# Patient Record
Sex: MALE | Race: WHITE | NOT HISPANIC OR LATINO | Employment: OTHER | URBAN - METROPOLITAN AREA
[De-identification: names, ages, dates, MRNs, and addresses within clinical notes are randomized per-mention and may not be internally consistent; named-entity substitution may affect disease eponyms.]

---

## 2017-06-27 PROBLEM — Q25.1 AORTA COARCTATION: Status: ACTIVE | Noted: 2017-06-27

## 2017-06-27 PROBLEM — I10 HYPERTENSION: Status: ACTIVE | Noted: 2017-06-27

## 2023-01-25 ENCOUNTER — TELEPHONE (OUTPATIENT)
Dept: NEUROLOGY | Facility: CLINIC | Age: 33
End: 2023-01-25
Payer: MEDICAID

## 2023-01-25 NOTE — TELEPHONE ENCOUNTER
Contacted patient and scheduled appt with ENMA Ruth on 2/1/23 for unruptured cerebral aneurysm. Pt does not use [facility] but appt letter will be sent. Pt provided instructions to get to [address] [place] [address]. Pt v/u.

## 2023-01-25 NOTE — TELEPHONE ENCOUNTER
----- Message from Pretty Heck sent at 1/25/2023  8:30 AM CST -----  Juana samson/ Dr. Berry Chase office calling regarding Appointment Access for the PT from a referral to see Dr Michael Reyes for Cerebral aneurysm, nonruptured, call back to the office with appt info [phone] ask for Juana

## 2023-02-01 ENCOUNTER — PATIENT MESSAGE (OUTPATIENT)
Dept: NEUROLOGY | Facility: CLINIC | Age: 33
End: 2023-02-01

## 2023-02-01 ENCOUNTER — OFFICE VISIT (OUTPATIENT)
Dept: NEUROLOGY | Facility: CLINIC | Age: 33
End: 2023-02-01
Payer: MEDICAID

## 2023-02-01 VITALS
WEIGHT: 193.25 LBS | BODY MASS INDEX: 29.29 KG/M2 | SYSTOLIC BLOOD PRESSURE: 169 MMHG | HEIGHT: 68 IN | DIASTOLIC BLOOD PRESSURE: 82 MMHG

## 2023-02-01 DIAGNOSIS — I62.9 INTRACRANIAL BLEED: ICD-10-CM

## 2023-02-01 DIAGNOSIS — I67.1 CEREBRAL ANEURYSM, NONRUPTURED: Primary | ICD-10-CM

## 2023-02-01 DIAGNOSIS — I10 PRIMARY HYPERTENSION: ICD-10-CM

## 2023-02-01 DIAGNOSIS — F10.10 ALCOHOL ABUSE: ICD-10-CM

## 2023-02-01 DIAGNOSIS — Z72.89 ENGAGES IN VAPING: ICD-10-CM

## 2023-02-01 PROCEDURE — 99999 PR PBB SHADOW E&M-EST. PATIENT-LVL IV: CPT | Mod: PBBFAC,,, | Performed by: NURSE PRACTITIONER

## 2023-02-01 PROCEDURE — 1159F PR MEDICATION LIST DOCUMENTED IN MEDICAL RECORD: ICD-10-PCS | Mod: CPTII,,, | Performed by: NURSE PRACTITIONER

## 2023-02-01 PROCEDURE — 1159F MED LIST DOCD IN RCRD: CPT | Mod: CPTII,,, | Performed by: NURSE PRACTITIONER

## 2023-02-01 PROCEDURE — 3008F PR BODY MASS INDEX (BMI) DOCUMENTED: ICD-10-PCS | Mod: CPTII,,, | Performed by: NURSE PRACTITIONER

## 2023-02-01 PROCEDURE — 3079F DIAST BP 80-89 MM HG: CPT | Mod: CPTII,,, | Performed by: NURSE PRACTITIONER

## 2023-02-01 PROCEDURE — 3079F PR MOST RECENT DIASTOLIC BLOOD PRESSURE 80-89 MM HG: ICD-10-PCS | Mod: CPTII,,, | Performed by: NURSE PRACTITIONER

## 2023-02-01 PROCEDURE — 3077F PR MOST RECENT SYSTOLIC BLOOD PRESSURE >= 140 MM HG: ICD-10-PCS | Mod: CPTII,,, | Performed by: NURSE PRACTITIONER

## 2023-02-01 PROCEDURE — 1160F RVW MEDS BY RX/DR IN RCRD: CPT | Mod: CPTII,,, | Performed by: NURSE PRACTITIONER

## 2023-02-01 PROCEDURE — 99999 PR PBB SHADOW E&M-EST. PATIENT-LVL IV: ICD-10-PCS | Mod: PBBFAC,,, | Performed by: NURSE PRACTITIONER

## 2023-02-01 PROCEDURE — 99205 OFFICE O/P NEW HI 60 MIN: CPT | Mod: S$PBB,,, | Performed by: NURSE PRACTITIONER

## 2023-02-01 PROCEDURE — 3077F SYST BP >= 140 MM HG: CPT | Mod: CPTII,,, | Performed by: NURSE PRACTITIONER

## 2023-02-01 PROCEDURE — 99214 OFFICE O/P EST MOD 30 MIN: CPT | Mod: PBBFAC | Performed by: NURSE PRACTITIONER

## 2023-02-01 PROCEDURE — 1160F PR REVIEW ALL MEDS BY PRESCRIBER/CLIN PHARMACIST DOCUMENTED: ICD-10-PCS | Mod: CPTII,,, | Performed by: NURSE PRACTITIONER

## 2023-02-01 PROCEDURE — 99205 PR OFFICE/OUTPT VISIT, NEW, LEVL V, 60-74 MIN: ICD-10-PCS | Mod: S$PBB,,, | Performed by: NURSE PRACTITIONER

## 2023-02-01 PROCEDURE — 3008F BODY MASS INDEX DOCD: CPT | Mod: CPTII,,, | Performed by: NURSE PRACTITIONER

## 2023-02-02 NOTE — PROGRESS NOTES
[facility] VISIT      SUBJECTIVE:    History for Present Illness: Kalia Pacheco is a 32 y.o.  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  He is accompanied to today's visit by his mother.    At the time of today's visit, the patient denies new or worsening focal neurologic symptoms concerning for new stroke or TIA.  Per the patient and his mother he was hospitalized in May with intracranial bleeding ( etiology unclear as record not available for review at time of visit) and seizure activity.  Post discharge patient followed up with Neurology  and PCP ([facility]).  PCP ordered outpatient CTA head and neck which indicated a small saccular aneurysm at the region of the anterior communicating artery.  Patient denies residual neurologic deficit post hospitalization.  Chronic left hip pain due to leg Perthes disease.  Patient denies recurrent seizure activity.  At the time of today's visit,He denies associated nausea, vomiting, vertigo, double vision,  language or visual disturbances.  He is independent with ADLs.  Patient currently vapes, social THC use, and alcohol dependency.  He is compliant with home medications including antihypertensives.        Past Medical History:   Diagnosis Date    ADHD (attention deficit hyperactivity disorder)     Aorta coarctation     Arthritis     Coarctation of aorta     Depression     GERD (gastroesophageal reflux disease)     History of alcohol abuse     Hypertension     Legg-Perthes disease      Past Surgical History:   Procedure Laterality Date    hip surgeries      x 6    open heart surgery      surgery on aorta as a child , 3 procedures       Family History  "  Problem Relation Age of Onset    Hypertension Mother     Hypertension Father     No Known Problems Sister         Current Outpatient Medications:     azilsartan medoxomiL (EDARBI) 40 mg Tab, Take 1 tablet by mouth Daily., Disp: , Rfl:     metoprolol succinate (TOPROL-XL) 50 MG 24 hr tablet, Take 1 tablet (50 mg total) by mouth once daily., Disp: 90 tablet, Rfl: 3    HYDROcodone-acetaminophen (NORCO)  mg per tablet, Take 1 tablet by mouth every 8 (eight) hours as needed for Pain. (Patient not taking: Reported on 10/6/2022), Disp: 21 tablet, Rfl: 0    ondansetron (ZOFRAN-ODT) 4 MG TbDL, Take 1 tablet (4 mg total) by mouth every 6 (six) hours as needed (nausea). (Patient not taking: Reported on 1/20/2023), Disp: 30 tablet, Rfl: 1     Review of Systems:   Constitutional:  Negative for chills and fever.   HENT:  Negative for sore throat.    Eyes:  Negative visual disturbance. Negative for photophobia, pain and redness.   Respiratory:  Negative for shortness of breath.    Cardiovascular:  Negative for chest pain.   Gastrointestinal:  Negative for nausea.   Genitourinary:  Negative for dysuria.   Musculoskeletal:  Negative for back pain.  Chronic left hip pain   Skin:  Negative for rash.   Neurological:  Negative for weakness.    Hematological:  Does not bruise/bleed easily.     OBJECTIVE:    BP (!) 169/82   Ht 5' 8" (1.727 m)   Wt 87.6 kg (193 lb 3.7 oz)   BMI 29.38 kg/m²     Physical Exam   Constitution: He appears well nourished. No distress   LOC: Alert and follows request  Head: Normocephalic and atraumatic.   Cardiovascular: Normal rate. Intact distal pulses  Pulmonary/Chest: Effort normal. No respiratory distress  Psychiatric: no pressured speech; normal affect; no evidence of impaired cognition    Neurologic Exam:  Orientation: person, place and time  Language: No aphasia  Speech: No dysarthria  Memory: Recent memory intact; Remote memory intact; age correct; month correct  Visual Fields (CN II):  " Full  EOM (CN III, IV, VI): Full intact  Pupils (CN II, III): PERRL  Facial Sensation (CN V): symmetric  Facial Movement (CN VII): Symmetrical facial expressions   Hearing (CN VIII):  Intact bilaterally   Shoulder/Neck (CN XI): SCM-Left: Normal; SCM-Right: Normal; Left Shoulder Shrug: Normal/Symmetric ; Right Shoulder Shrug: Normal/Symmetric  Tongue (CN XII): to midline  Motor examination of all extremities :demonstrates normal bulk and tone in all four limbs. There are no atrophy or fasciculations.  Decreased muscle strength and AROM in left lower extremity 2/2 chronic left hip pain.       Left Right     Left Right   Deltoid 5/5 5/5   Hip Flexion 4/5 5/5   Biceps 5/5 5/5   Hip Extension 4/5 5/5   Triceps 5/5 5/5   Knee Flexion 5/5 5/5   Wrist Ext 5/5 5/5   Knee Extension 4/5 5/5   Finger Abd 5/5 5/5   Ankle dorsiflex 5/5 5/5           Ankle plantar flex 5/5 5/5     Sensory examination: is normal light touch in BUE and BLE.  Romberg is negative.  Deep tendon reflexes :are 2+ and symmetric in the upper and lower extremities bilaterally.    Gait: Gait steady with normal arm swing and stride length  Coordination: No dysmetria with finger-to-nose . Rapid alternating movements and fine finger movements are intact.                                                                                                                                                                                          NIHSS:0  Modified Ida Score:  0     Relevant Labwork:        Diagnostic Results:  Brain Imaging   MRI brain 05/26/2022:  Small amount of layering blood within the occipital horn the right lateral ventricle.  No hydrocephalus.  Otherwise, normal MRI of the brain with contrast  Vessel Imaging   CTA head and neck 01/18/2023:  No evidence of acute intracranial hemorrhage, midline shift, mass, or mass effect.  Stable CT appearance of anterior communicating artery aneurysm as detailed above.  Cardiac Imaging      Assessment:  Kalia Pacheco  is a 32 y.o.  male  seen today in clinic for follow-up assessment and recommendations. The following recommendations and plan were discussed in depth with the patient who voiced understanding and was in agreement.  Plan:  Cerebral aneurysm without rupture  CHESTER aneurysm   -Discussed intracranial aneurysm pathology; risk factors; presentation; diagnosis & imaging findings.   -In -depth discussion on the natural history; annual rupture risk rate; cumulative / lifetime rupture risk.   -Given history of intracranial hemorrhage (with unclear etiology), CHESTER aneurysm on CTA and history of coarctation of the great vessels will plan for diagnostic cerebral angiogram to better assess cerebral vasculature including unruptured CHESTER aneurysm (3 x 2.5 mm in axial dimensions extending over a craniocaudal length of 2 mm.) and to help guide future plan of care including need for endovascular intervention  -patient and mother verbalized understanding of risks and benefits and would like to move forward with DSA  -labs:  CBC, CMP, PT/INR  -Hypertension: Long term goal is normotension w/ target BP of less than 130/80 mmHg.  Continue home medications and follow up with PCP for surveillance and long-term management  Tobacco dependency: Counseled on Vaping cessation.  Vaping dependency will increase your future stroke risk and elevated your BP.  Patient encouraged to work towards cessation.      H/O intracranial hemorrhage  -Given history of intracranial hemorrhage (with unclear etiology), CHESTER aneurysm on CTA and history of coarctation of the great vessels will plan for diagnostic cerebral angiogram to better assess cerebral vasculature including unruptured CHESTER aneurysm and to help guide future plan of care including need for endovascular intervention  -patient and mother verbalized understanding of risks and benefits and would like to move forward with DSA  -OSH records requested      Substance use  disorder  -alcohol and THC use  -ambulatory referral to Psychology for talk therapy    Patient/Family teaching  -A significant amount of time was spent reviewing the patient's stroke risk factors   -Counseled on lifestyle modifications for risk factor reduction     We discussed the usual stroke warning signs and symptoms, and the need to activate EMS (call 911) as soon as the symptoms present.  - Sudden onset numbness or weakness of the face, arm, or leg;  especially on one side of the body  - Sudden confusion, trouble speaking, or understanding  - Sudden trouble seeing in one or both eyes  - Sudden trouble walking, dizziness, loss of coordination  - Sudden severe headache with no known cause      I will plan on having Kalia return to clinic as needed. The patient can contact my office with any questions or concerns they may have as they arise in the interim.     45  minutes of total time spent on the encounter, which includes face to face time and non-face to face time preparing to see the patient (eg, review of tests), Obtaining and/or reviewing separately obtained history, Documenting clinical information in the electronic or other health record, Independently interpreting results (not separately reported) and communicating results to the patient/family/caregiver, patient/family education and Care coordination (not separately reported).     Mariah Adam NP-C  Department of Vascular Neurology  [facility]  [phone]    This note is dictated on M*Modal Fluency Direct word recognition program. There are word recognition mistakes that are occasionally missed on review

## 2023-03-20 ENCOUNTER — DOCUMENTATION ONLY (OUTPATIENT)
Dept: PREADMISSION TESTING | Facility: HOSPITAL | Age: 33
End: 2023-03-20
Payer: MEDICAID

## 2023-03-20 NOTE — PRE-PROCEDURE INSTRUCTIONS
PRE-OP INSTRUCTIONS:  Instructed patient to have no food,milk or milk products after midnight   It is ok to take AM medications with a few sips of water   Medication instructions for pm prior to and am of surgery reviewed.  Instructed patient to avoid taking vitamins,supplements or ibuprofen the am of surgery.  Shower instructions provided    Patient denies any side effects or issues with anesthesia or sedation.

## 2023-03-21 ENCOUNTER — ANESTHESIA EVENT (OUTPATIENT)
Dept: INTERVENTIONAL RADIOLOGY/VASCULAR | Facility: HOSPITAL | Age: 33
End: 2023-03-21
Payer: MEDICAID

## 2023-03-21 ENCOUNTER — HOSPITAL ENCOUNTER (OUTPATIENT)
Dept: INTERVENTIONAL RADIOLOGY/VASCULAR | Facility: HOSPITAL | Age: 33
Discharge: HOME | End: 2023-03-21
Attending: NURSE PRACTITIONER
Payer: MEDICAID

## 2023-03-21 VITALS
WEIGHT: 190 LBS | HEART RATE: 60 BPM | DIASTOLIC BLOOD PRESSURE: 70 MMHG | BODY MASS INDEX: 28.79 KG/M2 | HEIGHT: 68 IN | RESPIRATION RATE: 18 BRPM | OXYGEN SATURATION: 99 % | SYSTOLIC BLOOD PRESSURE: 152 MMHG | TEMPERATURE: 98 F

## 2023-03-21 DIAGNOSIS — I67.1 CEREBRAL ANEURYSM, NONRUPTURED: ICD-10-CM

## 2023-03-21 PROCEDURE — 36221 PLACE CATH THORACIC AORTA: CPT | Performed by: PSYCHIATRY & NEUROLOGY

## 2023-03-21 PROCEDURE — 36226 PR ANGIO VERTEBRAL ARTERY +/- CERVIOCEREBRAL ARCH, VERTEBRAL ART, SELECTV CATH,S&I: ICD-10-PCS | Mod: 51,RT,, | Performed by: PSYCHIATRY & NEUROLOGY

## 2023-03-21 PROCEDURE — 36227 PLACE CATH XTRNL CAROTID: CPT | Mod: 50,,, | Performed by: PSYCHIATRY & NEUROLOGY

## 2023-03-21 PROCEDURE — 37000008 HC ANESTHESIA 1ST 15 MINUTES

## 2023-03-21 PROCEDURE — 36226 PLACE CATH VERTEBRAL ART: CPT | Mod: 51,RT,, | Performed by: PSYCHIATRY & NEUROLOGY

## 2023-03-21 PROCEDURE — D9220A PRA ANESTHESIA: Mod: ANES,,, | Performed by: ANESTHESIOLOGY

## 2023-03-21 PROCEDURE — 36224 PLACE CATH CAROTD ART: CPT | Mod: 50 | Performed by: PSYCHIATRY & NEUROLOGY

## 2023-03-21 PROCEDURE — 36226 PLACE CATH VERTEBRAL ART: CPT | Mod: LT | Performed by: PSYCHIATRY & NEUROLOGY

## 2023-03-21 PROCEDURE — 36227 PLACE CATH XTRNL CAROTID: CPT | Mod: 50 | Performed by: PSYCHIATRY & NEUROLOGY

## 2023-03-21 PROCEDURE — G0269 OCCLUSIVE DEVICE IN VEIN ART: HCPCS | Performed by: PSYCHIATRY & NEUROLOGY

## 2023-03-21 PROCEDURE — C1769 GUIDE WIRE: HCPCS

## 2023-03-21 PROCEDURE — 25000003 PHARM REV CODE 250: Performed by: NURSE ANESTHETIST, CERTIFIED REGISTERED

## 2023-03-21 PROCEDURE — 76377 3D RENDER W/INTRP POSTPROCES: CPT | Mod: 26,,, | Performed by: PSYCHIATRY & NEUROLOGY

## 2023-03-21 PROCEDURE — D9220A PRA ANESTHESIA: ICD-10-PCS | Mod: ANES,,, | Performed by: ANESTHESIOLOGY

## 2023-03-21 PROCEDURE — 25500020 PHARM REV CODE 255: Performed by: PSYCHIATRY & NEUROLOGY

## 2023-03-21 PROCEDURE — D9220A PRA ANESTHESIA: ICD-10-PCS | Mod: CRNA,,, | Performed by: NURSE ANESTHETIST, CERTIFIED REGISTERED

## 2023-03-21 PROCEDURE — 01925 ANES IVNTL RAD ARTL CRTD/C: CPT

## 2023-03-21 PROCEDURE — 76377 PR  3D RENDERING W/ IMAGE POSTPROCESS: ICD-10-PCS | Mod: 26,,, | Performed by: PSYCHIATRY & NEUROLOGY

## 2023-03-21 PROCEDURE — 76377 3D RENDER W/INTRP POSTPROCES: CPT | Mod: TC | Performed by: PSYCHIATRY & NEUROLOGY

## 2023-03-21 PROCEDURE — 63600175 PHARM REV CODE 636 W HCPCS: Performed by: NURSE ANESTHETIST, CERTIFIED REGISTERED

## 2023-03-21 PROCEDURE — 37000009 HC ANESTHESIA EA ADD 15 MINS

## 2023-03-21 PROCEDURE — 36224 PLACE CATH CAROTD ART: CPT | Mod: 50,,, | Performed by: PSYCHIATRY & NEUROLOGY

## 2023-03-21 PROCEDURE — 36227 PR ANGIO XTRNL CAROTD CIRC, XTRNL CAROTID, SELECTV CATH S&I: ICD-10-PCS | Mod: 50,,, | Performed by: PSYCHIATRY & NEUROLOGY

## 2023-03-21 PROCEDURE — D9220A PRA ANESTHESIA: Mod: CRNA,,, | Performed by: NURSE ANESTHETIST, CERTIFIED REGISTERED

## 2023-03-21 PROCEDURE — 25000003 PHARM REV CODE 250: Performed by: ANESTHESIOLOGY

## 2023-03-21 PROCEDURE — 36224 IR ANGIOGRAM CEREBRAL INTRACRANIAL EA ADD VESSEL: ICD-10-PCS | Mod: 50,,, | Performed by: PSYCHIATRY & NEUROLOGY

## 2023-03-21 RX ORDER — IODIXANOL 320 MG/ML
200 INJECTION, SOLUTION INTRAVASCULAR
Status: COMPLETED | OUTPATIENT
Start: 2023-03-21 | End: 2023-03-21

## 2023-03-21 RX ORDER — ACETAMINOPHEN 500 MG
1000 TABLET ORAL ONCE
Status: COMPLETED | OUTPATIENT
Start: 2023-03-21 | End: 2023-03-21

## 2023-03-21 RX ORDER — SODIUM CHLORIDE 0.9 % (FLUSH) 0.9 %
3 SYRINGE (ML) INJECTION
Status: DISCONTINUED | OUTPATIENT
Start: 2023-03-21 | End: 2023-03-22 | Stop reason: HOSPADM

## 2023-03-21 RX ORDER — HALOPERIDOL 5 MG/ML
0.5 INJECTION INTRAMUSCULAR EVERY 10 MIN PRN
Status: DISCONTINUED | OUTPATIENT
Start: 2023-03-21 | End: 2023-03-22 | Stop reason: HOSPADM

## 2023-03-21 RX ORDER — FENTANYL CITRATE 50 UG/ML
25 INJECTION, SOLUTION INTRAMUSCULAR; INTRAVENOUS EVERY 5 MIN PRN
Status: DISCONTINUED | OUTPATIENT
Start: 2023-03-21 | End: 2023-03-22 | Stop reason: HOSPADM

## 2023-03-21 RX ORDER — LIDOCAINE HYDROCHLORIDE 10 MG/ML
1 INJECTION, SOLUTION EPIDURAL; INFILTRATION; INTRACAUDAL; PERINEURAL ONCE
Status: DISCONTINUED | OUTPATIENT
Start: 2023-03-21 | End: 2023-03-22 | Stop reason: HOSPADM

## 2023-03-21 RX ORDER — FENTANYL CITRATE 50 UG/ML
INJECTION, SOLUTION INTRAMUSCULAR; INTRAVENOUS
Status: DISCONTINUED | OUTPATIENT
Start: 2023-03-21 | End: 2023-03-21

## 2023-03-21 RX ORDER — SODIUM CHLORIDE 0.9 % (FLUSH) 0.9 %
10 SYRINGE (ML) INJECTION
Status: DISCONTINUED | OUTPATIENT
Start: 2023-03-21 | End: 2023-03-22 | Stop reason: HOSPADM

## 2023-03-21 RX ORDER — SODIUM CHLORIDE 9 MG/ML
75 INJECTION, SOLUTION INTRAVENOUS CONTINUOUS
Status: DISCONTINUED | OUTPATIENT
Start: 2023-03-21 | End: 2023-03-22 | Stop reason: HOSPADM

## 2023-03-21 RX ORDER — MIDAZOLAM HYDROCHLORIDE 1 MG/ML
INJECTION, SOLUTION INTRAMUSCULAR; INTRAVENOUS
Status: DISCONTINUED | OUTPATIENT
Start: 2023-03-21 | End: 2023-03-21

## 2023-03-21 RX ADMIN — IODIXANOL 150 ML: 320 INJECTION, SOLUTION INTRAVASCULAR at 10:03

## 2023-03-21 RX ADMIN — SODIUM CHLORIDE: 0.9 INJECTION, SOLUTION INTRAVENOUS at 08:03

## 2023-03-21 RX ADMIN — FENTANYL CITRATE 50 MCG: 50 INJECTION, SOLUTION INTRAMUSCULAR; INTRAVENOUS at 09:03

## 2023-03-21 RX ADMIN — MIDAZOLAM HYDROCHLORIDE 2 MG: 1 INJECTION, SOLUTION INTRAMUSCULAR; INTRAVENOUS at 09:03

## 2023-03-21 RX ADMIN — MIDAZOLAM HYDROCHLORIDE 1 MG: 1 INJECTION, SOLUTION INTRAMUSCULAR; INTRAVENOUS at 09:03

## 2023-03-21 RX ADMIN — ACETAMINOPHEN 1000 MG: 500 TABLET ORAL at 01:03

## 2023-03-21 NOTE — CARE UPDATE
Discharge instructions given and explained to patient and family with verbalization of understanding all instructions. Patients v/s stable, denies n/v and tolerating po, rates pain level tolerable, IV removed, and family at bedside for patient discharge home.  Neuro IR came to bedside to assess and do discharge teaching.

## 2023-03-21 NOTE — PLAN OF CARE
MD notified of hematoma in Right groin angio site and decreased DP pulse on right. Increased bedrest from 2 hours to 4 hours. Pt denies pain at right groin site.

## 2023-03-21 NOTE — TRANSFER OF CARE
"Anesthesia Transfer of Care Note    Patient: Kalia Pacheco    Procedure(s) Performed: * No procedures listed *    Patient location: PACU    Anesthesia Type: MAC    Transport from OR: Transported from OR on 6-10 L/min O2 by face mask with adequate spontaneous ventilation    Post pain: adequate analgesia    Post assessment: no apparent anesthetic complications    Post vital signs: stable    Level of consciousness: alert, awake and oriented    Nausea/Vomiting: no nausea/vomiting    Complications: none    Transfer of care protocol was followed      Last vitals:   Visit Vitals  /60 (BP Location: Right arm, Patient Position: Lying)   Pulse 61   Temp 36.6 °C (97.9 °F) (Temporal)   Resp 16   Ht 5' 8" (1.727 m)   Wt 86.2 kg (190 lb)   SpO2 96%   BMI 28.89 kg/m²     "

## 2023-03-21 NOTE — NURSING TRANSFER
Nursing Transfer Note      3/21/2023     Reason patient is being transferred: per md order    Transfer To: [facility] 26    Transfer via stretcher    Transfer with n/a    Transported by n/a    Medicines sent: n/a    Any special needs or follow-up needed: neurovascular checks    Chart send with patient: Yes    Notified: mother    Patient reassessed at: 03/21/2023 @ 4637

## 2023-03-21 NOTE — ANESTHESIA PREPROCEDURE EVALUATION
03/21/2023  Kalia Pacheco is a 32 y.o., male.      Pre-op Assessment    I have reviewed the Patient Summary Reports.     I have reviewed the Nursing Notes. I have reviewed the NPO Status.   I have reviewed the Medications.     Review of Systems  Anesthesia Hx:  No problems with previous Anesthesia  History of prior surgery of interest to airway management or planning: Denies Family Hx of Anesthesia complications.   Denies Personal Hx of Anesthesia complications.   Social:  Smoker, Alcohol Use MJ use   Hematology/Oncology:  Hematology Normal   Oncology Normal     EENT/Dental:EENT/Dental Normal   Cardiovascular:   Exercise tolerance: good Hypertension ECG has been reviewed. Hx of aortic coarctation/ASD/VSD repair as child   Pulmonary:  Pulmonary Normal    Renal/:  Renal/ Normal     Hepatic/GI:   GERD    Musculoskeletal:  Musculoskeletal Normal    Neurological:   CVA, no residual symptoms Seizures, well controlled    Endocrine:  Endocrine Normal    Dermatological:  Skin Normal    Psych:   depression          Physical Exam  General: Well nourished, Cooperative, Alert and Oriented    Airway:  Mallampati: II   Mouth Opening: Normal  TM Distance: Normal  Tongue: Normal  Neck ROM: Normal ROM    Dental:  Intact        Anesthesia Plan  Type of Anesthesia, risks & benefits discussed:    Anesthesia Type: Gen ETT, MAC  Intra-op Monitoring Plan: Standard ASA Monitors  Post Op Pain Control Plan: multimodal analgesia and IV/PO Opioids PRN  Induction:  IV  Airway Plan: Direct, Post-Induction  Informed Consent: Informed consent signed with the Patient and all parties understand the risks and agree with anesthesia plan.  All questions answered.   ASA Score: 3  Day of Surgery Review of History & Physical: H&P Update referred to the surgeon/provider.    Ready For Surgery From Anesthesia Perspective.     .

## 2023-03-21 NOTE — H&P
Radiology History & Physical      SUBJECTIVE:     Chief Complaint: Intracranial aneurysm     History of Present Illness:  Kalia Pacheco is a 32 y.o. 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 Referred for diagnostic cerebral angiogram for characterization of intracranial aneurysm & rest of the vasculature.      Past Medical History:   Diagnosis Date    ADHD (attention deficit hyperactivity disorder)     Aorta coarctation     Arthritis     Coarctation of aorta     Depression     GERD (gastroesophageal reflux disease)     History of alcohol abuse     Hypertension     Legg-Perthes disease     Seizures 05/2022     Past Surgical History:   Procedure Laterality Date    hip surgeries      x 6    open heart surgery      surgery on aorta as a child , 3 procedures         Home Meds:   Prior to Admission medications    Medication Sig Start Date End Date Taking? Authorizing Provider   HYDROcodone-acetaminophen (NORCO)  mg per tablet Take 1 tablet by mouth every 8 (eight) hours as needed for Pain. 2/6/23  Yes Christiano Baldwin MD   metoprolol succinate (TOPROL-XL) 50 MG 24 hr tablet Take 1 tablet (50 mg total) by mouth once daily. 3/20/23  Yes Kady Pacheco NP   azilsartan medoxomiL (EDARBI) 40 mg Tab Take 1 tablet (40 mg total) by mouth every morning. 3/20/23   Kady Pacheco NP   ondansetron (ZOFRAN-ODT) 4 MG TbDL Take 1 tablet (4 mg total) by mouth every 6 (six) hours as needed (nausea).  Patient not taking: Reported on 1/20/2023 10/6/22   Christiano Baldwin MD     Anticoagulants/Antiplatelets: no anticoagulation    Allergies: Review of patient's allergies indicates:  No Known Allergies  Sedation History:  no adverse reactions    Review of Systems:   Hematological: no known coagulopathies  Respiratory: no shortness  of breath  Cardiovascular: no chest pain  Gastrointestinal: no abdominal pain  Genito-Urinary: no dysuria  Musculoskeletal: negative  Neurological: no TIA or stroke symptoms         OBJECTIVE:     Vital Signs (Most Recent)  Temp: 97.9 °F (36.6 °C) (03/21/23 0810)  Pulse: 61 (03/21/23 0810)  Resp: 16 (03/21/23 0810)  BP: 129/60 (03/21/23 0811)  SpO2: 96 % (03/21/23 0810)    Physical Exam:  ASA: 3  Mallampati: 2    General: no acute distress  Mental Status: alert and oriented to person, place and time  HEENT: normocephalic, atraumatic  Chest: unlabored breathing  Heart: regular heart rate  Abdomen: nondistended  Extremity: moves all extremities    Laboratory  Lab Results   Component Value Date    INR 1.0 03/13/2023       Lab Results   Component Value Date    WBC 6.21 03/13/2023    HGB 14.4 03/13/2023    HCT 43.9 03/13/2023    MCV 94 03/13/2023     03/13/2023      Lab Results   Component Value Date    GLU 87 03/13/2023     03/13/2023    K 4.4 03/13/2023     03/13/2023    CO2 23 03/13/2023    BUN 12 03/13/2023    CREATININE 0.9 03/13/2023    CALCIUM 8.9 03/13/2023    ALT 20 10/06/2022    AST 32 10/06/2022    ALBUMIN 4.3 10/06/2022    BILITOT 0.8 10/06/2022       ASSESSMENT/PLAN:     Sedation Plan: As per anesthesia    Patient will undergo: diagnostic cerebral angiogram for characterization of intracranial aneurysm & rest of the vasculature.          Russell Triana MD     Neuro Endovascular Surgery Fellow   [facility]

## 2023-03-21 NOTE — ANESTHESIA POSTPROCEDURE EVALUATION
Anesthesia Post Evaluation    Patient: Kalia Pacheco    Procedure(s) Performed: * No procedures listed *    Final Anesthesia Type: MAC      Patient location during evaluation: PACU  Patient participation: Yes- Able to Participate  Level of consciousness: awake and alert and oriented  Post-procedure vital signs: reviewed and stable  Pain management: adequate  Airway patency: patent    PONV status at discharge: No PONV  Anesthetic complications: no      Cardiovascular status: blood pressure returned to baseline  Respiratory status: unassisted, room air and spontaneous ventilation  Hydration status: euvolemic  Follow-up not needed.          Vitals Value Taken Time   /83 03/21/23 1201   Temp 36.2 °C (97.2 °F) 03/21/23 1032   Pulse 59 03/21/23 1203   Resp 17 03/21/23 1203   SpO2 99 % 03/21/23 1203   Vitals shown include unvalidated device data.      No case tracking events are documented in the log.      Pain/Erin Score: Erin Score: 9 (3/21/2023 11:15 AM)

## 2023-03-21 NOTE — PLAN OF CARE
Pt arrived from SCU to IR 4 via stretcher for cerebral angiogram. Pt positioned supine, monitors applied. Anesthesia present for case. Please see anesthesia record for medications, assessments, and vital signs. Pt ID verified using 2 identifiers, Allergies and NPO status verified with pt.

## 2023-04-05 ENCOUNTER — OFFICE VISIT (OUTPATIENT)
Dept: NEUROLOGY | Facility: CLINIC | Age: 33
End: 2023-04-05
Payer: MEDICAID

## 2023-04-05 VITALS
BODY MASS INDEX: 28.8 KG/M2 | SYSTOLIC BLOOD PRESSURE: 104 MMHG | WEIGHT: 190.06 LBS | HEIGHT: 68 IN | DIASTOLIC BLOOD PRESSURE: 61 MMHG | HEART RATE: 65 BPM

## 2023-04-05 DIAGNOSIS — F19.11 HISTORY OF SUBSTANCE ABUSE: ICD-10-CM

## 2023-04-05 DIAGNOSIS — I10 PRIMARY HYPERTENSION: ICD-10-CM

## 2023-04-05 DIAGNOSIS — I67.1 UNRUPTURED CEREBRAL ANEURYSM: Primary | ICD-10-CM

## 2023-04-05 DIAGNOSIS — F17.200 TOBACCO DEPENDENCY: ICD-10-CM

## 2023-04-05 PROCEDURE — 3078F PR MOST RECENT DIASTOLIC BLOOD PRESSURE < 80 MM HG: ICD-10-PCS | Mod: CPTII,,, | Performed by: NURSE PRACTITIONER

## 2023-04-05 PROCEDURE — 3008F PR BODY MASS INDEX (BMI) DOCUMENTED: ICD-10-PCS | Mod: CPTII,,, | Performed by: NURSE PRACTITIONER

## 2023-04-05 PROCEDURE — 4010F PR ACE/ARB THEARPY RXD/TAKEN: ICD-10-PCS | Mod: CPTII,,, | Performed by: NURSE PRACTITIONER

## 2023-04-05 PROCEDURE — 1160F RVW MEDS BY RX/DR IN RCRD: CPT | Mod: CPTII,,, | Performed by: NURSE PRACTITIONER

## 2023-04-05 PROCEDURE — 1159F MED LIST DOCD IN RCRD: CPT | Mod: CPTII,,, | Performed by: NURSE PRACTITIONER

## 2023-04-05 PROCEDURE — 4010F ACE/ARB THERAPY RXD/TAKEN: CPT | Mod: CPTII,,, | Performed by: NURSE PRACTITIONER

## 2023-04-05 PROCEDURE — 99213 OFFICE O/P EST LOW 20 MIN: CPT | Mod: PBBFAC | Performed by: NURSE PRACTITIONER

## 2023-04-05 PROCEDURE — 3074F SYST BP LT 130 MM HG: CPT | Mod: CPTII,,, | Performed by: NURSE PRACTITIONER

## 2023-04-05 PROCEDURE — 99999 PR PBB SHADOW E&M-EST. PATIENT-LVL III: CPT | Mod: PBBFAC,,, | Performed by: NURSE PRACTITIONER

## 2023-04-05 PROCEDURE — 1159F PR MEDICATION LIST DOCUMENTED IN MEDICAL RECORD: ICD-10-PCS | Mod: CPTII,,, | Performed by: NURSE PRACTITIONER

## 2023-04-05 PROCEDURE — 3008F BODY MASS INDEX DOCD: CPT | Mod: CPTII,,, | Performed by: NURSE PRACTITIONER

## 2023-04-05 PROCEDURE — 99215 PR OFFICE/OUTPT VISIT, EST, LEVL V, 40-54 MIN: ICD-10-PCS | Mod: S$PBB,,, | Performed by: NURSE PRACTITIONER

## 2023-04-05 PROCEDURE — 99999 PR PBB SHADOW E&M-EST. PATIENT-LVL III: ICD-10-PCS | Mod: PBBFAC,,, | Performed by: NURSE PRACTITIONER

## 2023-04-05 PROCEDURE — 3078F DIAST BP <80 MM HG: CPT | Mod: CPTII,,, | Performed by: NURSE PRACTITIONER

## 2023-04-05 PROCEDURE — 99215 OFFICE O/P EST HI 40 MIN: CPT | Mod: S$PBB,,, | Performed by: NURSE PRACTITIONER

## 2023-04-05 PROCEDURE — 1160F PR REVIEW ALL MEDS BY PRESCRIBER/CLIN PHARMACIST DOCUMENTED: ICD-10-PCS | Mod: CPTII,,, | Performed by: NURSE PRACTITIONER

## 2023-04-05 PROCEDURE — 3074F PR MOST RECENT SYSTOLIC BLOOD PRESSURE < 130 MM HG: ICD-10-PCS | Mod: CPTII,,, | Performed by: NURSE PRACTITIONER

## 2023-04-05 RX ORDER — LOSARTAN POTASSIUM 50 MG/1
50 TABLET ORAL EVERY MORNING
COMMUNITY
Start: 2023-03-20 | End: 2023-07-25 | Stop reason: ALTCHOICE

## 2023-04-05 NOTE — PROGRESS NOTES
[facility] VISIT      SUBJECTIVE:    History for Present Illness: Kalia Pacheco is a 32 y.o.  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  He was initially seen in clinic by me on 02/01/2023 as a referral for abnormal imaging finding.  He is accompanied to today's visit by his mother.    At the time of today's visit, the patient denies new or worsening focal neurologic symptoms concerning for new stroke or TIA.  Patient recovered well from DSA.  He denies recurrent seizures HA ,vertigo, double vision, focal weakness or numbness, gait imbalance,  language or visual disturbances. Chronic left hip pain due to leg Perthes disease (noncontributory to today's visit).     He is independent with ADLs.  History of tobacco and substance abuse.  He is compliant with home medications including antihypertensives.    Past Medical History:   Diagnosis Date    ADHD (attention deficit hyperactivity disorder)     Aorta coarctation     Arthritis     Coarctation of aorta     Depression     GERD (gastroesophageal reflux disease)     History of alcohol abuse     Hypertension     Legg-Perthes disease     Seizures 05/2022     Past Surgical History:   Procedure Laterality Date    hip surgeries      x 6    open heart surgery      surgery on aorta as a child , 3 procedures       Family History   Problem Relation Age of Onset    Hypertension Mother     Hypertension Father     No Known Problems Sister         Current Outpatient Medications:     losartan (COZAAR) 50 MG tablet, Take 50 mg by mouth every morning., Disp: , Rfl:     metoprolol succinate (TOPROL-XL) 50 MG 24 hr tablet, Take 1 tablet (50 mg total) by mouth once daily., Disp: 90  "tablet, Rfl: 3    valACYclovir (VALTREX) 500 MG tablet, Take 1 tablet (500 mg total) by mouth 2 (two) times daily., Disp: 180 tablet, Rfl: 11    azilsartan medoxomiL (EDARBI) 40 mg Tab, Take 1 tablet (40 mg total) by mouth every morning., Disp: 90 tablet, Rfl: 3    HYDROcodone-acetaminophen (NORCO)  mg per tablet, Take 1 tablet by mouth every 8 (eight) hours as needed for Pain., Disp: 21 tablet, Rfl: 0    ondansetron (ZOFRAN-ODT) 4 MG TbDL, Take 1 tablet (4 mg total) by mouth every 6 (six) hours as needed (nausea)., Disp: 30 tablet, Rfl: 1     Review of Systems:   Constitutional:  Negative for chills and fever.   HENT:  Negative for sore throat.    Eyes:  Negative visual disturbance. Negative for photophobia, pain and redness.   Respiratory:  Negative for shortness of breath.    Cardiovascular:  Negative for chest pain.   Gastrointestinal:  Negative for nausea.   Genitourinary:  Negative for dysuria.   Musculoskeletal:  Negative for back pain.  Chronic left hip pain   Skin:  Negative for rash.   Neurological:  Negative for weakness.    Hematological:  Does not bruise/bleed easily.     OBJECTIVE:    /61   Pulse 65   Ht 5' 8" (1.727 m)   Wt 86.2 kg (190 lb 0.6 oz)   BMI 28.89 kg/m²     Physical Exam   Constitution: He appears well nourished. No distress   LOC: Alert and follows request  Head: Normocephalic and atraumatic.   Cardiovascular: Normal rate. Intact distal pulses  Pulmonary/Chest: Effort normal. No respiratory distress  Psychiatric: no pressured speech; normal affect; no evidence of impaired cognition    Neurologic Exam:  Orientation: person, place and time  Language: No aphasia  Speech: No dysarthria  Memory: Recent memory intact; Remote memory intact; age correct; month correct  Visual Fields (CN II):  Full  EOM (CN III, IV, VI): Full intact  Pupils (CN II, III): PERRL  Facial Sensation (CN V): symmetric  Facial Movement (CN VII): Symmetrical facial expressions   Hearing (CN VIII):  Intact " bilaterally   Shoulder/Neck (CN XI): SCM-Left: Normal; SCM-Right: Normal; Left Shoulder Shrug: Normal/Symmetric ; Right Shoulder Shrug: Normal/Symmetric  Tongue (CN XII): to midline  Motor examination of all extremities :demonstrates normal bulk and tone in all four limbs. There are no atrophy or fasciculations.  Decreased muscle strength and AROM in left lower extremity 2/2 chronic left hip pain.       Left Right     Left Right   Deltoid 5/5 5/5   Hip Flexion 4/5 5/5   Biceps 5/5 5/5   Hip Extension 4/5 5/5   Triceps 5/5 5/5   Knee Flexion 5/5 5/5   Wrist Ext 5/5 5/5   Knee Extension 4/5 5/5   Finger Abd 5/5 5/5   Ankle dorsiflex 5/5 5/5           Ankle plantar flex 5/5 5/5     Sensory examination: is normal light touch in BUE and BLE.  Romberg is negative.  Deep tendon reflexes :are 2+ and symmetric in the upper and lower extremities bilaterally.    Gait: Gait steady with normal arm swing and stride length  Coordination: No dysmetria with finger-to-nose . Rapid alternating movements and fine finger movements are intact.                                                                                                                                                                                          NIHSS:0  Modified Echo Score:  0     Relevant Labwork:        Diagnostic Results:  I have personally reviewed the pertinent imaging made available to me today  Brain Imaging   MRI brain 05/26/2022:  Small amount of layering blood within the occipital horn the right lateral ventricle.  No hydrocephalus.  Otherwise, normal MRI of the brain with contrast  Vessel Imaging   IR angiogram 03/21/2023:  Previously mentioned, unruptured, homogeneous, saccular, anterior communicating segment aneurysm was 3D characterized this injection. Approximate dimensions of 1.8 mm height X 1.5 mm width X 1.3 mm neck.     Prominent retrograde collateralization of the left vertebral artery and left subclavian artery through the right  vertebral artery is noted in this injection.  Compensatiory findings likely related to congenital proximal left subclavian artery stenosis versus atresia.  Negative for any obvious steal phenomena related intracranial hypoperfusion.     Cardiac Imaging     Assessment:  Kalia Pacheco  is a 32 y.o.  male  seen today in clinic for follow-up assessment and recommendations. The following recommendations and plan were discussed in depth with the patient who voiced understanding and was in agreement.  Plan:  Cerebral aneurysm without rupture  CHESTER aneurysm   -Discussed intracranial aneurysm pathology; risk factors; presentation; diagnosis & imaging findings.   -In -depth discussion on the natural history; annual rupture risk rate; cumulative / lifetime rupture risk.   -unruptured CHESTER aneurysm not amenable to endovascular embolization.  Will refer patient to Dr. Kyle in Neurosurgery to discuss possible neurosurgical intervention   -patient and mother verbalized understanding  and would like to move forward neurosurgery consult  -Hypertension: Long term goal is normotension w/ target BP of less than 130/80 mmHg.  Continue home medications and follow up with PCP for surveillance and long-term management  Tobacco dependency: Counseled on Vaping cessation.  Vaping dependency will increase your future stroke risk and elevated your BP.  Patient encouraged to work towards cessation.    Substance use disorder  -alcohol and THC use  -ambulatory referral to Psychiatry    Patient/Family teaching  -A significant amount of time was spent reviewing the patient's stroke risk factors   -Counseled on lifestyle modifications for risk factor reduction     We discussed the usual stroke warning signs and symptoms, and the need to activate EMS (call 911) as soon as the symptoms present.  - Sudden onset numbness or weakness of the face, arm, or leg;  especially on one side of the body  - Sudden confusion, trouble speaking, or  understanding  - Sudden trouble seeing in one or both eyes  - Sudden trouble walking, dizziness, loss of coordination  - Sudden severe headache with no known cause      I will plan on having Kalia return to clinic as needed. The patient can contact my office with any questions or concerns they may have as they arise in the interim.     45  minutes of total time spent on the encounter, which includes face to face time and non-face to face time preparing to see the patient (eg, review of tests), Obtaining and/or reviewing separately obtained history, Documenting clinical information in the electronic or other health record, Independently interpreting results (not separately reported) and communicating results to the patient/family/caregiver, patient/family education and Care coordination (not separately reported).     Mariah Adam, NP-C  Department of Vascular Neurology  [facility]  [phone]    This note is dictated on M*Modal Fluency Direct word recognition program. There are word recognition mistakes that are occasionally missed on review

## 2023-04-11 ENCOUNTER — TELEPHONE (OUTPATIENT)
Dept: NEUROSURGERY | Facility: CLINIC | Age: 33
End: 2023-04-11
Payer: MEDICAID

## 2023-04-12 ENCOUNTER — TELEPHONE (OUTPATIENT)
Dept: NEUROSURGERY | Facility: CLINIC | Age: 33
End: 2023-04-12
Payer: MEDICAID

## 2023-04-12 NOTE — TELEPHONE ENCOUNTER
----- Message from Patricia Townsend sent at 4/12/2023  3:03 PM CDT -----  Pt mom calling to see if appt needs to be rescheduled       Confirmed patient's contact info below:  Contact Name: Kaliasarah Pacheco  Phone Number:[phone]

## 2023-04-13 NOTE — DISCHARGE SUMMARY
Radiology Discharge Summary      History of Present Illness:  Kalia Pacheco is a 32 y.o. 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 Referred for diagnostic cerebral angiogram for characterization of intracranial aneurysm & rest of the vasculature.    Hospital Course: No complications    Admit Date: 3/21/2023  Discharge Date: 3/21/2023    Instructions Given to Patient: Yes  Diet: Resume prior diet  Activity: activity as tolerated    Description of Condition on Discharge: Stable  Vital Signs (Most Recent): Temp: 97.7 °F (36.5 °C) (03/21/23 1230)  Pulse: 60 (03/21/23 1415)  Resp: 18 (03/21/23 1415)  BP: (!) 152/70 (03/21/23 1415)  SpO2: 99 % (03/21/23 1415)    Discharge Disposition: Home    Discharge Diagnosis:     Intracranial aneurysm     Previously mentioned, unruptured, homogeneous, saccular, anterior communicating segment aneurysm was 3D characterized this injection. Approximate dimensions of 1.8 mm height X 1.5 mm width X 1.3 mm neck.    Prominent retrograde collateralization of the left vertebral artery and left subclavian artery through the right vertebral artery is noted in this injection.  Compensatiory findings likely related to congenital proximal left subclavian artery stenosis versus atresia.  Negative for any obvious steal phenomena related intracranial hypoperfusion     Follow-up: F/u in IR clinic in 2-4 weeks         Russell Triana MD     Neuro Endovascular Surgery Fellow   [facility]

## 2023-04-17 ENCOUNTER — OFFICE VISIT (OUTPATIENT)
Dept: NEUROSURGERY | Facility: CLINIC | Age: 33
End: 2023-04-17
Payer: MEDICAID

## 2023-04-17 DIAGNOSIS — I67.1 CEREBRAL ANEURYSM, NONRUPTURED: Primary | ICD-10-CM

## 2023-04-17 PROCEDURE — 1159F MED LIST DOCD IN RCRD: CPT | Mod: CPTII,95,, | Performed by: NEUROLOGICAL SURGERY

## 2023-04-17 PROCEDURE — 1159F PR MEDICATION LIST DOCUMENTED IN MEDICAL RECORD: ICD-10-PCS | Mod: CPTII,95,, | Performed by: NEUROLOGICAL SURGERY

## 2023-04-17 PROCEDURE — 99205 OFFICE O/P NEW HI 60 MIN: CPT | Mod: 95,,, | Performed by: NEUROLOGICAL SURGERY

## 2023-04-17 PROCEDURE — 99205 PR OFFICE/OUTPT VISIT, NEW, LEVL V, 60-74 MIN: ICD-10-PCS | Mod: 95,,, | Performed by: NEUROLOGICAL SURGERY

## 2023-04-17 PROCEDURE — 1160F PR REVIEW ALL MEDS BY PRESCRIBER/CLIN PHARMACIST DOCUMENTED: ICD-10-PCS | Mod: CPTII,95,, | Performed by: NEUROLOGICAL SURGERY

## 2023-04-17 PROCEDURE — 4010F PR ACE/ARB THEARPY RXD/TAKEN: ICD-10-PCS | Mod: CPTII,95,, | Performed by: NEUROLOGICAL SURGERY

## 2023-04-17 PROCEDURE — 1160F RVW MEDS BY RX/DR IN RCRD: CPT | Mod: CPTII,95,, | Performed by: NEUROLOGICAL SURGERY

## 2023-04-17 PROCEDURE — 4010F ACE/ARB THERAPY RXD/TAKEN: CPT | Mod: CPTII,95,, | Performed by: NEUROLOGICAL SURGERY

## 2023-04-17 NOTE — PROGRESS NOTES
Neurosurgery  History & Physical    SUBJECTIVE:     Chief Complaint:  Anterior cerebral artery aneurysm    History of Present Illness:  The patient is a 32-year-old man with a past medical history of ADHD, EtOH use and abuse, arthritis, hypertension, coarctation of the aorta (status post repair), PDA, VSD status post repair x2.  History of leg birth disease in intracranial hemorrhage in May 2022.  He had a new onset seizure, without a previous history of seizure.  He was referred by my endovascular partner Dr. Michael Reyes and Mariah Adam, secondary to his CHESTER aneurysm.  Was concern that his CHESTER aneurysms may not be able to be treated endovascularly given its size, and may be more appropriate for microsurgical clip ligation.  This was a virtual audio visual visit.  The patient denies any new headache, nausea, vomiting.  Denies any new or recurrent seizure since his previous seizure.   New onset seizure     Review of patient's allergies indicates:  No Known Allergies    Current Outpatient Medications   Medication Sig Dispense Refill    azilsartan medoxomiL (EDARBI) 40 mg Tab Take 1 tablet (40 mg total) by mouth every morning. 90 tablet 3    HYDROcodone-acetaminophen (NORCO)  mg per tablet Take 1 tablet by mouth every 8 (eight) hours as needed for Pain. 21 tablet 0    losartan (COZAAR) 50 MG tablet Take 50 mg by mouth every morning.      metoprolol succinate (TOPROL-XL) 50 MG 24 hr tablet Take 1 tablet (50 mg total) by mouth once daily. 90 tablet 3    ondansetron (ZOFRAN-ODT) 4 MG TbDL Take 1 tablet (4 mg total) by mouth every 6 (six) hours as needed (nausea). 30 tablet 1    valACYclovir (VALTREX) 500 MG tablet Take 1 tablet (500 mg total) by mouth 2 (two) times daily. 180 tablet 11     No current facility-administered medications for this visit.       Past Medical History:   Diagnosis Date    ADHD (attention deficit hyperactivity disorder)     Aorta coarctation     Arthritis     Coarctation of aorta      Depression     GERD (gastroesophageal reflux disease)     History of alcohol abuse     Hypertension     Legg-Perthes disease     Seizures 05/2022     Past Surgical History:   Procedure Laterality Date    hip surgeries      x 6    open heart surgery      surgery on aorta as a child , 3 procedures       Family History       Problem Relation (Age of Onset)    Hypertension Mother, Father    No Known Problems Sister          Social History     Socioeconomic History    Marital status: Single    Number of children: 0   Tobacco Use    Smoking status: Every Day     Types: Vaping with nicotine    Smokeless tobacco: Never   Substance and Sexual Activity    Alcohol use: Not Currently     Comment: rarely    Drug use: Yes     Types: Marijuana     Comment: daily    Sexual activity: Yes     Partners: Female   Social History Narrative    ** Merged History Encounter **         Single- lives with parents.       Review of Systems    OBJECTIVE:     Vital Signs     There is no height or weight on file to calculate BMI.      Neurosurgery Physical Exam  On virtual audio visual visit   Head is normocephalic atraumatic   Neck is grossly supple  No appreciable labored breathing   Admitting appears to be nontender   Patient is able to move extremities     On virtual audio visual visit the patient's speech is fluent, goal directed without any noted dysarthria or aphasia   Unable to evaluate pupils on virtual audio visual visit   Face is symmetric   Tongue is midline  Unable to evaluate palate   Hearing appears to be intact on virtual audio visual visit to voice   Patient able to move both upper and lower extremities without any gross motor asymmetry on virtual audio visual visit     Diagnostic Results:  I personally reviewed patient's Diagnostic Imaging.  There is a small saccular anterior communicating artery aneurysm 1.8 mm x 1.5 mm x 1.3 mm in size.    ASSESSMENT/PLAN:     32-year-old man with a new onset of seizure, incidentally found  anterior communicating artery aneurysm.    1. No focal deficits on virtual audio visual visit     2. Cerebral angiogram with a 2 mm x 1.3 mm anterior communicating artery aneurysms     3. Had long discussion with the patient.  Would recommend MRA with vessel wall imaging to assess for any enhancement of the blood vessel wall to necessitate treatment.  Did discuss with the patient that the size is likely not amenable to any endovascular treatment given the exceedingly high risk for intraprocedural rupture.  If there is no obvious wall enhancement certainly conservative treatment would be likely the case.  If, however, there is evidence of vessel wall enhancement would recommend treatment given the patient's age.  This would likely require craniotomy microsurgical clip ligation.  I answered all the patient's questions to his satisfaction.      You so very much for allowing me to participate in the care of this patient.  Please feel free to call any questions, comments, or concerns.    Swapna Kyle MD,MSc  Department of Neurosurgery   Department of Radiology  Department of Neurology  [facility]    [facility]   [employer] [facility] [employer]      MRA vessel wall imaging    Note dictated with voice recognition software, please excuse any grammatical errors.

## 2023-04-19 ENCOUNTER — PATIENT MESSAGE (OUTPATIENT)
Dept: NEUROSURGERY | Facility: CLINIC | Age: 33
End: 2023-04-19
Payer: MEDICAID

## 2023-04-19 ENCOUNTER — TELEPHONE (OUTPATIENT)
Dept: NEUROSURGERY | Facility: CLINIC | Age: 33
End: 2023-04-19
Payer: MEDICAID

## 2023-04-19 DIAGNOSIS — I67.1 CEREBRAL ANEURYSM, NONRUPTURED: Primary | ICD-10-CM

## 2023-04-20 ENCOUNTER — TELEPHONE (OUTPATIENT)
Dept: NEUROSURGERY | Facility: CLINIC | Age: 33
End: 2023-04-20
Payer: MEDICAID

## 2023-04-20 ENCOUNTER — PATIENT MESSAGE (OUTPATIENT)
Dept: NEUROSURGERY | Facility: CLINIC | Age: 33
End: 2023-04-20
Payer: MEDICAID

## 2023-05-05 ENCOUNTER — HOSPITAL ENCOUNTER (OUTPATIENT)
Dept: RADIOLOGY | Facility: HOSPITAL | Age: 33
Discharge: HOME | End: 2023-05-05
Attending: NEUROLOGICAL SURGERY
Payer: MEDICAID

## 2023-05-05 DIAGNOSIS — I67.1 CEREBRAL ANEURYSM, NONRUPTURED: ICD-10-CM

## 2023-05-05 PROCEDURE — 25500020 PHARM REV CODE 255: Performed by: NEUROLOGICAL SURGERY

## 2023-05-05 PROCEDURE — A9585 GADOBUTROL INJECTION: HCPCS | Performed by: NEUROLOGICAL SURGERY

## 2023-05-05 PROCEDURE — 70546 MRA BRAIN WITH AND WITHOUT: ICD-10-PCS | Mod: 26,,, | Performed by: RADIOLOGY

## 2023-05-05 PROCEDURE — 70546 MR ANGIOGRAPH HEAD W/O&W/DYE: CPT | Mod: TC

## 2023-05-05 PROCEDURE — 70546 MR ANGIOGRAPH HEAD W/O&W/DYE: CPT | Mod: 26,,, | Performed by: RADIOLOGY

## 2023-05-05 RX ORDER — GADOBUTROL 604.72 MG/ML
10 INJECTION INTRAVENOUS
Status: COMPLETED | OUTPATIENT
Start: 2023-05-05 | End: 2023-05-05

## 2023-05-05 RX ADMIN — GADOBUTROL 10 ML: 604.72 INJECTION INTRAVENOUS at 05:05

## 2023-05-08 ENCOUNTER — OFFICE VISIT (OUTPATIENT)
Dept: NEUROSURGERY | Facility: CLINIC | Age: 33
End: 2023-05-08
Payer: MEDICAID

## 2023-05-08 DIAGNOSIS — I67.1 ANTERIOR COMMUNICATING ARTERY ANEURYSM: ICD-10-CM

## 2023-05-08 DIAGNOSIS — I67.1 CEREBRAL ANEURYSM, NONRUPTURED: Primary | ICD-10-CM

## 2023-05-08 DIAGNOSIS — I67.1 UNRUPTURED CEREBRAL ANEURYSM: ICD-10-CM

## 2023-05-08 PROCEDURE — 4010F ACE/ARB THERAPY RXD/TAKEN: CPT | Mod: CPTII,95,, | Performed by: PHYSICIAN ASSISTANT

## 2023-05-08 PROCEDURE — 99214 PR OFFICE/OUTPT VISIT, EST, LEVL IV, 30-39 MIN: ICD-10-PCS | Mod: 95,,, | Performed by: PHYSICIAN ASSISTANT

## 2023-05-08 PROCEDURE — 4010F PR ACE/ARB THEARPY RXD/TAKEN: ICD-10-PCS | Mod: CPTII,95,, | Performed by: PHYSICIAN ASSISTANT

## 2023-05-08 PROCEDURE — 99214 OFFICE O/P EST MOD 30 MIN: CPT | Mod: 95,,, | Performed by: PHYSICIAN ASSISTANT

## 2023-05-08 NOTE — PROGRESS NOTES
The patient location is: [place]  The chief complaint leading to consultation is: cerebral aneurysm follow-up    Visit type: audiovisual    Face to Face time with patient: 10 minutes  40 minutes of total time spent on the encounter, which includes face to face time and non-face to face time preparing to see the patient (eg, review of tests), Obtaining and/or reviewing separately obtained history, Documenting clinical information in the electronic or other health record, Independently interpreting results (not separately reported) and communicating results to the patient/family/caregiver, or Care coordination (not separately reported).         Each patient to whom he or she provides medical services by telemedicine is:  (1) informed of the relationship between the physician and patient and the respective role of any other health care provider with respect to management of the patient; and (2) notified that he or she may decline to receive medical services by telemedicine and may withdraw from such care at any time.      Neurosurgery  Established Patient    SUBJECTIVE:     History of Present Illness (4/17/23):  The patient is a 32-year-old man with a past medical history of ADHD, EtOH use and abuse, arthritis, hypertension, coarctation of the aorta (status post repair), PDA, VSD status post repair x2.  History of Legg-Perthes disease, and intracranial hemorrhage in May 2022.  He had a new onset seizure, without a previous history of seizure.  He was referred by my endovascular partner Dr. Michael Reyes and Mariah Adam, secondary to his CHESTER aneurysm.  Was concern that his CHESTER aneurysms may not be able to be treated endovascularly given its size, and may be more appropriate for microsurgical clip ligation.  This was a virtual audio visual visit.  The patient denies any new headache, nausea, vomiting.  Denies any new or recurrent seizure since his previous seizure.    Interval History 5/8/23:  Patient presents  for follow-up via audiovirtual visit after undergoing MRA with vessel wall imaging to review results. He is accompanied by his mother. Reports he has been doing well since last visit, no changes. Denies any significant headache, seizure like activity, speech difficulty, visual disturbance, focal weakness/numbness, or any other neurologic deficits.     Review of patient's allergies indicates:  No Known Allergies    Current Outpatient Medications   Medication Sig Dispense Refill    azilsartan medoxomiL (EDARBI) 40 mg Tab Take 1 tablet (40 mg total) by mouth every morning. 90 tablet 3    HYDROcodone-acetaminophen (NORCO)  mg per tablet Take 1 tablet by mouth every 8 (eight) hours as needed for Pain. 21 tablet 0    losartan (COZAAR) 50 MG tablet Take 50 mg by mouth every morning.      metoprolol succinate (TOPROL-XL) 50 MG 24 hr tablet Take 1 tablet (50 mg total) by mouth once daily. 90 tablet 3    ondansetron (ZOFRAN-ODT) 4 MG TbDL Take 1 tablet (4 mg total) by mouth every 6 (six) hours as needed (nausea). 30 tablet 1    valACYclovir (VALTREX) 500 MG tablet Take 1 tablet (500 mg total) by mouth 2 (two) times daily. 180 tablet 11     No current facility-administered medications for this visit.       Past Medical History:   Diagnosis Date    ADHD (attention deficit hyperactivity disorder)     Aorta coarctation     Arthritis     Coarctation of aorta     Depression     GERD (gastroesophageal reflux disease)     History of alcohol abuse     Hypertension     Legg-Perthes disease     Seizures 05/2022     Past Surgical History:   Procedure Laterality Date    hip surgeries      x 6    open heart surgery      surgery on aorta as a child , 3 procedures       Family History       Problem Relation (Age of Onset)    Hypertension Mother, Father    No Known Problems Sister          Social History     Socioeconomic History    Marital status: Single    Number of children: 0   Tobacco Use    Smoking status: Every Day     Types:  Vaping with nicotine    Smokeless tobacco: Never   Substance and Sexual Activity    Alcohol use: Not Currently     Comment: rarely    Drug use: Yes     Types: Marijuana     Comment: daily    Sexual activity: Yes     Partners: Female   Social History Narrative    ** Merged History Encounter **         Single- lives with parents.       Review of Systems  Positive per HPI, otherwise a pertinent ROS was performed and was negative.        OBJECTIVE:     Vital Signs     There is no height or weight on file to calculate BMI.    Neurosurgery Physical Exam  General: well developed, well nourished, no distress.   Head: normocephalic, atraumatic  Neck: appears supple, full ROM  Neurologic: Alert and oriented. Thought content appropriate.  Language: No aphasia, goal-directed  Speech: Fluent, no dysarthria  Cranial nerves: Face grossly symmetric, tongue grossly midline  Eyes: Unable to assess pupils. EOMI appear grossly intact.  Pulmonary: no signs of respiratory distress, no labored breathing  Motor Strength: Moves all extremities spontaneously with good tone. BUE and BLE are at least 3/5. No abnormal movements seen. No appreciable motor asymmetry.          Diagnostic Results:  Imaging was independently reviewed by myself, along with the associated radiology report.    IR Cerebral Angiogram 3/21/23:  - There is a small saccular anterior communicating artery aneurysm 1.8 mm x 1.5 mm x 1.3 mm in size.      MRA brain w/ contrast 5/5/23:  - Stable small medially projecting CHESTER aneurysm without evidence of abnormal wall enhancement    ASSESSMENT/PLAN:     32 year old male who initially presented in 2022 after new onset seizure, with incidentally found anterior communicating artery aneurysm.     Patient remains neurologically intact with no focal deficits or concerning symptoms. Cerebral angiogram with a 2 mm x 1.3 mm anterior communicating artery aneurysm. No enhancement on vessel wall imaging.    - Given small size of aneurysm,  likely not amenable to any endovascular treatment given exceedingly high risk of intraprocedural rupture.   - As there is no mural enhancement of aneurysm and will small size, overall relatively low risk of rupture at this point.   - Recommend conservative measures with ongoing surveillance of aneurysm   - Follow up in 1 year with repeat CTA and MRA w/ vessel wall imaging at that time  - Discussed red flag signs/symptoms for which to contact the clinic or seek urgent medical care       Dilcia Payan PA-C  Neurosurgery  [facility]

## 2024-01-01 ENCOUNTER — TELEPHONE (OUTPATIENT)
Dept: TRANSPLANT | Facility: CLINIC | Age: 34
End: 2024-01-01
Payer: MEDICAID

## 2024-01-01 ENCOUNTER — HOSPITAL ENCOUNTER (OUTPATIENT)
Dept: PULMONOLOGY | Facility: CLINIC | Age: 34
Discharge: HOME | End: 2024-12-17
Payer: MEDICAID

## 2024-01-01 ENCOUNTER — OFFICE VISIT (OUTPATIENT)
Dept: TRANSPLANT | Facility: CLINIC | Age: 34
End: 2024-01-01
Attending: INTERNAL MEDICINE
Payer: MEDICAID

## 2024-01-01 ENCOUNTER — HOSPITAL ENCOUNTER (OUTPATIENT)
Dept: CARDIOLOGY | Facility: HOSPITAL | Age: 34
Discharge: HOME | End: 2024-12-17
Attending: INTERNAL MEDICINE
Payer: MEDICAID

## 2024-01-01 VITALS
DIASTOLIC BLOOD PRESSURE: 60 MMHG | SYSTOLIC BLOOD PRESSURE: 118 MMHG | WEIGHT: 162 LBS | HEIGHT: 68 IN | BODY MASS INDEX: 24.55 KG/M2

## 2024-01-01 VITALS
BODY MASS INDEX: 23.79 KG/M2 | DIASTOLIC BLOOD PRESSURE: 51 MMHG | HEIGHT: 68 IN | HEART RATE: 77 BPM | OXYGEN SATURATION: 100 % | WEIGHT: 157 LBS | WEIGHT: 160.69 LBS | SYSTOLIC BLOOD PRESSURE: 107 MMHG | HEIGHT: 68 IN | BODY MASS INDEX: 24.35 KG/M2

## 2024-01-01 DIAGNOSIS — I50.22 CHRONIC SYSTOLIC CONGESTIVE HEART FAILURE: Primary | ICD-10-CM

## 2024-01-01 DIAGNOSIS — I67.1 ANTERIOR COMMUNICATING ARTERY ANEURYSM: ICD-10-CM

## 2024-01-01 DIAGNOSIS — I10 PRIMARY HYPERTENSION: ICD-10-CM

## 2024-01-01 DIAGNOSIS — I35.2 NONRHEUMATIC AORTIC INSUFFICIENCY WITH AORTIC STENOSIS: ICD-10-CM

## 2024-01-01 DIAGNOSIS — I50.43 ACUTE ON CHRONIC COMBINED SYSTOLIC AND DIASTOLIC CONGESTIVE HEART FAILURE: ICD-10-CM

## 2024-01-01 DIAGNOSIS — Q23.81 AORTIC STENOSIS DUE TO BICUSPID AORTIC VALVE: ICD-10-CM

## 2024-01-01 DIAGNOSIS — I50.9 CONGESTIVE HEART FAILURE, UNSPECIFIED HF CHRONICITY, UNSPECIFIED HEART FAILURE TYPE: ICD-10-CM

## 2024-01-01 DIAGNOSIS — I50.9 CONGESTIVE HEART FAILURE, UNSPECIFIED HF CHRONICITY, UNSPECIFIED HEART FAILURE TYPE: Primary | ICD-10-CM

## 2024-01-01 DIAGNOSIS — Q23.0 AORTIC STENOSIS DUE TO BICUSPID AORTIC VALVE: ICD-10-CM

## 2024-01-01 LAB
ASCENDING AORTA: 3.21 CM
AV AREA BY CONTINUOUS VTI: 1.1 CM2
AV INDEX (PROSTH): 0.22
AV LVOT MEAN GRADIENT: 1 MMHG
AV LVOT PEAK GRADIENT: 2 MMHG
AV MEAN GRADIENT: 29.9 MMHG
AV PEAK GRADIENT: 49 MMHG
AV VALVE AREA BY VELOCITY RATIO: 1 CM²
AV VALVE AREA: 1.1 CM2
AV VELOCITY RATIO: 0.2
BSA FOR ECHO PROCEDURE: 1.88 M2
CV ECHO LV RWT: 0.16 CM
DOP CALC AO PEAK VEL: 3.5 M/S
DOP CALC AO VTI: 80.5 CM
DOP CALC LVOT AREA: 4.9 CM2
DOP CALC LVOT DIAMETER: 2.5 CM
DOP CALC LVOT PEAK VEL: 0.7 M/S
DOP CALC LVOT STROKE VOLUME: 85.4 CM3
DOP CALCLVOT PEAK VEL VTI: 17.4 CM
E WAVE DECELERATION TIME: 121.42 MS
E/A RATIO: 5.11
E/E' RATIO: 14.92 M/S
ECHO EF ESTIMATED: 42 %
ECHO LV POSTERIOR WALL: 0.6 CM (ref 0.6–1.1)
EJECTION FRACTION: 23 %
FRACTIONAL SHORTENING: 21.3 % (ref 28–44)
INTERVENTRICULAR SEPTUM: 0.6 CM (ref 0.6–1.1)
IVC DIAMETER: 1.83 CM
LA MAJOR: 6.37 CM
LA MINOR: 6.27 CM
LA WIDTH: 5.64 CM
LEFT ATRIUM SIZE: 3.51 CM
LEFT ATRIUM VOLUME INDEX MOD: 85.7 ML/M2
LEFT ATRIUM VOLUME INDEX: 56.9 ML/M2
LEFT ATRIUM VOLUME MOD: 160.19 ML
LEFT ATRIUM VOLUME: 106.34 CM3
LEFT INTERNAL DIMENSION IN SYSTOLE: 5.9 CM (ref 2.1–4)
LEFT VENTRICLE DIASTOLIC VOLUME INDEX: 158.75 ML/M2
LEFT VENTRICLE DIASTOLIC VOLUME: 296.86 ML
LEFT VENTRICLE MASS INDEX: 105.6 G/M2
LEFT VENTRICLE SYSTOLIC VOLUME INDEX: 91.7 ML/M2
LEFT VENTRICLE SYSTOLIC VOLUME: 171.39 ML
LEFT VENTRICULAR INTERNAL DIMENSION IN DIASTOLE: 7.5 CM (ref 3.5–6)
LEFT VENTRICULAR MASS: 197.5 G
LV LATERAL E/E' RATIO: 13.86
LV SEPTAL E/E' RATIO: 16.17
MV PEAK A VEL: 0.19 M/S
MV PEAK E VEL: 0.97 M/S
OHS CV RV/LV RATIO: 0.57 CM
OHS LV EJECTION FRACTION SIMPSONS BIPLANE MOD: 31 %
PISA TR MAX VEL: 2.46 M/S
RA MAJOR: 4.32 CM
RA PRESSURE ESTIMATED: 3 MMHG
RA WIDTH: 3.67 CM
RIGHT ATRIAL AREA: 14.3 CM2
RIGHT VENTRICLE DIASTOLIC BASEL DIMENSION: 4.3 CM
RV TB RVSP: 5 MMHG
RV TISSUE DOPPLER FREE WALL SYSTOLIC VELOCITY 1 (APICAL 4 CHAMBER VIEW): 11.92 CM/S
SINUS: 3.67 CM
STJ: 2.45 CM
TDI LATERAL: 0.07 M/S
TDI SEPTAL: 0.06 M/S
TDI: 0.07 M/S
TR MAX PG: 24 MMHG
TRICUSPID ANNULAR PLANE SYSTOLIC EXCURSION: 1.85 CM
TV PEAK GRADIENT: 24 MMHG
TV REST PULMONARY ARTERY PRESSURE: 27 MMHG
Z-SCORE OF LEFT VENTRICULAR DIMENSION IN END DIASTOLE: 3.64
Z-SCORE OF LEFT VENTRICULAR DIMENSION IN END SYSTOLE: 4.8

## 2024-01-01 PROCEDURE — 99205 OFFICE O/P NEW HI 60 MIN: CPT | Mod: S$PBB,,, | Performed by: INTERNAL MEDICINE

## 2024-01-01 PROCEDURE — 99999 PR PBB SHADOW E&M-EST. PATIENT-LVL V: CPT | Mod: PBBFAC,,, | Performed by: INTERNAL MEDICINE

## 2024-01-01 PROCEDURE — 4010F ACE/ARB THERAPY RXD/TAKEN: CPT | Mod: CPTII,,, | Performed by: INTERNAL MEDICINE

## 2024-01-01 PROCEDURE — 1160F RVW MEDS BY RX/DR IN RCRD: CPT | Mod: CPTII,,, | Performed by: INTERNAL MEDICINE

## 2024-01-01 PROCEDURE — 99215 OFFICE O/P EST HI 40 MIN: CPT | Mod: PBBFAC,25 | Performed by: INTERNAL MEDICINE

## 2024-01-01 PROCEDURE — 93306 TTE W/DOPPLER COMPLETE: CPT | Mod: TXP

## 2024-01-01 PROCEDURE — 3074F SYST BP LT 130 MM HG: CPT | Mod: CPTII,,, | Performed by: INTERNAL MEDICINE

## 2024-01-01 PROCEDURE — 3078F DIAST BP <80 MM HG: CPT | Mod: CPTII,,, | Performed by: INTERNAL MEDICINE

## 2024-01-01 PROCEDURE — 3008F BODY MASS INDEX DOCD: CPT | Mod: CPTII,,, | Performed by: INTERNAL MEDICINE

## 2024-01-01 PROCEDURE — 93306 TTE W/DOPPLER COMPLETE: CPT | Mod: 26,NTX,, | Performed by: INTERNAL MEDICINE

## 2024-01-01 PROCEDURE — 94618 PULMONARY STRESS TESTING: CPT | Mod: PBBFAC,NTX | Performed by: INTERNAL MEDICINE

## 2024-01-01 PROCEDURE — 1159F MED LIST DOCD IN RCRD: CPT | Mod: CPTII,,, | Performed by: INTERNAL MEDICINE

## 2024-01-01 PROCEDURE — 1111F DSCHRG MED/CURRENT MED MERGE: CPT | Mod: CPTII,,, | Performed by: INTERNAL MEDICINE

## 2024-03-05 ENCOUNTER — ANESTHESIA EVENT (OUTPATIENT)
Dept: INTERVENTIONAL RADIOLOGY/VASCULAR | Facility: HOSPITAL | Age: 34
DRG: 020 | End: 2024-03-05
Payer: MEDICAID

## 2024-03-05 ENCOUNTER — HOSPITAL ENCOUNTER (INPATIENT)
Facility: HOSPITAL | Age: 34
LOS: 15 days | Discharge: HOME | DRG: 020 | End: 2024-03-20
Attending: EMERGENCY MEDICINE | Admitting: PSYCHIATRY & NEUROLOGY
Payer: MEDICAID

## 2024-03-05 DIAGNOSIS — I60.9 SUBARACHNOID HEMORRHAGE: ICD-10-CM

## 2024-03-05 DIAGNOSIS — I60.8 SUBARACHNOID HEMORRHAGE DUE TO RUPTURED ANEURYSM: ICD-10-CM

## 2024-03-05 DIAGNOSIS — I60.9 NONTRAUMATIC SUBARACHNOID HEMORRHAGE, UNSPECIFIED: Primary | ICD-10-CM

## 2024-03-05 DIAGNOSIS — I60.9 SAH (SUBARACHNOID HEMORRHAGE): ICD-10-CM

## 2024-03-05 PROBLEM — I61.5 IVH (INTRAVENTRICULAR HEMORRHAGE): Status: ACTIVE | Noted: 2024-03-05

## 2024-03-05 PROBLEM — G91.8 OTHER HYDROCEPHALUS: Status: ACTIVE | Noted: 2024-03-05

## 2024-03-05 LAB
ABO + RH BLD: NORMAL
APTT PPP: 24.2 SEC (ref 21–32)
BLD GP AB SCN CELLS X3 SERPL QL: NORMAL
CHOLEST SERPL-MCNC: 160 MG/DL (ref 120–199)
CHOLEST/HDLC SERPL: 3 {RATIO} (ref 2–5)
HDLC SERPL-MCNC: 54 MG/DL (ref 40–75)
HDLC SERPL: 33.8 % (ref 20–50)
INR PPP: 1.1 (ref 0.8–1.2)
LDLC SERPL CALC-MCNC: 94.4 MG/DL (ref 63–159)
NONHDLC SERPL-MCNC: 106 MG/DL
OHS QRS DURATION: 128 MS
OHS QTC CALCULATION: 446 MS
PROTHROMBIN TIME: 11.6 SEC (ref 9–12.5)
SPECIMEN OUTDATE: NORMAL
TRIGL SERPL-MCNC: 58 MG/DL (ref 30–150)
TSH SERPL DL<=0.005 MIU/L-ACNC: 0.72 UIU/ML (ref 0.4–4)

## 2024-03-05 PROCEDURE — 63600175 PHARM REV CODE 636 W HCPCS: Performed by: NURSE ANESTHETIST, CERTIFIED REGISTERED

## 2024-03-05 PROCEDURE — 20000000 HC ICU ROOM

## 2024-03-05 PROCEDURE — 27100171 HC OXYGEN HIGH FLOW UP TO 24 HOURS

## 2024-03-05 PROCEDURE — 25500020 PHARM REV CODE 255: Performed by: PSYCHIATRY & NEUROLOGY

## 2024-03-05 PROCEDURE — 63600175 PHARM REV CODE 636 W HCPCS

## 2024-03-05 PROCEDURE — D9220A PRA ANESTHESIA: Mod: CRNA,,, | Performed by: STUDENT IN AN ORGANIZED HEALTH CARE EDUCATION/TRAINING PROGRAM

## 2024-03-05 PROCEDURE — 86900 BLOOD TYPING SEROLOGIC ABO: CPT | Performed by: STUDENT IN AN ORGANIZED HEALTH CARE EDUCATION/TRAINING PROGRAM

## 2024-03-05 PROCEDURE — D9220A PRA ANESTHESIA: Mod: ANES,,, | Performed by: ANESTHESIOLOGY

## 2024-03-05 PROCEDURE — 99223 1ST HOSP IP/OBS HIGH 75: CPT | Mod: ,,, | Performed by: STUDENT IN AN ORGANIZED HEALTH CARE EDUCATION/TRAINING PROGRAM

## 2024-03-05 PROCEDURE — 99900035 HC TECH TIME PER 15 MIN (STAT)

## 2024-03-05 PROCEDURE — 25000003 PHARM REV CODE 250: Performed by: PHYSICIAN ASSISTANT

## 2024-03-05 PROCEDURE — 03VG3DZ RESTRICTION OF INTRACRANIAL ARTERY WITH INTRALUMINAL DEVICE, PERCUTANEOUS APPROACH: ICD-10-PCS | Performed by: RADIOLOGY

## 2024-03-05 PROCEDURE — 25000003 PHARM REV CODE 250

## 2024-03-05 PROCEDURE — 86901 BLOOD TYPING SEROLOGIC RH(D): CPT | Performed by: STUDENT IN AN ORGANIZED HEALTH CARE EDUCATION/TRAINING PROGRAM

## 2024-03-05 PROCEDURE — 99291 CRITICAL CARE FIRST HOUR: CPT | Mod: ,,, | Performed by: PSYCHIATRY & NEUROLOGY

## 2024-03-05 PROCEDURE — B3151ZZ FLUOROSCOPY OF BILATERAL COMMON CAROTID ARTERIES USING LOW OSMOLAR CONTRAST: ICD-10-PCS | Performed by: RADIOLOGY

## 2024-03-05 PROCEDURE — B31D1ZZ FLUOROSCOPY OF RIGHT VERTEBRAL ARTERY USING LOW OSMOLAR CONTRAST: ICD-10-PCS | Performed by: RADIOLOGY

## 2024-03-05 PROCEDURE — 63600175 PHARM REV CODE 636 W HCPCS: Performed by: STUDENT IN AN ORGANIZED HEALTH CARE EDUCATION/TRAINING PROGRAM

## 2024-03-05 PROCEDURE — 94002 VENT MGMT INPAT INIT DAY: CPT

## 2024-03-05 PROCEDURE — 85610 PROTHROMBIN TIME: CPT | Performed by: STUDENT IN AN ORGANIZED HEALTH CARE EDUCATION/TRAINING PROGRAM

## 2024-03-05 PROCEDURE — 84443 ASSAY THYROID STIM HORMONE: CPT

## 2024-03-05 PROCEDURE — 009630Z DRAINAGE OF CEREBRAL VENTRICLE WITH DRAINAGE DEVICE, PERCUTANEOUS APPROACH: ICD-10-PCS | Performed by: NEUROLOGICAL SURGERY

## 2024-03-05 PROCEDURE — 99900026 HC AIRWAY MAINTENANCE (STAT)

## 2024-03-05 PROCEDURE — 61210 BURR HOLE IMPLT VENTR CATH: CPT

## 2024-03-05 PROCEDURE — 94761 N-INVAS EAR/PLS OXIMETRY MLT: CPT

## 2024-03-05 PROCEDURE — 63600175 PHARM REV CODE 636 W HCPCS: Performed by: EMERGENCY MEDICINE

## 2024-03-05 PROCEDURE — 25000003 PHARM REV CODE 250: Performed by: PSYCHIATRY & NEUROLOGY

## 2024-03-05 PROCEDURE — 25000003 PHARM REV CODE 250: Performed by: STUDENT IN AN ORGANIZED HEALTH CARE EDUCATION/TRAINING PROGRAM

## 2024-03-05 PROCEDURE — 85730 THROMBOPLASTIN TIME PARTIAL: CPT | Performed by: STUDENT IN AN ORGANIZED HEALTH CARE EDUCATION/TRAINING PROGRAM

## 2024-03-05 PROCEDURE — 99285 EMERGENCY DEPT VISIT HI MDM: CPT | Mod: 25

## 2024-03-05 PROCEDURE — 25000003 PHARM REV CODE 250: Performed by: NURSE ANESTHETIST, CERTIFIED REGISTERED

## 2024-03-05 PROCEDURE — 80061 LIPID PANEL: CPT

## 2024-03-05 PROCEDURE — B3181ZZ FLUOROSCOPY OF BILATERAL INTERNAL CAROTID ARTERIES USING LOW OSMOLAR CONTRAST: ICD-10-PCS | Performed by: RADIOLOGY

## 2024-03-05 RX ORDER — NICARDIPINE HYDROCHLORIDE 0.2 MG/ML
INJECTION INTRAVENOUS
Status: COMPLETED
Start: 2024-03-05 | End: 2024-03-05

## 2024-03-05 RX ORDER — PROPOFOL 10 MG/ML
VIAL (ML) INTRAVENOUS
Status: DISCONTINUED | OUTPATIENT
Start: 2024-03-05 | End: 2024-03-05

## 2024-03-05 RX ORDER — HEPARIN SODIUM 1000 [USP'U]/ML
INJECTION, SOLUTION INTRAVENOUS; SUBCUTANEOUS
Status: DISCONTINUED | OUTPATIENT
Start: 2024-03-05 | End: 2024-03-05

## 2024-03-05 RX ORDER — METOPROLOL TARTRATE 50 MG/1
50 TABLET ORAL DAILY
Status: ON HOLD | COMMUNITY
Start: 2023-11-21 | End: 2024-03-20 | Stop reason: HOSPADM

## 2024-03-05 RX ORDER — CLOPIDOGREL BISULFATE 75 MG/1
75 TABLET ORAL DAILY
Status: DISCONTINUED | OUTPATIENT
Start: 2024-03-06 | End: 2024-03-06

## 2024-03-05 RX ORDER — SODIUM CHLORIDE 0.9 % (FLUSH) 0.9 %
10 SYRINGE (ML) INJECTION
Status: DISCONTINUED | OUTPATIENT
Start: 2024-03-05 | End: 2024-03-06

## 2024-03-05 RX ORDER — ONDANSETRON HYDROCHLORIDE 2 MG/ML
INJECTION, SOLUTION INTRAVENOUS
Status: DISCONTINUED | OUTPATIENT
Start: 2024-03-05 | End: 2024-03-05

## 2024-03-05 RX ORDER — NICARDIPINE HYDROCHLORIDE 0.2 MG/ML
0-15 INJECTION INTRAVENOUS CONTINUOUS
Status: DISCONTINUED | OUTPATIENT
Start: 2024-03-05 | End: 2024-03-06

## 2024-03-05 RX ORDER — LIDOCAINE HCL/EPINEPHRINE/PF 2%-1:200K
1 VIAL (ML) INJECTION ONCE
Status: DISCONTINUED | OUTPATIENT
Start: 2024-03-05 | End: 2024-03-05

## 2024-03-05 RX ORDER — ROCURONIUM BROMIDE 10 MG/ML
INJECTION, SOLUTION INTRAVENOUS
Status: DISCONTINUED | OUTPATIENT
Start: 2024-03-05 | End: 2024-03-05

## 2024-03-05 RX ORDER — ONDANSETRON HYDROCHLORIDE 2 MG/ML
8 INJECTION, SOLUTION INTRAVENOUS ONCE
Status: COMPLETED | OUTPATIENT
Start: 2024-03-05 | End: 2024-03-05

## 2024-03-05 RX ORDER — NIMODIPINE 30 MG/1
60 CAPSULE, LIQUID FILLED ORAL EVERY 4 HOURS
Status: DISCONTINUED | OUTPATIENT
Start: 2024-03-05 | End: 2024-03-05

## 2024-03-05 RX ORDER — FENTANYL CITRATE 50 UG/ML
INJECTION, SOLUTION INTRAMUSCULAR; INTRAVENOUS
Status: COMPLETED
Start: 2024-03-05 | End: 2024-03-05

## 2024-03-05 RX ORDER — LIDOCAINE HYDROCHLORIDE 10 MG/ML
1 INJECTION INFILTRATION; PERINEURAL ONCE
Status: DISCONTINUED | OUTPATIENT
Start: 2024-03-05 | End: 2024-03-05

## 2024-03-05 RX ORDER — ONDANSETRON HYDROCHLORIDE 2 MG/ML
INJECTION, SOLUTION INTRAVENOUS
Status: DISPENSED
Start: 2024-03-05 | End: 2024-03-05

## 2024-03-05 RX ORDER — ASPIRIN 325 MG
TABLET ORAL
Status: DISCONTINUED | OUTPATIENT
Start: 2024-03-05 | End: 2024-03-05

## 2024-03-05 RX ORDER — PHENYLEPHRINE HYDROCHLORIDE 10 MG/ML
INJECTION INTRAVENOUS
Status: DISCONTINUED | OUTPATIENT
Start: 2024-03-05 | End: 2024-03-05

## 2024-03-05 RX ORDER — CHLORHEXIDINE GLUCONATE ORAL RINSE 1.2 MG/ML
15 SOLUTION DENTAL 2 TIMES DAILY
Status: DISCONTINUED | OUTPATIENT
Start: 2024-03-05 | End: 2024-03-06

## 2024-03-05 RX ORDER — LEVETIRACETAM 500 MG/5ML
500 INJECTION, SOLUTION, CONCENTRATE INTRAVENOUS EVERY 12 HOURS
Status: DISCONTINUED | OUTPATIENT
Start: 2024-03-05 | End: 2024-03-05

## 2024-03-05 RX ORDER — NAPROXEN SODIUM 220 MG/1
81 TABLET, FILM COATED ORAL DAILY
Status: DISCONTINUED | OUTPATIENT
Start: 2024-03-06 | End: 2024-03-06

## 2024-03-05 RX ORDER — FAMOTIDINE 10 MG/ML
20 INJECTION INTRAVENOUS 2 TIMES DAILY
Status: DISCONTINUED | OUTPATIENT
Start: 2024-03-05 | End: 2024-03-07

## 2024-03-05 RX ORDER — ONDANSETRON HYDROCHLORIDE 2 MG/ML
4 INJECTION, SOLUTION INTRAVENOUS
Status: COMPLETED | OUTPATIENT
Start: 2024-03-05 | End: 2024-03-05

## 2024-03-05 RX ORDER — LIDOCAINE HYDROCHLORIDE AND EPINEPHRINE 10; 10 MG/ML; UG/ML
1 INJECTION, SOLUTION INFILTRATION; PERINEURAL ONCE
Status: DISCONTINUED | OUTPATIENT
Start: 2024-03-05 | End: 2024-03-05

## 2024-03-05 RX ORDER — SUCCINYLCHOLINE CHLORIDE 20 MG/ML
INJECTION INTRAMUSCULAR; INTRAVENOUS
Status: DISCONTINUED | OUTPATIENT
Start: 2024-03-05 | End: 2024-03-05

## 2024-03-05 RX ORDER — DEXMEDETOMIDINE HYDROCHLORIDE 4 UG/ML
0-1.4 INJECTION, SOLUTION INTRAVENOUS CONTINUOUS
Status: DISCONTINUED | OUTPATIENT
Start: 2024-03-05 | End: 2024-03-10

## 2024-03-05 RX ORDER — ONDANSETRON HYDROCHLORIDE 2 MG/ML
INJECTION, SOLUTION INTRAVENOUS
Status: COMPLETED
Start: 2024-03-05 | End: 2024-03-05

## 2024-03-05 RX ORDER — AMOXICILLIN 250 MG
1 CAPSULE ORAL 2 TIMES DAILY
Status: DISCONTINUED | OUTPATIENT
Start: 2024-03-05 | End: 2024-03-18

## 2024-03-05 RX ORDER — PROPOFOL 10 MG/ML
0-50 INJECTION, EMULSION INTRAVENOUS CONTINUOUS
Status: DISCONTINUED | OUTPATIENT
Start: 2024-03-05 | End: 2024-03-05

## 2024-03-05 RX ORDER — MIDAZOLAM HYDROCHLORIDE 1 MG/ML
2 INJECTION INTRAMUSCULAR; INTRAVENOUS ONCE
Status: COMPLETED | OUTPATIENT
Start: 2024-03-05 | End: 2024-03-05

## 2024-03-05 RX ORDER — FENTANYL CITRATE 50 UG/ML
100 INJECTION, SOLUTION INTRAMUSCULAR; INTRAVENOUS
Status: DISCONTINUED | OUTPATIENT
Start: 2024-03-05 | End: 2024-03-05

## 2024-03-05 RX ORDER — PROPOFOL 10 MG/ML
100 VIAL (ML) INTRAVENOUS ONCE
Status: DISCONTINUED | OUTPATIENT
Start: 2024-03-05 | End: 2024-03-06

## 2024-03-05 RX ORDER — CLOPIDOGREL BISULFATE 300 MG/1
TABLET, FILM COATED ORAL
Status: DISCONTINUED | OUTPATIENT
Start: 2024-03-05 | End: 2024-03-05

## 2024-03-05 RX ORDER — BISACODYL 10 MG/1
10 SUPPOSITORY RECTAL DAILY PRN
Status: DISCONTINUED | OUTPATIENT
Start: 2024-03-05 | End: 2024-03-20 | Stop reason: HOSPADM

## 2024-03-05 RX ORDER — NOREPINEPHRINE BITARTRATE/D5W 4MG/250ML
0-3 PLASTIC BAG, INJECTION (ML) INTRAVENOUS CONTINUOUS
Status: DISCONTINUED | OUTPATIENT
Start: 2024-03-05 | End: 2024-03-06

## 2024-03-05 RX ORDER — LIDOCAINE HYDROCHLORIDE 20 MG/ML
INJECTION INTRAVENOUS
Status: DISCONTINUED | OUTPATIENT
Start: 2024-03-05 | End: 2024-03-05

## 2024-03-05 RX ORDER — PROPOFOL 10 MG/ML
0-50 INJECTION, EMULSION INTRAVENOUS CONTINUOUS
Status: DISCONTINUED | OUTPATIENT
Start: 2024-03-05 | End: 2024-03-06

## 2024-03-05 RX ORDER — EPHEDRINE SULFATE 50 MG/ML
INJECTION, SOLUTION INTRAVENOUS
Status: DISCONTINUED | OUTPATIENT
Start: 2024-03-05 | End: 2024-03-05

## 2024-03-05 RX ORDER — FENTANYL CITRATE 50 UG/ML
INJECTION, SOLUTION INTRAMUSCULAR; INTRAVENOUS
Status: DISCONTINUED | OUTPATIENT
Start: 2024-03-05 | End: 2024-03-05

## 2024-03-05 RX ORDER — FENTANYL CITRATE-0.9 % NACL/PF 10 MCG/ML
0-200 PLASTIC BAG, INJECTION (ML) INTRAVENOUS CONTINUOUS
Status: DISCONTINUED | OUTPATIENT
Start: 2024-03-05 | End: 2024-03-07

## 2024-03-05 RX ORDER — LIDOCAINE HYDROCHLORIDE AND EPINEPHRINE 10; 10 MG/ML; UG/ML
1 INJECTION, SOLUTION INFILTRATION; PERINEURAL ONCE
Status: COMPLETED | OUTPATIENT
Start: 2024-03-05 | End: 2024-03-05

## 2024-03-05 RX ORDER — IODIXANOL 320 MG/ML
100 INJECTION, SOLUTION INTRAVASCULAR
Status: COMPLETED | OUTPATIENT
Start: 2024-03-05 | End: 2024-03-05

## 2024-03-05 RX ORDER — ONDANSETRON HYDROCHLORIDE 2 MG/ML
4 INJECTION, SOLUTION INTRAVENOUS EVERY 6 HOURS PRN
Status: DISCONTINUED | OUTPATIENT
Start: 2024-03-05 | End: 2024-03-20 | Stop reason: HOSPADM

## 2024-03-05 RX ORDER — LABETALOL HCL 20 MG/4 ML
10 SYRINGE (ML) INTRAVENOUS
Status: DISCONTINUED | OUTPATIENT
Start: 2024-03-05 | End: 2024-03-11

## 2024-03-05 RX ADMIN — CEFAZOLIN 2 G: 2 INJECTION, POWDER, FOR SOLUTION INTRAMUSCULAR; INTRAVENOUS at 10:03

## 2024-03-05 RX ADMIN — SODIUM CHLORIDE 1000 ML: 9 INJECTION, SOLUTION INTRAVENOUS at 11:03

## 2024-03-05 RX ADMIN — PROPOFOL 30 MG: 10 INJECTION, EMULSION INTRAVENOUS at 10:03

## 2024-03-05 RX ADMIN — SUGAMMADEX 200 MG: 100 INJECTION, SOLUTION INTRAVENOUS at 02:03

## 2024-03-05 RX ADMIN — PHENYLEPHRINE HYDROCHLORIDE 100 MCG: 10 INJECTION INTRAVENOUS at 01:03

## 2024-03-05 RX ADMIN — ASPIRIN 325 MG ORAL TABLET 325 MG: 325 PILL ORAL at 09:03

## 2024-03-05 RX ADMIN — PROPOFOL 110 MG: 10 INJECTION, EMULSION INTRAVENOUS at 12:03

## 2024-03-05 RX ADMIN — ROCURONIUM BROMIDE 50 MG: 10 INJECTION INTRAVENOUS at 07:03

## 2024-03-05 RX ADMIN — IOHEXOL 125 ML: 350 INJECTION, SOLUTION INTRAVENOUS at 11:03

## 2024-03-05 RX ADMIN — MIDAZOLAM 2 MG: 1 INJECTION INTRAMUSCULAR; INTRAVENOUS at 11:03

## 2024-03-05 RX ADMIN — PROPOFOL 50 MG: 10 INJECTION, EMULSION INTRAVENOUS at 07:03

## 2024-03-05 RX ADMIN — CLOPIDOGREL BISULFATE 600 MG: 300 TABLET, FILM COATED ORAL at 09:03

## 2024-03-05 RX ADMIN — Medication 112.5 MCG/HR: at 11:03

## 2024-03-05 RX ADMIN — IODIXANOL 70 ML: 320 INJECTION, SOLUTION INTRAVASCULAR at 02:03

## 2024-03-05 RX ADMIN — ROCURONIUM BROMIDE 20 MG: 10 INJECTION INTRAVENOUS at 09:03

## 2024-03-05 RX ADMIN — Medication 25 MCG/HR: at 03:03

## 2024-03-05 RX ADMIN — SUCCINYLCHOLINE CHLORIDE 140 MG: 20 INJECTION, SOLUTION INTRAMUSCULAR; INTRAVENOUS at 12:03

## 2024-03-05 RX ADMIN — EPHEDRINE SULFATE 10 MG: 50 INJECTION INTRAVENOUS at 01:03

## 2024-03-05 RX ADMIN — LIDOCAINE HYDROCHLORIDE,EPINEPHRINE BITARTRATE 1 ML: 10; .01 INJECTION, SOLUTION INFILTRATION; PERINEURAL at 11:03

## 2024-03-05 RX ADMIN — ONDANSETRON HYDROCHLORIDE 8 MG: 2 INJECTION, SOLUTION INTRAVENOUS at 11:03

## 2024-03-05 RX ADMIN — SODIUM CHLORIDE: 0.9 INJECTION, SOLUTION INTRAVENOUS at 12:03

## 2024-03-05 RX ADMIN — NICARDIPINE HYDROCHLORIDE 2.5 MG/HR: 0.2 INJECTION, SOLUTION INTRAVENOUS at 02:03

## 2024-03-05 RX ADMIN — ROCURONIUM BROMIDE 50 MG: 10 INJECTION INTRAVENOUS at 01:03

## 2024-03-05 RX ADMIN — SUGAMMADEX 200 MG: 100 INJECTION, SOLUTION INTRAVENOUS at 10:03

## 2024-03-05 RX ADMIN — PROPOFOL 30 MCG/KG/MIN: 10 INJECTION, EMULSION INTRAVENOUS at 06:03

## 2024-03-05 RX ADMIN — PHENYLEPHRINE HYDROCHLORIDE 50 MCG: 10 INJECTION INTRAVENOUS at 08:03

## 2024-03-05 RX ADMIN — HEPARIN SODIUM 3000 UNITS: 1000 INJECTION, SOLUTION INTRAVENOUS; SUBCUTANEOUS at 09:03

## 2024-03-05 RX ADMIN — ONDANSETRON 4 MG: 2 INJECTION INTRAMUSCULAR; INTRAVENOUS at 11:03

## 2024-03-05 RX ADMIN — FAMOTIDINE 20 MG: 10 INJECTION, SOLUTION INTRAVENOUS at 11:03

## 2024-03-05 RX ADMIN — FENTANYL CITRATE 100 MCG: 50 INJECTION INTRAMUSCULAR; INTRAVENOUS at 11:03

## 2024-03-05 RX ADMIN — FENTANYL CITRATE 100 MCG: 50 INJECTION INTRAMUSCULAR; INTRAVENOUS at 03:03

## 2024-03-05 RX ADMIN — SODIUM CHLORIDE 0.4 MCG/KG/MIN: 9 INJECTION, SOLUTION INTRAVENOUS at 08:03

## 2024-03-05 RX ADMIN — FENTANYL CITRATE 100 MCG: 50 INJECTION, SOLUTION INTRAMUSCULAR; INTRAVENOUS at 12:03

## 2024-03-05 RX ADMIN — LIDOCAINE HYDROCHLORIDE 100 MG: 20 INJECTION INTRAVENOUS at 12:03

## 2024-03-05 RX ADMIN — ONDANSETRON 8 MG: 2 INJECTION INTRAMUSCULAR; INTRAVENOUS at 11:03

## 2024-03-05 RX ADMIN — PROPOFOL 40 MG: 10 INJECTION, EMULSION INTRAVENOUS at 09:03

## 2024-03-05 RX ADMIN — ONDANSETRON 4 MG: 2 INJECTION INTRAMUSCULAR; INTRAVENOUS at 02:03

## 2024-03-05 RX ADMIN — HEPARIN SODIUM 2000 UNITS: 1000 INJECTION, SOLUTION INTRAVENOUS; SUBCUTANEOUS at 09:03

## 2024-03-05 RX ADMIN — DEXMEDETOMIDINE HYDROCHLORIDE 0.5 MCG/KG/HR: 4 INJECTION, SOLUTION INTRAVENOUS at 11:03

## 2024-03-05 RX ADMIN — DEXMEDETOMIDINE HYDROCHLORIDE 0.7 MCG/KG/HR: 4 INJECTION, SOLUTION INTRAVENOUS at 11:03

## 2024-03-05 RX ADMIN — LEVETIRACETAM 500 MG: 100 INJECTION, SOLUTION INTRAVENOUS at 03:03

## 2024-03-05 RX ADMIN — IODIXANOL 65 ML: 320 INJECTION, SOLUTION INTRAVASCULAR at 09:03

## 2024-03-05 RX ADMIN — NICARDIPINE HYDROCHLORIDE 2.5 MG/HR: 0.2 INJECTION INTRAVENOUS at 02:03

## 2024-03-05 NOTE — NURSING
Patient arrived to [facility] from [place]>[facility] ed>[id number]    Report received from: ED nurse    Type of stroke/diagnosis: aneurysmal SAH CHESTER    Current symptoms: HA    Skin Assessment done: Yes  Wounds noted: none  Skin Assessment Verified by:  Pardeep Mckeon Completed? Yes    Patient Belongings on Admit: (be specific) khaki shorts, brown shoes with socks, phone, keys    NCC notified: MD Pavithra

## 2024-03-05 NOTE — CONSULTS
[facility] - Neuro Critical Care  Neurosurgery  Consult Note    Inpatient consult to Neurosurgery  Consult performed by: Rica Lilly PA-C  Consult ordered by: Eduard Arshad MD        Subjective:     Chief Complaint/Reason for Admission: Diffuse SAH throughout the basal cisterns with IVH    History of Present Illness: Kalia Pacheco is a 34yo man with history of known CHESTER aneurysm, not on any AP/AC who presented to [facility] this AM with headache and nausea that began 2 days ago and acutely worsened while he was at work this morning. He also reports blurry vision and has had multiple episodes of emesis. Denies any inciting factors. CTH reveals diffuse subarachnoid hemorrhage throughout the basal cisterns, HH score 1. [place] [facility] [place] and Neurosurgical management.     Review of patient's allergies indicates:  No Known Allergies    Past Medical History:   Diagnosis Date    ADHD (attention deficit hyperactivity disorder)     Aneurysm     Aorta coarctation     Arthritis     Coarctation of aorta     Depression     GERD (gastroesophageal reflux disease)     History of alcohol abuse     Hypertension     Legg-Perthes disease     Seizures 05/2022     Past Surgical History:   Procedure Laterality Date    hip surgeries      x 6    open heart surgery      surgery on aorta as a child , 3 procedures       Family History       Problem Relation (Age of Onset)    Hypertension Mother, Father    No Known Problems Sister          Tobacco Use    Smoking status: Every Day     Types: Vaping with nicotine    Smokeless tobacco: Never   Substance and Sexual Activity    Alcohol use: Not Currently     Comment: rarely    Drug use: Yes     Types: Marijuana     Comment: daily    Sexual activity: Yes     Partners: Female     Objective:     Weight: 100.7 kg (222 lb 0.1 oz)  Body mass index is 33.76 kg/m².  Vital Signs (Most Recent):  Temp: 98 °F (36.7 °C) (03/05/24 1016)  Pulse: 78 (03/05/24 1120)  Resp: 18  "(03/05/24 1016)  BP: 133/71 (03/05/24 1120)  SpO2: 100 % (03/05/24 1120) Vital Signs (24h Range):  Temp:  [97.5 °F (36.4 °C)-98 °F (36.7 °C)] 98 °F (36.7 °C)  Pulse:  [68-78] 78  Resp:  [12-24] 18  SpO2:  [99 %-100 %] 100 %  BP: (102-170)/(58-78) 133/71      Physical Exam  General: well developed, well nourished, no distress.   Head: normocephalic, atraumatic  Mental Status: Awake, Alert, Oriented, GCS 15  Speech: Clear with content appropriate to conversation  Cranial nerves: face symmetric, CN II-XII grossly intact.   Eyes: pupils equal, round, reactive to light, EOMI.  Sensory: intact to light touch throughout    Motor Strength: Moves all extremities spontaneously with good tone.  Full strength upper and lower extremities. No abnormal movements seen.     Pronator Drift: no drift noted  Finger-to-nose: Intact bilaterally    Significant Labs:  Recent Labs   Lab 03/05/24  0831   *      K 3.8      CO2 20*   BUN 13   CREATININE 0.99   CALCIUM 9.7     Recent Labs   Lab 03/05/24  0831   WBC 9.40   HGB 14.8   HCT 44.4        No results for input(s): "LABPT", "INR", "APTT" in the last 48 hours.  Microbiology Results (last 7 days)       ** No results found for the last 168 hours. **          All pertinent labs from the last 24 hours have been reviewed.    Significant Diagnostics:  CT Head Without Contrast 3/5/24:   - Diffuse SAH throughout the basal cisterns with extension into the ventricular system.     I have personally reviewed the above referenced imaging.   Assessment/Plan:     * Subarachnoid hemorrhage  Kalia Pacheco is a 32yo man with history of known CHESTER aneurysm who presented to [place] with headache and nausea. CTH demonstrated diffuse SAH throughout the basal cisterns HH and he was transferred to [facility] [place] for close monitoring in the Neuro ICU and Neurosurgical consultation.    - Please obtain STAT CTA head  - Admit to [facility] with q1 neuro checks  - NSGY planning for EVD placement   - " Please obtain STAT coags and type and screen   - Please order lidocaine with epi, fentanyl 100mg, and versed 2mg to use for the procedure   - Please ensure recording of hourly outputs  - Consult to Neuro IR for emergent DSA  - HOB@30   - Keppra 500 BID x7d   - TCDs daily x14d   - Nimotop 60q4 x 21d   - Continue to follow clinically and notify NSGY immediately if any neurostatus change     Case and plan discussed with attending Neurosurgeon Dr. Mendoza        Thank you for your consult. I will follow-up with patient. Please contact us if you have any additional questions.    Rica Lilly PA-C  Neurosurgery  [facility] - Neuro Critical Care

## 2024-03-05 NOTE — HPI
Vascular Neurology consulted for evaluation of Mr Pacheco, a 33 year old gentleman with past medical history of CHESTER aneurysm, aortic coarctation, and hypertension who presents as a transfer from [facility] for subarachnoid hemorrhage higher level of care.     In brief, presented to OSH ED after a worsening headache with concomitant nausea/vomiting and a transient episode of slurred speech/blurry vision. Imaging OSH notable for SAH with blood throughout the basilar cisterns as well as 3rd/4th ventricles. Patient was subsequently transferred to [facility] for higher level of care and Neurosurgical consultation.

## 2024-03-05 NOTE — CONSULTS
Interventional Neuroradiology Pre-procedure Note    Procedure: Diagnostic cerebral angiogram    History of Present Illness:  Kalia Pacheco is a 33 y.o. male with history of known CHESTER aneurysm, not on any AP/AC who presented to [facility] this AM with headache and nausea that began 2 days ago and acutely worsened while he was at work this morning. CTH demonstrated HH1mF4 SAH. S/p EVD placement by Nsx. NATALIYA consulted for DSA and management.     ROS:   Hematological: no known coagulopathies  Respiratory: no shortness of breath  Cardiovascular: no chest pain  Gastrointestinal: no abdominal pain  Genito-Urinary: no dysuria  Musculoskeletal: negative  Neurological: +altered mental status    Scheduled Meds:    LIDOcaine-EPINEPHrine (PF) 2%-1:200,000  1 mL Intradermal Once    niMODipine  60 mg Oral Q4H    ondansetron        senna-docusate 8.6-50 mg  1 tablet Oral BID     Current Meds:   Current Facility-Administered Medications:     bisacodyL suppository 10 mg, 10 mg, Rectal, Daily PRN, Eduard Arshad MD    fentaNYL 50 mcg/mL injection 100 mcg, 100 mcg, Intravenous, Q1H PRN, Edmond Cooley MD, 100 mcg at 03/05/24 1132    labetalol 20 mg/4 mL (5 mg/mL) IV syring, 10 mg, Intravenous, Q15 Min PRN, Eduard Arshad MD    LIDOcaine-EPINEPHrine (PF) 2%-1:200,000 injection 1 mL, 1 mL, Intradermal, Once, Hector Vidales MD    niMODipine capsule 60 mg, 60 mg, Oral, Q4H, Eduard Arshad MD    ondansetron 4 mg/2 mL injection, , , ,     senna-docusate 8.6-50 mg per tablet 1 tablet, 1 tablet, Oral, BID, Eduard Arshad MD    sodium chloride 0.9% flush 10 mL, 10 mL, Intravenous, PRN, Eduard Arshad MD   Continuous Infusions:   PRN Meds:bisacodyL, fentaNYL, labetalol, ondansetron, sodium chloride 0.9%    Allergies: Review of patient's allergies indicates:  No Known Allergies  Sedation Hx: No adverse events.    Labs:  PT/INR/PTT:  11.6/1.1/24.2 (03/05 1104)  WBC/Hgb/Hct/Plts:  9.40/14.8/44.4/255 (03/05  "0831)  BUN/Cr/glu/ALT/AST/amyl/lip:  13/0.99/--/44/57/--/-- (03/05 0831)     Objective:  Vitals: Blood pressure 133/71, pulse 78, temperature 98 °F (36.7 °C), temperature source Oral, resp. rate 17, height 5' 8" (1.727 m), weight 100.7 kg (222 lb 0.1 oz), SpO2 100 %.     Physical Exam:  General: well developed, well nourished, no distress.   Head: normocephalic, atraumatic  Neck: No tracheal deviation. No palpable masses. Full ROM.   Neurologic: Alert and oriented to self. No aphasia, moves all four extremities    ASA: 2  MAL: 2    Plan:  -Plan for cerebral angiogram +/- aneurysm embolization  -Sedation Plan: General  anesthesia  -All diagnostics and imaging reviewed  -Risks & benefits of procedure explained in detail; patient's mother consented and all questions answered  -Further reccs to follow procedure          Denver Larry MD, MHA  Fellow, NeuroEndovascular Surgery, [facility]  Neurologist, [facility] [place]    "

## 2024-03-05 NOTE — SUBJECTIVE & OBJECTIVE
Past Medical History:   Diagnosis Date    ADHD (attention deficit hyperactivity disorder)     Aneurysm     Aorta coarctation     Arthritis     Coarctation of aorta     Depression     GERD (gastroesophageal reflux disease)     History of alcohol abuse     Hypertension     Legg-Perthes disease     Seizures 05/2022     Past Surgical History:   Procedure Laterality Date    hip surgeries      x 6    open heart surgery      surgery on aorta as a child , 3 procedures       Social History     Tobacco Use    Smoking status: Every Day     Types: Vaping with nicotine    Smokeless tobacco: Never   Substance Use Topics    Alcohol use: Not Currently     Comment: rarely    Drug use: Yes     Types: Marijuana     Comment: daily     Review of patient's allergies indicates:  No Known Allergies    Medications: I have reviewed the current medication administration record.    Medications Prior to Admission   Medication Sig Dispense Refill Last Dose    metoprolol succinate (TOPROL-XL) 50 MG 24 hr tablet Take 1 tablet (50 mg total) by mouth once daily. 90 tablet 3     ondansetron (ZOFRAN-ODT) 4 MG TbDL Take 1 tablet (4 mg total) by mouth every 6 (six) hours as needed (nausea). 30 tablet 1     valACYclovir (VALTREX) 500 MG tablet Take 1 tablet (500 mg total) by mouth 2 (two) times daily. 180 tablet 2     valsartan (DIOVAN) 160 MG tablet Take 1 tablet (160 mg total) by mouth once daily. 90 tablet 3        Review of Systems   Unable to perform ROS: Other     Objective:     Vital Signs (Most Recent):  Temp: 98 °F (36.7 °C) (03/05/24 1016)  Pulse: 73 (03/05/24 1211)  Resp: (!) 28 (03/05/24 1200)  BP: 130/69 (03/05/24 1145)  SpO2: 99 % (03/05/24 1200)  ICP Mean (mmHg): 7 mmHg (03/05/24 1200)    Vital Signs Range (Last 24H):  Temp:  [97.5 °F (36.4 °C)-98 °F (36.7 °C)]   Pulse:  [68-87]   Resp:  [12-28]   BP: (102-170)/(57-78)   SpO2:  [97 %-100 %]        Physical Exam  Vitals and nursing note reviewed.   Constitutional:       General: He is  "not in acute distress.     Appearance: He is not toxic-appearing.      Comments: Somnolent   Neurological:      Mental Status: He is easily aroused.   Psychiatric:         Behavior: Behavior is cooperative.              Neurological Exam:   LOC: drowsy  Attention Span: fair  Language: No aphasia  Articulation: No dysarthria  Orientation: Person, Place, Time   Visual Fields: Full  EOM (CN III, IV, VI): Full/intact  Pupils (CN II, III): PERRL  Facial Sensation (CN V): Normal  Facial Movement (CN VII): Symmetric facial expression    Motor: 4 extremities anti-gravity without drift  Sensation: intact to light touch throughout      Laboratory:  CMP:   Recent Labs   Lab 03/05/24  0831   CALCIUM 9.7   ALBUMIN 4.5   PROT 7.1      K 3.8   CO2 20*      BUN 13   CREATININE 0.99   ALKPHOS 56   ALT 44   AST 57   BILITOT 0.8     CBC:   Recent Labs   Lab 03/05/24  0831   WBC 9.40   RBC 4.62   HGB 14.8   HCT 44.4      MCV 96   MCH 32.0*   MCHC 33.3     Lipid Panel:   Recent Labs   Lab 03/05/24  1131   CHOL 160   LDLCALC 94.4   HDL 54   TRIG 58     Coagulation:   Recent Labs   Lab 03/05/24  1104   INR 1.1   APTT 24.2     Hgb A1C: No results for input(s): "HGBA1C" in the last 168 hours.  TSH:   Recent Labs   Lab 03/05/24  1131   TSH 0.716       Diagnostic Results:      Brain imaging:  CTA Head and Neck (xpd) 03/05/2024    Narrative  EXAMINATION:  CTA HEAD AND NECK (XPD)    CLINICAL HISTORY:  Neuro deficit, acute, stroke suspected;Stroke/TIA, determine embolic source;    TECHNIQUE:  5 mm axial images of the head pre and post contrast with 0.625 mm axial CTA images of the head neck post-contrast.  Coronal and sagittal MPR and MIP imaging was performed 125 ml of Omnipaque 350 contrast was injected intravenously    COMPARISON:  CT head 03/05/2024 and MRA 05/05/2023    FINDINGS:  CT head with and  without contrast: Scattered subarachnoid hemorrhage basilar cisterns with intraventricular extension of hemorrhage.  There is " increasing distension of the ventricles compatible with worsening hydrocephalus    There is crowding and partial effacement cerebral sulci at the vertex as well as slight crowding of the cerebellar tonsils at the craniocervical junction early tonsillar herniation not excluded.    There is scattered probable subarachnoid hemorrhage extending to the craniocervical junction with questionable superimposed subdural hemorrhage    Allowing for CT technique there is no abnormal parenchymal enhancement.    CTA head:    Anterior circulation: Bilateral distal cervical, petrous, cavernous and supraclinoid segments of the ICAs are patent without significant focal stenosis or aneurysm.  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    There is continued lobular aneurysm along the left aspect of the anterior communicating artery measuring approximately 0.3 cm.    The middle cerebral arteries are patent without significant focal stenosis or definite additional aneurysm    Posterior circulation: Distal vertebral arteries, basilar artery and posterior cerebral arteries are patent there is small caliber posterior circulation with hypoplastic or absent right P1 segment of the PCA with essentially persistent fetal circulation right PCA via right posterior communicating artery with prominent left posterior communicating artery as well.    CTA neck: Slight prominence of the ascending aorta measuring approximately 4.2 cm.  There is slight truncation of the proximal descending thoracic aorta which may be unusual developmental variant.  Origin of the right brachiocephalic/left common carotid artery from the arch are within normal limits.  Left subclavian artery origin the arch is not seen.  Overall concerning for occlusion with filling of the proximal left subclavian artery possibly retrograde via left vertebral artery.    There is patent normal  caliber vertebral arteries arising from the subclavian arteries.    Right carotid: The right common carotid artery, carotid bifurcation and extracranial portions of the internal carotid artery are patent without significant focal stenosis.    Left carotid: The left common carotid artery, carotid bifurcation and extracranial portions of the internal carotid arteries are patent without significant focal stenosis.    There is less than 50% stenosis of the proximal ICAs by NASCET criteria.    Pharynx/larynx: Allowing for distortion by motion the nasopharynx, oropharynx, hypopharynx larynx and trachea unremarkable.  There is no consolidation visualized lungs.    Glands: No focal parotid, submandibular or thyroid lesion.    No evidence for adenopathy throughout the neck by size criteria.    No consolidation within the visualized lungs.  No evidence for acute fracture or subluxation visualized spine    Case discussed with Dr. Roper via telephone on 03/05/2024 at 11:22    This report was flagged in Epic as abnormal.    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  No definite additional aneurysm identified concerning for source of hemorrhage.  Clinical correlation and correlation with conventional angiography recommended.    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    CT head: Evolving subarachnoid and intraventricular hemorrhages.  Interval increase distension lateral and 3rd ventricles compatible  with developing hydrocephalus    Crowding cerebral sulci at the vertex with additional crowding basilar cisterns with questionable early cerebellar tonsillar herniation.    Clinical correlation and neuro surgical evaluation recommended.      Electronically signed by: Jose Sarmiento DO  Date:    03/05/2024  Time:    11:26      CT Head Without Contrast 03/05/2024    Narrative  EXAMINATION:  CT HEAD WITHOUT CONTRAST    CLINICAL HISTORY:  Headache, new or worsening, neuro deficit (Age 19-49y);    TECHNIQUE:  Iterative reconstruction technique was performed.    CT/cardiac nuclear exam/s in prior 12 months:  0.    COMPARISON:  .    FINDINGS:  Subarachnoid hemorrhage with moderate volume blood throughout the basilar cisterns.  Moderate blood within the 3rd and 4th ventricles.  Small amount of blood within the lateral ventricles also and mild ventricular prominence.  No mass seen.  No midline shift.  Moderate paranasal sinus disease.    Impression  Subarachnoid hemorrhage with blood throughout the basilar cisterns.  Moderate intraventricular blood and mild ventricular prominence.    COMMUNICATION  Findings discussed with Dr. Tong at the time of dictation.      Electronically signed by: Bernard Edmond MD  Date:    03/05/2024  Time:    08:46      Cardiac Evaluation:   N/A

## 2024-03-05 NOTE — HPI
Kalia Pacheco is a 34yo man with history of known CHESTER aneurysm, not on any AP/AC who presented to [facility] this AM with headache and nausea that began 2 days ago and acutely worsened while he was at work this morning. He also reports blurry vision and has had multiple episodes of emesis. Denies any inciting factors. CTH reveals diffuse subarachnoid hemorrhage throughout the basal cisterns, HH score 1. [place] [facility] and Neurosurgical management.

## 2024-03-05 NOTE — ANESTHESIA POSTPROCEDURE EVALUATION
Anesthesia Post Evaluation    Patient: Kalia Pacheco    Procedure(s) Performed: * No procedures listed *    Final Anesthesia Type: general      Patient location during evaluation: ICU  Patient participation: No - Unable to Participate, Intubation  Level of consciousness: sedated  Post-procedure vital signs: reviewed and stable      Anesthetic complications: no      Cardiovascular status: hemodynamically stable  Respiratory status: intubated and ventilator  Follow-up not needed.              Vitals Value Taken Time   /52 03/05/24 1438   Temp 37 03/05/24 1443   Pulse 61 03/05/24 1442   Resp 18 03/05/24 1442   SpO2 100 % 03/05/24 1442   Vitals shown include unvalidated device data.      No case tracking events are documented in the log.      Pain/Erin Score: Pain Rating Prior to Med Admin: 0 (3/5/2024  9:02 AM)  Pain Rating Post Med Admin: 0 (3/5/2024 12:02 PM)

## 2024-03-05 NOTE — ASSESSMENT & PLAN NOTE
33-year-old male with a suspected A-comm aneurysm rupture complicated by intraventricular hemorrhage status post EVD placement and angiography with unsuccessful vessel was intubated and sedated, likely pending surgical intervention.    - q1h neuro checks  - sheath care per IR recs  - SBP < 140, MAP > 65  - Propofol and fentanyl for sedation  - Cardene and Norepi as needed for tight BP control  - OGT for meds  - Mechanical ventilation pending surgery  - Strict I & O  - Hold DVT Ppx for now  - EVD per NSGY  - Trendelenburg at 15deg  - zofran PRN for vomiting    Hold Ppx until after surgery  A-line, PIVs

## 2024-03-05 NOTE — CONSULTS
"[facility]: Main [place]  Inpatient Consult Note  Interventional Neuroradiology       CC: Nontraumatic subarachnoid hemorrhage, unspecified    SUBJECTIVE:     History of Present Illness: per chart " Kalia Pacheco is a 33 y.o. male with history of known CHESTER aneurysm, not on any AP/AC who presented to [facility] this AM with headache and nausea that began 2 days ago and acutely worsened while he was at work this morning. CTH demonstrated [id number] SAH. S/p EVD placement by Nsx. NATALIYA consulted for DSA and management. "     Sedation History:  General    Past Medical History:   Diagnosis Date    ADHD (attention deficit hyperactivity disorder)     Aneurysm     Aorta coarctation     Arthritis     Coarctation of aorta     Depression     GERD (gastroesophageal reflux disease)     History of alcohol abuse     Hypertension     Legg-Perthes disease     Seizures 05/2022      Past Surgical History:   Procedure Laterality Date    hip surgeries      x 6    open heart surgery      surgery on aorta as a child , 3 procedures        Current Facility-Administered Medications on File Prior to Encounter   Medication Dose Route Frequency Provider Last Rate Last Admin    [COMPLETED] acetaminophen 1,000 mg/100 mL (10 mg/mL) injection 1,000 mg  1,000 mg Intravenous ED 1 Time Gregg Tong MD   Stopped at 03/05/24 0914    [COMPLETED] ondansetron injection 4 mg  4 mg Intravenous ED 1 Time Gregg Tong MD   4 mg at 03/05/24 0840    [DISCONTINUED] levETIRAcetam in NaCl (iso-os) IVPB 500 mg  500 mg Intravenous Q12H Gregg Tong  mL/hr at 03/05/24 0857 500 mg at 03/05/24 0857    [DISCONTINUED] niCARdipine (CARDENE) 40 mg/200 mL (0.2 mg/mL) infusion             [DISCONTINUED] niCARdipine 40 mg/200 mL (0.2 mg/mL) infusion  0-15 mg/hr Intravenous Continuous Gregg Tong MD 50 mL/hr at 03/05/24 0903 10 mg/hr at 03/05/24 0903     Current Outpatient Medications on File Prior to Encounter   Medication Sig Dispense " Refill    metoprolol tartrate (LOPRESSOR) 50 MG tablet Take 50 mg by mouth once daily.      metoprolol succinate (TOPROL-XL) 50 MG 24 hr tablet Take 1 tablet (50 mg total) by mouth once daily. (Patient not taking: Reported on 3/5/2024) 90 tablet 3    ondansetron (ZOFRAN-ODT) 4 MG TbDL Take 1 tablet (4 mg total) by mouth every 6 (six) hours as needed (nausea). 30 tablet 1    valACYclovir (VALTREX) 500 MG tablet Take 1 tablet (500 mg total) by mouth 2 (two) times daily. 180 tablet 2    valsartan (DIOVAN) 160 MG tablet Take 1 tablet (160 mg total) by mouth once daily. 90 tablet 3    [DISCONTINUED] HYDROcodone-acetaminophen (NORCO)  mg per tablet Take 1 tablet by mouth every 8 (eight) hours as needed for Pain. (Patient not taking: Reported on 7/25/2023) 21 tablet 0      Review of patient's allergies indicates:  No Known Allergies    Family History   Problem Relation Age of Onset    Hypertension Mother     Hypertension Father     No Known Problems Sister        reports that he has been smoking vaping with nicotine. He has never used smokeless tobacco. He reports that he does not currently use alcohol. He reports current drug use. Drug: Marijuana.     Antiplatelets/Anticoagulants: no    Review of Systems:  ROS    OBJECTIVE:     Vital Signs (Most Recent):  Temp: 98 °F (36.7 °C) (03/05/24 1016)  Pulse: 72 (03/05/24 1500)  Resp: 18 (03/05/24 1500)  BP: (!) 142/67 (03/05/24 1500)  SpO2: 100 % (03/05/24 1500) Vital Signs (24h Range):  Temp:  [97.5 °F (36.4 °C)-98 °F (36.7 °C)] 98 °F (36.7 °C)  Pulse:  [59-87] 72  Resp:  [0-28] 18  SpO2:  [97 %-100 %] 100 %  BP: ()/(46-78) 142/67       Physical Exam:  Physical Exam  Intubated and sedated  PERRL; Cough and gag reflex present    No with-drawal in arms and legs- sedation  ASA: II  Mallampati: II    Labs:  CBC/Anemia Profile:   Recent Labs   Lab 03/05/24  0831   WBC 9.40   HGB 14.8   HCT 44.4      MCV 96   RDW 14.0        Coags:   Recent Labs   Lab 03/05/24  1104    INR 1.1   APTT 24.2        Chemistries:   Recent Labs   Lab 03/05/24  0831      K 3.8      CO2 20*   BUN 13   CREATININE 0.99   CALCIUM 9.7   PROT 7.1   BILITOT 0.8   ALKPHOS 56   ALT 44   AST 57        Imaging:   IR angio- Right PICA aneurysm       ASSESSMENT/PLAN:     -Plan for cerebral angiogram +/- aneurysm embolization  -Sedation Plan: General  anesthesia  -All diagnostics and imaging reviewed  -Risks & benefits of procedure explained in detail; patient's mother consented and all questions answered  -Further reccs to follow procedure     Sedation Plan: General    Discussed with primary team, Dr. PATTEN.    Imaging reviewed with Radiology staff, Dr. Parra.      David Shay MD  Fellow, NeuroEndovascular Surgery, [facility]  Board certified Vascular Neurologist  [place], [place]

## 2024-03-05 NOTE — CONSULTS
Inpatient consult to Physical Medicine Rehab  Consult performed by: Carolina Craven NP  Consult ordered by: Eduard Arshad MD  Reason for consult: Rehab      Consult received. PM&R following.     EUGENIE Cr, FNP-C  Physical Medicine & Rehabilitation   03/05/2024

## 2024-03-05 NOTE — CONSULTS
[facility] - Neuro Critical Care  Vascular Neurology  Comprehensive Stroke Center  Consult Note    Inpatient consult to Vascular (Stroke) Neurology  Consult performed by: Luis Michael MD  Consult ordered by: Eduard Arshad MD        Assessment/Plan:     Patient is a 33 y.o. year old male with:    * Nontraumatic subarachnoid hemorrhage, unspecified  33 year old gentleman with history of CHESTER aneurysm transferred to [facility] for Neurosurgical evaluation after OSH-ED evaluation and workup consistent with SAH. NIHSS = 1. Hunt & Hill Grade I. Neurologic exam non-focal; notable for somnolence, discomfort, and decreased attention (requires repeated stimulation). 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    Admitted to Neurocritical Care. Neurosurgery at bedside preparing for EVD placement. Plan for angiogram thereafter/hemicrani/vasospasm watch. Nimodipine on MAR. Consider anti-seizure prophylaxis.     Antithrombotics for secondary stroke prevention: Antiplatelets: None: SAH    Statins for secondary stroke prevention and hyperlipidemia, if present:   N/A    Aggressive risk factor modification: HTN     Rehab efforts: The patient has been evaluated by a stroke team provider and the therapy needs have been fully considered based off the presenting complaints and exam findings. The following therapy evaluations are needed: PT evaluate and treat, OT evaluate and treat, SLP evaluate and treat, PM&R evaluate for appropriate placement    Diagnostics ordered/pending: Other: Angiogram    VTE prophylaxis:  None: Reason for No Pharmacological VTE Prophylaxis: SAH    BP parameters: SAH: Unsecured aneurysm, SBP <140        IVH (intraventricular hemorrhage)  - see principle problem    Other hydrocephalus  - see principle problem    Anterior communicating artery aneurysm  History of; 2022 - 2.5-3 mm aneurysm off the left aspect of the anterior communicating artery    Hypertension  SAH: Unsecured aneurysm, SBP <140         STROKE DOCUMENTATION     Acute Stroke Times   Last Known Normal Date: 03/05/24  Unknown Normal Time: Unknown Time  Symptom Onset Date: 03/05/24  Unknown Symptom Onset Time: Unknown Time  Thrombolytic Therapy Recommended: No  Thrombectomy Recommended: No    NIH Scale:  1a. Level of Consciousness: 1-->Not alert, but arousable by minor stimulation to obey, answer, or respond  1b. LOC Questions: 0-->Answers both questions correctly  1c. LOC Commands: 0-->Performs both tasks correctly  2. Best Gaze: 0-->Normal  3. Visual: 0-->No visual loss  4. Facial Palsy: 0-->Normal symmetrical movements  5a. Motor Arm, Left: 0-->No drift, limb holds 90 (or 45) degrees for full 10 secs  5b. Motor Arm, Right: 0-->No drift, limb holds 90 (or 45) degrees for full 10 secs  6a. Motor Leg, Left: 0-->No drift, leg holds 30 degree position for full 5 secs  6b. Motor Leg, Right: 0-->No drift, leg holds 30 degree position for full 5 secs  7. Limb Ataxia: 0-->Absent  8. Sensory: 0-->Normal, no sensory loss  9. Best Language: 0-->No aphasia, normal  10. Dysarthria: 0-->Normal  11. Extinction and Inattention (formerly Neglect): 0-->No abnormality  Total (NIH Stroke Scale): 1    Modified Johnson Score: 0  Irving Coma Scale:15   ABCD2 Score:    [id number] Score:   HAS -BLED Score:   ICH Score:   Hunt & Hill Classification:Grade I      Thrombolysis Candidate? No, SAH    Delays to Thrombolysis?  Not Applicable    Interventional Revascularization Candidate?   Is the patient eligible for mechanical endovascular reperfusion (YOHANNES)?   No,  SAH    Delays to Thrombectomy? Not Applicable    Hemorrhagic change of an Ischemic Stroke: Does this patient have an ischemic stroke with hemorrhagic changes? No     Subjective:     History of Present Illness:  Vascular Neurology consulted for evaluation of Mr Pacheco, a 33 year old gentleman with past medical history of CHESTER aneurysm, aortic coarctation, and hypertension who presents as a transfer from [facility] for subarachnoid hemorrhage higher level of care.     In brief, presented to OSH ED after a worsening headache with concomitant nausea/vomiting and a transient episode of slurred speech/blurry vision. Imaging OSH notable for SAH with blood throughout the basilar cisterns as well as 3rd/4th ventricles. Patient was subsequently transferred to [facility] for higher level of care and Neurosurgical consultation.          Past Medical History:   Diagnosis Date    ADHD (attention deficit hyperactivity disorder)     Aneurysm     Aorta coarctation     Arthritis     Coarctation of aorta     Depression     GERD (gastroesophageal reflux disease)     History of alcohol abuse     Hypertension     Legg-Perthes disease     Seizures 05/2022     Past Surgical History:   Procedure Laterality Date    hip surgeries      x 6    open heart surgery      surgery on aorta as a child , 3 procedures       Social History     Tobacco Use    Smoking status: Every Day     Types: Vaping with nicotine    Smokeless tobacco: Never   Substance Use Topics    Alcohol use: Not Currently     Comment: rarely    Drug use: Yes     Types: Marijuana     Comment: daily     Review of patient's allergies indicates:  No Known Allergies    Medications: I have reviewed the current medication administration record.    Medications Prior to Admission   Medication Sig Dispense Refill Last Dose    metoprolol succinate (TOPROL-XL) 50 MG 24 hr tablet Take 1 tablet (50 mg total) by mouth once daily. 90 tablet 3     ondansetron (ZOFRAN-ODT) 4 MG TbDL Take 1 tablet  (4 mg total) by mouth every 6 (six) hours as needed (nausea). 30 tablet 1     valACYclovir (VALTREX) 500 MG tablet Take 1 tablet (500 mg total) by mouth 2 (two) times daily. 180 tablet 2     valsartan (DIOVAN) 160 MG tablet Take 1 tablet (160 mg total) by mouth once daily. 90 tablet 3        Review of Systems   Unable to perform ROS: Other     Objective:     Vital Signs (Most Recent):  Temp: 98 °F (36.7 °C) (03/05/24 1016)  Pulse: 73 (03/05/24 1211)  Resp: (!) 28 (03/05/24 1200)  BP: 130/69 (03/05/24 1145)  SpO2: 99 % (03/05/24 1200)  ICP Mean (mmHg): 7 mmHg (03/05/24 1200)    Vital Signs Range (Last 24H):  Temp:  [97.5 °F (36.4 °C)-98 °F (36.7 °C)]   Pulse:  [68-87]   Resp:  [12-28]   BP: (102-170)/(57-78)   SpO2:  [97 %-100 %]        Physical Exam  Vitals and nursing note reviewed.   Constitutional:       General: He is not in acute distress.     Appearance: He is not toxic-appearing.      Comments: Somnolent   Neurological:      Mental Status: He is easily aroused.   Psychiatric:         Behavior: Behavior is cooperative.              Neurological Exam:   LOC: drowsy  Attention Span: fair  Language: No aphasia  Articulation: No dysarthria  Orientation: Person, Place, Time   Visual Fields: Full  EOM (CN III, IV, VI): Full/intact  Pupils (CN II, III): PERRL  Facial Sensation (CN V): Normal  Facial Movement (CN VII): Symmetric facial expression    Motor: 4 extremities anti-gravity without drift  Sensation: intact to light touch throughout      Laboratory:  CMP:   Recent Labs   Lab 03/05/24  0831   CALCIUM 9.7   ALBUMIN 4.5   PROT 7.1      K 3.8   CO2 20*      BUN 13   CREATININE 0.99   ALKPHOS 56   ALT 44   AST 57   BILITOT 0.8     CBC:   Recent Labs   Lab 03/05/24  0831   WBC 9.40   RBC 4.62   HGB 14.8   HCT 44.4      MCV 96   MCH 32.0*   MCHC 33.3     Lipid Panel:   Recent Labs   Lab 03/05/24  1131   CHOL 160   LDLCALC 94.4   HDL 54   TRIG 58     Coagulation:   Recent Labs   Lab 03/05/24  1104  "  INR 1.1   APTT 24.2     Hgb A1C: No results for input(s): "HGBA1C" in the last 168 hours.  TSH:   Recent Labs   Lab 03/05/24  1131   TSH 0.716       Diagnostic Results:      Brain imaging:  CTA Head and Neck (xpd) 03/05/2024    Narrative  EXAMINATION:  CTA HEAD AND NECK (XPD)    CLINICAL HISTORY:  Neuro deficit, acute, stroke suspected;Stroke/TIA, determine embolic source;    TECHNIQUE:  5 mm axial images of the head pre and post contrast with 0.625 mm axial CTA images of the head neck post-contrast.  Coronal and sagittal MPR and MIP imaging was performed 125 ml of Omnipaque 350 contrast was injected intravenously    COMPARISON:  CT head 03/05/2024 and MRA 05/05/2023    FINDINGS:  CT head with and  without contrast: Scattered subarachnoid hemorrhage basilar cisterns with intraventricular extension of hemorrhage.  There is increasing distension of the ventricles compatible with worsening hydrocephalus    There is crowding and partial effacement cerebral sulci at the vertex as well as slight crowding of the cerebellar tonsils at the craniocervical junction early tonsillar herniation not excluded.    There is scattered probable subarachnoid hemorrhage extending to the craniocervical junction with questionable superimposed subdural hemorrhage    Allowing for CT technique there is no abnormal parenchymal enhancement.    CTA head:    Anterior circulation: Bilateral distal cervical, petrous, cavernous and supraclinoid segments of the ICAs are patent without significant focal stenosis or aneurysm.  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    There is continued lobular aneurysm along the left aspect of the anterior communicating artery measuring approximately 0.3 cm.    The middle cerebral arteries are patent without significant focal stenosis or definite additional aneurysm    Posterior circulation: Distal vertebral " arteries, basilar artery and posterior cerebral arteries are patent there is small caliber posterior circulation with hypoplastic or absent right P1 segment of the PCA with essentially persistent fetal circulation right PCA via right posterior communicating artery with prominent left posterior communicating artery as well.    CTA neck: Slight prominence of the ascending aorta measuring approximately 4.2 cm.  There is slight truncation of the proximal descending thoracic aorta which may be unusual developmental variant.  Origin of the right brachiocephalic/left common carotid artery from the arch are within normal limits.  Left subclavian artery origin the arch is not seen.  Overall concerning for occlusion with filling of the proximal left subclavian artery possibly retrograde via left vertebral artery.    There is patent normal caliber vertebral arteries arising from the subclavian arteries.    Right carotid: The right common carotid artery, carotid bifurcation and extracranial portions of the internal carotid artery are patent without significant focal stenosis.    Left carotid: The left common carotid artery, carotid bifurcation and extracranial portions of the internal carotid arteries are patent without significant focal stenosis.    There is less than 50% stenosis of the proximal ICAs by NASCET criteria.    Pharynx/larynx: Allowing for distortion by motion the nasopharynx, oropharynx, hypopharynx larynx and trachea unremarkable.  There is no consolidation visualized lungs.    Glands: No focal parotid, submandibular or thyroid lesion.    No evidence for adenopathy throughout the neck by size criteria.    No consolidation within the visualized lungs.  No evidence for acute fracture or subluxation visualized spine    Case discussed with Dr. Roper via telephone on 03/05/2024 at 11:22    This report was flagged in Epic as abnormal.    Impression  CTA head: 3 mm left anterior communicating artery aneurysm again  identified.  No definite additional aneurysm identified concerning for source of hemorrhage.  Clinical correlation and correlation with conventional angiography recommended.    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    CT head: Evolving subarachnoid and intraventricular hemorrhages.  Interval increase distension lateral and 3rd ventricles compatible with developing hydrocephalus    Crowding cerebral sulci at the vertex with additional crowding basilar cisterns with questionable early cerebellar tonsillar herniation.    Clinical correlation and neuro surgical evaluation recommended.      Electronically signed by: Jose Sarmiento DO  Date:    03/05/2024  Time:    11:26      CT Head Without Contrast 03/05/2024    Narrative  EXAMINATION:  CT HEAD WITHOUT CONTRAST    CLINICAL HISTORY:  Headache, new or worsening, neuro deficit (Age 19-49y);    TECHNIQUE:  Iterative reconstruction technique was performed.    CT/cardiac nuclear exam/s in prior 12 months:  0.    COMPARISON:  .    FINDINGS:  Subarachnoid hemorrhage with moderate volume blood throughout the basilar cisterns.  Moderate blood within the 3rd and 4th ventricles.  Small amount of blood within the lateral ventricles also and mild ventricular prominence.  No mass seen.  No midline shift.  Moderate paranasal sinus disease.    Impression  Subarachnoid hemorrhage with blood throughout the basilar cisterns.  Moderate intraventricular blood and mild ventricular  prominence.    COMMUNICATION  Findings discussed with Dr. Tong at the time of dictation.      Electronically signed by: Bernard Edmond MD  Date:    03/05/2024  Time:    08:46      Cardiac Evaluation:   N/A      uLis Michael MD  [facility]  Department of Vascular Neurology   [facility] - Neuro Critical Care

## 2024-03-05 NOTE — PLAN OF CARE
Pt tolerated cerebral angiogram procedure well under crna care, rg groin cath secured, dressing c/d/I, report called to floor nurse, transfer back to room

## 2024-03-05 NOTE — PROCEDURES
Interventional Neuroradiology Post-Procedure Note    Pre Op Diagnosis: SAH in the setting of ruptured A.Comm Aneurysm     Post Op Diagnosis: Same    Procedure: Diagnostic cerebral angiogram    Procedure performed by: Fidel MINOR, Josue; Kendy MINOR, Denver; Jaspal MINOR, David    Written Informed Consent Obtained: Yes    Specimen Removed: NO    Estimated Blood Loss: Minimal    Procedure report:     A 5F sheath was placed into the right femoral artery and a 5F Som catheter was advanced into the aortic arch.  The right vertebral, right ICA and left ICA were subselected and angiography of the brain was performed after injection into each of these vessels. Also, 3D angiogram with reconstruction was performed.     Preliminary interpretation: Previously described A.Comm aneurysm that is projecting anteriorly and superiorly.  Please see Imaging report for full details.    A right femoral artery angiogram was performed, the sheath was sutured in the right groin and left connected with heparinized saline.  No hematoma was present at the time of hemostasis.    The patient tolerated the procedure well.     Plan:  -To [facility] for monitoring  -Bed rest for 2h  -Groin check and pulse check q2h   -Avoid carrying heavy weights > 10 lbs x 24 hrs   -Remove groin dressing tomorrow                     Denver Larry MD, MHA  Fellow, NeuroEndovascular Surgery, [facility]  Neurologist, [facility] [place], [place]

## 2024-03-05 NOTE — ANESTHESIA PROCEDURE NOTES
Intubation    Date/Time: 3/5/2024 12:56 PM    Performed by: Kenna Rice CRNA  Authorized by: Ramirez Khan MD    Intubation:     Induction:  Rapid sequence induction    Intubated:  Postinduction    Mask Ventilation:  Not attempted    Attempts:  1    Attempted By:  Student    Method of Intubation:  Video laryngoscopy    Blade:  Machado 3    Laryngeal View Grade: Grade I - full view of cords      Difficult Airway Encountered?: No      Complications:  None    Airway Device:  Oral endotracheal tube    Airway Device Size:  7.5    Style/Cuff Inflation:  Cuffed (inflated to minimal occlusive pressure)    Inflation Amount (mL):  10    Tube secured:  23    Secured at:  The teeth    Placement Verified By:  Capnometry    Complicating Factors:  None    Findings Post-Intubation:  BS equal bilateral and atraumatic/condition of teeth unchanged

## 2024-03-05 NOTE — TRANSFER OF CARE
"Anesthesia Transfer of Care Note    Patient: Kalia Pacheco    Procedure(s) Performed: * No procedures listed *    Patient location: ICU    Anesthesia Type: general    Transport from OR: Transported from OR intubated on 100% O2 by AMBU with adequate controlled ventilation. Continuous ECG monitoring in transport. Continuous SpO2 monitoring in transport. Continuos invasive BP monitoring in transport    Post pain: adequate analgesia    Post assessment: no apparent anesthetic complications and tolerated procedure well    Post vital signs: stable    Level of consciousness: sedated    Nausea/Vomiting: no nausea/vomiting    Complications: none    Transfer of care protocol was followedComments: Report given to ICU RN and CRT. Pt vss, placed on ventilator in ICU. Pt tolerated well. All questions answered.       Last vitals: Visit Vitals  /48   Pulse 58   Temp 36.7 °C (98 °F) (Oral)   Resp (!) 12   Ht 5' 8" (1.727 m)   Wt 100.7 kg (222 lb 0.1 oz)   SpO2 100%   BMI 33.76 kg/m²     "

## 2024-03-05 NOTE — SUBJECTIVE & OBJECTIVE
Review of patient's allergies indicates:  No Known Allergies    Past Medical History:   Diagnosis Date    ADHD (attention deficit hyperactivity disorder)     Aneurysm     Aorta coarctation     Arthritis     Coarctation of aorta     Depression     GERD (gastroesophageal reflux disease)     History of alcohol abuse     Hypertension     Legg-Perthes disease     Seizures 05/2022     Past Surgical History:   Procedure Laterality Date    hip surgeries      x 6    open heart surgery      surgery on aorta as a child , 3 procedures       Family History       Problem Relation (Age of Onset)    Hypertension Mother, Father    No Known Problems Sister          Tobacco Use    Smoking status: Every Day     Types: Vaping with nicotine    Smokeless tobacco: Never   Substance and Sexual Activity    Alcohol use: Not Currently     Comment: rarely    Drug use: Yes     Types: Marijuana     Comment: daily    Sexual activity: Yes     Partners: Female     Objective:     Weight: 100.7 kg (222 lb 0.1 oz)  Body mass index is 33.76 kg/m².  Vital Signs (Most Recent):  Temp: 98 °F (36.7 °C) (03/05/24 1016)  Pulse: 78 (03/05/24 1120)  Resp: 18 (03/05/24 1016)  BP: 133/71 (03/05/24 1120)  SpO2: 100 % (03/05/24 1120) Vital Signs (24h Range):  Temp:  [97.5 °F (36.4 °C)-98 °F (36.7 °C)] 98 °F (36.7 °C)  Pulse:  [68-78] 78  Resp:  [12-24] 18  SpO2:  [99 %-100 %] 100 %  BP: (102-170)/(58-78) 133/71      Physical Exam  General: well developed, well nourished, no distress.   Head: normocephalic, atraumatic  Mental Status: Awake, Alert, Oriented, GCS 15  Speech: Clear with content appropriate to conversation  Cranial nerves: face symmetric, CN II-XII grossly intact.   Eyes: pupils equal, round, reactive to light, EOMI.  Sensory: intact to light touch throughout    Motor Strength: Moves all extremities spontaneously with good tone.  Full strength upper and lower extremities. No abnormal movements seen.     Pronator Drift: no drift noted  Finger-to-nose:  "Intact bilaterally    Significant Labs:  Recent Labs   Lab 03/05/24  0831   *      K 3.8      CO2 20*   BUN 13   CREATININE 0.99   CALCIUM 9.7     Recent Labs   Lab 03/05/24  0831   WBC 9.40   HGB 14.8   HCT 44.4        No results for input(s): "LABPT", "INR", "APTT" in the last 48 hours.  Microbiology Results (last 7 days)       ** No results found for the last 168 hours. **          All pertinent labs from the last 24 hours have been reviewed.    Significant Diagnostics:  CT Head Without Contrast 3/5/24:   - Diffuse SAH throughout the basal cisterns with extension into the ventricular system.     I have personally reviewed the above referenced imaging.   "

## 2024-03-05 NOTE — H&P
[facility]  Neurocritical Care  History & Physical    Admit Date: 3/5/2024  Service Date: 03/05/2024  Length of Stay: 0    Subjective:     Chief Complaint: Nontraumatic subarachnoid hemorrhage, unspecified    History of Present Illness: The patient is a 32-year-old man with a past medical history of ADHD, EtOH use and abuse, arthritis, hypertension, coarctation of the aorta (status post repair), PDA, VSD status post repair x2.  History of Legg-Perthes disease, and intracranial hemorrhage in May 2022.  [facility] and [facility] that began 2 days ago and acutely worsened while he was at work this morning. CTH demonstrated [id number] SAH. S/p EVD placement by Nsx. Was taken to IR for angiography, but the vessel was not sercured during the procedure, the patient was returned to the NSICU, intubated and sedated, [facility] neurosurgery pending.       Past Medical History:   Diagnosis Date    ADHD (attention deficit hyperactivity disorder)     Aneurysm     Aorta coarctation     Arthritis     Coarctation of aorta     Depression     GERD (gastroesophageal reflux disease)     History of alcohol abuse     Hypertension     Legg-Perthes disease     Seizures 05/2022     Past Surgical History:   Procedure Laterality Date    hip surgeries      x 6    open heart surgery      surgery on aorta as a child , 3 procedures        Current Facility-Administered Medications on File Prior to Encounter   Medication Dose Route Frequency Provider Last Rate Last Admin    [COMPLETED] acetaminophen 1,000 mg/100 mL (10 mg/mL) injection 1,000 mg  1,000 mg Intravenous ED 1 Time Gregg Tong MD   Stopped at 03/05/24 0914    [COMPLETED] ondansetron injection 4 mg  4 mg Intravenous ED 1 Time Gregg Tong MD   4 mg at 03/05/24 0840    [DISCONTINUED] levETIRAcetam in NaCl (iso-os) IVPB 500 mg  500 mg Intravenous Q12H Gregg Tong  mL/hr at 03/05/24 0857 500 mg at 03/05/24 0857     [DISCONTINUED] niCARdipine (CARDENE) 40 mg/200 mL (0.2 mg/mL) infusion             [DISCONTINUED] niCARdipine 40 mg/200 mL (0.2 mg/mL) infusion  0-15 mg/hr Intravenous Continuous Gregg Tong MD 50 mL/hr at 03/05/24 0903 10 mg/hr at 03/05/24 0903     Current Outpatient Medications on File Prior to Encounter   Medication Sig Dispense Refill    metoprolol tartrate (LOPRESSOR) 50 MG tablet Take 50 mg by mouth once daily.      metoprolol succinate (TOPROL-XL) 50 MG 24 hr tablet Take 1 tablet (50 mg total) by mouth once daily. (Patient not taking: Reported on 3/5/2024) 90 tablet 3    ondansetron (ZOFRAN-ODT) 4 MG TbDL Take 1 tablet (4 mg total) by mouth every 6 (six) hours as needed (nausea). 30 tablet 1    valACYclovir (VALTREX) 500 MG tablet Take 1 tablet (500 mg total) by mouth 2 (two) times daily. 180 tablet 2    valsartan (DIOVAN) 160 MG tablet Take 1 tablet (160 mg total) by mouth once daily. 90 tablet 3    [DISCONTINUED] HYDROcodone-acetaminophen (NORCO)  mg per tablet Take 1 tablet by mouth every 8 (eight) hours as needed for Pain. (Patient not taking: Reported on 7/25/2023) 21 tablet 0      Allergies: Patient has no known allergies.  Family History   Problem Relation Age of Onset    Hypertension Mother     Hypertension Father     No Known Problems Sister      Social History     Tobacco Use    Smoking status: Every Day     Types: Vaping with nicotine    Smokeless tobacco: Never   Substance Use Topics    Alcohol use: Not Currently     Comment: rarely    Drug use: Yes     Types: Marijuana     Comment: daily     Review of Systems   HENT:  Negative for congestion.    Respiratory:  Negative for cough, chest tightness and shortness of breath.    Cardiovascular:  Negative for chest pain.   Gastrointestinal:  Negative for abdominal pain, constipation, diarrhea and vomiting.   Genitourinary:  Negative for difficulty urinating.   Musculoskeletal:  Negative for back pain, gait problem, neck pain and neck  stiffness.   Skin:  Negative for pallor and rash.   Neurological:  Positive for headaches. Negative for seizures, syncope and light-headedness.   Psychiatric/Behavioral:  Negative for confusion and dysphoric mood.      Objective:     Vitals:    Temp: 98 °F (36.7 °C)  Pulse: 72  BP: (!) 142/67  MAP (mmHg): 96  ICP Mean (mmHg): 15 mmHg  Resp: 18  ETCO2 (mmHg): 0 mmHg  SpO2: 100 %  Oxygen Concentration (%): 40  Vent Mode: A/C  Set Rate: 16 BPM  Vt Set: 500 mL  PEEP/CPAP: 5 cmH20  Peak Airway Pressure: 21 cmH20  Mean Airway Pressure: 8.8 cmH20  Plateau Pressure: 0 cmH20    Temp  Min: 97.5 °F (36.4 °C)  Max: 98 °F (36.7 °C)  Pulse  Min: 59  Max: 87  BP  Min: 87/46  Max: 170/70  MAP (mmHg)  Min: 63  Max: 101  ICP Mean (mmHg)  Min: 5 mmHg  Max: 15 mmHg  Resp  Min: 0  Max: 28  ETCO2 (mmHg)  Min: 0 mmHg  Max: 34 mmHg  SpO2  Min: 97 %  Max: 100 %  Oxygen Concentration (%)  Min: 40  Max: 40    No intake/output data recorded.            Physical Exam  Vitals and nursing note reviewed.   Constitutional:       General: He is not in acute distress.     Appearance: Normal appearance. He is normal weight. He is not ill-appearing, toxic-appearing or diaphoretic.   HENT:      Head: Normocephalic and atraumatic.   Eyes:      Extraocular Movements: Extraocular movements intact.      Pupils: Pupils are equal, round, and reactive to light.   Cardiovascular:      Rate and Rhythm: Normal rate and regular rhythm.      Pulses: Normal pulses.      Heart sounds: Normal heart sounds.   Pulmonary:      Effort: Pulmonary effort is normal. No respiratory distress.      Breath sounds: No wheezing, rhonchi or rales.   Chest:      Chest wall: No tenderness.   Abdominal:      General: Abdomen is flat.      Palpations: Abdomen is soft.      Tenderness: There is no abdominal tenderness. There is no guarding or rebound.   Musculoskeletal:         General: No swelling or tenderness. Normal range of motion.      Cervical back: Normal range of motion and  neck supple. No rigidity or tenderness.      Right lower leg: No edema.      Left lower leg: No edema.   Skin:     General: Skin is warm and dry.      Capillary Refill: Capillary refill takes less than 2 seconds.      Coloration: Skin is not jaundiced.      Findings: No erythema.   Neurological:      General: No focal deficit present.      Mental Status: He is alert. He is disoriented.      Cranial Nerves: No cranial nerve deficit.      Sensory: No sensory deficit.      Motor: No weakness.      Comments: Cranial nerves 2-12 grossly intact bilaterally, there was no pronator drift, strength and sensation preserved and equal in the upper and lower extremities, there is no dysmetria   Psychiatric:         Mood and Affect: Mood normal.         Behavior: Behavior normal.         Thought Content: Thought content normal.         Judgment: Judgment normal.         Assessment/Plan:     Neuro  * Nontraumatic subarachnoid hemorrhage, unspecified  33-year-old male with a suspected A-comm aneurysm rupture complicated by intraventricular hemorrhage status post EVD placement and angiography with unsuccessful vessel was intubated and sedated, likely pending surgical intervention.    - q1h neuro checks  - sheath care per IR recs  - SBP < 140, MAP > 65  - Propofol and fentanyl for sedation  - Cardene and Norepi as needed for tight BP control  - OGT for meds  - Mechanical ventilation pending surgery  - Strict I & O  - Hold DVT Ppx for now  - EVD per NSGY  - Trendelenburg at 15deg  - zofran PRN for vomiting    Hold Ppx until after surgery  A-line, PIVs       IVH (intraventricular hemorrhage)  See Principle problem      Other hydrocephalus  See Principle problem    Anterior communicating artery aneurysm  See Principle problem    Cardiac/Vascular  Hypertension  -Hold home Losartan 160 and Toprol 50 until after surgery          The patient is being Prophylaxed for:  Venous Thromboembolism with: Mechanical  Stress Ulcer with:  H2B  Ventilator Pneumonia with: chlorhexidine oral care    Activity Orders            Turn patient starting at 03/05 1200    Elevate HOB starting at 03/05 1119    Diet NPO: NPO starting at 03/05 1119          Full Code    Eduard Arshad MD  Neurocritical Care  [facility]

## 2024-03-05 NOTE — ASSESSMENT & PLAN NOTE
33 year old gentleman with history of CHESTER aneurysm [facility] evaluation after OSH-ED evaluation and workup consistent with SAH. NIHSS = 1. [employer] Grade I. Neurologic exam non-focal; notable for somnolence, discomfort, and decreased attention (requires repeated stimulation). 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    Admitted to Neurocritical Care. Neurosurgery at bedside preparing for EVD placement. Plan for angiogram thereafter/hemicrani/vasospasm watch. Nimodipine on MAR. Consider anti-seizure prophylaxis.     Antithrombotics for secondary stroke prevention: Antiplatelets: None: SAH    Statins for secondary stroke prevention and hyperlipidemia, if present:   N/A    Aggressive risk factor modification: HTN     Rehab efforts: The patient has been evaluated by a stroke team provider and the therapy needs have been fully considered based off the presenting complaints and exam findings. The following therapy evaluations are needed: PT evaluate and treat, OT evaluate and treat, SLP evaluate and treat, PM&R evaluate for appropriate placement    Diagnostics ordered/pending: Other: Angiogram    VTE prophylaxis: None: Reason for No Pharmacological VTE Prophylaxis: SAH    BP parameters: SAH: Unsecured aneurysm, SBP <140

## 2024-03-05 NOTE — ASSESSMENT & PLAN NOTE
Kalia Pacheco is a 34yo man with history of known CHESTER aneurysm who presented to [place] with headache and nausea. CTH demonstrated diffuse SAH throughout the basal cisterns HH and [place] was [place] [facility] [place] in the Neuro ICU and Neurosurgical consultation.    - Please obtain STAT CTA head  - Admit to [facility] with q1 neuro checks  - NSGY planning for EVD placement   - Please obtain STAT coags and type and screen   - Please order lidocaine with epi, fentanyl 100mg, and versed 2mg to use for the procedure   - Please ensure recording of hourly outputs  - Consult to Neuro IR for emergent DSA  - HOB@30   - Keppra 500 BID x7d   - TCDs daily x14d   - Nimotop 60q4 x 21d   - Continue to follow clinically and notify NSGY immediately if any neurostatus change     Case and plan discussed with attending Neurosurgeon Dr. Mendoza

## 2024-03-05 NOTE — ANESTHESIA PREPROCEDURE EVALUATION
[facility]  Anesthesia Pre-Operative Evaluation         Patient Name: Kalia Pacheco  YOB: 1990  MRN: [id number]    SUBJECTIVE:     Pre-operative evaluation for  IR cerebral angiogram with possible intervention     03/05/2024    Kalia Pacheco is a 33 y.o. male with a significant medical history of known CHESTER aneurysm, HTN, LCP disease, coarctation of the aorta/ASD/VSD repair as a child, and GERD now presenting with subarachnoid hemorrhage.     CTA reveals evolving subarachnoid hemorrhage throughout the basal cisterns. Interval increase distension lateral and 3rd ventricles compatible with developing hydrocephalus. He now presents for the above procedure.    NPO since last night around 8PM. Currently with nausea and emesis. PIV 18g x2. On RA satting >98%.    LDA:        Peripheral IV - Single Lumen 03/05/24 18 G Left;Posterior Hand (Active)   Number of days: 0            Peripheral IV - Single Lumen 03/05/24 0834 18 G Posterior;Right Hand (Active)   Site Assessment Clean;Intact;Dry;No redness;No swelling 03/05/24 0834   Number of days: 0       Prev airway: None documented.      Patient Active Problem List   Diagnosis    Aorta coarctation    Hypertension    Anterior communicating artery aneurysm    Subarachnoid hemorrhage       Review of patient's allergies indicates:  No Known Allergies    Current Inpatient Medications:   LIDOcaine-EPINEPHrine (PF) 2%-1:200,000  1 mL Intradermal Once    niMODipine  60 mg Oral Q4H    ondansetron        senna-docusate 8.6-50 mg  1 tablet Oral BID       No current facility-administered medications on file prior to encounter.     Current Outpatient Medications on File Prior to Encounter   Medication Sig Dispense Refill    metoprolol succinate (TOPROL-XL) 50 MG 24 hr tablet Take 1 tablet (50 mg total) by mouth once daily. 90 tablet 3    ondansetron (ZOFRAN-ODT) 4 MG TbDL Take 1 tablet (4 mg total) by mouth every 6 (six) hours as needed  "(nausea). 30 tablet 1    valACYclovir (VALTREX) 500 MG tablet Take 1 tablet (500 mg total) by mouth 2 (two) times daily. 180 tablet 2    valsartan (DIOVAN) 160 MG tablet Take 1 tablet (160 mg total) by mouth once daily. 90 tablet 3       Past Surgical History:   Procedure Laterality Date    hip surgeries      x 6    open heart surgery      surgery on aorta as a child , 3 procedures         Social History     Socioeconomic History    Marital status: Single    Number of children: 0   Tobacco Use    Smoking status: Every Day     Types: Vaping with nicotine    Smokeless tobacco: Never   Substance and Sexual Activity    Alcohol use: Not Currently     Comment: rarely    Drug use: Yes     Types: Marijuana     Comment: daily    Sexual activity: Yes     Partners: Female   Social History Narrative    ** Merged History Encounter **         Single- lives with parents.       OBJECTIVE:     Vital Signs Range:      3/5/2024     8:30 AM 3/5/2024    10:16 AM 3/5/2024    11:20 AM   Vitals - 1 value per visit   SYSTOLIC 164 102    DIASTOLIC 78 58    Pulse 71 74    Temp 36.4 °C (97.5 °F) 36.7 °C (98 °F)    Resp 24 18    SPO2 100 % 100 %    Weight (lb) 183.2  222   Weight (kg) 83.099  100.7   Height   5' 8" (1.727 m)   BMI (Calculated)   33.8         CBC:   Lab Results   Component Value Date    WBC 9.40 03/05/2024    HGB 14.8 03/05/2024    HCT 44.4 03/05/2024    MCV 96 03/05/2024     03/05/2024         CMP:   Sodium   Date Value Ref Range Status   03/05/2024 137 136 - 145 mmol/L Final     Potassium   Date Value Ref Range Status   03/05/2024 3.8 3.5 - 5.1 mmol/L Final     Chloride   Date Value Ref Range Status   03/05/2024 105 95 - 110 mmol/L Final     CO2   Date Value Ref Range Status   03/05/2024 20 (L) 23 - 29 mmol/L Final     Glucose   Date Value Ref Range Status   03/05/2024 151 (H) 74 - 106 mg/dL Final     BUN   Date Value Ref Range Status   03/05/2024 13 9 - 20 mg/dL Final     Creatinine   Date Value Ref Range Status "   03/05/2024 0.99 0.80 - 1.50 mg/dL Final     Calcium   Date Value Ref Range Status   03/05/2024 9.7 8.4 - 10.2 mg/dL Final     Total Protein   Date Value Ref Range Status   03/05/2024 7.1 6.3 - 8.2 g/dL Final     Albumin   Date Value Ref Range Status   03/05/2024 4.5 3.5 - 5.2 g/dL Final     Total Bilirubin   Date Value Ref Range Status   03/05/2024 0.8 0.2 - 1.3 mg/dL Final     Alkaline Phosphatase   Date Value Ref Range Status   03/05/2024 56 38 - 145 U/L Final     AST   Date Value Ref Range Status   03/05/2024 57 17 - 59 U/L Final     ALT   Date Value Ref Range Status   03/05/2024 44 10 - 44 U/L Final     Anion Gap   Date Value Ref Range Status   03/05/2024 12 8 - 16 mmol/L Final     eGFR if    Date Value Ref Range Status   05/19/2022 >60 >60 mL/min/1.73 m^2 Final     eGFR if non    Date Value Ref Range Status   05/19/2022 >60 >60 mL/min/1.73 m^2 Final     Comment:     Calculation used to obtain the estimated glomerular filtration  rate (eGFR) is the CKD-EPI equation.          INR:  Lab Results   Component Value Date    INR 1.1 03/05/2024    INR 1.0 03/13/2023       EKG:   Results for orders placed or performed during the hospital encounter of 03/05/24   EKG 12-lead    Collection Time: 03/05/24  8:43 AM   Result Value Ref Range    QRS Duration 134 ms    OHS QTC Calculation 447 ms    Narrative    Test Reason : R07.9,    Vent. Rate : 070 BPM     Atrial Rate : 070 BPM     P-R Int : 228 ms          QRS Dur : 134 ms      QT Int : 414 ms       P-R-T Axes : 018 066 -47 degrees     QTc Int : 447 ms    Sinus rhythm with 1st degree A-V block with occasional Premature  ventricular complexes  LVH with QRS widening and repolarization abnormality ( Sokolow-Jernigan ,   Terry product , Romhilt-Almonte )  Lateral infarct ,age undetermined  Abnormal ECG  When compared with ECG of 23-SEP-1997 08:50,  PREVIOUS ECG IS PRESENT    Referred By: JARRED OTT           Confirmed By:         2D ECHO:   No  results found for this or any previous visit.         ASSESSMENT/PLAN:         Pre-op Assessment    I have reviewed the Patient Summary Reports.     I have reviewed the Nursing Notes. I have reviewed the NPO Status.   I have reviewed the Medications.     Review of Systems  Anesthesia Hx:  No problems with previous Anesthesia   History of prior surgery of interest to airway management or planning:          Denies Family Hx of Anesthesia complications.    Denies Personal Hx of Anesthesia complications.                    Social:  Smoker, Alcohol Use MJ use      Hematology/Oncology:  Hematology Normal   Oncology Normal                                   EENT/Dental:  EENT/Dental Normal           Cardiovascular:  Exercise tolerance: good   Hypertension              ECG has been reviewed. Hx of aortic coarctation/ASD/VSD repair as child                         Pulmonary:  Pulmonary Normal                       Renal/:  Renal/ Normal                 Hepatic/GI:     GERD             Musculoskeletal:  Musculoskeletal Normal                Neurological:   CVA, no residual symptoms    Seizures, well controlled                                Endocrine:  Endocrine Normal            Dermatological:  Skin Normal    Psych:    depression                Physical Exam  General: Confusion and Alert    Airway:  Mallampati: II     Dental:  Intact        Anesthesia Plan  Type of Anesthesia, risks & benefits discussed:    Anesthesia Type: Gen ETT  Intra-op Monitoring Plan: Standard ASA Monitors  Post Op Pain Control Plan: multimodal analgesia and IV/PO Opioids PRN  Induction:  IV  Airway Plan: Direct and Video, Post-Induction  Informed Consent: Informed consent signed with the Patient and all parties understand the risks and agree with anesthesia plan.  All questions answered.   ASA Score: 4 Emergent  Day of Surgery Review of History & Physical: H&P Update referred to the surgeon/provider.    Ready For Surgery From Anesthesia  Perspective.     .

## 2024-03-05 NOTE — PROCEDURES
A time-out was completed verifying correct patient, procedure, and site. All pre-operative labs and imaging were reviewed. The scalp was clipped. Patient was prepped with ChloraPrep and draped in a sterile manner. Incisions were infiltrated with 1% Xylocaine with epinephrine 1:419651 and post-procedural antibiotics were given. An incision was made on the right side of the head at the mid-pupillary line, 11 cm behind the nasion. A self-retaining retractor was placed. A burrhole was drilled with the cranial twist drill and the dura was punctured with the EVD catheter trochar. The ventricular catheter was then passed into the ventricle and brought out through a separate stab wound, the depth of catheter was 7.5 cm from the outer table of the skull. There was intermittent drainage of CSF after that. The catheter was secured to the scalp with #2-0 silk suture, stapled to the scalp at various places along its length, and the incision was closed with staples. The patient tolerated the procedure well. No complications. Sponge and needle counts were correct. Blood loss is minimal. Post-operative CTH was ordered for confirmation of catheter placement.

## 2024-03-05 NOTE — ANESTHESIA PROCEDURE NOTES
Arterial    Diagnosis: SAH    Patient location during procedure: done in OR  Timeout: 3/5/2024 1:00 PM  Procedure end time: 3/5/2024 1:05 PM    Staffing  Authorizing Provider: Ramirez Khan MD  Performing Provider: Ramirez Khan MD    Staffing  Performed by: Ramirez Khan MD  Authorized by: Ramirez Khan MD    Anesthesiologist was present at the time of the procedure.    Preanesthetic Checklist  Completed: patient identified, IV checked, site marked, risks and benefits discussed, surgical consent, monitors and equipment checked, pre-op evaluation, timeout performed and anesthesia consent givenArterial  Skin Prep: chlorhexidine gluconate  Local Infiltration: none  Orientation: right  Location: ulnar    Catheter Size: 20 G  Catheter placement by Ultrasound guidance. Heme positive aspiration all ports.   Vessel Caliber: small, patent, compressibility normal  Vascular Doppler:  not done  Needle advanced into vessel with real time Ultrasound guidance.  Guidewire confirmed in vessel.  Sterile sheath used.Insertion Attempts: 1  Assessment  Dressing: secured with tape and tegaderm  Patient: Tolerated well

## 2024-03-05 NOTE — ED TRIAGE NOTES
Patient identifiers for Kalia Pacheco 33 y.o. male checked and correct.  Chief Complaint   Patient presents with    headbleed transfer     Past Medical History:   Diagnosis Date    ADHD (attention deficit hyperactivity disorder)     Aneurysm     Aorta coarctation     Arthritis     Coarctation of aorta     Depression     GERD (gastroesophageal reflux disease)     History of alcohol abuse     Hypertension     Legg-Perthes disease     Seizures 05/2022     Allergies reported: Review of patient's allergies indicates:  No Known Allergies      LOC: Patient is awake, alert, and aware of environment with an appropriate affect. Patient is oriented x 4 and speaking appropriately.  APPEARANCE: Patient resting comfortably and in no acute distress. Patient is clean and well groomed, patient's clothing is properly fastened.  SKIN: The skin is warm and dry. Patient has normal skin turgor and moist mucus membranes.   MUSKULOSKELETAL: Patient is moving all extremities well, no obvious deformities noted. Pulses intact.   RESPIRATORY: Airway is open and patent. Respirations are spontaneous and non-labored with normal effort and rate.  CARDIAC: Patient has a normal rate and rhythm. Normal sinus on cardiac monitor. No peripheral edema noted.   ABDOMEN: No distention noted. Soft and non-tender upon palpation.  NEUROLOGICAL:PERRL. Facial expression is symmetrical. Hand grasps are equal bilaterally. Normal sensation in all extremities when touched with finger. Reports a headache

## 2024-03-05 NOTE — SUBJECTIVE & OBJECTIVE
Past Medical History:   Diagnosis Date    ADHD (attention deficit hyperactivity disorder)     Aneurysm     Aorta coarctation     Arthritis     Coarctation of aorta     Depression     GERD (gastroesophageal reflux disease)     History of alcohol abuse     Hypertension     Legg-Perthes disease     Seizures 05/2022     Past Surgical History:   Procedure Laterality Date    hip surgeries      x 6    open heart surgery      surgery on aorta as a child , 3 procedures        Current Facility-Administered Medications on File Prior to Encounter   Medication Dose Route Frequency Provider Last Rate Last Admin    [COMPLETED] acetaminophen 1,000 mg/100 mL (10 mg/mL) injection 1,000 mg  1,000 mg Intravenous ED 1 Time Gregg Tong MD   Stopped at 03/05/24 0914    [COMPLETED] ondansetron injection 4 mg  4 mg Intravenous ED 1 Time Gregg Tong MD   4 mg at 03/05/24 0840    [DISCONTINUED] levETIRAcetam in NaCl (iso-os) IVPB 500 mg  500 mg Intravenous Q12H Gregg Tong  mL/hr at 03/05/24 0857 500 mg at 03/05/24 0857    [DISCONTINUED] niCARdipine (CARDENE) 40 mg/200 mL (0.2 mg/mL) infusion             [DISCONTINUED] niCARdipine 40 mg/200 mL (0.2 mg/mL) infusion  0-15 mg/hr Intravenous Continuous Gregg Tong MD 50 mL/hr at 03/05/24 0903 10 mg/hr at 03/05/24 0903     Current Outpatient Medications on File Prior to Encounter   Medication Sig Dispense Refill    metoprolol tartrate (LOPRESSOR) 50 MG tablet Take 50 mg by mouth once daily.      metoprolol succinate (TOPROL-XL) 50 MG 24 hr tablet Take 1 tablet (50 mg total) by mouth once daily. (Patient not taking: Reported on 3/5/2024) 90 tablet 3    ondansetron (ZOFRAN-ODT) 4 MG TbDL Take 1 tablet (4 mg total) by mouth every 6 (six) hours as needed (nausea). 30 tablet 1    valACYclovir (VALTREX) 500 MG tablet Take 1 tablet (500 mg total) by mouth 2 (two) times daily. 180 tablet 2    valsartan (DIOVAN) 160 MG tablet Take 1 tablet (160 mg total) by mouth once daily.  90 tablet 3    [DISCONTINUED] HYDROcodone-acetaminophen (NORCO)  mg per tablet Take 1 tablet by mouth every 8 (eight) hours as needed for Pain. (Patient not taking: Reported on 7/25/2023) 21 tablet 0      Allergies: Patient has no known allergies.  Family History   Problem Relation Age of Onset    Hypertension Mother     Hypertension Father     No Known Problems Sister      Social History     Tobacco Use    Smoking status: Every Day     Types: Vaping with nicotine    Smokeless tobacco: Never   Substance Use Topics    Alcohol use: Not Currently     Comment: rarely    Drug use: Yes     Types: Marijuana     Comment: daily     Review of Systems   HENT:  Negative for congestion.    Respiratory:  Negative for cough, chest tightness and shortness of breath.    Cardiovascular:  Negative for chest pain.   Gastrointestinal:  Negative for abdominal pain, constipation, diarrhea and vomiting.   Genitourinary:  Negative for difficulty urinating.   Musculoskeletal:  Negative for back pain, gait problem, neck pain and neck stiffness.   Skin:  Negative for pallor and rash.   Neurological:  Positive for headaches. Negative for seizures, syncope and light-headedness.   Psychiatric/Behavioral:  Negative for confusion and dysphoric mood.      Objective:     Vitals:    Temp: 98 °F (36.7 °C)  Pulse: 72  BP: (!) 142/67  MAP (mmHg): 96  ICP Mean (mmHg): 15 mmHg  Resp: 18  ETCO2 (mmHg): 0 mmHg  SpO2: 100 %  Oxygen Concentration (%): 40  Vent Mode: A/C  Set Rate: 16 BPM  Vt Set: 500 mL  PEEP/CPAP: 5 cmH20  Peak Airway Pressure: 21 cmH20  Mean Airway Pressure: 8.8 cmH20  Plateau Pressure: 0 cmH20    Temp  Min: 97.5 °F (36.4 °C)  Max: 98 °F (36.7 °C)  Pulse  Min: 59  Max: 87  BP  Min: 87/46  Max: 170/70  MAP (mmHg)  Min: 63  Max: 101  ICP Mean (mmHg)  Min: 5 mmHg  Max: 15 mmHg  Resp  Min: 0  Max: 28  ETCO2 (mmHg)  Min: 0 mmHg  Max: 34 mmHg  SpO2  Min: 97 %  Max: 100 %  Oxygen Concentration (%)  Min: 40  Max: 40    No intake/output data  recorded.            Physical Exam  Vitals and nursing note reviewed.   Constitutional:       General: He is not in acute distress.     Appearance: Normal appearance. He is normal weight. He is not ill-appearing, toxic-appearing or diaphoretic.   HENT:      Head: Normocephalic and atraumatic.   Eyes:      Extraocular Movements: Extraocular movements intact.      Pupils: Pupils are equal, round, and reactive to light.   Cardiovascular:      Rate and Rhythm: Normal rate and regular rhythm.      Pulses: Normal pulses.      Heart sounds: Normal heart sounds.   Pulmonary:      Effort: Pulmonary effort is normal. No respiratory distress.      Breath sounds: No wheezing, rhonchi or rales.   Chest:      Chest wall: No tenderness.   Abdominal:      General: Abdomen is flat.      Palpations: Abdomen is soft.      Tenderness: There is no abdominal tenderness. There is no guarding or rebound.   Musculoskeletal:         General: No swelling or tenderness. Normal range of motion.      Cervical back: Normal range of motion and neck supple. No rigidity or tenderness.      Right lower leg: No edema.      Left lower leg: No edema.   Skin:     General: Skin is warm and dry.      Capillary Refill: Capillary refill takes less than 2 seconds.      Coloration: Skin is not jaundiced.      Findings: No erythema.   Neurological:      General: No focal deficit present.      Mental Status: He is alert. He is disoriented.      Cranial Nerves: No cranial nerve deficit.      Sensory: No sensory deficit.      Motor: No weakness.      Comments: Cranial nerves 2-12 grossly intact bilaterally, there was no pronator drift, strength and sensation preserved and equal in the upper and lower extremities, there is no dysmetria   Psychiatric:         Mood and Affect: Mood normal.         Behavior: Behavior normal.         Thought Content: Thought content normal.         Judgment: Judgment normal.

## 2024-03-05 NOTE — HPI
The patient is a 32-year-old man with a past medical history of ADHD, EtOH use and abuse, arthritis, hypertension, coarctation of the aorta (status post repair), PDA, VSD status post repair x2.  History of Legg-Perthes disease, and intracranial hemorrhage in May 2022.  presented to [facility] this AM with headache and nausea that began 2 days ago and acutely worsened while he was at work this morning. CTH demonstrated HH1mF4 SAH. S/p EVD placement by Nsx. Was taken to IR for angiography, but the vessel was not sercured during the procedure, the patient was returned to the NSICU, intubated and sedated, [facility] neurosurgery pending.

## 2024-03-05 NOTE — ED PROVIDER NOTES
Encounter Date: 3/5/2024       History     Chief Complaint   Patient presents with    headbleed transfer     HPI  34 Y/O M with Dx'ed SAH @ [facility] is BIBA reporting nausea and emesis x1 with SBP <120. Patient denies any complaints eating bandlike headache.  No ipsilateral numbness or weakness.  Nausea resolved.  No chest/back/abdominal pain.  Denies recent illness.  No allergies to any medications.    Review of patient's allergies indicates:  No Known Allergies  Past Medical History:   Diagnosis Date    ADHD (attention deficit hyperactivity disorder)     Aneurysm     Aorta coarctation     Arthritis     Coarctation of aorta     Depression     GERD (gastroesophageal reflux disease)     History of alcohol abuse     Hypertension     Legg-Perthes disease     Seizures 05/2022     Past Surgical History:   Procedure Laterality Date    hip surgeries      x 6    open heart surgery      surgery on aorta as a child , 3 procedures       Family History   Problem Relation Age of Onset    Hypertension Mother     Hypertension Father     No Known Problems Sister      Social History     Tobacco Use    Smoking status: Every Day     Types: Vaping with nicotine    Smokeless tobacco: Never   Substance Use Topics    Alcohol use: Not Currently     Comment: rarely    Drug use: Yes     Types: Marijuana     Comment: daily     Review of Systems    Physical Exam     Initial Vitals [03/05/24 1016]   BP Pulse Resp Temp SpO2   (!) 102/58 74 18 98 °F (36.7 °C) 100 %      MAP       --         Physical Exam  GENERAL: Well-appearing and Non-Toxic; Well-Nourished; NAD.  HEENT: AT/NC; anicteric; PERRL, EOMI, Acuity & Fields WNL; speaking full sentences with no drooling.  NECK: Supple, FROM with no meningismus, no accessory muscle use.  HEART: Regular rate and rhythm, no M/G/T.   LUNGS: No Tachypnea, No Retractions, and CTA B/L with no W/R/R.  ABDOMEN: Soft, ND, NTTP. No rigidity. No guarding.   BACK: Atraumatic  EXTREMITIES: FROM.   SKIN:  Warm, Dry, No Skin Tears or Rashes.  VASCULAR: 2+ pulses Prox/Dist &amp; Symm with no delay.  NEUROLOGIC: AAOx3, No Receptive or Expressive Aphasia, Answering Questions Appropriately and following commands, + Mild Perseveration; CN/PN Intact, Strength 5/5 with intact sensation to light touch to all 4 extremity; No no truncal Ataxia.    ED Course   Procedures  Labs Reviewed   APTT   PROTIME-INR   TYPE & SCREEN          Imaging Results              CTA Head and Neck (xpd) (In process)                      Medications   LIDOcaine-EPINEPHrine (PF) 2%-1:200,000 injection 1 mL (has no administration in time range)     Medical Decision Making  Transfer from [facility] and general secondary to subarachnoid hemorrhage warranting emergent neurosurgical evaluation and admission to neuro critical care unit.  No medications need of reversal agents.  Blood pressure monitored and controlled.  CTA ordered by neurosurgery team.  ____________________  Td Casillas MD, [facility]  Emergency Medicine Staff  10:47 AM 3/5/2024      Risk  Decision regarding hospitalization.                                      Clinical Impression:  Final diagnoses:  [I60.9] Subarachnoid hemorrhage          ED Disposition Condition    Admit                 Deon Casillas MD  03/05/24 1049

## 2024-03-06 LAB
ALLENS TEST: ABNORMAL
ANION GAP SERPL CALC-SCNC: 13 MMOL/L (ref 8–16)
BASOPHILS # BLD AUTO: 0.02 K/UL (ref 0–0.2)
BASOPHILS NFR BLD: 0.2 % (ref 0–1.9)
BUN SERPL-MCNC: 9 MG/DL (ref 6–20)
CALCIUM SERPL-MCNC: 8.8 MG/DL (ref 8.7–10.5)
CHLORIDE SERPL-SCNC: 111 MMOL/L (ref 95–110)
CO2 SERPL-SCNC: 18 MMOL/L (ref 23–29)
CREAT SERPL-MCNC: 0.9 MG/DL (ref 0.5–1.4)
DELSYS: ABNORMAL
DIFFERENTIAL METHOD BLD: ABNORMAL
EOSINOPHIL # BLD AUTO: 0.1 K/UL (ref 0–0.5)
EOSINOPHIL NFR BLD: 0.6 % (ref 0–8)
ERYTHROCYTE [DISTWIDTH] IN BLOOD BY AUTOMATED COUNT: 13 % (ref 11.5–14.5)
ERYTHROCYTE [SEDIMENTATION RATE] IN BLOOD BY WESTERGREN METHOD: 16 MM/H
EST. GFR  (NO RACE VARIABLE): >60 ML/MIN/1.73 M^2
FIO2: 40
GLUCOSE SERPL-MCNC: 107 MG/DL (ref 70–110)
HCO3 UR-SCNC: 20.2 MMOL/L (ref 24–28)
HCT VFR BLD AUTO: 38.6 % (ref 40–54)
HGB BLD-MCNC: 12.9 G/DL (ref 14–18)
IMM GRANULOCYTES # BLD AUTO: 0.03 K/UL (ref 0–0.04)
IMM GRANULOCYTES NFR BLD AUTO: 0.3 % (ref 0–0.5)
LYMPHOCYTES # BLD AUTO: 1.2 K/UL (ref 1–4.8)
LYMPHOCYTES NFR BLD: 11.7 % (ref 18–48)
MAGNESIUM SERPL-MCNC: 1.8 MG/DL (ref 1.6–2.6)
MCH RBC QN AUTO: 31.9 PG (ref 27–31)
MCHC RBC AUTO-ENTMCNC: 33.4 G/DL (ref 32–36)
MCV RBC AUTO: 96 FL (ref 82–98)
MODE: ABNORMAL
MONOCYTES # BLD AUTO: 0.5 K/UL (ref 0.3–1)
MONOCYTES NFR BLD: 4.3 % (ref 4–15)
NEUTROPHILS # BLD AUTO: 8.7 K/UL (ref 1.8–7.7)
NEUTROPHILS NFR BLD: 82.9 % (ref 38–73)
NRBC BLD-RTO: 0 /100 WBC
PA AA PRP-ACNC: 405 ARU (ref 620–672)
PCO2 BLDA: 31.8 MMHG (ref 35–45)
PEEP: 5
PH SMN: 7.41 [PH] (ref 7.35–7.45)
PHOSPHATE SERPL-MCNC: 3.2 MG/DL (ref 2.7–4.5)
PLATELET # BLD AUTO: 194 K/UL (ref 150–450)
PLATELET FUNCTION ASSAY - EPINEPHRINE: 124 SECS (ref 76–199)
PLATELET RESPONSE PLAVIX: 228 PRU (ref 194–418)
PMV BLD AUTO: 10.2 FL (ref 9.2–12.9)
PO2 BLDA: 188 MMHG (ref 80–100)
POC BE: -4 MMOL/L
POC SATURATED O2: 100 % (ref 95–100)
POC TCO2: 21 MMOL/L (ref 23–27)
POCT GLUCOSE: 121 MG/DL (ref 70–110)
POCT GLUCOSE: 98 MG/DL (ref 70–110)
POTASSIUM SERPL-SCNC: 3.9 MMOL/L (ref 3.5–5.1)
RBC # BLD AUTO: 4.04 M/UL (ref 4.6–6.2)
SAMPLE: ABNORMAL
SITE: ABNORMAL
SODIUM SERPL-SCNC: 142 MMOL/L (ref 136–145)
SP02: 100
VT: 500
WBC # BLD AUTO: 10.48 K/UL (ref 3.9–12.7)

## 2024-03-06 PROCEDURE — 85576 BLOOD PLATELET AGGREGATION: CPT | Mod: 91

## 2024-03-06 PROCEDURE — 85576 BLOOD PLATELET AGGREGATION: CPT | Performed by: PSYCHIATRY & NEUROLOGY

## 2024-03-06 PROCEDURE — 27100171 HC OXYGEN HIGH FLOW UP TO 24 HOURS

## 2024-03-06 PROCEDURE — 80048 BASIC METABOLIC PNL TOTAL CA: CPT

## 2024-03-06 PROCEDURE — 99900017 HC EXTUBATION W/PARAMETERS (STAT)

## 2024-03-06 PROCEDURE — 83735 ASSAY OF MAGNESIUM: CPT

## 2024-03-06 PROCEDURE — 82803 BLOOD GASES ANY COMBINATION: CPT

## 2024-03-06 PROCEDURE — 25000003 PHARM REV CODE 250: Performed by: PSYCHIATRY & NEUROLOGY

## 2024-03-06 PROCEDURE — 63600175 PHARM REV CODE 636 W HCPCS: Performed by: STUDENT IN AN ORGANIZED HEALTH CARE EDUCATION/TRAINING PROGRAM

## 2024-03-06 PROCEDURE — 99291 CRITICAL CARE FIRST HOUR: CPT | Mod: ,,, | Performed by: PSYCHIATRY & NEUROLOGY

## 2024-03-06 PROCEDURE — 25000003 PHARM REV CODE 250: Performed by: STUDENT IN AN ORGANIZED HEALTH CARE EDUCATION/TRAINING PROGRAM

## 2024-03-06 PROCEDURE — 99900035 HC TECH TIME PER 15 MIN (STAT)

## 2024-03-06 PROCEDURE — 84100 ASSAY OF PHOSPHORUS: CPT

## 2024-03-06 PROCEDURE — 25000003 PHARM REV CODE 250

## 2024-03-06 PROCEDURE — 94761 N-INVAS EAR/PLS OXIMETRY MLT: CPT | Mod: XB

## 2024-03-06 PROCEDURE — 94010 BREATHING CAPACITY TEST: CPT

## 2024-03-06 PROCEDURE — 85025 COMPLETE CBC W/AUTO DIFF WBC: CPT

## 2024-03-06 PROCEDURE — 63600175 PHARM REV CODE 636 W HCPCS

## 2024-03-06 PROCEDURE — 99233 SBSQ HOSP IP/OBS HIGH 50: CPT | Mod: ,,, | Performed by: PSYCHIATRY & NEUROLOGY

## 2024-03-06 PROCEDURE — 94003 VENT MGMT INPAT SUBQ DAY: CPT

## 2024-03-06 PROCEDURE — 37799 UNLISTED PX VASCULAR SURGERY: CPT

## 2024-03-06 PROCEDURE — 25000003 PHARM REV CODE 250: Performed by: PHYSICIAN ASSISTANT

## 2024-03-06 PROCEDURE — 20000000 HC ICU ROOM

## 2024-03-06 PROCEDURE — 27200966 HC CLOSED SUCTION SYSTEM

## 2024-03-06 PROCEDURE — 94150 VITAL CAPACITY TEST: CPT

## 2024-03-06 RX ORDER — NAPROXEN SODIUM 220 MG/1
81 TABLET, FILM COATED ORAL DAILY
Status: DISCONTINUED | OUTPATIENT
Start: 2024-03-07 | End: 2024-03-20 | Stop reason: HOSPADM

## 2024-03-06 RX ORDER — OXYCODONE HYDROCHLORIDE 5 MG/1
5 TABLET ORAL EVERY 6 HOURS PRN
Status: DISCONTINUED | OUTPATIENT
Start: 2024-03-06 | End: 2024-03-20 | Stop reason: HOSPADM

## 2024-03-06 RX ORDER — PROCHLORPERAZINE EDISYLATE 5 MG/ML
10 INJECTION INTRAMUSCULAR; INTRAVENOUS ONCE
Status: COMPLETED | OUTPATIENT
Start: 2024-03-06 | End: 2024-03-06

## 2024-03-06 RX ORDER — SODIUM,POTASSIUM PHOSPHATES 280-250MG
2 POWDER IN PACKET (EA) ORAL
Status: DISCONTINUED | OUTPATIENT
Start: 2024-03-06 | End: 2024-03-08

## 2024-03-06 RX ORDER — MAGNESIUM SULFATE HEPTAHYDRATE 40 MG/ML
2 INJECTION, SOLUTION INTRAVENOUS ONCE
Status: COMPLETED | OUTPATIENT
Start: 2024-03-06 | End: 2024-03-06

## 2024-03-06 RX ORDER — ACETAMINOPHEN 325 MG/1
650 TABLET ORAL EVERY 6 HOURS PRN
Status: DISCONTINUED | OUTPATIENT
Start: 2024-03-06 | End: 2024-03-17

## 2024-03-06 RX ORDER — NIMODIPINE 30 MG/1
60 CAPSULE, LIQUID FILLED ORAL EVERY 4 HOURS
Status: DISCONTINUED | OUTPATIENT
Start: 2024-03-06 | End: 2024-03-07

## 2024-03-06 RX ORDER — METOCLOPRAMIDE HYDROCHLORIDE 5 MG/ML
10 INJECTION INTRAMUSCULAR; INTRAVENOUS EVERY 6 HOURS PRN
Status: DISCONTINUED | OUTPATIENT
Start: 2024-03-06 | End: 2024-03-20 | Stop reason: HOSPADM

## 2024-03-06 RX ORDER — CLOPIDOGREL BISULFATE 75 MG/1
75 TABLET ORAL DAILY
Status: DISCONTINUED | OUTPATIENT
Start: 2024-03-07 | End: 2024-03-08

## 2024-03-06 RX ORDER — KETOROLAC TROMETHAMINE 30 MG/ML
30 INJECTION, SOLUTION INTRAMUSCULAR; INTRAVENOUS ONCE
Status: DISCONTINUED | OUTPATIENT
Start: 2024-03-06 | End: 2024-03-06

## 2024-03-06 RX ADMIN — MAGNESIUM SULFATE HEPTAHYDRATE 2 G: 40 INJECTION, SOLUTION INTRAVENOUS at 06:03

## 2024-03-06 RX ADMIN — CHLORHEXIDINE GLUCONATE 0.12% ORAL RINSE 15 ML: 1.2 LIQUID ORAL at 08:03

## 2024-03-06 RX ADMIN — CEFAZOLIN 2 G: 2 INJECTION, POWDER, FOR SOLUTION INTRAMUSCULAR; INTRAVENOUS at 05:03

## 2024-03-06 RX ADMIN — NIMODIPINE 60 MG: 60 SOLUTION ORAL at 05:03

## 2024-03-06 RX ADMIN — ONDANSETRON 4 MG: 2 INJECTION INTRAMUSCULAR; INTRAVENOUS at 02:03

## 2024-03-06 RX ADMIN — CEFAZOLIN 2 G: 2 INJECTION, POWDER, FOR SOLUTION INTRAMUSCULAR; INTRAVENOUS at 01:03

## 2024-03-06 RX ADMIN — NIMODIPINE 60 MG: 60 SOLUTION ORAL at 01:03

## 2024-03-06 RX ADMIN — DEXMEDETOMIDINE HYDROCHLORIDE 0.7 MCG/KG/HR: 4 INJECTION, SOLUTION INTRAVENOUS at 03:03

## 2024-03-06 RX ADMIN — OXYCODONE 5 MG: 5 TABLET ORAL at 10:03

## 2024-03-06 RX ADMIN — ASPIRIN 81 MG CHEWABLE TABLET 81 MG: 81 TABLET CHEWABLE at 08:03

## 2024-03-06 RX ADMIN — ONDANSETRON 4 MG: 2 INJECTION INTRAMUSCULAR; INTRAVENOUS at 10:03

## 2024-03-06 RX ADMIN — FAMOTIDINE 20 MG: 10 INJECTION, SOLUTION INTRAVENOUS at 09:03

## 2024-03-06 RX ADMIN — FAMOTIDINE 20 MG: 10 INJECTION, SOLUTION INTRAVENOUS at 08:03

## 2024-03-06 RX ADMIN — NIMODIPINE 60 MG: 30 CAPSULE, LIQUID FILLED ORAL at 05:03

## 2024-03-06 RX ADMIN — NIMODIPINE 60 MG: 30 CAPSULE, LIQUID FILLED ORAL at 03:03

## 2024-03-06 RX ADMIN — DOCUSATE SODIUM AND SENNOSIDES 1 TABLET: 8.6; 5 TABLET, FILM COATED ORAL at 09:03

## 2024-03-06 RX ADMIN — ACETAMINOPHEN 650 MG: 325 TABLET ORAL at 05:03

## 2024-03-06 RX ADMIN — NIMODIPINE 60 MG: 30 CAPSULE, LIQUID FILLED ORAL at 09:03

## 2024-03-06 RX ADMIN — NIMODIPINE 60 MG: 60 SOLUTION ORAL at 09:03

## 2024-03-06 RX ADMIN — CLOPIDOGREL BISULFATE 75 MG: 75 TABLET ORAL at 08:03

## 2024-03-06 RX ADMIN — CEFAZOLIN 2 G: 2 INJECTION, POWDER, FOR SOLUTION INTRAMUSCULAR; INTRAVENOUS at 09:03

## 2024-03-06 RX ADMIN — SODIUM CHLORIDE 1000 ML: 9 INJECTION, SOLUTION INTRAVENOUS at 07:03

## 2024-03-06 RX ADMIN — PROCHLORPERAZINE EDISYLATE 10 MG: 5 INJECTION INTRAMUSCULAR; INTRAVENOUS at 06:03

## 2024-03-06 NOTE — HPI
Vascular Neurology consulted for evaluation of Mr Pacheco, a 33 year old gentleman with past medical history of CHESTER aneurysm, aortic coarctation, and hypertension who presents as a transfer from [facility] for subarachnoid hemorrhage higher level of care.      In brief, presented to OSH ED after a worsening headache with concomitant nausea/vomiting and a transient episode of slurred speech/blurry vision. Imaging OSH notable for SAH with blood throughout the basilar cisterns as well as 3rd/4th ventricles. Patient was subsequently transferred to [facility] [place] for higher level of care and Neurosurgical consultation.

## 2024-03-06 NOTE — PLAN OF CARE
Pt arrived to 104 for aneurysm coiling. Pt oriented to unit and staff, Pt safely transferred from stretcher to procedural table. Fall risk reviewed and comfort measures utilized with interventions. Safety strap applied, position pillows to minimize pressure points. Blankets applied. Pt prepped and draped utilizing standard sterile technique. Patient placed on continuous monitoring, as required by sedation policy. Timeouts implemented utilizing standard universal time-out per department and facility policy. CRNA to remain at bedside with continuous monitoring. Pt resting comfortably. Denies pain/discomfort. Will continue to monitor. See flow sheets for monitoring, medication administration, and updates. patient verbalizes understanding.

## 2024-03-06 NOTE — PT/OT/SLP PROGRESS
Speech Language Pathology  Discharge Summary    Kalia Pacheco  MRN: [id number]    SLP Evaluation orders received and reviewed.  Patient not seen today and orders discontinued secondary Patient intubated. Patient will require new orders when appropriate.      3/6/2024

## 2024-03-06 NOTE — PROGRESS NOTES
[facility] - Neuro Critical Care  Neurosurgery  Progress Note    Subjective:     History of Present Illness: Kalia Pacheco is a 34yo man with history of known CHESTER aneurysm, not on any AP/AC who presented to [facility] this AM with headache and nausea that began 2 days ago and acutely worsened while he was at work this morning. He also reports blurry vision and has had multiple episodes of emesis. Denies any inciting factors. CTH reveals diffuse subarachnoid hemorrhage throughout the basal cisterns, HH score 1. He was transferred to [facility] for Neuro Critical Care and Neurosurgical management.     Post-Op Info:  * No surgery found *       Interval History: 3/6: s/p R V4 segment irregularly shaped aneurysm stent coil, no intervention to acomm aneurysm     Medications:  Continuous Infusions:   dexmedeTOMIDine (Precedex) infusion (titrating) 0.5 mcg/kg/hr (03/06/24 0900)    fentanyl Stopped (03/06/24 0848)     Scheduled Meds:   aspirin  81 mg Per OG tube Daily    ceFAZolin (Ancef) IV (PEDS and ADULTS)  2 g Intravenous Q8H    chlorhexidine  15 mL Mouth/Throat BID    clopidogreL  75 mg Per OG tube Daily    famotidine (PF)  20 mg Intravenous BID    niMODipine  60 mg Per OG tube Q4H    senna-docusate 8.6-50 mg  1 tablet Oral BID     PRN Meds:bisacodyL, fentanyl, labetalol, magnesium oxide, magnesium oxide, ondansetron, potassium bicarbonate, potassium bicarbonate, potassium bicarbonate, potassium, sodium phosphates, potassium, sodium phosphates, potassium, sodium phosphates     Review of Systems  Objective:     Weight: (!) 239.6 kg (528 lb 3.6 oz)  Body mass index is 80.32 kg/m².  Vital Signs (Most Recent):  Temp: 97 °F (36.1 °C) (03/06/24 0301)  Pulse: (!) 57 (03/06/24 0954)  Resp: 16 (03/06/24 0954)  BP: (!) 129/41 (03/06/24 0911)  SpO2: 100 % (03/06/24 0954) Vital Signs (24h Range):  Temp:  [96.4 °F (35.8 °C)-98.4 °F (36.9 °C)] 97 °F (36.1 °C)  Pulse:  [53-96] 57  Resp:  [0-28] 16  SpO2:  [97 %-100 %] 100 %  BP:  ()/(41-75) 129/41  Arterial Line BP: ()/(32-66) 114/36     Date 03/06/24 0700 - 03/07/24 0659   Shift 4826-2403 4203-4636 7840-3588 24 Hour Total   INTAKE   I.V.(mL/kg) 224.6(0.9)   224.6(0.9)   IV Piggyback 96.5   96.5   Shift Total(mL/kg) 321.1(1.3)   321.1(1.3)   OUTPUT   Drains 30   30   Shift Total(mL/kg) 30(0.1)   30(0.1)   Weight (kg) 239.6 239.6 239.6 239.6              Vent Mode: Spont  Oxygen Concentration (%):  [40] 40  Resp Rate Total:  [14 br/min-24 br/min] 17 br/min  Vt Set:  [500 mL] 500 mL  PEEP/CPAP:  [5 cmH20] 5 cmH20  Pressure Support:  [8 cmH20] 8 cmH20  Mean Airway Pressure:  [7.8 cmH20-10 cmH20] 7.8 cmH20        Male External Urinary Catheter 03/05/24 1900 (Active)   Collection Container Urimeter 03/06/24 0900   Securement Method secured to top of thigh w/ adhesive device 03/06/24 0900   Skin no redness;no breakdown 03/06/24 0900   Tolerance no signs/symptoms of discomfort 03/06/24 0900   Output (mL) 100 mL 03/06/24 0613            ICP/Ventriculostomy 03/05/24 1143 Right Parietal region (Active)   Level of Ventriculostomy (cm above) 20 03/06/24 0701   Status Open to drainage 03/06/24 0954   Site Assessment Clean;Dry 03/06/24 0954   Site Drainage No drainage 03/06/24 0501   Waveform normal waveform 03/06/24 0701   Output (mL) 9 mL 03/06/24 0900   CSF Color pink;red;clear 03/06/24 0954   Dressing Status Clean;Dry;Intact 03/06/24 0954   Interventions HOB degrees;zeroed;level adjusted per order 03/06/24 0954          Physical Exam         Neurosurgery Physical Exam    [id number]  PERRL  BSi  FC x4    Significant Labs:  Recent Labs   Lab 03/05/24  0831 03/06/24  0138   * 107    142   K 3.8 3.9    111*   CO2 20* 18*   BUN 13 9   CREATININE 0.99 0.9   CALCIUM 9.7 8.8   MG  --  1.8     Recent Labs   Lab 03/05/24  0831 03/06/24  0138   WBC 9.40 10.48   HGB 14.8 12.9*   HCT 44.4 38.6*    194     Recent Labs   Lab 03/05/24  1104   INR 1.1   APTT 24.2     Microbiology  Results (last 7 days)       ** No results found for the last 168 hours. **          All pertinent labs from the last 24 hours have been reviewed.    Significant Diagnostics:  CT: No results found in the last 24 hours.  MRI: No results found in the last 24 hours.  Assessment/Plan:     * Nontraumatic subarachnoid hemorrhage, unspecified  Kalia Pacheco is a 32yo man with history of known CHESTRE aneurysm who presented to [place] with headache and nausea. CTH demonstrated diffuse SAH throughout the basal cisterns HH and he was transferred to [facility] [place] for close monitoring in the Neuro ICU and Neurosurgical consultation.     R frontal EVD was placed on arrival on 3/6 and patient was taken to IR for angiogram, with no intervention upon Acomm aneurysm. Upon further review of DSA, a vertebral artery aneurysm was noted and patient was brought back to IR for a right V4/PICA irregular aneurysm stent assisted coiling.     Imaging reviewed:  CTH 3/5: Diffuse basal cistern SAH with IVH   CTA 3/5: 3mm ant projecting L Acomm, no R A1, R pcomm feeding posterior circulation   DSA 3/5: EVD in position, anteriosuperiorly projecting small L Acomm, R V4 irregular aneurysm not well visualized    DSA +embo 3/5: stent assisted coiling of R V4/PICA irregularly shaped anuerysm, 1/4 lobules still filling     - Admit to NCC with q1 neuro checks  - R EVD at 10, please record ICP and output hourly, call NSGY if ICP sustained >25  - HOB@30   - Keppra 500 BID x7d   - TCDs daily x14d   - Nimotop 60q4 x 21d   - strict I/Os, goal euvolemia to net positive for SAH patients in general, in this patient may need to be more conservative due to cardiac hx, management per NCC   - WTE per NCC   - continue ASA/plavix per neuro IR  - based on bleed pattern believe vertebral aneurysm to be the ruptured aneurysm, no plan for intervention upon Acomm at this point  - Continue to follow clinically and notify NSGY immediately if any neurostatus change     Case and  plan discussed with attending Neurosurgeon Dr. Saroj Lange MD  Neurosurgery  [facility] - Neuro Critical Care

## 2024-03-06 NOTE — PLAN OF CARE
[facility] Care Plan    POC reviewed with Kalia Pacheco and family at 1700. Patient's family verbalized understanding. Questions and concerns addressed. No acute events today. Pt progressing toward goals. Will continue to monitor. See below and flowsheets for full assessment and VS info.     -Admitted today from [facility] for SAH  -EVD placed at bedside open at 20  -Traveled to IR today for diagnostic angio  -Sheath remains in place right groin  -Propofol/Fentanyl started today for agitation/pulling at ETT  -Cardene started for SBP < 140  -Restraints applied for attempting to self-extubate  -Consents signed - patient traveling back to IR tonight Class A interventional angio  -Stroke education booklet personalized and at bedside. Education provided to family today.        Is this a stroke patient? yes- Stroke booklet reviewed with patient and family, risk factors identified for patient and stroke booklet remains at bedside for ongoing education.     Care individualization: Yes  Restraints:   Restraint Order  Length of Order: Order good for next 24 hours or when removed.  Date that the current order will : 24  Time that the current order will :   Order Upon Application: Yes    Neuro:  Irving Coma Scale  Best Eye Response: 3-->(E3) to speech  Best Motor Response: 6-->(M6) obeys commands  Best Verbal Response: 1-->(V1) none  Brandon Coma Scale Score: 10  Assessment Qualifiers: patient chemically sedated or paralyzed, patient intubated  Pupil PERRLA: yes     24 hr Temp:  [97.5 °F (36.4 °C)-98.4 °F (36.9 °C)]     CV:      BP goals:   SBP < 140  MAP > 65    Resp:      Vent Mode: A/C  Set Rate: 16 BPM  Oxygen Concentration (%): 40  Vt Set: 500 mL  PEEP/CPAP: 5 cmH20    Plan:  Remains intubated with sheath for procedure this evening    GI/:        Last Bowel Movement: 24       Intake/Output Summary (Last 24 hours) at 3/5/2024 1910  Last data filed at 3/5/2024 1800  Gross per 24 hour   Intake  591.12 ml   Output 63 ml   Net 528.12 ml          Labs/Accuchecks:  Recent Labs   Lab 03/05/24  0831   WBC 9.40   RBC 4.62   HGB 14.8   HCT 44.4         Recent Labs   Lab 03/05/24  0831      K 3.8   CO2 20*      BUN 13   CREATININE 0.99   ALKPHOS 56   ALT 44   AST 57   BILITOT 0.8      Recent Labs   Lab 03/05/24  1104   INR 1.1   APTT 24.2      Recent Labs   Lab 03/05/24  0831   TROPONINI 0.070       Electrolytes: N/A - electrolytes WDL  Accuchecks: none    Gtts:   fentanyl 50 mcg/hr (03/05/24 1800)    nicardipine Stopped (03/05/24 1724)    NORepinephrine bitartrate-D5W      propofoL 30 mcg/kg/min (03/05/24 1835)       LDA/Wounds:      Nurses Note -- 4 Eyes      Is there altered skin present? no     Prevention Measures Documented    Second RN/Staff Member:  Carol DAVIDSON

## 2024-03-06 NOTE — CONSULTS
"[facility]  Adult Nutrition  Consult Note    SUMMARY     Recommendations    1. When appropriate ADAT per MD/SLP    2. If TF warranted: Impact Peptide 1.5 @ 55ml/hr   - Or hospital alternative Pevot 1.5 @ 55ml/hr     3. RD Following    Goals: Meet % een/epn by next RD f/u  Nutrition Goal Status: new  Communication of RD Recs: other (comment) (poc)    Assessment and Plan    Nutrition Problem  Inadequate oral intake    Related to (etiology):   Inability to consume sufficient energy     Signs and Symptoms (as evidenced by):   NPO, NGT    Interventions/Recommendations (treatment strategy):  Collaboration with other providers    Nutrition Diagnosis Status:   New    Reason for Assessment    Reason For Assessment: consult  Diagnosis: hemorrhage  Relevant Medical History: GERD, HTN  Interdisciplinary Rounds: did not attend  General Information Comments: RD consulted to assess dietary needs. Pt's extubated today w/ NGT in place pending Mckeon swallow study. Wt appears stable. RD following. UBW appears to be around 200-189# per chart review. Message RN regarding last bed scale wt. Likely a bed scale area. Following.  Nutrition Discharge Planning: pending medical course    Nutrition Risk Screen    Nutrition Risk Screen: tube feeding or parenteral nutrition    Nutrition/Diet History    Food Allergies: NKFA    Anthropometrics    Temp: 97 °F (36.1 °C)  Height Method: Measured  Height: 5' 8" (172.7 cm)  Height (inches): 68 in  Weight Method: Bed Scale  Weight: (!) 239.6 kg (528 lb 3.6 oz)  Weight (lb): (!) 528.23 lb  Ideal Body Weight (IBW), Male: 154 lb  % Ideal Body Weight, Male (lb): 144.16 %  BMI (Calculated): 80.3  BMI Grade: greater than 40 - morbid obesity       Lab/Procedures/Meds    Pertinent Labs Reviewed: reviewed  Pertinent Labs Comments: H/H: 12.9/38.6, MCH: 31.9  Pertinent Medications Reviewed: reviewed  Pertinent Medications Comments: Clopidogrel, famotidine, senna-docusate, precedex, " fentanyl      Estimated/Assessed Needs    Weight Used For Calorie Calculations: 100.7 kg (222 lb) (ABW 3/5/24)  Energy Calorie Requirements (kcal): 1921 (msj)  Energy Need Method: Capron-St Jeor  Protein Requirements:  (.8-1 g/kg)  Weight Used For Protein Calculations: 100.7 kg (222 lb) (ABW 3/5/24)     Estimated Fluid Requirement Method: RDA Method  RDA Method (mL): 1921         Nutrition Prescription Ordered    Current Diet Order: NPO    Evaluation of Received Nutrient/Fluid Intake    I/O: +561.3 L since admit  Comments: LBM 3/4  % Intake of Estimated Energy Needs: 0 - 25 %  % Meal Intake: NPO    Nutrition Risk    Level of Risk/Frequency of Follow-up: low - moderate (f/u 1x/week)       Monitor and Evaluation    Anthropometric Measurements: height/length, weight, weight change, body mass index  Biochemical Data, Medical Tests and Procedures: electrolyte and renal panel, gastrointestinal profile, glucose/endocrine profile, inflammatory profile, lipid profile       Nutrition Follow-Up    RD Follow-up?: Yes    Matilde Willson RD, LDN

## 2024-03-06 NOTE — PROGRESS NOTES
[facility]  Vascular Neurology  Comprehensive Stroke Center  Progress Note    Assessment/Plan:     * Nontraumatic subarachnoid hemorrhage, unspecified  33 year old gentleman with history of CHESTER aneurysm transferred to [facility] for Neurosurgical evaluation after OSH-ED evaluation and workup consistent with SAH. NIHSS = 1. Hunt & Hill Grade I. Neurologic exam non-focal; notable for somnolence, discomfort, and decreased attention (requires repeated stimulation). 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 Patient extubated this morning, pending SLP eval. Patient complains of headache, but was improved with 5ml drained from EVD per nursing staff. Neuro exam stable.     Recommendation:  -TTE    Antithrombotics for secondary stroke prevention: Antiplatelets: Aspirin: 81 mg daily  Clopidogrel: 75 mg daily for intracranial stent per IR    Statins for secondary stroke prevention and hyperlipidemia, if present:   Statins: Atorvastatin- 40 mg daily    Aggressive risk factor modification: HTN     Rehab efforts: The patient has been evaluated by a stroke team provider and the therapy needs have been fully considered based off the presenting complaints and exam findings. The following therapy evaluations are needed: PT evaluate and treat, OT  evaluate and treat, SLP evaluate and treat, PM&R evaluate for appropriate placement    Diagnostics ordered/pending: TTE to assess cardiac function/status     VTE prophylaxis: Mechanical prophylaxis: Place SCDs    BP parameters: SAH: Secured aneurysm, no target, increase BP to prevent vasospasm if needed         Other hydrocephalus  EVD placed by NSGY on 3/5.  Maintained by [facility].    Anterior communicating artery aneurysm  Past history of.  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  Stable.    Hypertension  Stroke Risk Factor  SBP <140  PRN labetolol           03/06/2024: Patient s/p EVD placement by NSGY secondary to hydrocephalus, s/p DSA with identification of  right vertebral artery aneurysm and confirmed stable left anterior communicating artery aneurysm, and s/p stent and coil of vertebral artery aneurysm. Patient extubated this morning, pending SLP eval. Patient complains of headache, but was improved with 5ml drained from EVD per nursing staff. Neuro exam stable. Pending ECHO.     STROKE DOCUMENTATION   Acute Stroke Times   Last Known Normal Date: 03/05/24  Unknown Normal Time: Unknown Time  Symptom Onset Date: 03/05/24  Unknown Symptom Onset Time: Unknown Time  Thrombolytic Therapy Recommended: No  Thrombectomy Recommended: No    NIH Scale:  1a. Level of Consciousness: 0-->Alert, keenly responsive  1b. LOC Questions: 0-->Answers both questions correctly  1c. LOC Commands: 0-->Performs both tasks correctly  2. Best Gaze: 0-->Normal  3. Visual: 0-->No visual loss  4. Facial Palsy: 0-->Normal symmetrical movements  5a. Motor Arm, Left: 0-->No drift, limb holds 90 (or 45) degrees for full 10 secs  5b. Motor Arm, Right: 0-->No drift, limb holds 90 (or 45) degrees for full 10 secs  6a. Motor Leg, Left: 0-->No drift, leg holds 30 degree position for full 5 secs  6b. Motor Leg, Right: 0-->No drift, leg holds 30 degree position for full 5 secs  7. Limb Ataxia: 0-->Absent  8. Sensory:  0-->Normal, no sensory loss  9. Best Language: 0-->No aphasia, normal  10. Dysarthria: 0-->Normal  11. Extinction and Inattention (formerly Neglect): 0-->No abnormality  Total (NIH Stroke Scale): 0       Modified [place] Score: 0  Irving Coma Scale:    ABCD2 Score:    [id number] Score:   HAS -BLED Score:   ICH Score:   Hunt & Hill Classification:Grade I     Hemorrhagic change of an Ischemic Stroke: Does this patient have an ischemic stroke with hemorrhagic changes? No     Neurologic Chief Complaint: SAH secondary to right vertebral artery aneurysm rupture    Subjective:     Interval History: Patient is seen for follow-up neurological assessment and treatment recommendations: Patient s/p EVD placement by NSGY secondary to hydrocephalus, s/p DSA with identification of  right vertebral artery aneurysm and confirmed stable left anterior communicating artery aneurysm, and s/p stent and coil of vertebral artery aneurysm. Patient extubated this morning, pending SLP eval. Patient complains of headache, but was improved with 5ml drained from EVD per nursing staff. Pending ECHO.     HPI, Past Medical, Family, and Social History remains the same as documented in the initial encounter.     Review of Systems   Respiratory: Negative.     Cardiovascular: Negative.    Neurological:  Positive for weakness and headaches. Negative for facial asymmetry.     Scheduled Meds:   [START ON 3/7/2024] aspirin  81 mg Oral Daily    ceFAZolin (Ancef) IV (PEDS and ADULTS)  2 g Intravenous Q8H    [START ON 3/7/2024] clopidogreL  75 mg Oral Daily    famotidine (PF)  20 mg Intravenous BID    niMODipine  60 mg Oral Q4H    senna-docusate 8.6-50 mg  1 tablet Oral BID     Continuous Infusions:   dexmedeTOMIDine (Precedex) infusion (titrating) Stopped (03/06/24 1044)    fentanyl Stopped (03/06/24 0848)     PRN Meds:bisacodyL, fentanyl, labetalol, magnesium oxide, magnesium oxide, ondansetron, potassium bicarbonate, potassium bicarbonate, potassium  "bicarbonate, potassium, sodium phosphates, potassium, sodium phosphates, potassium, sodium phosphates    Objective:     Vital Signs (Most Recent):  Temp: 97 °F (36.1 °C) (03/06/24 0301)  Pulse: 66 (03/06/24 1243)  Resp: (!) 9 (03/06/24 1243)  BP: (!) 86/52 (03/06/24 1200)  SpO2: 100 % (03/06/24 1243)  BP Location: Right arm    Vital Signs Range (Last 24H):  Temp:  [96.4 °F (35.8 °C)-98.4 °F (36.9 °C)]   Pulse:  [53-96]   Resp:  [0-26]   BP: ()/(41-75)   SpO2:  [100 %]   Arterial Line BP: ()/(32-66)   BP Location: Right arm       Physical Exam  Vitals and nursing note reviewed.   HENT:      Head:      Comments: EVD in place.  Neurological:      Mental Status: He is alert.   Psychiatric:         Mood and Affect: Affect is flat.              Neurological Exam:   LOC: alert  Attention Span: Good   Language: No aphasia  Articulation: No dysarthria  Orientation: Person, Place, Time   Facial Movement (CN VII): Symmetric facial expression    Sensation: intact to light touch bilaterally  Tone: Normal tone throughout    Laboratory:  CMP:   Recent Labs   Lab 03/06/24  0138   CALCIUM 8.8      K 3.9   CO2 18*   *   BUN 9   CREATININE 0.9     BMP:   Recent Labs   Lab 03/06/24  0138      K 3.9   *   CO2 18*   BUN 9   CREATININE 0.9   CALCIUM 8.8     CBC:   Recent Labs   Lab 03/06/24  0138   WBC 10.48   RBC 4.04*   HGB 12.9*   HCT 38.6*      MCV 96   MCH 31.9*   MCHC 33.4     Lipid Panel:   Recent Labs   Lab 03/05/24  1131   CHOL 160   LDLCALC 94.4   HDL 54   TRIG 58     Coagulation:   Recent Labs   Lab 03/05/24  1104   INR 1.1   APTT 24.2     Platelet Aggregation Study: No results for input(s): "PLTAGG", "PLTAGINTERP", "PLTAGREGLACO", "ADPPLTAGGREG" in the last 168 hours.  Hgb A1C: No results for input(s): "HGBA1C" in the last 168 hours.  TSH:   Recent Labs   Lab 03/05/24  1131   TSH 0.716       Diagnostic Results     Brain Imaging:   CT Head w/o Contrast 3/5/24  Impression:  Subarachnoid " hemorrhage with blood throughout the basilar cisterns.  Moderate intraventricular blood and mild ventricular prominence.    Vessel Imaging:  CTA Head & Neck 3/6/24  Impression:  CTA head: 3 mm left anterior communicating artery aneurysm again identified.  No definite additional aneurysm identified concerning for source of hemorrhage.  Clinical correlation and correlation with conventional angiography recommended.  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  CT head: Evolving subarachnoid and intraventricular hemorrhages.  Interval increase distension lateral and 3rd ventricles compatible with developing hydrocephalus  Crowding cerebral sulci at the vertex with additional crowding basilar cisterns with questionable early cerebellar tonsillar herniation.    Cardiac Imaging:  ECHO   Recommended    Peyton Panchal NP  [facility]  Department of Vascular Neurology   [facility]

## 2024-03-06 NOTE — SUBJECTIVE & OBJECTIVE
Interval History: 3/6: s/p R V4 segment irregularly shaped aneurysm stent coil, no intervention to acomm aneurysm     Medications:  Continuous Infusions:   dexmedeTOMIDine (Precedex) infusion (titrating) 0.5 mcg/kg/hr (03/06/24 0900)    fentanyl Stopped (03/06/24 0848)     Scheduled Meds:   aspirin  81 mg Per OG tube Daily    ceFAZolin (Ancef) IV (PEDS and ADULTS)  2 g Intravenous Q8H    chlorhexidine  15 mL Mouth/Throat BID    clopidogreL  75 mg Per OG tube Daily    famotidine (PF)  20 mg Intravenous BID    niMODipine  60 mg Per OG tube Q4H    senna-docusate 8.6-50 mg  1 tablet Oral BID     PRN Meds:bisacodyL, fentanyl, labetalol, magnesium oxide, magnesium oxide, ondansetron, potassium bicarbonate, potassium bicarbonate, potassium bicarbonate, potassium, sodium phosphates, potassium, sodium phosphates, potassium, sodium phosphates     Review of Systems  Objective:     Weight: (!) 239.6 kg (528 lb 3.6 oz)  Body mass index is 80.32 kg/m².  Vital Signs (Most Recent):  Temp: 97 °F (36.1 °C) (03/06/24 0301)  Pulse: (!) 57 (03/06/24 0954)  Resp: 16 (03/06/24 0954)  BP: (!) 129/41 (03/06/24 0911)  SpO2: 100 % (03/06/24 0954) Vital Signs (24h Range):  Temp:  [96.4 °F (35.8 °C)-98.4 °F (36.9 °C)] 97 °F (36.1 °C)  Pulse:  [53-96] 57  Resp:  [0-28] 16  SpO2:  [97 %-100 %] 100 %  BP: ()/(41-75) 129/41  Arterial Line BP: ()/(32-66) 114/36     Date 03/06/24 0700 - 03/07/24 0659   Shift 3410-7998 7449-5007 5494-7835 24 Hour Total   INTAKE   I.V.(mL/kg) 224.6(0.9)   224.6(0.9)   IV Piggyback 96.5   96.5   Shift Total(mL/kg) 321.1(1.3)   321.1(1.3)   OUTPUT   Drains 30   30   Shift Total(mL/kg) 30(0.1)   30(0.1)   Weight (kg) 239.6 239.6 239.6 239.6              Vent Mode: Spont  Oxygen Concentration (%):  [40] 40  Resp Rate Total:  [14 br/min-24 br/min] 17 br/min  Vt Set:  [500 mL] 500 mL  PEEP/CPAP:  [5 cmH20] 5 cmH20  Pressure Support:  [8 cmH20] 8 cmH20  Mean Airway Pressure:  [7.8 cmH20-10 cmH20] 7.8  cmH20        Male External Urinary Catheter 03/05/24 1900 (Active)   Collection Container Urimeter 03/06/24 0900   Securement Method secured to top of thigh w/ adhesive device 03/06/24 0900   Skin no redness;no breakdown 03/06/24 0900   Tolerance no signs/symptoms of discomfort 03/06/24 0900   Output (mL) 100 mL 03/06/24 0613            ICP/Ventriculostomy 03/05/24 1143 Right Parietal region (Active)   Level of Ventriculostomy (cm above) 20 03/06/24 0701   Status Open to drainage 03/06/24 0954   Site Assessment Clean;Dry 03/06/24 0954   Site Drainage No drainage 03/06/24 0501   Waveform normal waveform 03/06/24 0701   Output (mL) 9 mL 03/06/24 0900   CSF Color pink;red;clear 03/06/24 0954   Dressing Status Clean;Dry;Intact 03/06/24 0954   Interventions HOB degrees;zeroed;level adjusted per order 03/06/24 0954          Physical Exam         Neurosurgery Physical Exam    [id number]  PERRL  BSi  FC x4    Significant Labs:  Recent Labs   Lab 03/05/24  0831 03/06/24  0138   * 107    142   K 3.8 3.9    111*   CO2 20* 18*   BUN 13 9   CREATININE 0.99 0.9   CALCIUM 9.7 8.8   MG  --  1.8     Recent Labs   Lab 03/05/24  0831 03/06/24  0138   WBC 9.40 10.48   HGB 14.8 12.9*   HCT 44.4 38.6*    194     Recent Labs   Lab 03/05/24  1104   INR 1.1   APTT 24.2     Microbiology Results (last 7 days)       ** No results found for the last 168 hours. **          All pertinent labs from the last 24 hours have been reviewed.    Significant Diagnostics:  CT: No results found in the last 24 hours.  MRI: No results found in the last 24 hours.

## 2024-03-06 NOTE — ANESTHESIA PREPROCEDURE EVALUATION
03/05/2024  Kalia Pacheco is a 33 y.o., male.  Pre-operative evaluation for * No procedures listed *    Kalia Pacheco is a 33 y.o. male   S/p attempted but unsuccessful aneurysm clipping earlier today  Now bringback  Intubated, sedated  Art line and 2 18 g PIVs    PAPER CONSENT COMPLETED BY PATIENT'S MOTHER AND PLACED IN PATIENT'S CHART.    Patient Active Problem List   Diagnosis    Aorta coarctation    Hypertension    Anterior communicating artery aneurysm    Nontraumatic subarachnoid hemorrhage, unspecified    Other hydrocephalus    IVH (intraventricular hemorrhage)       Past Surgical History:   Procedure Laterality Date    hip surgeries      x 6    open heart surgery      surgery on aorta as a child , 3 procedures         Social History     Socioeconomic History    Marital status: Single    Number of children: 0   Tobacco Use    Smoking status: Every Day     Types: Vaping with nicotine    Smokeless tobacco: Never   Substance and Sexual Activity    Alcohol use: Not Currently     Comment: rarely    Drug use: Yes     Types: Marijuana     Comment: daily    Sexual activity: Yes     Partners: Female   Social History Narrative    ** Merged History Encounter **         Single- lives with parents.       No current facility-administered medications on file prior to encounter.     Current Outpatient Medications on File Prior to Encounter   Medication Sig Dispense Refill    metoprolol tartrate (LOPRESSOR) 50 MG tablet Take 50 mg by mouth once daily.      metoprolol succinate (TOPROL-XL) 50 MG 24 hr tablet Take 1 tablet (50 mg total) by mouth once daily. (Patient not taking: Reported on 3/5/2024) 90 tablet 3    ondansetron (ZOFRAN-ODT) 4 MG TbDL Take 1 tablet (4 mg total) by mouth every 6 (six) hours as needed (nausea). 30 tablet 1    valACYclovir (VALTREX) 500 MG tablet Take 1 tablet (500 mg total) by  "mouth 2 (two) times daily. 180 tablet 2    valsartan (DIOVAN) 160 MG tablet Take 1 tablet (160 mg total) by mouth once daily. 90 tablet 3       Review of patient's allergies indicates:  No Known Allergies      CBC:   Recent Labs     03/05/24  0831   WBC 9.40   RBC 4.62   HGB 14.8   HCT 44.4      MCV 96   MCH 32.0*   MCHC 33.3       CMP:   Recent Labs     03/05/24  0831      K 3.8      CO2 20*   BUN 13   CREATININE 0.99   *   CALCIUM 9.7   ALBUMIN 4.5   PROT 7.1   ALKPHOS 56   ALT 44   AST 57   BILITOT 0.8       INR  Recent Labs     03/05/24  1104   INR 1.1   APTT 24.2         Diagnostic Studies:    EKG:   Results for orders placed or performed in visit on 03/05/24   EKG 12-lead    Collection Time: 03/05/24 10:16 AM   Result Value Ref Range    QRS Duration 128 ms    OHS QTC Calculation 446 ms    Narrative    Test Reason :     Vent. Rate : 074 BPM     Atrial Rate : 074 BPM     P-R Int : 218 ms          QRS Dur : 128 ms      QT Int : 402 ms       P-R-T Axes : 055 024 087 degrees     QTc Int : 446 ms    Sinus rhythm with 1st degree A-V block with occasional Premature  ventricular complexes  Biatrial enlargement  Nonspecific intraventricular block  Cannot rule out Anterior infarct (cited on or before 05-MAR-2024)  T wave abnormality, consider lateral ischemia  Abnormal ECG  When compared with ECG of 05-MAR-2024 08:43,  Serial changes of evolving Anterior infarct Present  Confirmed by Mateo MINOR, Hector ZALDIVAR ([id number]) on 3/5/2024 2:05:34 PM    Referred By: DIONICIO TALLEY           Confirmed By:Hector Galindo MD       TTE:  No results found for this or any previous visit.  No results found for: "EF"   No results found for this or any previous visit.    KEVEN:  No results found for this or any previous visit.    Stress Test:  No results found for this or any previous visit.       LHC:  No results found for this or any previous visit.       PFT:  No results found for: "FEV1", "FVC", "IMJ1ICL", "TLC", "DLCO" "     ALLERGIES:   Review of patient's allergies indicates:  No Known Allergies  LDA:   Vent Mode: A/C  Oxygen Concentration (%):  [40] 40  Resp Rate Total:  [16 br/min-21 br/min] 20 br/min  Vt Set:  [500 mL] 500 mL  PEEP/CPAP:  [5 cmH20] 5 cmH20  Mean Airway Pressure:  [8.8 cmH20] 8.8 cmH20      Lines/Drains/Airways       Drain  Duration                  Drain/Device  03/05/24 1430 Right groin <1 day         ICP/Ventriculostomy 03/05/24 1143 Right Parietal region <1 day              Airway  Duration                  Airway - Non-Surgical 03/05/24 1256 <1 day              Arterial Line  Duration             Arterial Line 03/05/24 1300 Right Other (Comment) <1 day              Peripheral Intravenous Line  Duration                  Peripheral IV - Single Lumen 03/05/24 0834 18 G Posterior;Right Hand <1 day         Peripheral IV - Single Lumen 03/05/24 1843 18 G Left Antecubital <1 day                  Anesthesia Evaluation         Pre-op Assessment    I have reviewed the Patient Summary Reports.     I have reviewed the Nursing Notes. I have reviewed the NPO Status.   I have reviewed the Medications.     Review of Systems  UTO 2/2 clinical condition    Physical Exam  General: Well nourished  Intubated and sedated  Airway:  Pre-Existing Airway: Oral Endotracheal tube        Anesthesia Plan  Type of Anesthesia, risks & benefits discussed:    Anesthesia Type: Gen ETT  Intra-op Monitoring Plan: Standard ASA Monitors and Art Line  Post Op Pain Control Plan: multimodal analgesia and IV/PO Opioids PRN  Induction:  IV  Airway Plan: Direct, Post-Induction  Informed Consent: Informed consent signed with the Patient representative and all parties understand the risks and agree with anesthesia plan.  All questions answered. Patient consented to blood products? Yes  ASA Score: 3 Emergent  Day of Surgery Review of History & Physical: H&P Update referred to the surgeon/provider.    Ready For Surgery From Anesthesia Perspective.      .

## 2024-03-06 NOTE — PT/OT/SLP PROGRESS
Physical Therapy Discharge      Patient Name:  Kalia Pacheco   MRN:  [id number]    Patient not seen today secondary to (patient intubated, sedated); not appropriate for PT evaluation at this time. Orders to be discontinued, please re-consult following extubation/as needed.

## 2024-03-06 NOTE — ANESTHESIA POSTPROCEDURE EVALUATION
Anesthesia Post Evaluation    Patient: Kalia Pacheco    Procedure(s) Performed: * No procedures listed *    Final Anesthesia Type: general      Patient location during evaluation: ICU  Patient participation: No - Unable to Participate, Sedation  Level of consciousness: sedated  Post-procedure vital signs: reviewed and stable  Pain management: adequate  Airway patency: patent    PONV status at discharge: No PONV  Anesthetic complications: no      Cardiovascular status: hemodynamically stable  Respiratory status: intubated                Vitals Value Taken Time   BP 83/50 03/06/24 1003   Temp 36.1 °C (97 °F) 03/06/24 0301   Pulse 56 03/06/24 1038   Resp 21 03/06/24 1038   SpO2 100 % 03/06/24 1038   Vitals shown include unvalidated device data.      No case tracking events are documented in the log.      Pain/Erin Score: Pain Rating Prior to Med Admin: 0 (3/6/2024  5:00 AM)  Pain Rating Post Med Admin: 0 (3/6/2024  3:02 AM)

## 2024-03-06 NOTE — MEDICAL/APP STUDENT
33M history of Acomm aneurysm, Intracranial hemorrahge May 2022, aortic coarctation s/p repair, who presented to [place] ED yesterday morning with headache and nausea. Investigation revealed subarachnoid hemorrhage, [facility], neurosurgery placed R frontal EVD. Subsequent DSA revealed aneurysm of the vertebral artery, taken back to IR for right V4/PICA stent assisted coiling. So he has two aneurysms, one in the Acomm and a newly diagnosed on in the vertebral. No plan for intervention on the Acomm aneurysm.    On exam, just got extubated. Moving all extremities spontaneously.    Will continue DAPT     Ddx: Connective tissue disase (marfans, ehler-danlos), vasculitis

## 2024-03-06 NOTE — TRANSFER OF CARE
"Anesthesia Transfer of Care Note    Patient: Kalia Pacheco    Procedure(s) Performed: * No procedures listed *    Patient location: ICU    Anesthesia Type: general    Transport from OR: Transported from OR intubated on 100% O2 by AMBU with adequate controlled ventilation. Continuous ECG monitoring in transport. Continuous SpO2 monitoring in transport. Continuos invasive BP monitoring in transport    Post pain: adequate analgesia    Post assessment: no apparent anesthetic complications and tolerated procedure well    Post vital signs: stable    Level of consciousness: sedated    Nausea/Vomiting: no nausea/vomiting    Complications: none    Transfer of care protocol was followed      Last vitals: Visit Vitals  /60 (BP Location: Right arm, Patient Position: Lying)   Pulse 96   Temp 35.8 °C (96.4 °F) (Axillary)   Resp 17   Ht 5' 8" (1.727 m)   Wt 100.7 kg (222 lb 0.1 oz)   SpO2 100%   BMI 33.76 kg/m²     "

## 2024-03-06 NOTE — SUBJECTIVE & OBJECTIVE
Interval History:  See hospital course.     Review of Systems   Unable to perform ROS: Intubated     Objective:     Vitals:  Temp: 98.3 °F (36.8 °C)  Pulse: 68  Rhythm: normal sinus rhythm  BP: (!) 87/51  MAP (mmHg): 63  ICP Mean (mmHg): 6 mmHg  Resp: 17  SpO2: 100 %  Oxygen Concentration (%): 40  Vent Mode: Spont  Set Rate: 16 BPM  Vt Set: 500 mL  Pressure Support: 8 cmH20  PEEP/CPAP: 5 cmH20  Peak Airway Pressure: 14 cmH20  Mean Airway Pressure: 8.6 cmH20  Plateau Pressure: 15 cmH20    Temp  Min: 96.4 °F (35.8 °C)  Max: 98.4 °F (36.9 °C)  Pulse  Min: 53  Max: 96  BP  Min: 67/42  Max: 159/74  MAP (mmHg)  Min: 50  Max: 106  ICP Mean (mmHg)  Min: 0 mmHg  Max: 24 mmHg  Resp  Min: 3  Max: 26  ETCO2 (mmHg)  Min: 0 mmHg  Max: 0 mmHg  SpO2  Min: 100 %  Max: 100 %  Oxygen Concentration (%)  Min: 40  Max: 40    03/05 0701 - 03/06 0700  In: 1612.3 [I.V.:412.3]  Out: 1051 [Urine:900; Drains:151]   Unmeasured Output  Stool Occurrence: 0        Physical Exam  Vitals and nursing note reviewed.   Constitutional:       Appearance: He is not ill-appearing.   HENT:      Head: Normocephalic and atraumatic.      Mouth/Throat:      Mouth: Mucous membranes are moist.      Pharynx: Oropharynx is clear.   Eyes:      Extraocular Movements: Extraocular movements intact.      Conjunctiva/sclera: Conjunctivae normal.      Pupils: Pupils are equal, round, and reactive to light.   Cardiovascular:      Rate and Rhythm: Regular rhythm. Bradycardia present.   Pulmonary:      Effort: Pulmonary effort is normal. No respiratory distress.   Abdominal:      General: Abdomen is flat. There is no distension.      Palpations: Abdomen is soft.      Tenderness: There is no abdominal tenderness.   Musculoskeletal:      Right lower leg: No edema.      Left lower leg: No edema.   Skin:     General: Skin is warm and dry.   Neurological:      General: No focal deficit present.      Mental Status: He is alert.      Cranial Nerves: No cranial nerve deficit.       Sensory: No sensory deficit.      Motor: No weakness.      Comments: Moves all extremities spontaneously, following commands            Medications:  ContinuousdexmedeTOMIDine (Precedex) infusion (titrating), Last Rate: Stopped (03/06/24 1044)  fentanyl, Last Rate: Stopped (03/06/24 0848)    Scheduled[START ON 3/7/2024] aspirin, 81 mg, Daily  ceFAZolin (Ancef) IV (PEDS and ADULTS), 2 g, Q8H  [START ON 3/7/2024] clopidogreL, 75 mg, Daily  famotidine (PF), 20 mg, BID  niMODipine, 60 mg, Q4H  senna-docusate 8.6-50 mg, 1 tablet, BID    PRNacetaminophen, 650 mg, Q6H PRN  bisacodyL, 10 mg, Daily PRN  fentanyl, 100 mcg, Q1H PRN  labetalol, 10 mg, Q15 Min PRN  magnesium oxide, 800 mg, PRN  magnesium oxide, 800 mg, PRN  ondansetron, 4 mg, Q6H PRN  potassium bicarbonate, 35 mEq, PRN  potassium bicarbonate, 50 mEq, PRN  potassium bicarbonate, 60 mEq, PRN  potassium, sodium phosphates, 2 packet, PRN  potassium, sodium phosphates, 2 packet, PRN  potassium, sodium phosphates, 2 packet, PRN      Today I personally reviewed pertinent medications, lines/drains/airways, imaging, cardiology results, laboratory results, microbiology results, notably:    Diet  Diet NPO  Diet NPO

## 2024-03-06 NOTE — HOSPITAL COURSE
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 Patient extubated this morning, pending SLP eval. Patient complains of headache, but was improved with 5ml drained from EVD per nursing staff. Neuro exam stable. Pending ECHO.   03/07/24: Patient stable on SAH pathway with daily TCDs and nimodipine. Exam non-focal with EVD @10 with 250ml out in last 24hrs. Awaiting MRA at this time. On DAPT with subtherapeutic PRU with possible pending switch to brillinta ameliorate.  03/09/2024: No acute events overnight. Stable neuro exam (NIH 1 for chronic leg weakness). Remain on SAH pathway. EVD in place. MRA Brain with no vessel wall enhancement. DAPT switched to ASA and brilinta for stents; patient tolerating well.   03/10/2024 NAEO, neuro exam stable. EVD remains in place and open. No current complaints/concerns. NSGY following.  03/11/2024 NAEO, neuro exam stable. EVD still in, drain moved to 20 today. TCDs unremarkable thus far. Hyponatremic with Na 132, urine studies ordered.   3/12/2024 EVD clamped. TCDs today w/signs of early vasospasm in the L CHESTER. Neuro exam stable.  3/13/2024 NAEON, neuro exam remains stable. Remains on SAH protocol. No evidence of vasospasm on today's TCD.  3/14/2024 No acute events overnight. Stable neuro exam. EVD d/c'd yesterday. Repeat CTH stable. Continue to remain on SAH protocol. TCD from today with Mild MCA and left CHESTER territory vasospasm. On nimodipine.    3/18/2024 No acute events overnight. Patient completed 14-days monitoring in [facility]. TCD today with no vasospasms. Tolerating diet well. PT/OT recommend low intensity therapy. Patient can discharge home from [facility] and follow up with NSGY and Vascular Neurology in clinic.    Yes

## 2024-03-06 NOTE — PLAN OF CARE
[facility] Care Plan    POC reviewed with Kalia Juan Carlos Pacheco and family at 0300. Pt unable to verbalize understanding. Questions and concerns addressed. No acute events overnight. Pt progressing toward goals. Will continue to monitor. See below and flowsheets for full assessment and VS info.     Fentanyl gtt at 87.5  Precedex gtt at 0.7  Bath complete, linens changed  IR - Stent + coil      Is this a stroke patient? no    Neuro:  Irving Coma Scale  Best Eye Response: 3-->(E3) to speech  Best Motor Response: 6-->(M6) obeys commands  Best Verbal Response: 1-->(V1) none  Gardner Coma Scale Score: 10  Assessment Qualifiers: patient chemically sedated or paralyzed  Pupil PERRLA: yes     24hr Temp:  [96.4 °F (35.8 °C)-98.4 °F (36.9 °C)]     CV:   Rhythm: normal sinus rhythm  BP goals:   SBP < 140  MAP > 65    Resp:      Vent Mode: A/C  Set Rate: 16 BPM  Oxygen Concentration (%): 40  Vt Set: 500 mL  PEEP/CPAP: 5 cmH20    Plan: wean to extubate    GI/:     Diet/Nutrition Received: NPO  Last Bowel Movement: 03/04/24       Intake/Output Summary (Last 24 hours) at 3/6/2024 0525  Last data filed at 3/6/2024 0501  Gross per 24 hour   Intake 1612.25 ml   Output 394 ml   Net 1218.25 ml          Labs/Accuchecks:  Recent Labs   Lab 03/06/24  0138   WBC 10.48   RBC 4.04*   HGB 12.9*   HCT 38.6*         Recent Labs   Lab 03/05/24  0831 03/06/24  0138    142   K 3.8 3.9   CO2 20* 18*    111*   BUN 13 9   CREATININE 0.99 0.9   ALKPHOS 56  --    ALT 44  --    AST 57  --    BILITOT 0.8  --       Recent Labs   Lab 03/05/24  1104   INR 1.1   APTT 24.2      Recent Labs   Lab 03/05/24  0831   TROPONINI 0.070       Electrolytes: N/A - electrolytes WDL  Accuchecks: Q6H    Gtts:   dexmedeTOMIDine (Precedex) infusion (titrating) 0.7 mcg/kg/hr (03/06/24 0326)    fentanyl 87.5 mcg/hr (03/06/24 0400)    nicardipine Stopped (03/05/24 2313)    NORepinephrine bitartrate-D5W      propofoL Stopped (03/05/24 6690)        LDA/Wounds:    Nurses Note -- 4 Eyes    Is there altered skin present? no     Prevention Measures Documented    Second RN/Staff Member:  carroll DAVIDSON     Problem: Adult Inpatient Plan of Care  Goal: Plan of Care Review  Outcome: Ongoing, Progressing  Goal: Patient-Specific Goal (Individualized)  Description: Admit Date: 3/5/2024    Subarachnoid hemorrhage    Past Medical History:  No date: ADHD (attention deficit hyperactivity disorder)  No date: Aneurysm  No date: Aorta coarctation  No date: Arthritis  No date: Coarctation of aorta  No date: Depression  No date: GERD (gastroesophageal reflux disease)  No date: History of alcohol abuse  No date: Hypertension  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 Pt likes dim lights.    Restraints:              Outcome: Ongoing, Progressing  Goal: Absence of Hospital-Acquired Illness or Injury  Outcome: Ongoing, Progressing     Problem: Adjustment to Illness (Stroke, Hemorrhagic)  Goal: Optimal Coping  Outcome: Ongoing, Progressing     Problem: Pain (Stroke, Hemorrhagic)  Goal: Acceptable Pain Control  Outcome: Ongoing, Progressing     Problem: Fall Injury Risk  Goal: Absence of Fall and Fall-Related Injury  Outcome: Ongoing, Progressing

## 2024-03-06 NOTE — PROCEDURES
nterventional Neuroradiology Post-Procedure Note     Pre Op Diagnosis: SAH in the setting of ruptured A.Comm Aneurysm      Post Op Diagnosis: Same     Procedure: Diagnostic cerebral angiogram     Procedure performed by: Fidel MINOR, Josue; Jaspal MINOR, David CAR     Written Informed Consent Obtained: Yes     Specimen Removed: NO     Estimated Blood Loss: Minimal     Procedure report:      A 6F sheath was placed into the right femoral artery and a 5F Som catheter was advanced into the aortic arch.  The right vertebral was subselected and angiography of the brain was performed after injection into right vertebral artery. Also, 3D angiogram with reconstruction was performed.      Preliminary interpretation: Previously described right V4 aneurysm near PICA origin is seen. Morphology is multi-lobulated and stent assisted coiling was performed. Please see Imaging report for full details.     A right femoral artery angiogram was performed and hemostasis achieved via 6 F closure device.  No hematoma was present at the time of hemostasis.     The patient tolerated the procedure well.      Plan:  -To [facility] for monitoring  -Bed rest for 2h  -Groin check and pulse check q2h   - Continue aspirin 81 mg and plavix 75 mg for 6 months   -Avoid carrying heavy weights > 10 lbs x 24 hrs   -Remove groin dressing tomorrow            David Shay MD  Fellow, NeuroEndovascular Surgery, [facility] [place] Geoffrey Bowser  Board certified Vascular Neurologist  [place], [place]

## 2024-03-06 NOTE — SUBJECTIVE & OBJECTIVE
Neurologic Chief Complaint: SAH secondary to right vertebral artery aneurysm rupture    Subjective:     Interval History: Patient is seen for follow-up neurological assessment and treatment recommendations: Patient s/p EVD placement by NSGY secondary to hydrocephalus, s/p DSA with identification of  right vertebral artery aneurysm and confirmed stable left anterior communicating artery aneurysm, and s/p stent and coil of vertebral artery aneurysm. Patient extubated this morning, pending SLP eval. Patient complains of headache, but was improved with 5ml drained from EVD per nursing staff. Pending ECHO.     HPI, Past Medical, Family, and Social History remains the same as documented in the initial encounter.     Review of Systems   Respiratory: Negative.     Cardiovascular: Negative.    Neurological:  Positive for weakness and headaches. Negative for facial asymmetry.     Scheduled Meds:   [START ON 3/7/2024] aspirin  81 mg Oral Daily    ceFAZolin (Ancef) IV (PEDS and ADULTS)  2 g Intravenous Q8H    [START ON 3/7/2024] clopidogreL  75 mg Oral Daily    famotidine (PF)  20 mg Intravenous BID    niMODipine  60 mg Oral Q4H    senna-docusate 8.6-50 mg  1 tablet Oral BID     Continuous Infusions:   dexmedeTOMIDine (Precedex) infusion (titrating) Stopped (03/06/24 1044)    fentanyl Stopped (03/06/24 0848)     PRN Meds:bisacodyL, fentanyl, labetalol, magnesium oxide, magnesium oxide, ondansetron, potassium bicarbonate, potassium bicarbonate, potassium bicarbonate, potassium, sodium phosphates, potassium, sodium phosphates, potassium, sodium phosphates    Objective:     Vital Signs (Most Recent):  Temp: 97 °F (36.1 °C) (03/06/24 0301)  Pulse: 66 (03/06/24 1243)  Resp: (!) 9 (03/06/24 1243)  BP: (!) 86/52 (03/06/24 1200)  SpO2: 100 % (03/06/24 1243)  BP Location: Right arm    Vital Signs Range (Last 24H):  Temp:  [96.4 °F (35.8 °C)-98.4 °F (36.9 °C)]   Pulse:  [53-96]   Resp:  [0-26]   BP: ()/(41-75)   SpO2:  [100 %]  "  Arterial Line BP: ()/(32-66)   BP Location: Right arm       Physical Exam  Vitals and nursing note reviewed.   HENT:      Head:      Comments: EVD in place.  Neurological:      Mental Status: He is alert.   Psychiatric:         Mood and Affect: Affect is flat.              Neurological Exam:   LOC: alert  Attention Span: Good   Language: No aphasia  Articulation: No dysarthria  Orientation: Person, Place, Time   Facial Movement (CN VII): Symmetric facial expression    Sensation: intact to light touch bilaterally  Tone: Normal tone throughout    Laboratory:  CMP:   Recent Labs   Lab 03/06/24  0138   CALCIUM 8.8      K 3.9   CO2 18*   *   BUN 9   CREATININE 0.9     BMP:   Recent Labs   Lab 03/06/24  0138      K 3.9   *   CO2 18*   BUN 9   CREATININE 0.9   CALCIUM 8.8     CBC:   Recent Labs   Lab 03/06/24  0138   WBC 10.48   RBC 4.04*   HGB 12.9*   HCT 38.6*      MCV 96   MCH 31.9*   MCHC 33.4     Lipid Panel:   Recent Labs   Lab 03/05/24  1131   CHOL 160   LDLCALC 94.4   HDL 54   TRIG 58     Coagulation:   Recent Labs   Lab 03/05/24  1104   INR 1.1   APTT 24.2     Platelet Aggregation Study: No results for input(s): "PLTAGG", "PLTAGINTERP", "PLTAGREGLACO", "ADPPLTAGGREG" in the last 168 hours.  Hgb A1C: No results for input(s): "HGBA1C" in the last 168 hours.  TSH:   Recent Labs   Lab 03/05/24  1131   TSH 0.716       Diagnostic Results     Brain Imaging:   CT Head w/o Contrast 3/5/24  Impression:  Subarachnoid hemorrhage with blood throughout the basilar cisterns.  Moderate intraventricular blood and mild ventricular prominence.    Vessel Imaging:  CTA Head & Neck 3/6/24  Impression:  CTA head: 3 mm left anterior communicating artery aneurysm again identified.  No definite additional aneurysm identified concerning for source of hemorrhage.  Clinical correlation and correlation with conventional angiography recommended.  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  CT head: Evolving subarachnoid and intraventricular hemorrhages.  Interval increase distension lateral and 3rd ventricles compatible with developing hydrocephalus  Crowding cerebral sulci at the vertex with additional crowding basilar cisterns with questionable early cerebellar tonsillar herniation.    Cardiac Imaging:  ECHO   Recommended

## 2024-03-06 NOTE — HOSPITAL COURSE
03/06/2024 Overnight, mild bradycardia and soft BPs. Weaned off fentanyl, weaning precedex. Extubated to NC this morning.   03/07/2024 AF, soft BPs. TCD with no signs of vasospasm. Neuro exam stable. Persistent HA, waxing/waning in severity. EVD with 289mL output. MRA pending.   03/08/2024 AF, HDS on RA. TCD no signs of vasospasm. Neuro exam stable. HA. EVD open @10 w/ 250mL output. MRA today.  03/09/2024 AF. BP soft. RA. TTE with reduced EF (40-45%) and mild aortic regurg. TCD w/ no signs of vasospasm. EVD open @10 w/ 224mL output. Started brillinta.   3/10: KUB, robaxin, encourage PO  3/11/2024: no free water intake, send Urine studies, TCD no vasospasm, Follow Na, discussed with IR- aneurysm secured and now SBP <220  03/12/2024: mild hyponatremia, mild hypotension fluid responsive, evd clamped  3/13/24: Pull EVD per NSGY  3/14/2024 Q2h neuros, added valium 2 mg q6h prn for spasms   3/15/2024 TCDs pending, 3/14 TCD with mild MCA/CHESTER vasospasm, NS 1 L bolus x 1  03/16/2024 naeon, mild pi elevation no exam change  3/17/2024 NAEOD  3/18/2024: Stable. NSGY recommended step down

## 2024-03-06 NOTE — PLAN OF CARE
Pt tolerated aneurysm coiling well under crna care, 6fr exoseal closure device placed rg groin, dressing c/d/I, hemostasis obtain @ 0694, bed rest up @ 2124, transfer to , report given @ bedside

## 2024-03-06 NOTE — PLAN OF CARE
Recommendations    1. When appropriate ADAT per MD/SLP    2. If TF warranted: Impact Peptide 1.5 @ 55ml/hr   - Or hospital alternative Pevot 1.5 @ 55ml/hr     3. RD Following    Goals: Meet % een/epn by next RD f/u  Nutrition Goal Status: new  Communication of RD Recs: other (comment) (poc)

## 2024-03-06 NOTE — ASSESSMENT & PLAN NOTE
Kalia Pacheco is a 34yo man with history of known CHESTER aneurysm who presented to [place] with headache and nausea. CTH demonstrated diffuse SAH throughout the basal cisterns HH and he was transferred to [facility] for close monitoring in the Neuro ICU and Neurosurgical consultation.     R frontal EVD was placed on arrival on 3/6 and patient was taken to IR for angiogram, with no intervention upon Acomm aneurysm. Upon further review of DSA, a vertebral artery aneurysm was noted and patient was brought back to IR for a right V4/PICA irregular aneurysm stent assisted coiling.     Imaging reviewed:  CTH 3/5: Diffuse basal cistern SAH with IVH   CTA 3/5: 3mm ant projecting L Acomm, no R A1, R pcomm feeding posterior circulation   DSA 3/5: EVD in position, anteriosuperiorly projecting small L Acomm, R V4 irregular aneurysm not well visualized    DSA +embo 3/5: stent assisted coiling of R V4/PICA irregularly shaped anuerysm, 1/4 lobules still filling     - Admit to NCC with q1 neuro checks  - R EVD at 10, please record ICP and output hourly, call NSGY if ICP sustained >25  - HOB@30   - Keppra 500 BID x7d   - TCDs daily x14d   - Nimotop 60q4 x 21d   - strict I/Os, goal euvolemia to net positive for SAH patients in general, in this patient may need to be more conservative due to cardiac hx, management per NCC   - WTE per NCC   - continue ASA/plavix per neuro IR  - based on bleed pattern believe vertebral aneurysm to be the ruptured aneurysm, no plan for intervention upon Acomm at this point  - Continue to follow clinically and notify NSGY immediately if any neurostatus change     Case and plan discussed with attending Neurosurgeon Dr. Kyle

## 2024-03-06 NOTE — ASSESSMENT & PLAN NOTE
33 year old gentleman with history of CHESTER aneurysm transferred to [facility] [place] [facility] for Neurosurgical evaluation after OSH-ED evaluation and workup consistent with SAH. NIHSS = 1. [employer] Grade I. Neurologic exam non-focal; notable for somnolence, discomfort, and decreased attention (requires repeated stimulation). 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 Patient extubated this morning, pending SLP eval. Patient complains of headache, but was improved with 5ml drained from EVD per nursing staff. Neuro exam stable.     Recommendation:  -TTE    Antithrombotics for secondary stroke prevention: Antiplatelets: Aspirin: 81 mg daily  Clopidogrel: 75 mg daily for intracranial stent per IR    Statins for secondary stroke prevention and hyperlipidemia, if present:   Statins: Atorvastatin- 40 mg daily    Aggressive risk factor modification: HTN     Rehab efforts: The patient has been evaluated by a stroke team provider and the therapy needs have been fully considered based off the presenting complaints and exam findings. The following therapy evaluations are needed: PT evaluate and treat, OT evaluate and treat, SLP evaluate and treat, PM&R evaluate for appropriate placement    Diagnostics ordered/pending: TTE to assess cardiac function/status     VTE prophylaxis:  Pt seen and treated by provider. Mechanical prophylaxis: Place SCDs    BP parameters: SAH: Secured aneurysm, no target, increase BP to prevent vasospasm if needed

## 2024-03-06 NOTE — PLAN OF CARE
[facility] Care Plan    POC reviewed with Kalia Chandleres and family at 1400. Patient and family verbalized understanding. Questions and concerns addressed. No acute events today. Pt progressing toward goals. Will continue to monitor. See below and flowsheets for full assessment and VS info.     -Fentanyl and Precedex infusions weaned and d/c today  -Patient extubated today to room air  -Mckeno passed - nauseated and emesis today will progress diet when ready  -EVD open at 10  -Stroke education reviewed with patient and family today        Is this a stroke patient? yes- Stroke booklet reviewed with patient and family, risk factors identified for patient and stroke booklet remains at bedside for ongoing education.     Care individualization: Booklet individualized at bedside  Restraints:   Restraint Order  Length of Order: Order good for next 24 hours or when removed.  Date that the current order will : 24  Time that the current order will :   Order Upon Application: Yes    Neuro:  Industry Coma Scale  Best Eye Response: 4-->(E4) spontaneous  Best Motor Response: 6-->(M6) obeys commands  Best Verbal Response: 5-->(V5) oriented  Industry Coma Scale Score: 15  Assessment Qualifiers: patient not sedated/intubated  Pupil PERRLA: yes     24 hr Temp:  [96.4 °F (35.8 °C)-98.3 °F (36.8 °C)]     CV:   Rhythm: normal sinus rhythm  BP goals:   SBP < 140  MAP > 65    Resp:      Vent Mode: Spont  Set Rate: 16 BPM  Oxygen Concentration (%): 40  Vt Set: 500 mL  PEEP/CPAP: 5 cmH20  Pressure Support: 8 cmH20    Plan:  Extubated today    GI/:     Diet/Nutrition Received: ice chips, NPO  Last Bowel Movement: 24       Intake/Output Summary (Last 24 hours) at 3/6/2024 1610  Last data filed at 3/6/2024 1600  Gross per 24 hour   Intake 1431.05 ml   Output 1203 ml   Net 228.05 ml     Unmeasured Output  Stool Occurrence: 0  Emesis Occurrence: 1    Labs/Accuchecks:  Recent Labs   Lab 24  0138   WBC 10.48   RBC  4.04*   HGB 12.9*   HCT 38.6*         Recent Labs   Lab 03/05/24  0831 03/06/24  0138    142   K 3.8 3.9   CO2 20* 18*    111*   BUN 13 9   CREATININE 0.99 0.9   ALKPHOS 56  --    ALT 44  --    AST 57  --    BILITOT 0.8  --       Recent Labs   Lab 03/05/24  1104   INR 1.1   APTT 24.2      Recent Labs   Lab 03/05/24  0831   TROPONINI 0.070       Electrolytes: N/A - electrolytes WDL  Accuchecks: none    Gtts:   dexmedeTOMIDine (Precedex) infusion (titrating) Stopped (03/06/24 1044)    fentanyl Stopped (03/06/24 0848)       LDA/Wounds:      Nurses Note -- 4 Eyes      Is there altered skin present? no     Prevention Measures Documented

## 2024-03-06 NOTE — ASSESSMENT & PLAN NOTE
33-year-old male with a suspected A-comm aneurysm rupture complicated by intraventricular hemorrhage status post EVD placement and angiography with unsuccessful vessel was intubated and sedated, likely pending surgical intervention.    - q1h neuro checks  - sheath care per IR recs  - SBP < 140, MAP > 65  - Cardene and Norepi as needed for tight BP control  - OGT for meds  - Extubated today  - Strict I & O  - Hold DVT Ppx for now  - EVD per NSGY  - Trendelenburg at 15deg  - zofran PRN for vomiting  - DAPT  - Resume SQH when appropriate

## 2024-03-06 NOTE — PLAN OF CARE
[facility]  Initial Discharge Assessment       Primary Care Provider: Christiano Baldwin MD    Admission Diagnosis: Subarachnoid hemorrhage [I60.9]    Admission Date: 3/5/2024  Expected Discharge Date: 3/14/2024    Transition of Care Barriers: Underinsured    Payor: MEDICAID / Plan: [place] / Product Type: Managed Medicaid /     Extended Emergency Contact Information  Primary Emergency Contact: Kady Jay  Mobile Phone: [phone]  Relation: Mother  Secondary Emergency Contact: KAVIN JAY  Mobile Phone: [phone]  Relation: Father  Preferred language: English   needed? No    Discharge Plan A: Home with family, Home Health  Discharge Plan B: Rehab      [facility] - [place] - [address]  [address]  [place] [address]  Phone: [phone] Fax: [phone]    [facility] - [place] - [address]  [address]  [facility] [address]  Phone: [phone] Fax: [phone]        Transferred from:  [facility]    Past Medical History:   Diagnosis Date    ADHD (attention deficit hyperactivity disorder)     Aneurysm     Aorta coarctation     Arthritis     Coarctation of aorta     Depression     GERD (gastroesophageal reflux disease)     History of alcohol abuse     Hypertension     Legg-Perthes disease     Seizures 05/2022         CM met with patient and Kady Jay (Mother) [phone]  in room for Discharge Planning Assessment.  Patient is able to answer questions.  Per patient, he lives alone in a single story house with 4 step(s) to enter.   Per patient, he was independent with ADLS and used no dme for ambulation.  Patient will have assistance from his family upon discharge.   Discharge Planning Booklet given to patient/family and discussed.  All questions addressed.  CM will follow for needs.      Discharge Plan A and Plan B have been determined by review of patient's clinical status, future  medical and therapeutic needs, and coverage/benefits for post-acute care in coordination with multidisciplinary team members.        Initial Assessment (most recent)       Adult Discharge Assessment - 03/06/24 3883          Discharge Assessment    Confirmed/corrected address, phone number and insurance Yes     Confirmed Demographics Correct on Facesheet     Source of Information patient;family     Communicated FRANCISCO with patient/caregiver Date not available/Unable to determine     Reason For Admission Nontraumatic subarachnoid hemorrhage,     People in Home alone     Facility Arrived From: [facility]     Do you expect to return to your current living situation? Yes     Do you have help at home or someone to help you manage your care at home? Yes     Who are your caregiver(s) and their phone number(s)? Kady Pacheco (Mother) [phone]     Prior to hospitilization cognitive status: Alert/Oriented     Current cognitive status: Alert/Oriented     Walking or Climbing Stairs Difficulty no     Dressing/Bathing Difficulty no     Home Accessibility stairs to enter home     Number of Stairs, Main Entrance four     Stair Railings, Main Entrance railings on both sides of stairs     Home Layout Able to live on 1st floor     Equipment Currently Used at Home none     Readmission within 30 days? No     Patient currently being followed by outpatient case management? No     Do you currently have service(s) that help you manage your care at home? No     Do you take prescription medications? Yes     Do you have prescription coverage? Yes     Coverage Healthy Blue     Do you have any problems affording any of your prescribed medications? No     Is the patient taking medications as prescribed? yes     Who is going to help you get home at discharge? Kady Pacheco (Mother) [phone]     How do you get to doctors appointments? family or friend will provide     Are you on dialysis? No     Do you take coumadin? No     Discharge Plan A Home with  family;Home Health     Discharge Plan B Rehab     DME Needed Upon Discharge  none     Discharge Plan discussed with: Patient     Transition of Care Barriers Underinsured        Physical Activity    On average, how many days per week do you engage in moderate to strenuous exercise (like a brisk walk)? 7 days     On average, how many minutes do you engage in exercise at this level? 40 min        Financial Resource Strain    How hard is it for you to pay for the very basics like food, housing, medical care, and heating? Not hard at all        Housing Stability    In the last 12 months, was there a time when you were not able to pay the mortgage or rent on time? No     In the last 12 months, how many places have you lived? 2        Transportation Needs    In the past 12 months, has lack of transportation kept you from medical appointments or from getting medications? No     In the past 12 months, has lack of transportation kept you from meetings, work, or from getting things needed for daily living? No        Food Insecurity    Within the past 12 months, you worried that your food would run out before you got the money to buy more. Never true     Within the past 12 months, the food you bought just didn't last and you didn't have money to get more. Never true        Stress    Do you feel stress - tense, restless, nervous, or anxious, or unable to sleep at night because your mind is troubled all the time - these days? Not at all        Social Connections    In a typical week, how many times do you talk on the phone with family, friends, or neighbors? More than three times a week     How often do you get together with friends or relatives? More than three times a week     How often do you attend Religious or Bahai services? Never     Do you belong to any clubs or organizations such as Religious groups, unions, fraternal or athletic groups, or school groups? Yes     How often do you attend meetings of the clubs or  organizations you belong to? More than 4 times per year     Are you , , , , never , or living with a partner? Never         Alcohol Use    Q1: How often do you have a drink containing alcohol? 2-3 times a week     Q2: How many drinks containing alcohol do you have on a typical day when you are drinking? 1 or 2     Q3: How often do you have six or more drinks on one occasion? Never                   Discharge Plan A and Plan B have been determined by review of patient's clinical status, future medical and therapeutic needs, and coverage/benefits for post-acute care in coordination with multidisciplinary team members.      Amber Vazquez RN, CCRN-K, [facility]  Neuro-Critical Care   X [phone]

## 2024-03-06 NOTE — PROGRESS NOTES
[facility]  Neurocritical Care  Progress Note    Admit Date: 3/5/2024  Service Date: 03/06/2024  Length of Stay: 1    Subjective:     Chief Complaint: Nontraumatic subarachnoid hemorrhage, unspecified    History of Present Illness: The patient is a 32-year-old man with a past medical history of ADHD, EtOH use and abuse, arthritis, hypertension, coarctation of the aorta (status post repair), PDA, VSD status post repair x2.  History of Legg-Perthes disease, and intracranial hemorrhage in May 2022.  presented to [facility] this AM with headache and nausea that began 2 days ago and acutely worsened while he was at work this morning. CTH demonstrated HH1mF4 SAH. S/p EVD placement by Nsx. Was taken to IR for angiography, but the vessel was not sercured during the procedure, the patient was returned to the NSICU, intubated and sedated, [facility] neurosurgery pending.     Hospital Course: 03/06/2024 Overnight, mild bradycardia and soft BPs. Weaned off fentanyl, weaning precedex. Extubated to NC this morning.     Interval History:  See hospital course.     Review of Systems   Unable to perform ROS: Intubated     Objective:     Vitals:  Temp: 98.3 °F (36.8 °C)  Pulse: 68  Rhythm: normal sinus rhythm  BP: (!) 87/51  MAP (mmHg): 63  ICP Mean (mmHg): 6 mmHg  Resp: 17  SpO2: 100 %  Oxygen Concentration (%): 40  Vent Mode: Spont  Set Rate: 16 BPM  Vt Set: 500 mL  Pressure Support: 8 cmH20  PEEP/CPAP: 5 cmH20  Peak Airway Pressure: 14 cmH20  Mean Airway Pressure: 8.6 cmH20  Plateau Pressure: 15 cmH20    Temp  Min: 96.4 °F (35.8 °C)  Max: 98.4 °F (36.9 °C)  Pulse  Min: 53  Max: 96  BP  Min: 67/42  Max: 159/74  MAP (mmHg)  Min: 50  Max: 106  ICP Mean (mmHg)  Min: 0 mmHg  Max: 24 mmHg  Resp  Min: 3  Max: 26  ETCO2 (mmHg)  Min: 0 mmHg  Max: 0 mmHg  SpO2  Min: 100 %  Max: 100 %  Oxygen Concentration (%)  Min: 40  Max: 40    03/05 0701 - 03/06 0700  In: 1612.3 [I.V.:412.3]  Out: 1051 [Urine:900; Drains:151]   Unmeasured  Output  Stool Occurrence: 0        Physical Exam  Vitals and nursing note reviewed.   Constitutional:       Appearance: He is not ill-appearing.   HENT:      Head: Normocephalic and atraumatic.      Mouth/Throat:      Mouth: Mucous membranes are moist.      Pharynx: Oropharynx is clear.   Eyes:      Extraocular Movements: Extraocular movements intact.      Conjunctiva/sclera: Conjunctivae normal.      Pupils: Pupils are equal, round, and reactive to light.   Cardiovascular:      Rate and Rhythm: Regular rhythm. Bradycardia present.   Pulmonary:      Effort: Pulmonary effort is normal. No respiratory distress.   Abdominal:      General: Abdomen is flat. There is no distension.      Palpations: Abdomen is soft.      Tenderness: There is no abdominal tenderness.   Musculoskeletal:      Right lower leg: No edema.      Left lower leg: No edema.   Skin:     General: Skin is warm and dry.   Neurological:      General: No focal deficit present.      Mental Status: He is alert.      Cranial Nerves: No cranial nerve deficit.      Sensory: No sensory deficit.      Motor: No weakness.      Comments: Moves all extremities spontaneously, following commands            Medications:  ContinuousdexmedeTOMIDine (Precedex) infusion (titrating), Last Rate: Stopped (03/06/24 1044)  fentanyl, Last Rate: Stopped (03/06/24 0848)    Scheduled[START ON 3/7/2024] aspirin, 81 mg, Daily  ceFAZolin (Ancef) IV (PEDS and ADULTS), 2 g, Q8H  [START ON 3/7/2024] clopidogreL, 75 mg, Daily  famotidine (PF), 20 mg, BID  niMODipine, 60 mg, Q4H  senna-docusate 8.6-50 mg, 1 tablet, BID    PRNacetaminophen, 650 mg, Q6H PRN  bisacodyL, 10 mg, Daily PRN  fentanyl, 100 mcg, Q1H PRN  labetalol, 10 mg, Q15 Min PRN  magnesium oxide, 800 mg, PRN  magnesium oxide, 800 mg, PRN  ondansetron, 4 mg, Q6H PRN  potassium bicarbonate, 35 mEq, PRN  potassium bicarbonate, 50 mEq, PRN  potassium bicarbonate, 60 mEq, PRN  potassium, sodium phosphates, 2 packet, PRN  potassium,  sodium phosphates, 2 packet, PRN  potassium, sodium phosphates, 2 packet, PRN      Today I personally reviewed pertinent medications, lines/drains/airways, imaging, cardiology results, laboratory results, microbiology results, notably:    Diet  Diet NPO  Diet NPO    Assessment/Plan:     Neuro  * Nontraumatic subarachnoid hemorrhage, unspecified  33-year-old male with a suspected A-comm aneurysm rupture complicated by intraventricular hemorrhage status post EVD placement and angiography with unsuccessful vessel was intubated and sedated, likely pending surgical intervention.    - q1h neuro checks  - sheath care per IR recs  - SBP < 140, MAP > 65  - Nimodipine 60q4  - TCDs daily  - Cardene and Norepi as needed for tight BP control  - OGT for meds  - Extubated today  - Strict I & O  - Hold DVT Ppx for now  - EVD per NSGY  - Trendelenburg at 15deg  - zofran PRN for vomiting  - DAPT  - Resume SQH when appropriate     IVH (intraventricular hemorrhage)  See Principle problem      Other hydrocephalus  See Principle problem    Anterior communicating artery aneurysm  See Principle problem    Cardiac/Vascular  Hypertension  -Hold home Losartan 160 and Toprol 50 until after surgery          The patient is being Prophylaxed for:  Venous Thromboembolism with: None  Stress Ulcer with: None  Ventilator Pneumonia with: not applicable    Activity Orders            Bed rest starting at 03/05 2152    Turn patient starting at 03/05 1200    Elevate HOB starting at 03/05 1119    Diet NPO: NPO starting at 03/05 1119          Full Code    Aby Moran MD  Neurocritical Care  [facility]

## 2024-03-06 NOTE — PROGRESS NOTES
RT and NCC team at bedside for ordered extubation.    Patient extubated to 3LPM NC and tolerated well. NGT to remain in place until Mckeon can be performed

## 2024-03-07 LAB
ANION GAP SERPL CALC-SCNC: 8 MMOL/L (ref 8–16)
BASOPHILS # BLD AUTO: 0.03 K/UL (ref 0–0.2)
BASOPHILS NFR BLD: 0.3 % (ref 0–1.9)
BUN SERPL-MCNC: 6 MG/DL (ref 6–20)
CALCIUM SERPL-MCNC: 8.3 MG/DL (ref 8.7–10.5)
CHLORIDE SERPL-SCNC: 112 MMOL/L (ref 95–110)
CO2 SERPL-SCNC: 20 MMOL/L (ref 23–29)
CREAT SERPL-MCNC: 0.8 MG/DL (ref 0.5–1.4)
DIFFERENTIAL METHOD BLD: ABNORMAL
EOSINOPHIL # BLD AUTO: 0 K/UL (ref 0–0.5)
EOSINOPHIL NFR BLD: 0.1 % (ref 0–8)
ERYTHROCYTE [DISTWIDTH] IN BLOOD BY AUTOMATED COUNT: 13.1 % (ref 11.5–14.5)
EST. GFR  (NO RACE VARIABLE): >60 ML/MIN/1.73 M^2
GLUCOSE SERPL-MCNC: 87 MG/DL (ref 70–110)
HCT VFR BLD AUTO: 34.6 % (ref 40–54)
HGB BLD-MCNC: 11.4 G/DL (ref 14–18)
IMM GRANULOCYTES # BLD AUTO: 0.04 K/UL (ref 0–0.04)
IMM GRANULOCYTES NFR BLD AUTO: 0.4 % (ref 0–0.5)
LYMPHOCYTES # BLD AUTO: 1.5 K/UL (ref 1–4.8)
LYMPHOCYTES NFR BLD: 15.7 % (ref 18–48)
MAGNESIUM SERPL-MCNC: 1.8 MG/DL (ref 1.6–2.6)
MCH RBC QN AUTO: 31.7 PG (ref 27–31)
MCHC RBC AUTO-ENTMCNC: 32.9 G/DL (ref 32–36)
MCV RBC AUTO: 96 FL (ref 82–98)
MONOCYTES # BLD AUTO: 0.6 K/UL (ref 0.3–1)
MONOCYTES NFR BLD: 6.8 % (ref 4–15)
NEUTROPHILS # BLD AUTO: 7.1 K/UL (ref 1.8–7.7)
NEUTROPHILS NFR BLD: 76.7 % (ref 38–73)
NRBC BLD-RTO: 0 /100 WBC
PHOSPHATE SERPL-MCNC: 2.9 MG/DL (ref 2.7–4.5)
PLATELET # BLD AUTO: 161 K/UL (ref 150–450)
PMV BLD AUTO: 10.5 FL (ref 9.2–12.9)
POCT GLUCOSE: 83 MG/DL (ref 70–110)
POTASSIUM SERPL-SCNC: 3.7 MMOL/L (ref 3.5–5.1)
RBC # BLD AUTO: 3.6 M/UL (ref 4.6–6.2)
SODIUM SERPL-SCNC: 140 MMOL/L (ref 136–145)
WBC # BLD AUTO: 9.27 K/UL (ref 3.9–12.7)

## 2024-03-07 PROCEDURE — 85025 COMPLETE CBC W/AUTO DIFF WBC: CPT

## 2024-03-07 PROCEDURE — 97165 OT EVAL LOW COMPLEX 30 MIN: CPT

## 2024-03-07 PROCEDURE — 97530 THERAPEUTIC ACTIVITIES: CPT

## 2024-03-07 PROCEDURE — 25000003 PHARM REV CODE 250

## 2024-03-07 PROCEDURE — 25000003 PHARM REV CODE 250: Performed by: PSYCHIATRY & NEUROLOGY

## 2024-03-07 PROCEDURE — 84100 ASSAY OF PHOSPHORUS: CPT

## 2024-03-07 PROCEDURE — 25000003 PHARM REV CODE 250: Performed by: STUDENT IN AN ORGANIZED HEALTH CARE EDUCATION/TRAINING PROGRAM

## 2024-03-07 PROCEDURE — 94761 N-INVAS EAR/PLS OXIMETRY MLT: CPT

## 2024-03-07 PROCEDURE — 92523 SPEECH SOUND LANG COMPREHEN: CPT

## 2024-03-07 PROCEDURE — 83735 ASSAY OF MAGNESIUM: CPT

## 2024-03-07 PROCEDURE — 80048 BASIC METABOLIC PNL TOTAL CA: CPT

## 2024-03-07 PROCEDURE — 63600175 PHARM REV CODE 636 W HCPCS

## 2024-03-07 PROCEDURE — 20000000 HC ICU ROOM

## 2024-03-07 PROCEDURE — 99291 CRITICAL CARE FIRST HOUR: CPT | Mod: ,,, | Performed by: PSYCHIATRY & NEUROLOGY

## 2024-03-07 PROCEDURE — 25000003 PHARM REV CODE 250: Performed by: PHYSICIAN ASSISTANT

## 2024-03-07 PROCEDURE — 63600175 PHARM REV CODE 636 W HCPCS: Performed by: STUDENT IN AN ORGANIZED HEALTH CARE EDUCATION/TRAINING PROGRAM

## 2024-03-07 PROCEDURE — 92610 EVALUATE SWALLOWING FUNCTION: CPT

## 2024-03-07 RX ORDER — MAGNESIUM SULFATE HEPTAHYDRATE 40 MG/ML
2 INJECTION, SOLUTION INTRAVENOUS ONCE
Status: COMPLETED | OUTPATIENT
Start: 2024-03-07 | End: 2024-03-07

## 2024-03-07 RX ORDER — GABAPENTIN 300 MG/1
300 CAPSULE ORAL EVERY 8 HOURS
Status: DISCONTINUED | OUTPATIENT
Start: 2024-03-07 | End: 2024-03-20 | Stop reason: HOSPADM

## 2024-03-07 RX ORDER — HYDROXYZINE HYDROCHLORIDE 25 MG/1
25 TABLET, FILM COATED ORAL NIGHTLY PRN
Status: DISCONTINUED | OUTPATIENT
Start: 2024-03-07 | End: 2024-03-18

## 2024-03-07 RX ORDER — GABAPENTIN 300 MG/1
300 CAPSULE ORAL EVERY 8 HOURS
Status: DISCONTINUED | OUTPATIENT
Start: 2024-03-07 | End: 2024-03-07

## 2024-03-07 RX ORDER — NIMODIPINE 30 MG/1
30 CAPSULE, LIQUID FILLED ORAL
Status: DISCONTINUED | OUTPATIENT
Start: 2024-03-07 | End: 2024-03-20 | Stop reason: HOSPADM

## 2024-03-07 RX ORDER — POLYETHYLENE GLYCOL 3350 17 G/17G
17 POWDER, FOR SOLUTION ORAL DAILY
Status: DISCONTINUED | OUTPATIENT
Start: 2024-03-07 | End: 2024-03-18

## 2024-03-07 RX ORDER — PROCHLORPERAZINE EDISYLATE 5 MG/ML
10 INJECTION INTRAMUSCULAR; INTRAVENOUS ONCE
Status: COMPLETED | OUTPATIENT
Start: 2024-03-07 | End: 2024-03-07

## 2024-03-07 RX ADMIN — GABAPENTIN 300 MG: 300 CAPSULE ORAL at 02:03

## 2024-03-07 RX ADMIN — GABAPENTIN 300 MG: 300 CAPSULE ORAL at 09:03

## 2024-03-07 RX ADMIN — OXYCODONE 5 MG: 5 TABLET ORAL at 02:03

## 2024-03-07 RX ADMIN — CEFAZOLIN 2 G: 2 INJECTION, POWDER, FOR SOLUTION INTRAMUSCULAR; INTRAVENOUS at 06:03

## 2024-03-07 RX ADMIN — ACETAMINOPHEN 650 MG: 325 TABLET ORAL at 02:03

## 2024-03-07 RX ADMIN — FAMOTIDINE 20 MG: 10 INJECTION, SOLUTION INTRAVENOUS at 10:03

## 2024-03-07 RX ADMIN — ACETAMINOPHEN 650 MG: 325 TABLET ORAL at 12:03

## 2024-03-07 RX ADMIN — NIMODIPINE 30 MG: 30 CAPSULE, LIQUID FILLED ORAL at 06:03

## 2024-03-07 RX ADMIN — NIMODIPINE 30 MG: 30 CAPSULE, LIQUID FILLED ORAL at 10:03

## 2024-03-07 RX ADMIN — HYDROXYZINE HYDROCHLORIDE 25 MG: 25 TABLET, FILM COATED ORAL at 09:03

## 2024-03-07 RX ADMIN — NIMODIPINE 30 MG: 30 CAPSULE, LIQUID FILLED ORAL at 11:03

## 2024-03-07 RX ADMIN — POLYETHYLENE GLYCOL 3350 17 G: 17 POWDER, FOR SOLUTION ORAL at 10:03

## 2024-03-07 RX ADMIN — NIMODIPINE 60 MG: 30 CAPSULE, LIQUID FILLED ORAL at 02:03

## 2024-03-07 RX ADMIN — PROCHLORPERAZINE EDISYLATE 10 MG: 5 INJECTION INTRAMUSCULAR; INTRAVENOUS at 02:03

## 2024-03-07 RX ADMIN — NIMODIPINE 60 MG: 30 CAPSULE, LIQUID FILLED ORAL at 01:03

## 2024-03-07 RX ADMIN — CEFAZOLIN 2 G: 2 INJECTION, POWDER, FOR SOLUTION INTRAMUSCULAR; INTRAVENOUS at 09:03

## 2024-03-07 RX ADMIN — CEFAZOLIN 2 G: 2 INJECTION, POWDER, FOR SOLUTION INTRAMUSCULAR; INTRAVENOUS at 01:03

## 2024-03-07 RX ADMIN — Medication 800 MG: at 06:03

## 2024-03-07 RX ADMIN — NIMODIPINE 30 MG: 30 CAPSULE, LIQUID FILLED ORAL at 08:03

## 2024-03-07 RX ADMIN — CLOPIDOGREL BISULFATE 75 MG: 75 TABLET ORAL at 10:03

## 2024-03-07 RX ADMIN — DOCUSATE SODIUM AND SENNOSIDES 1 TABLET: 8.6; 5 TABLET, FILM COATED ORAL at 08:03

## 2024-03-07 RX ADMIN — POTASSIUM BICARBONATE 50 MEQ: 978 TABLET, EFFERVESCENT ORAL at 06:03

## 2024-03-07 RX ADMIN — NIMODIPINE 60 MG: 30 CAPSULE, LIQUID FILLED ORAL at 10:03

## 2024-03-07 RX ADMIN — OXYCODONE 5 MG: 5 TABLET ORAL at 04:03

## 2024-03-07 RX ADMIN — METOCLOPRAMIDE 10 MG: 5 INJECTION, SOLUTION INTRAMUSCULAR; INTRAVENOUS at 02:03

## 2024-03-07 RX ADMIN — MAGNESIUM SULFATE HEPTAHYDRATE 2 G: 40 INJECTION, SOLUTION INTRAVENOUS at 02:03

## 2024-03-07 RX ADMIN — OXYCODONE 5 MG: 5 TABLET ORAL at 09:03

## 2024-03-07 RX ADMIN — OXYCODONE 5 MG: 5 TABLET ORAL at 10:03

## 2024-03-07 RX ADMIN — NIMODIPINE 60 MG: 30 CAPSULE, LIQUID FILLED ORAL at 06:03

## 2024-03-07 RX ADMIN — DOCUSATE SODIUM AND SENNOSIDES 1 TABLET: 8.6; 5 TABLET, FILM COATED ORAL at 10:03

## 2024-03-07 RX ADMIN — ASPIRIN 81 MG CHEWABLE TABLET 81 MG: 81 TABLET CHEWABLE at 10:03

## 2024-03-07 NOTE — PT/OT/SLP EVAL
Speech Language Pathology Evaluation  Cognitive/Bedside Swallow    Patient Name:  Kalia Pacheco   MRN:  [id number]  Admitting Diagnosis: Nontraumatic subarachnoid hemorrhage, unspecified    Recommendations:                  General Recommendations:  Cognitive-linguistic therapy  Diet recommendations:  Regular Diet - IDDSI Level 7, Thin liquids - IDDSI Level 0   Aspiration Precautions: Standard aspiration precautions   General Precautions: Standard, fall (EVD in place)  Communication strategies:  none    Assessment:     Kalia Pacheco is a 33 y.o. male with an SLP diagnosis of mild Cognitive Impairment.      History:     Past Medical History:   Diagnosis Date    ADHD (attention deficit hyperactivity disorder)     Aneurysm     Aorta coarctation     Arthritis     Coarctation of aorta     Depression     GERD (gastroesophageal reflux disease)     History of alcohol abuse     Hypertension     Legg-Perthes disease     Seizures 05/2022       Past Surgical History:   Procedure Laterality Date    hip surgeries      x 6    open heart surgery      surgery on aorta as a child , 3 procedures       Chief Complaint: Nontraumatic subarachnoid hemorrhage, unspecified     History of Present Illness: The patient is a 32-year-old man with a past medical history of ADHD, EtOH use and abuse, arthritis, hypertension, coarctation of the aorta (status post repair), PDA, VSD status post repair x2.  History of Legg-Perthes disease, and intracranial hemorrhage in May 2022.  presented to [facility] this AM with headache and nausea that began 2 days ago and acutely worsened while he was at work this morning. CTH demonstrated HH1mF4 SAH. S/p EVD placement by Nsx. Was taken to IR for angiography, but the vessel was not sercured during the procedure, the patient was returned to the NSICU, intubated and sedated, [facility] neurosurgery pending.      Hospital Course: 03/06/2024 Overnight, mild bradycardia and soft BPs. Weaned off fentanyl,  "weaning precedex. Extubated to NC this morning.     Prior Intubation HX:  3/5 - 3/6    Modified Barium Swallow: none on file    Chest X-Rays: 3/5/24: FINDINGS:  Endotracheal tube tip projects at the superior margin of the clavicular heads, approximately 7 cm above the floyd.  Enteric tube courses below the diaphragm, extending beyond the field of view.  Mediastinal structures are midline.  There are low lung volumes with increased interstitial prominence.  No substantial volume of pleural fluid, pneumothorax or large region of confluent airspace consolidation identified.  Osseous structures intact.    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    Subjective     "I had an aneurysm."     Pain/Comfort:  Pain Rating 1: 0/10    Respiratory Status: Room air    Objective:     Cognitive Status:    Pt O x 4. General recall was good. Pt immediately recalled series of 3-, 4-, and 5-digits series with 100% accuracy. Following a 2 minute delay, pt recalled 2/3 unrelated words given cues (1/3 IND). Pt answered problem solving q's with 83% accuracy IND/100% given cues.  Min cues were needed to generate multiple causes for a hypothetical problem.        Receptive Language:   Comprehension:      WFL for complex yes/no q's and 1-, 2-, and 3-step commands    Expressive Language:  Verbal:  Pt able to express basic and functional information without evidence of expressive language deficits.  During a word fluency, pt listed 17 items in a category in one minute (15-20 is wnl).       Motor Speech:  WFL    Voice:   WFL    Visual-Spatial:  tbd    Reading:   tbd      Written Expression:   tbd    Oral Musculature Evaluation  Oral Musculature: WNL  Dentition: present and adequate  Secretion Management: adequate  Mucosal Quality: adequate  Mandibular Strength and Mobility: WNL  Oral Labial Strength and Mobility: WNL  Lingual Strength and Mobility: WNL  Velar Elevation: " WNL  Buccal Strength and Mobility: WNL  Volitional Cough: strong  Volitional Swallow: elicited  Voice Prior to PO Intake: dry, clear    Bedside Swallow Eval:   Consistencies Assessed:  Thin liquids cup sip x 1, straw sips x 2  Solids 1 cracker      Oral Phase:   WNL    Pharyngeal Phase:   no overt clinical signs/symptoms of aspiration  no overt clinical signs/symptoms of pharyngeal dysphagia    Compensatory Strategies  None    Treatment: Education provided to pt/family regarding role of SLP, purpose of speech/language/cognitive and swallowing evaluations, diet recommendations, recommendations for continued cognitive therapy, and SLP treatment plan and POC. Pt demonstrated understanding and agreement.     Goals:   Multidisciplinary Problems       SLP Goals          Problem: SLP    Goal Priority Disciplines Outcome   SLP Goal     SLP    Description: Speech Language Pathology Goals  Goals expected to be met by 3/14:  1. Pt will recall 3/3 novel items after a 2 minute delay given mod cues.   2. Pt will complete moderate to complex problem solving tasks with 80% accuracy given min cues.   3. Pt will complete mental manipulation tasks with 80% accuracy.   4. Pt will participate in evaluation of reading, writing, visual spatial, and functional math abilities.                                Plan:     Patient to be seen:  3 x/week   Plan of Care expires:  04/08/24  Plan of Care reviewed with:  patient, family   SLP Follow-Up:  Yes       Discharge recommendations:  Therapy Intensity Recommendations at Discharge:  (tbd)     Time Tracking:     SLP Treatment Date:   03/07/24  Speech Start Time:  1027  Speech Stop Time:  1043     Speech Total Time (min):  16 min    Billable Minutes: Eval 8  and Eval Swallow and Oral Function 8    03/07/2024

## 2024-03-07 NOTE — PLAN OF CARE
[facility] Care Plan    POC reviewed with Kalia Pacheco and family at 1400. Pt verbalized understanding. Questions and concerns addressed. No acute events today. Pt progressing toward goals. Will continue to monitor. See below and flowsheets for full assessment and VS info.             Is this a stroke patient? yes- Stroke booklet reviewed with patient and family, risk factors identified for patient and stroke booklet remains at bedside for ongoing education.     Care individualization: yes  Restraints:   Restraint Order  Length of Order: Order good for next 24 hours or when removed.  Date that the current order will : 24  Time that the current order will :   Order Upon Application: Yes    Neuro:  Irving Coma Scale  Best Eye Response: 4-->(E4) spontaneous  Best Motor Response: 6-->(M6) obeys commands  Best Verbal Response: 5-->(V5) oriented  Estero Coma Scale Score: 15  Assessment Qualifiers: patient not sedated/intubated  Pupil PERRLA: yes     24 hr Temp:  [97.2 °F (36.2 °C)-98.2 °F (36.8 °C)]     CV:   Rhythm: normal sinus rhythm  BP goals:   SBP < 140  MAP >     Resp:      Vent Mode: Spont  Set Rate: 16 BPM  Oxygen Concentration (%): 40  Vt Set: 500 mL  PEEP/CPAP: 5 cmH20  Pressure Support: 8 cmH20    Plan: N/A    GI/:     Diet/Nutrition Received: regular  Last Bowel Movement: 24       Intake/Output Summary (Last 24 hours) at 3/7/2024 1546  Last data filed at 3/7/2024 1501  Gross per 24 hour   Intake 1552.17 ml   Output 3602 ml   Net -2049.83 ml     Unmeasured Output  Urine Occurrence: 1  Stool Occurrence: 0  Emesis Occurrence: 1    Labs/Accuchecks:  Recent Labs   Lab 24   WBC 9.27   RBC 3.60*   HGB 11.4*   HCT 34.6*         Recent Labs   Lab 24  0831 24  0138 24      < > 140   K 3.8   < > 3.7   CO2 20*   < > 20*      < > 112*   BUN 13   < > 6   CREATININE 0.99   < > 0.8   ALKPHOS 56  --   --    ALT 44  --   --    AST 57  --    --    BILITOT 0.8  --   --     < > = values in this interval not displayed.      Recent Labs   Lab 03/05/24  1104   INR 1.1   APTT 24.2      Recent Labs   Lab 03/05/24  0831   TROPONINI 0.070       Electrolytes: N/A - electrolytes WDL  Accuchecks: none    Gtts:   dexmedeTOMIDine (Precedex) infusion (titrating) Stopped (03/06/24 1044)       LDA/Wounds:      Nurses Note -- 4 Eyes      Is there altered skin present? no     Prevention Measures Documented    Second RN/Staff Member:  Chary STALLINGS RN      [facility]

## 2024-03-07 NOTE — NURSING
GHASSAN Rubalcava PAC notified of sbp 90 and 98, MAP 81 and 71, orders will be placed to split Nimodipine doses

## 2024-03-07 NOTE — NURSING
Spoke with [facility] in MRI regarding ordered scan, stated that scan availability is most likely to be this evening, will check again prior to shift end.

## 2024-03-07 NOTE — PLAN OF CARE
OT evaluation completed.  Problem: Occupational Therapy  Goal: Occupational Therapy Goal  Description: Goals set 3/7 with an expiration date, 4/4:  Patient will increase functional independence with ADLs by performing:    Patient will demonstrate rolling to the right with modified independence.  Not met   Patient will demonstrate rolling to the left with modified independence.   Not met  Patient will demonstrate supine -sit with modified independence.   Not met  Patient will demonstrate stand pivot transfers with modified independence.   Not met  Patient will demonstrate grooming while standing with modified independence.   Not met  Patient will demonstrate upper body dressing with modified independence while seated EOB.   Not met  Patient will demonstrate lower body dressing with modified independence while seated EOB.   Not met  Patient will demonstrate toileting with modified independence.   Not met  Patient will demonstrate bathing while seated EOB with modified independence   Not met  Patient's family / caregiver will demonstrate independence and safety with assisting patient with self-care skills and functional mobility.     Not met      Outcome: Ongoing, Progressing

## 2024-03-07 NOTE — ASSESSMENT & PLAN NOTE
Kalia Pacheco is a 32yo man with history of known CHESTER aneurysm who presented to [place] with headache and nausea. CTH demonstrated diffuse SAH throughout the basal cisterns HH and he was transferred to [facility] for close monitoring in the Neuro ICU and Neurosurgical consultation.     R frontal EVD was placed on arrival on 3/6 and patient was taken to IR for angiogram, with no intervention upon Acomm aneurysm. Upon further review of DSA, a vertebral artery aneurysm was noted and patient was brought back to IR for a right V4/PICA irregular aneurysm stent assisted coiling.     Imaging reviewed:  CTH 3/5: Diffuse basal cistern SAH with IVH   CTA 3/5: 3mm ant projecting L Acomm, no R A1, R pcomm feeding posterior circulation   DSA 3/5: EVD in position, anteriosuperiorly projecting small L Acomm, R V4 irregular aneurysm not well visualized    DSA +embo 3/5: stent assisted coiling of R V4/PICA irregularly shaped anuerysm, 1/4 lobules still filling     - Admit to NCC with q1 neuro checks  - R EVD at 10, please record ICP and output hourly, call NSGY if ICP sustained >25  - HOB@30   - Keppra 500 BID x7d   - TCDs daily x14d   - Nimotop 60q4 x 21d   - strict I/Os, goal euvolemia to net positive for SAH patients in general, in this patient may need to be more conservative due to cardiac hx, management per NCC   - WTE per NCC   - continue ASA/plavix per neuro IR  - based on bleed pattern believe vertebral aneurysm to be the ruptured aneurysm, no plan for intervention upon Acomm at this point  -Will obtain MRA with vessel wall of the PICA and Acom  -PRU not therapeutic. Likely needs brillinta  - Continue to follow clinically and notify NSGY immediately if any neurostatus change     Case and plan discussed with attending Neurosurgeon Dr. Kyle

## 2024-03-07 NOTE — SUBJECTIVE & OBJECTIVE
Interval History: NAEON. Pt exam stable this morning. EVD put out 274cc with icps from 0-15. Pt is -1100 in volume. Plan for MRA today. PRU not therapeutic will need to be swapped to brillinta    Medications:  Continuous Infusions:   dexmedeTOMIDine (Precedex) infusion (titrating) Stopped (03/06/24 1044)    fentanyl Stopped (03/06/24 0848)     Scheduled Meds:   aspirin  81 mg Oral Daily    ceFAZolin (Ancef) IV (PEDS and ADULTS)  2 g Intravenous Q8H    clopidogreL  75 mg Oral Daily    famotidine (PF)  20 mg Intravenous BID    niMODipine  60 mg Oral Q4H    polyethylene glycol  17 g Oral Daily    senna-docusate 8.6-50 mg  1 tablet Oral BID     PRN Meds:acetaminophen, bisacodyL, fentanyl, labetalol, magnesium oxide, magnesium oxide, metoclopramide, ondansetron, oxyCODONE, potassium bicarbonate, potassium bicarbonate, potassium bicarbonate, potassium, sodium phosphates, potassium, sodium phosphates, potassium, sodium phosphates     Review of Systems  Objective:     Weight: 106.6 kg (235 lb)  Body mass index is 35.73 kg/m².  Vital Signs (Most Recent):  Temp: 97.3 °F (36.3 °C) (03/07/24 0723)  Pulse: 88 (03/07/24 0816)  Resp: 20 (03/07/24 0816)  BP: 113/62 (03/07/24 0802)  SpO2: 98 % (03/07/24 0816) Vital Signs (24h Range):  Temp:  [97.3 °F (36.3 °C)-98.3 °F (36.8 °C)] 97.3 °F (36.3 °C)  Pulse:  [] 88  Resp:  [9-25] 20  SpO2:  [96 %-100 %] 98 %  BP: ()/(47-74) 113/62  Arterial Line BP: (114-160)/(36-80) 160/71     Date 03/07/24 0700 - 03/08/24 0659   Shift 9763-5525 6091-4155 5211-0931 24 Hour Total   INTAKE   IV Piggyback 50   50   Shift Total(mL/kg) 50(0.5)   50(0.5)   OUTPUT   Drains 18   18   Shift Total(mL/kg) 18(0.2)   18(0.2)   Weight (kg) 106.6 106.6 106.6 106.6              Vent Mode: Spont  Oxygen Concentration (%):  [40] 40  Resp Rate Total:  [16 br/min-17 br/min] 16 br/min  PEEP/CPAP:  [5 cmH20] 5 cmH20  Pressure Support:  [8 cmH20] 8 cmH20  Mean Airway Pressure:  [8.6 cmH20] 8.6 cmH20              ICP/Ventriculostomy 03/05/24 1143 Right Parietal region (Active)   Level of Ventriculostomy (cm above) 10 03/07/24 0701   Status Open to drainage 03/07/24 0801   Site Assessment Clean;Dry 03/07/24 0801   Site Drainage No drainage 03/07/24 0801   Waveform normal waveform 03/07/24 0701   Output (mL) 8 mL 03/07/24 0801   CSF Color red;clear 03/07/24 0801   Dressing Status Clean;Dry;Intact 03/07/24 0801   Interventions bed controls locked;zeroed 03/07/24 0801          Physical Exam       Neurosurgery Physical Exam    General: well developed, well nourished, no distress.   HEENT: normocephalic, atraumatic  CV: regular rate   Pulmonary: normal respirations, no signs of respiratory distress  Abdomen: soft, non-distended, not tender to palpation  Skin: Skin is warm, dry and intact  Heme: no bruising    Neuro:   Mental Status: AO x4, no aphasia, no dysarthria   CN: PERRL, EOMI, VF intact to confrontation, sensation intact bilaterally, eyebrow raise and grimace symmetric, tongue midline  Motor: moves all extremities spontaneously, full strength throughout, no pronator drift   Sensory: intact to light touch throughout  Cerebellar: finger to nose intact bilaterally  Reflexes: -Patterson's, -Babinski, no clonus. Patellar: 2+ bilaterally            Significant Labs:  Recent Labs   Lab 03/06/24  0138 03/07/24 0222    87    140   K 3.9 3.7   * 112*   CO2 18* 20*   BUN 9 6   CREATININE 0.9 0.8   CALCIUM 8.8 8.3*   MG 1.8 1.8     Recent Labs   Lab 03/06/24  0138 03/07/24  0222   WBC 10.48 9.27   HGB 12.9* 11.4*   HCT 38.6* 34.6*    161     Recent Labs   Lab 03/05/24  1104   INR 1.1   APTT 24.2     Microbiology Results (last 7 days)       ** No results found for the last 168 hours. **          All pertinent labs from the last 24 hours have been reviewed.    Significant Diagnostics:  CT: No results found in the last 24 hours.  I have reviewed all pertinent imaging results/findings within the past 24 hours.

## 2024-03-07 NOTE — PT/OT/SLP EVAL
"Occupational Therapy   Evaluation    Name: Kalia Pacheco  MRN: [id number]  Admitting Diagnosis: Nontraumatic subarachnoid hemorrhage, unspecified  Recent Surgery: * No surgery found *      Recommendations:     Discharge Recommendations: No Therapy Indicated  Discharge Equipment Recommendations:  none  Barriers to discharge:  None    Assessment:     Kalia Pacheco is a 33 y.o. male with a medical diagnosis of Nontraumatic subarachnoid hemorrhage, unspecified.  He presents with performance deficits affecting function: weakness, impaired self care skills, impaired functional mobility.      Rehab Prognosis: Good; patient would benefit from acute skilled OT services to address these deficits and reach maximum level of function.       Plan:     Patient to be seen 3 x/week to address the above listed problems via self-care/home management, therapeutic activities, therapeutic exercises, neuromuscular re-education  Plan of Care Expires: 04/03/24  Plan of Care Reviewed with: patient    Subjective     Patient:  "I have had 6 hip surgeries on the left hip; I grew up in a wheelchair."  "I am a little nauseated."    Occupational Profile:  Patient resides in [place] alone in one story home with 4 steps to enter, bilateral rail. Patient is right handed. PTA patient independent with ADLs including driving.  Works: [employer].  Hobbies:  golf, tennis.  DME:  rolling walker.       Pain/Comfort:  Pain Rating 1: 0/10  Pain Rating Post-Intervention 1: 0/10    Patients cultural, spiritual, [employer]aritan conflicts given the current situation: no    Objective:     Communicated with: Nurse prior to session.  Patient found supine with external ventricular drain, peripheral IV, blood pressure cuff, arterial line, bed alarm upon OT entry to room.    General Precautions: Standard, aspiration, fall (EVD clamped prior to session)  Orthopedic Precautions: N/A  Braces: N/A  Respiratory Status: Room air    Occupational Performance:    Bed " Mobility:    Patient completed Rolling/Turning to Left with  modified independence  Patient completed Rolling/Turning to Right with modified independence  Patient completed Supine to Sit with modified independence  Patient completed Sit to Supine with modified independence    Functional Mobility/Transfers:  Patient completed Sit <> Stand Transfer with stand by assistance  with  no assistive device   Patient completed Bed <> Chair Transfer using Stand Pivot technique with stand by assistance with no assistive device    Activities of Daily Living:  Grooming: stand by assistance    Upper Body Dressing: stand by assistance    Lower Body Dressing: stand by assistance      Cognitive/Visual Perceptual:  Cognitive/Psychosocial Skills:     -       Oriented to: Person, Place, Time, and Situation   -       Follows Commands/attention:Follows one-step commands  -       Communication: clear/fluent    Physical Exam:  Postural examination/scapula alignment:    -       Rounded shoulders  Upper Extremity Range of Motion:     -       Right Upper Extremity: WNL  -       Left Upper Extremity: WNL  Upper Extremity Strength:    -       Right Upper Extremity: WNL  -       Left Upper Extremity: WNL    AMPAC 6 Click ADL:  AMPAC Total Score: 18    Treatment & Education:  Patient alert and oriented x 3; able to follow 4/4 one step commands.  Patient attentive and interactive throughout the session.      Patient left supine with all lines intact, call button in reach, and bed alarm on    GOALS:   Multidisciplinary Problems       Occupational Therapy Goals          Problem: Occupational Therapy    Goal Priority Disciplines Outcome Interventions   Occupational Therapy Goal     OT, PT/OT Ongoing, Progressing    Description: Goals set 3/7 with an expiration date, 4/4:  Patient will increase functional independence with ADLs by performing:    Patient will demonstrate rolling to the right with modified independence.  Not met   Patient will  demonstrate rolling to the left with modified independence.   Not met  Patient will demonstrate supine -sit with modified independence.   Not met  Patient will demonstrate stand pivot transfers with modified independence.   Not met  Patient will demonstrate grooming while standing with modified independence.   Not met  Patient will demonstrate upper body dressing with modified independence while seated EOB.   Not met  Patient will demonstrate lower body dressing with modified independence while seated EOB.   Not met  Patient will demonstrate toileting with modified independence.   Not met  Patient will demonstrate bathing while seated EOB with modified independence   Not met  Patient's family / caregiver will demonstrate independence and safety with assisting patient with self-care skills and functional mobility.     Not met                           History:     Past Medical History:   Diagnosis Date    ADHD (attention deficit hyperactivity disorder)     Aneurysm     Aorta coarctation     Arthritis     Coarctation of aorta     Depression     GERD (gastroesophageal reflux disease)     History of alcohol abuse     Hypertension     Legg-Perthes disease     Seizures 05/2022         Past Surgical History:   Procedure Laterality Date    hip surgeries      x 6    open heart surgery      surgery on aorta as a child , 3 procedures         Time Tracking:     OT Date of Treatment: 03/07/24  OT Start Time: 0527  OT Stop Time: 0547  OT Total Time (min): 20 min    Billable Minutes:Evaluation 10  Therapeutic Activity 10    3/7/2024

## 2024-03-07 NOTE — PROGRESS NOTES
[facility] - Neuro Critical Care  Neurosurgery  Progress Note    Subjective:     History of Present Illness: Kalia Pacheco is a 34yo man with history of known CHESTER aneurysm, not on any AP/AC who presented to [facility] this AM with headache and nausea that began 2 days ago and acutely worsened while he was at work this morning. He also reports blurry vision and has had multiple episodes of emesis. Denies any inciting factors. CTH reveals diffuse subarachnoid hemorrhage throughout the basal cisterns, HH score 1. [place] [facility] [place] and Neurosurgical management.     Post-Op Info:  * No surgery found *       Interval History: NAEON. Pt exam stable this morning. EVD put out 274cc with icps from 0-15. Pt is -1100 in volume. Plan for MRA today. PRU not therapeutic will need to be swapped to brillinta    Medications:  Continuous Infusions:   dexmedeTOMIDine (Precedex) infusion (titrating) Stopped (03/06/24 1044)    fentanyl Stopped (03/06/24 0848)     Scheduled Meds:   aspirin  81 mg Oral Daily    ceFAZolin (Ancef) IV (PEDS and ADULTS)  2 g Intravenous Q8H    clopidogreL  75 mg Oral Daily    famotidine (PF)  20 mg Intravenous BID    niMODipine  60 mg Oral Q4H    polyethylene glycol  17 g Oral Daily    senna-docusate 8.6-50 mg  1 tablet Oral BID     PRN Meds:acetaminophen, bisacodyL, fentanyl, labetalol, magnesium oxide, magnesium oxide, metoclopramide, ondansetron, oxyCODONE, potassium bicarbonate, potassium bicarbonate, potassium bicarbonate, potassium, sodium phosphates, potassium, sodium phosphates, potassium, sodium phosphates     Review of Systems  Objective:     Weight: 106.6 kg (235 lb)  Body mass index is 35.73 kg/m².  Vital Signs (Most Recent):  Temp: 97.3 °F (36.3 °C) (03/07/24 0723)  Pulse: 88 (03/07/24 0816)  Resp: 20 (03/07/24 0816)  BP: 113/62 (03/07/24 0802)  SpO2: 98 % (03/07/24 0816) Vital Signs (24h Range):  Temp:  [97.3 °F (36.3 °C)-98.3 °F (36.8 °C)] 97.3 °F (36.3  °C)  Pulse:  [] 88  Resp:  [9-25] 20  SpO2:  [96 %-100 %] 98 %  BP: ()/(47-74) 113/62  Arterial Line BP: (114-160)/(36-80) 160/71     Date 03/07/24 0700 - 03/08/24 0659   Shift 2171-8100 1500-5182 7066-0890 24 Hour Total   INTAKE   IV Piggyback 50   50   Shift Total(mL/kg) 50(0.5)   50(0.5)   OUTPUT   Drains 18   18   Shift Total(mL/kg) 18(0.2)   18(0.2)   Weight (kg) 106.6 106.6 106.6 106.6              Vent Mode: Spont  Oxygen Concentration (%):  [40] 40  Resp Rate Total:  [16 br/min-17 br/min] 16 br/min  PEEP/CPAP:  [5 cmH20] 5 cmH20  Pressure Support:  [8 cmH20] 8 cmH20  Mean Airway Pressure:  [8.6 cmH20] 8.6 cmH20             ICP/Ventriculostomy 03/05/24 1143 Right Parietal region (Active)   Level of Ventriculostomy (cm above) 10 03/07/24 0701   Status Open to drainage 03/07/24 0801   Site Assessment Clean;Dry 03/07/24 0801   Site Drainage No drainage 03/07/24 0801   Waveform normal waveform 03/07/24 0701   Output (mL) 8 mL 03/07/24 0801   CSF Color red;clear 03/07/24 0801   Dressing Status Clean;Dry;Intact 03/07/24 0801   Interventions bed controls locked;zeroed 03/07/24 0801          Physical Exam       Neurosurgery Physical Exam    General: well developed, well nourished, no distress.   HEENT: normocephalic, atraumatic  CV: regular rate   Pulmonary: normal respirations, no signs of respiratory distress  Abdomen: soft, non-distended, not tender to palpation  Skin: Skin is warm, dry and intact  Heme: no bruising    Neuro:   Mental Status: AO x4, no aphasia, no dysarthria   CN: PERRL, EOMI, VF intact to confrontation, sensation intact bilaterally, eyebrow raise and grimace symmetric, tongue midline  Motor: moves all extremities spontaneously, full strength throughout, no pronator drift   Sensory: intact to light touch throughout  Cerebellar: finger to nose intact bilaterally  Reflexes: -Patterson's, -Babinski, no clonus. Patellar: 2+ bilaterally            Significant Labs:  Recent Labs   Lab  03/06/24  0138 03/07/24  0222    87    140   K 3.9 3.7   * 112*   CO2 18* 20*   BUN 9 6   CREATININE 0.9 0.8   CALCIUM 8.8 8.3*   MG 1.8 1.8     Recent Labs   Lab 03/06/24  0138 03/07/24  0222   WBC 10.48 9.27   HGB 12.9* 11.4*   HCT 38.6* 34.6*    161     Recent Labs   Lab 03/05/24  1104   INR 1.1   APTT 24.2     Microbiology Results (last 7 days)       ** No results found for the last 168 hours. **          All pertinent labs from the last 24 hours have been reviewed.    Significant Diagnostics:  CT: No results found in the last 24 hours.  I have reviewed all pertinent imaging results/findings within the past 24 hours.  Assessment/Plan:     * Nontraumatic subarachnoid hemorrhage, unspecified  Kalia Pacheco is a 32yo man with history of known CHESTER aneurysm who presented to [place] with headache and nausea. CTH demonstrated diffuse SAH throughout the basal cisterns HH and he was transferred to [facility] [place] for close monitoring in the Neuro ICU and Neurosurgical consultation.     R frontal EVD was placed on arrival on 3/6 and patient was taken to IR for angiogram, with no intervention upon Acomm aneurysm. Upon further review of DSA, a vertebral artery aneurysm was noted and patient was brought back to IR for a right V4/PICA irregular aneurysm stent assisted coiling.     Imaging reviewed:  CTH 3/5: Diffuse basal cistern SAH with IVH   CTA 3/5: 3mm ant projecting L Acomm, no R A1, R pcomm feeding posterior circulation   DSA 3/5: EVD in position, anteriosuperiorly projecting small L Acomm, R V4 irregular aneurysm not well visualized    DSA +embo 3/5: stent assisted coiling of R V4/PICA irregularly shaped anuerysm, 1/4 lobules still filling     - Admit to NCC with q1 neuro checks  - R EVD at 10, please record ICP and output hourly, call NSGY if ICP sustained >25  - HOB@30   - Keppra 500 BID x7d   - TCDs daily x14d   - Nimotop 60q4 x 21d   - strict I/Os, goal euvolemia to net positive for SAH  patients in general, in this patient may need to be more conservative due to cardiac hx, management per NCC   - WTE per NCC   - continue ASA/plavix per neuro IR  - based on bleed pattern believe vertebral aneurysm to be the ruptured aneurysm, no plan for intervention upon Acomm at this point  -Will obtain MRA with vessel wall of the PICA and Acom  -PRU not therapeutic. Likely needs brillinta  - Continue to follow clinically and notify NSGY immediately if any neurostatus change     Case and plan discussed with attending Neurosurgeon Dr. Saroj Santiago MD  Neurosurgery  [facility] - Neuro Critical Care

## 2024-03-07 NOTE — PLAN OF CARE
[facility] Care Plan    POC reviewed with Kalia Pacheco and family at 0300. Questions and concerns addressed. No acute events overnight. Pt progressing toward goals. Will continue to monitor. See below and flowsheets for full assessment and VS info.           Is this a stroke patient? no    Neuro:  [place] Coma Scale  Best Eye Response: 4-->(E4) spontaneous  Best Motor Response: 6-->(M6) obeys commands  Best Verbal Response: 5-->(V5) oriented  [place] Coma Scale Score: 15  Assessment Qualifiers: patient not sedated/intubated  Pupil PERRLA: yes     24hr Temp:  [98 °F (36.7 °C)-98.3 °F (36.8 °C)]     CV:   Rhythm: normal sinus rhythm  BP goals:   SBP < 140  MAP > 65    Resp:      Vent Mode: Spont  Set Rate: 16 BPM  Oxygen Concentration (%): 40  Vt Set: 500 mL  PEEP/CPAP: 5 cmH20  Pressure Support: 8 cmH20    Plan: N/A    GI/:     Diet/Nutrition Received: NPO, ice chips  Last Bowel Movement: 03/04/24       Intake/Output Summary (Last 24 hours) at 3/7/2024 0537  Last data filed at 3/7/2024 0501  Gross per 24 hour   Intake 1657.26 ml   Output 2791 ml   Net -1133.74 ml     Unmeasured Output  Urine Occurrence: 1  Stool Occurrence: 0  Emesis Occurrence: 1    Labs/Accuchecks:  Recent Labs   Lab 03/07/24  0222   WBC 9.27   RBC 3.60*   HGB 11.4*   HCT 34.6*         Recent Labs   Lab 03/05/24  0831 03/06/24  0138 03/07/24  0222      < > 140   K 3.8   < > 3.7   CO2 20*   < > 20*      < > 112*   BUN 13   < > 6   CREATININE 0.99   < > 0.8   ALKPHOS 56  --   --    ALT 44  --   --    AST 57  --   --    BILITOT 0.8  --   --     < > = values in this interval not displayed.      Recent Labs   Lab 03/05/24  1104   INR 1.1   APTT 24.2      Recent Labs   Lab 03/05/24  0831   TROPONINI 0.070       Electrolytes: Electrolytes replaced  Accuchecks: Q6H    Gtts:   dexmedeTOMIDine (Precedex) infusion (titrating) Stopped (03/06/24 1044)    fentanyl Stopped (03/06/24 0848)       LDA/Wounds:    Nurses Note -- 4 Eyes    Is  there altered skin present? no     Prevention Measures Documented    Second RN/Staff Member:  carroll DAVIDSON     Problem: Adult Inpatient Plan of Care  Goal: Plan of Care Review  Outcome: Ongoing, Progressing  Goal: Patient-Specific Goal (Individualized)  Description: Admit Date: 3/5/2024    Subarachnoid hemorrhage    Past Medical History:  No date: ADHD (attention deficit hyperactivity disorder)  No date: Aneurysm  No date: Aorta coarctation  No date: Arthritis  No date: Coarctation of aorta  No date: Depression  No date: GERD (gastroesophageal reflux disease)  No date: History of alcohol abuse  No date: Hypertension  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 Pt likes dim lights.    Restraints:              Outcome: Ongoing, Progressing  Goal: Absence of Hospital-Acquired Illness or Injury  Outcome: Ongoing, Progressing  Goal: Optimal Comfort and Wellbeing  Outcome: Ongoing, Progressing     Problem: Cerebral Tissue Perfusion (Stroke, Hemorrhagic)  Goal: Optimal Cerebral Tissue Perfusion  Outcome: Ongoing, Progressing     Problem: Functional Ability Impaired (Stroke, Hemorrhagic)  Goal: Optimal Functional Ability  Outcome: Ongoing, Progressing

## 2024-03-08 LAB
ANION GAP SERPL CALC-SCNC: 11 MMOL/L (ref 8–16)
ASCENDING AORTA: 3.16 CM
AV INDEX (PROSTH): 0.17
AV MEAN GRADIENT: 55 MMHG
AV PEAK GRADIENT: 81 MMHG
AV VALVE AREA BY VELOCITY RATIO: 0.73 CM²
AV VALVE AREA: 0.79 CM²
AV VELOCITY RATIO: 0.16
BASOPHILS # BLD AUTO: 0.05 K/UL (ref 0–0.2)
BASOPHILS NFR BLD: 0.4 % (ref 0–1.9)
BSA FOR ECHO PROCEDURE: 2.26 M2
BUN SERPL-MCNC: 6 MG/DL (ref 6–20)
CALCIUM SERPL-MCNC: 9.5 MG/DL (ref 8.7–10.5)
CHLORIDE SERPL-SCNC: 107 MMOL/L (ref 95–110)
CO2 SERPL-SCNC: 20 MMOL/L (ref 23–29)
CREAT SERPL-MCNC: 0.9 MG/DL (ref 0.5–1.4)
CV ECHO LV RWT: 0.35 CM
DIFFERENTIAL METHOD BLD: ABNORMAL
DOP CALC AO PEAK VEL: 4.51 M/S
DOP CALC AO VTI: 101.54 CM
DOP CALC LVOT AREA: 4.5 CM2
DOP CALC LVOT DIAMETER: 2.4 CM
DOP CALC LVOT PEAK VEL: 0.73 M/S
DOP CALC LVOT STROKE VOLUME: 80.26 CM3
DOP CALCLVOT PEAK VEL VTI: 17.75 CM
E/E' RATIO: 6 M/S
ECHO LV POSTERIOR WALL: 1.14 CM (ref 0.6–1.1)
EOSINOPHIL # BLD AUTO: 0 K/UL (ref 0–0.5)
EOSINOPHIL NFR BLD: 0.1 % (ref 0–8)
ERYTHROCYTE [DISTWIDTH] IN BLOOD BY AUTOMATED COUNT: 13 % (ref 11.5–14.5)
EST. GFR  (NO RACE VARIABLE): >60 ML/MIN/1.73 M^2
FRACTIONAL SHORTENING: 20 % (ref 28–44)
GLUCOSE SERPL-MCNC: 101 MG/DL (ref 70–110)
HCT VFR BLD AUTO: 42.9 % (ref 40–54)
HGB BLD-MCNC: 14.7 G/DL (ref 14–18)
IMM GRANULOCYTES # BLD AUTO: 0.05 K/UL (ref 0–0.04)
IMM GRANULOCYTES NFR BLD AUTO: 0.4 % (ref 0–0.5)
INTERVENTRICULAR SEPTUM: 1.52 CM (ref 0.6–1.1)
LA MAJOR: 6.2 CM
LA MINOR: 5.92 CM
LA WIDTH: 4.69 CM
LEFT ATRIUM SIZE: 4.76 CM
LEFT ATRIUM VOLUME INDEX: 52.5 ML/M2
LEFT ATRIUM VOLUME: 114.93 CM3
LEFT INTERNAL DIMENSION IN SYSTOLE: 5.22 CM (ref 2.1–4)
LEFT VENTRICLE DIASTOLIC VOLUME INDEX: 99.64 ML/M2
LEFT VENTRICLE DIASTOLIC VOLUME: 218.21 ML
LEFT VENTRICLE MASS INDEX: 189 G/M2
LEFT VENTRICLE SYSTOLIC VOLUME INDEX: 59.7 ML/M2
LEFT VENTRICLE SYSTOLIC VOLUME: 130.83 ML
LEFT VENTRICULAR INTERNAL DIMENSION IN DIASTOLE: 6.53 CM (ref 3.5–6)
LEFT VENTRICULAR MASS: 414.69 G
LV LATERAL E/E' RATIO: 6.55 M/S
LV SEPTAL E/E' RATIO: 5.54 M/S
LYMPHOCYTES # BLD AUTO: 1.5 K/UL (ref 1–4.8)
LYMPHOCYTES NFR BLD: 10.8 % (ref 18–48)
MAGNESIUM SERPL-MCNC: 1.7 MG/DL (ref 1.6–2.6)
MCH RBC QN AUTO: 32 PG (ref 27–31)
MCHC RBC AUTO-ENTMCNC: 34.3 G/DL (ref 32–36)
MCV RBC AUTO: 94 FL (ref 82–98)
MONOCYTES # BLD AUTO: 0.8 K/UL (ref 0.3–1)
MONOCYTES NFR BLD: 6 % (ref 4–15)
MV PEAK E VEL: 0.72 M/S
NEUTROPHILS # BLD AUTO: 11.2 K/UL (ref 1.8–7.7)
NEUTROPHILS NFR BLD: 82.3 % (ref 38–73)
NRBC BLD-RTO: 0 /100 WBC
OHS QRS DURATION: 120 MS
OHS QTC CALCULATION: 447 MS
PHOSPHATE SERPL-MCNC: 2.1 MG/DL (ref 2.7–4.5)
PLATELET # BLD AUTO: 229 K/UL (ref 150–450)
PMV BLD AUTO: 10 FL (ref 9.2–12.9)
POTASSIUM SERPL-SCNC: 4 MMOL/L (ref 3.5–5.1)
RA MAJOR: 4.99 CM
RA PRESSURE ESTIMATED: 3 MMHG
RA WIDTH: 3.9 CM
RBC # BLD AUTO: 4.59 M/UL (ref 4.6–6.2)
RIGHT VENTRICULAR END-DIASTOLIC DIMENSION: 3.25 CM
SINUS: 3.6 CM
SODIUM SERPL-SCNC: 138 MMOL/L (ref 136–145)
STJ: 3.36 CM
TDI LATERAL: 0.11 M/S
TDI SEPTAL: 0.13 M/S
TDI: 0.12 M/S
TRICUSPID ANNULAR PLANE SYSTOLIC EXCURSION: 3.05 CM
WBC # BLD AUTO: 13.59 K/UL (ref 3.9–12.7)
Z-SCORE OF LEFT VENTRICULAR DIMENSION IN END DIASTOLE: -1.25
Z-SCORE OF LEFT VENTRICULAR DIMENSION IN END SYSTOLE: 1.18

## 2024-03-08 PROCEDURE — 97535 SELF CARE MNGMENT TRAINING: CPT

## 2024-03-08 PROCEDURE — 25500020 PHARM REV CODE 255: Performed by: PSYCHIATRY & NEUROLOGY

## 2024-03-08 PROCEDURE — 25000003 PHARM REV CODE 250: Performed by: PHYSICIAN ASSISTANT

## 2024-03-08 PROCEDURE — 99233 SBSQ HOSP IP/OBS HIGH 50: CPT | Mod: ,,, | Performed by: PSYCHIATRY & NEUROLOGY

## 2024-03-08 PROCEDURE — 25000003 PHARM REV CODE 250: Performed by: STUDENT IN AN ORGANIZED HEALTH CARE EDUCATION/TRAINING PROGRAM

## 2024-03-08 PROCEDURE — 80048 BASIC METABOLIC PNL TOTAL CA: CPT

## 2024-03-08 PROCEDURE — A9585 GADOBUTROL INJECTION: HCPCS | Performed by: PSYCHIATRY & NEUROLOGY

## 2024-03-08 PROCEDURE — 84100 ASSAY OF PHOSPHORUS: CPT

## 2024-03-08 PROCEDURE — 20000000 HC ICU ROOM

## 2024-03-08 PROCEDURE — 63600175 PHARM REV CODE 636 W HCPCS

## 2024-03-08 PROCEDURE — 63600175 PHARM REV CODE 636 W HCPCS: Performed by: STUDENT IN AN ORGANIZED HEALTH CARE EDUCATION/TRAINING PROGRAM

## 2024-03-08 PROCEDURE — 83735 ASSAY OF MAGNESIUM: CPT

## 2024-03-08 PROCEDURE — 25000003 PHARM REV CODE 250: Performed by: PSYCHIATRY & NEUROLOGY

## 2024-03-08 PROCEDURE — 25000003 PHARM REV CODE 250

## 2024-03-08 PROCEDURE — 99291 CRITICAL CARE FIRST HOUR: CPT | Mod: ,,, | Performed by: PSYCHIATRY & NEUROLOGY

## 2024-03-08 PROCEDURE — 85025 COMPLETE CBC W/AUTO DIFF WBC: CPT

## 2024-03-08 RX ORDER — LANOLIN ALCOHOL/MO/W.PET/CERES
800 CREAM (GRAM) TOPICAL
Status: DISCONTINUED | OUTPATIENT
Start: 2024-03-08 | End: 2024-03-19

## 2024-03-08 RX ORDER — SODIUM,POTASSIUM PHOSPHATES 280-250MG
2 POWDER IN PACKET (EA) ORAL
Status: DISCONTINUED | OUTPATIENT
Start: 2024-03-08 | End: 2024-03-19

## 2024-03-08 RX ORDER — HEPARIN SODIUM 5000 [USP'U]/ML
5000 INJECTION, SOLUTION INTRAVENOUS; SUBCUTANEOUS EVERY 8 HOURS
Status: DISCONTINUED | OUTPATIENT
Start: 2024-03-08 | End: 2024-03-17

## 2024-03-08 RX ORDER — GADOBUTROL 604.72 MG/ML
10 INJECTION INTRAVENOUS
Status: COMPLETED | OUTPATIENT
Start: 2024-03-08 | End: 2024-03-08

## 2024-03-08 RX ORDER — MIDAZOLAM HYDROCHLORIDE 1 MG/ML
2 INJECTION INTRAMUSCULAR; INTRAVENOUS ONCE
Status: COMPLETED | OUTPATIENT
Start: 2024-03-08 | End: 2024-03-08

## 2024-03-08 RX ORDER — MIDAZOLAM HYDROCHLORIDE 1 MG/ML
1 INJECTION INTRAMUSCULAR; INTRAVENOUS
Status: DISCONTINUED | OUTPATIENT
Start: 2024-03-08 | End: 2024-03-11

## 2024-03-08 RX ADMIN — HEPARIN SODIUM 5000 UNITS: 5000 INJECTION INTRAVENOUS; SUBCUTANEOUS at 09:03

## 2024-03-08 RX ADMIN — CEFAZOLIN 2 G: 2 INJECTION, POWDER, FOR SOLUTION INTRAMUSCULAR; INTRAVENOUS at 05:03

## 2024-03-08 RX ADMIN — POLYETHYLENE GLYCOL 3350 17 G: 17 POWDER, FOR SOLUTION ORAL at 08:03

## 2024-03-08 RX ADMIN — CLOPIDOGREL BISULFATE 75 MG: 75 TABLET ORAL at 08:03

## 2024-03-08 RX ADMIN — NIMODIPINE 30 MG: 30 CAPSULE, LIQUID FILLED ORAL at 06:03

## 2024-03-08 RX ADMIN — GABAPENTIN 300 MG: 300 CAPSULE ORAL at 05:03

## 2024-03-08 RX ADMIN — GABAPENTIN 300 MG: 300 CAPSULE ORAL at 02:03

## 2024-03-08 RX ADMIN — SODIUM CHLORIDE 1000 ML: 9 INJECTION, SOLUTION INTRAVENOUS at 10:03

## 2024-03-08 RX ADMIN — NIMODIPINE 30 MG: 30 CAPSULE, LIQUID FILLED ORAL at 02:03

## 2024-03-08 RX ADMIN — NIMODIPINE 30 MG: 30 CAPSULE, LIQUID FILLED ORAL at 08:03

## 2024-03-08 RX ADMIN — GADOBUTROL 10 ML: 604.72 INJECTION INTRAVENOUS at 02:03

## 2024-03-08 RX ADMIN — CEFAZOLIN 2 G: 2 INJECTION, POWDER, FOR SOLUTION INTRAMUSCULAR; INTRAVENOUS at 09:03

## 2024-03-08 RX ADMIN — NIMODIPINE 30 MG: 30 CAPSULE, LIQUID FILLED ORAL at 05:03

## 2024-03-08 RX ADMIN — DOCUSATE SODIUM AND SENNOSIDES 1 TABLET: 8.6; 5 TABLET, FILM COATED ORAL at 09:03

## 2024-03-08 RX ADMIN — CEFAZOLIN 2 G: 2 INJECTION, POWDER, FOR SOLUTION INTRAMUSCULAR; INTRAVENOUS at 02:03

## 2024-03-08 RX ADMIN — NIMODIPINE 30 MG: 30 CAPSULE, LIQUID FILLED ORAL at 04:03

## 2024-03-08 RX ADMIN — ACETAMINOPHEN 650 MG: 325 TABLET ORAL at 05:03

## 2024-03-08 RX ADMIN — DOCUSATE SODIUM AND SENNOSIDES 1 TABLET: 8.6; 5 TABLET, FILM COATED ORAL at 08:03

## 2024-03-08 RX ADMIN — HYDROXYZINE HYDROCHLORIDE 25 MG: 25 TABLET, FILM COATED ORAL at 09:03

## 2024-03-08 RX ADMIN — OXYCODONE 5 MG: 5 TABLET ORAL at 05:03

## 2024-03-08 RX ADMIN — TICAGRELOR 90 MG: 90 TABLET ORAL at 09:03

## 2024-03-08 RX ADMIN — GABAPENTIN 300 MG: 300 CAPSULE ORAL at 09:03

## 2024-03-08 RX ADMIN — ASPIRIN 81 MG CHEWABLE TABLET 81 MG: 81 TABLET CHEWABLE at 08:03

## 2024-03-08 RX ADMIN — MIDAZOLAM 2 MG: 1 INJECTION INTRAMUSCULAR; INTRAVENOUS at 02:03

## 2024-03-08 RX ADMIN — NIMODIPINE 30 MG: 30 CAPSULE, LIQUID FILLED ORAL at 12:03

## 2024-03-08 RX ADMIN — NIMODIPINE 30 MG: 30 CAPSULE, LIQUID FILLED ORAL at 09:03

## 2024-03-08 RX ADMIN — NIMODIPINE 30 MG: 30 CAPSULE, LIQUID FILLED ORAL at 07:03

## 2024-03-08 RX ADMIN — NIMODIPINE 30 MG: 30 CAPSULE, LIQUID FILLED ORAL at 10:03

## 2024-03-08 NOTE — PLAN OF CARE
[facility] Care Plan    POC reviewed with Kalia Pacheco and family. Pt verbalized understanding. Questions and concerns addressed. No acute events today. Pt progressing toward goals. Will continue to monitor. See below and flowsheets for full assessment and VS info.     - EVD open at 10  - CSF red-tinged/clear, 0-18 mL/hr  - 1L NS bolus given  - Vessel wall MRA completed      Is this a stroke patient? yes- Stroke booklet reviewed with patient and family, risk factors identified for patient and stroke booklet remains at bedside for ongoing education.     Care individualization: maintain wakefulness during the day to encourage sleep at night, goal to have patient sit in chair for meals, PT/OT ordered  Restraints: N/a - Pt met discontinuation criteria.  Restraint Order  Length of Order: Order good for next 24 hours or when removed.  Date that the current order will : 24  Time that the current order will :   Order Upon Application: Yes    Neuro:  [place] Coma Scale  Best Eye Response: 4-->(E4) spontaneous  Best Motor Response: 6-->(M6) obeys commands  Best Verbal Response: 5-->(V5) oriented  [place] Coma Scale Score: 15  Assessment Qualifiers: no eye obstruction present, patient not sedated/intubated  Pupil PERRLA: yes     24 hr Temp:  [97.2 °F (36.2 °C)-98.6 °F (37 °C)]     CV:   Rhythm: normal sinus rhythm  BP goals:   SBP < 140  MAP > 65    Resp:      Vent Mode: Spont  Set Rate: 16 BPM  Oxygen Concentration (%): 40  Vt Set: 500 mL  PEEP/CPAP: 5 cmH20  Pressure Support: 8 cmH20    Plan: N/A    GI/:     Diet/Nutrition Received: regular  Last Bowel Movement: 24       Intake/Output Summary (Last 24 hours) at 3/8/2024 1310  Last data filed at 3/8/2024 1201  Gross per 24 hour   Intake 1640.9 ml   Output 2272 ml   Net -631.1 ml     Unmeasured Output  Urine Occurrence: 1  Stool Occurrence: 0  Emesis Occurrence: 1    Labs/Accuchecks:  Recent Labs   Lab 24  0425   WBC 13.59*   RBC 4.59*    HGB 14.7   HCT 42.9         Recent Labs   Lab 03/05/24  0831 03/06/24  0138 03/08/24  0425      < > 138   K 3.8   < > 4.0   CO2 20*   < > 20*      < > 107   BUN 13   < > 6   CREATININE 0.99   < > 0.9   ALKPHOS 56  --   --    ALT 44  --   --    AST 57  --   --    BILITOT 0.8  --   --     < > = values in this interval not displayed.      Recent Labs   Lab 03/05/24  1104   INR 1.1   APTT 24.2      Recent Labs   Lab 03/05/24  0831   TROPONINI 0.070       Electrolytes: Electrolytes replaced  Accuchecks: none    Gtts:   dexmedeTOMIDine (Precedex) infusion (titrating) Stopped (03/06/24 1044)       LDA/Wounds:      Nurses Note -- 4 Eyes      Is there altered skin present? no     Prevention Measures Documented    Second RN/Staff Member:  MAREN Aguilar

## 2024-03-08 NOTE — SUBJECTIVE & OBJECTIVE
Interval History:  See hospital course.     Review of Systems   Eyes:  Negative for visual disturbance.   Gastrointestinal:  Negative for abdominal pain and nausea.   Musculoskeletal:  Negative for back pain and neck pain.   Neurological:  Positive for headaches. Negative for dizziness, weakness, light-headedness and numbness.       Objective:     Vitals:  Temp: 98 °F (36.7 °C)  Pulse: 82  Rhythm: normal sinus rhythm  BP: 113/69  MAP (mmHg): 87  ICP Mean (mmHg): 23 mmHg  Resp: 17  SpO2: 99 %    Temp  Min: 97.5 °F (36.4 °C)  Max: 98.6 °F (37 °C)  Pulse  Min: 71  Max: 110  BP  Min: 97/56  Max: 167/72  MAP (mmHg)  Min: 70  Max: 103  ICP Mean (mmHg)  Min: -8 mmHg  Max: 23 mmHg  Resp  Min: 14  Max: 43  SpO2  Min: 91 %  Max: 99 %    03/07 0701 - 03/08 0700  In: 643.3 [I.V.:44.2]  Out: 3401 [Urine:3150; Drains:250]   Unmeasured Output  Urine Occurrence: 1  Stool Occurrence: 0  Emesis Occurrence: 1        Physical Exam  Vitals and nursing note reviewed.   Constitutional:       General: He is not in acute distress.  HENT:      Head: Normocephalic.      Comments: EVD     Mouth/Throat:      Mouth: Mucous membranes are moist.   Eyes:      Extraocular Movements: Extraocular movements intact.      Conjunctiva/sclera: Conjunctivae normal.      Pupils: Pupils are equal, round, and reactive to light.   Cardiovascular:      Rate and Rhythm: Normal rate and regular rhythm.   Pulmonary:      Effort: Pulmonary effort is normal. No respiratory distress.   Abdominal:      General: Abdomen is flat. There is no distension.      Palpations: Abdomen is soft.      Tenderness: There is no abdominal tenderness.   Musculoskeletal:      Right lower leg: No edema.      Left lower leg: No edema.   Skin:     General: Skin is warm and dry.   Neurological:      General: No focal deficit present.      Mental Status: He is alert and oriented to person, place, and time.      Cranial Nerves: No cranial nerve deficit.      Sensory: No sensory deficit.       Motor: No weakness.      Comments:   Follows commands  Moves all extremities spontaneously  Full strength throughout              Medications:  ContinuousdexmedeTOMIDine (Precedex) infusion (titrating), Last Rate: Stopped (03/06/24 1044)    Scheduledaspirin, 81 mg, Daily  ceFAZolin (Ancef) IV (PEDS and ADULTS), 2 g, Q8H  clopidogreL, 75 mg, Daily  gabapentin, 300 mg, Q8H  niMODipine, 30 mg, Q2H  polyethylene glycol, 17 g, Daily  senna-docusate 8.6-50 mg, 1 tablet, BID    PRNacetaminophen, 650 mg, Q6H PRN  bisacodyL, 10 mg, Daily PRN  hydrOXYzine HCL, 25 mg, Nightly PRN  labetalol, 10 mg, Q15 Min PRN  magnesium oxide, 800 mg, PRN  magnesium oxide, 800 mg, PRN  metoclopramide, 10 mg, Q6H PRN  midazolam, 1 mg, Q15 Min PRN  ondansetron, 4 mg, Q6H PRN  oxyCODONE, 5 mg, Q6H PRN  potassium bicarbonate, 35 mEq, PRN  potassium bicarbonate, 50 mEq, PRN  potassium bicarbonate, 60 mEq, PRN  potassium, sodium phosphates, 2 packet, PRN  potassium, sodium phosphates, 2 packet, PRN  potassium, sodium phosphates, 2 packet, PRN      Today I personally reviewed pertinent medications, lines/drains/airways, imaging, cardiology results, laboratory results, microbiology results, notably:    Diet  Diet Adult Regular (IDDSI Level 7) Thin; Standard Tray  Diet Adult Regular (IDDSI Level 7) Thin; Standard Tray

## 2024-03-08 NOTE — PROGRESS NOTES
[facility] Neuro Critical Care  Neurocritical Care  Progress Note    Admit Date: 3/5/2024  Service Date: 03/07/2024  Length of Stay: 2    Subjective:     Chief Complaint: Nontraumatic subarachnoid hemorrhage, unspecified    History of Present Illness: The patient is a 32-year-old man with a past medical history of ADHD, EtOH use and abuse, arthritis, hypertension, coarctation of the aorta (status post repair), PDA, VSD status post repair x2.  History of Legg-Perthes disease, and intracranial hemorrhage in May 2022.  presented to [facility] this AM with headache and nausea that began 2 days ago and acutely worsened while he was at work this morning. CTH demonstrated HH1mF4 SAH. S/p EVD placement by Nsx. Was taken to IR for angiography, but the vessel was not sercured during the procedure, the patient was returned to the NSICU, intubated and sedated, [facility] neurosurgery pending.     Hospital Course: 03/06/2024 Overnight, mild bradycardia and soft BPs. Weaned off fentanyl, weaning precedex. Extubated to NC this morning.   03/07/2024 AF, soft BPs. TCD with no signs of vasospasm. Neuro exam stable. Persistent HA, waxing/waning in severity. EVD with 289mL output. MRA pending.     Interval History:  See hospital course.     Review of Systems   Neurological:  Positive for headaches. Negative for weakness and numbness.     Objective:     Vitals:  Temp: 97.2 °F (36.2 °C)  Pulse: 75  Rhythm: normal sinus rhythm  BP: 90/65  MAP (mmHg): 73  ICP Mean (mmHg): 23 mmHg  Resp: 11  SpO2: 95 %    Temp  Min: 97.2 °F (36.2 °C)  Max: 98.2 °F (36.8 °C)  Pulse  Min: 69  Max: 102  BP  Min: 89/52  Max: 160/66  MAP (mmHg)  Min: 65  Max: 104  ICP Mean (mmHg)  Min: -13 mmHg  Max: 24 mmHg  Resp  Min: 11  Max: 29  SpO2  Min: 93 %  Max: 100 %    03/06 0701 - 03/07 0700  In: 1657.3 [P.O.:250; I.V.:266.1]  Out: 2699 [Urine:2300; Drains:289]   Unmeasured Output  Urine Occurrence: 1  Stool Occurrence: 0  Emesis Occurrence: 1        Physical Exam  Vitals  and nursing note reviewed.   Constitutional:       General: He is not in acute distress.  HENT:      Head: Normocephalic and atraumatic.      Mouth/Throat:      Mouth: Mucous membranes are moist.   Eyes:      Extraocular Movements: Extraocular movements intact.      Conjunctiva/sclera: Conjunctivae normal.      Pupils: Pupils are equal, round, and reactive to light.   Cardiovascular:      Rate and Rhythm: Normal rate and regular rhythm.   Pulmonary:      Effort: Pulmonary effort is normal. No respiratory distress.   Abdominal:      General: Abdomen is flat. There is no distension.      Palpations: Abdomen is soft.      Tenderness: There is no abdominal tenderness.   Musculoskeletal:      Right lower leg: No edema.      Left lower leg: No edema.   Skin:     General: Skin is warm and dry.   Neurological:      General: No focal deficit present.      Mental Status: He is alert and oriented to person, place, and time.      Cranial Nerves: No cranial nerve deficit.      Sensory: No sensory deficit.      Motor: No weakness.      Comments:   Follows commands  Moves all extremities spontaneously  Full strength throughout            Medications:  ContinuousdexmedeTOMIDine (Precedex) infusion (titrating), Last Rate: Stopped (03/06/24 1044)    Scheduledaspirin, 81 mg, Daily  ceFAZolin (Ancef) IV (PEDS and ADULTS), 2 g, Q8H  clopidogreL, 75 mg, Daily  gabapentin, 300 mg, Q8H  niMODipine, 30 mg, Q2H  polyethylene glycol, 17 g, Daily  senna-docusate 8.6-50 mg, 1 tablet, BID    PRNacetaminophen, 650 mg, Q6H PRN  bisacodyL, 10 mg, Daily PRN  labetalol, 10 mg, Q15 Min PRN  magnesium oxide, 800 mg, PRN  magnesium oxide, 800 mg, PRN  metoclopramide, 10 mg, Q6H PRN  ondansetron, 4 mg, Q6H PRN  oxyCODONE, 5 mg, Q6H PRN  potassium bicarbonate, 35 mEq, PRN  potassium bicarbonate, 50 mEq, PRN  potassium bicarbonate, 60 mEq, PRN  potassium, sodium phosphates, 2 packet, PRN  potassium, sodium phosphates, 2 packet, PRN  potassium, sodium  phosphates, 2 packet, PRN      Today I personally reviewed pertinent medications, lines/drains/airways, imaging, cardiology results, laboratory results, microbiology results, notably:    Diet  Diet Adult Regular (IDDSI Level 7) Thin; Standard Tray  Diet Adult Regular (IDDSI Level 7) Thin; Standard Tray    Assessment/Plan:     Neuro  * Nontraumatic subarachnoid hemorrhage, unspecified  33-year-old male with a suspected A-comm aneurysm rupture complicated by intraventricular hemorrhage status post EVD placement and angiography with unsuccessful vessel was intubated and sedated, likely pending surgical intervention.    - F/u MRA  - q1h neuro checks  - Nimodipine 30mg q2h  - Daily TCDs  - sheath care per IR recs  - SBP < 140, MAP > 65  - Cardene and Norepi as needed for tight BP control, can restart home BP meds as needed  - OGT for meds  - Strict I & O  - Hold DVT Ppx for now  - EVD per NSGY  - zofran PRN for vomiting  - DAPT, although PRU not therapeutic  - Resume SQH when appropriate   - PT/OT following  - Na goal: EuNa    IVH (intraventricular hemorrhage)  See Principle problem      Other hydrocephalus  See Principle problem    Anterior communicating artery aneurysm  See Principle problem    Cardiac/Vascular  Hypertension  - Holding home Losartan 160 and Toprol 50          The patient is being Prophylaxed for:  Venous Thromboembolism with: None  Stress Ulcer with: None  Ventilator Pneumonia with: not applicable    Activity Orders            Diet Adult Regular (IDDSI Level 7) Thin; Standard Tray: Regular starting at 03/07 1158    Turn patient starting at 03/05 1200    Elevate HOB starting at 03/05 1119          Full Code    Aby Moran MD  Neurocritical Care  [facility] - Neuro Critical Care

## 2024-03-08 NOTE — PT/OT/SLP PROGRESS
"Occupational Therapy   Treatment    Name: Kalia Pacheco  MRN: [id number]  Admitting Diagnosis:  Nontraumatic subarachnoid hemorrhage, unspecified       Recommendations:     Discharge Recommendations: No Therapy Indicated  Discharge Equipment Recommendations:  none  Barriers to discharge:  None    Assessment:     Kalia Pacheco is a 33 y.o. male with a medical diagnosis of Nontraumatic subarachnoid hemorrhage, unspecified.  He presents with performance deficits affecting function are impaired self care skills, impaired functional mobility.     Rehab Prognosis:  Good; patient would benefit from acute skilled OT services to address these deficits and reach maximum level of function.       Plan:     Patient to be seen 3 x/week to address the above listed problems via self-care/home management, therapeutic activities, therapeutic exercises, neuromuscular re-education  Plan of Care Expires: 04/03/24  Plan of Care Reviewed with: patient    Subjective     Patient:  "I feel so much better sitting up."  Pain/Comfort:  Pain Rating 1: 0/10  Pain Rating Post-Intervention 1: 0/10    Objective:     Communicated with: Nurse prior to session.  Patient found supine with external ventricular drain, peripheral IV, blood pressure cuff, bed alarm upon OT entry to room.    General Precautions: Standard, fall; EVD clamped by RN prior to the session    Orthopedic Precautions:N/A  Braces: N/A  Respiratory Status: Room air     Occupational Performance:     Bed Mobility:    Patient completed Rolling/Turning to Left with  modified independence  Patient completed Rolling/Turning to Right with modified independence  Patient completed Supine to Sit with modified independence  Patient completed Sit to Supine with modified independence     Functional Mobility/Transfers:  Patient completed Sit <> Stand Transfer with supervision  with  no assistive device   Patient completed Bed <> Chair Transfer using Stand Pivot technique with supervision " with no assistive device    Activities of Daily Living:  Grooming: supervision    Upper Body Dressing: supervision    Lower Body Dressing: supervision      AMPAC 6 Click ADL: 18    Treatment & Education:  Patient alert and oriented x 3; able to follow 4/4 one step commands.  Patient attentive and interactive throughout the session.      Patient left supine with all lines intact, call button in reach, and bed alarm on    GOALS:   Multidisciplinary Problems       Occupational Therapy Goals          Problem: Occupational Therapy    Goal Priority Disciplines Outcome Interventions   Occupational Therapy Goal     OT, PT/OT Ongoing, Progressing    Description: Goals set 3/7 with an expiration date, 4/4:  Patient will increase functional independence with ADLs by performing:    Patient will demonstrate rolling to the right with modified independence.  Not met   Patient will demonstrate rolling to the left with modified independence.   Not met  Patient will demonstrate supine -sit with modified independence.   Not met  Patient will demonstrate stand pivot transfers with modified independence.   Not met  Patient will demonstrate grooming while standing with modified independence.   Not met  Patient will demonstrate upper body dressing with modified independence while seated EOB.   Not met  Patient will demonstrate lower body dressing with modified independence while seated EOB.   Not met  Patient will demonstrate toileting with modified independence.   Not met  Patient will demonstrate bathing while seated EOB with modified independence   Not met  Patient's family / caregiver will demonstrate independence and safety with assisting patient with self-care skills and functional mobility.     Not met                           Time Tracking:     OT Date of Treatment: 03/08/24  OT Start Time: 0629  OT Stop Time: 0639  OT Total Time (min): 10 min    Billable Minutes:Self Care/Home Management 10    OT/ZULEYMA: OT          3/8/2024

## 2024-03-08 NOTE — PROGRESS NOTES
[facility] Neuro Critical Care  Neurocritical Care  Progress Note    Admit Date: 3/5/2024  Service Date: 03/08/2024  Length of Stay: 3    Subjective:     Chief Complaint: Nontraumatic subarachnoid hemorrhage, unspecified    History of Present Illness: The patient is a 32-year-old man with a past medical history of ADHD, EtOH use and abuse, arthritis, hypertension, coarctation of the aorta (status post repair), PDA, VSD status post repair x2.  History of Legg-Perthes disease, and intracranial hemorrhage in May 2022.  presented to [facility] this AM with headache and nausea that began 2 days ago and acutely worsened while he was at work this morning. CTH demonstrated HH1mF4 SAH. S/p EVD placement by Nsx. Was taken to IR for angiography, but the vessel was not sercured during the procedure, the patient was returned to the NSICU, intubated and sedated, [facility] neurosurgery pending.     Hospital Course: 03/06/2024 Overnight, mild bradycardia and soft BPs. Weaned off fentanyl, weaning precedex. Extubated to NC this morning.   03/07/2024 AF, soft BPs. TCD with no signs of vasospasm. Neuro exam stable. Persistent HA, waxing/waning in severity. EVD with 289mL output. MRA pending.   03/08/2024 AF, HDS on RA. TCD no signs of vasospasm. Neuro exam stable. HA. EVD open @10 w/ 250mL output. MRA today.    Interval History:  See hospital course.     Review of Systems   Eyes:  Negative for visual disturbance.   Gastrointestinal:  Negative for abdominal pain and nausea.   Musculoskeletal:  Negative for back pain and neck pain.   Neurological:  Positive for headaches. Negative for dizziness, weakness, light-headedness and numbness.       Objective:     Vitals:  Temp: 98 °F (36.7 °C)  Pulse: 82  Rhythm: normal sinus rhythm  BP: 113/69  MAP (mmHg): 87  ICP Mean (mmHg): 23 mmHg  Resp: 17  SpO2: 99 %    Temp  Min: 97.5 °F (36.4 °C)  Max: 98.6 °F (37 °C)  Pulse  Min: 71  Max: 110  BP  Min: 97/56  Max: 167/72  MAP (mmHg)  Min: 70  Max:  103  ICP Mean (mmHg)  Min: -8 mmHg  Max: 23 mmHg  Resp  Min: 14  Max: 43  SpO2  Min: 91 %  Max: 99 %    03/07 0701 - 03/08 0700  In: 643.3 [I.V.:44.2]  Out: 3401 [Urine:3150; Drains:250]   Unmeasured Output  Urine Occurrence: 1  Stool Occurrence: 0  Emesis Occurrence: 1        Physical Exam  Vitals and nursing note reviewed.   Constitutional:       General: He is not in acute distress.  HENT:      Head: Normocephalic.      Comments: EVD     Mouth/Throat:      Mouth: Mucous membranes are moist.   Eyes:      Extraocular Movements: Extraocular movements intact.      Conjunctiva/sclera: Conjunctivae normal.      Pupils: Pupils are equal, round, and reactive to light.   Cardiovascular:      Rate and Rhythm: Normal rate and regular rhythm.   Pulmonary:      Effort: Pulmonary effort is normal. No respiratory distress.   Abdominal:      General: Abdomen is flat. There is no distension.      Palpations: Abdomen is soft.      Tenderness: There is no abdominal tenderness.   Musculoskeletal:      Right lower leg: No edema.      Left lower leg: No edema.   Skin:     General: Skin is warm and dry.   Neurological:      General: No focal deficit present.      Mental Status: He is alert and oriented to person, place, and time.      Cranial Nerves: No cranial nerve deficit.      Sensory: No sensory deficit.      Motor: No weakness.      Comments:   Follows commands  Moves all extremities spontaneously  Full strength throughout              Medications:  ContinuousdexmedeTOMIDine (Precedex) infusion (titrating), Last Rate: Stopped (03/06/24 1044)    Scheduledaspirin, 81 mg, Daily  ceFAZolin (Ancef) IV (PEDS and ADULTS), 2 g, Q8H  clopidogreL, 75 mg, Daily  gabapentin, 300 mg, Q8H  niMODipine, 30 mg, Q2H  polyethylene glycol, 17 g, Daily  senna-docusate 8.6-50 mg, 1 tablet, BID    PRNacetaminophen, 650 mg, Q6H PRN  bisacodyL, 10 mg, Daily PRN  hydrOXYzine HCL, 25 mg, Nightly PRN  labetalol, 10 mg, Q15 Min PRN  magnesium oxide, 800 mg,  PRN  magnesium oxide, 800 mg, PRN  metoclopramide, 10 mg, Q6H PRN  midazolam, 1 mg, Q15 Min PRN  ondansetron, 4 mg, Q6H PRN  oxyCODONE, 5 mg, Q6H PRN  potassium bicarbonate, 35 mEq, PRN  potassium bicarbonate, 50 mEq, PRN  potassium bicarbonate, 60 mEq, PRN  potassium, sodium phosphates, 2 packet, PRN  potassium, sodium phosphates, 2 packet, PRN  potassium, sodium phosphates, 2 packet, PRN      Today I personally reviewed pertinent medications, lines/drains/airways, imaging, cardiology results, laboratory results, microbiology results, notably:    Diet  Diet Adult Regular (IDDSI Level 7) Thin; Standard Tray  Diet Adult Regular (IDDSI Level 7) Thin; Standard Tray    Assessment/Plan:     Neuro  * Nontraumatic subarachnoid hemorrhage, unspecified  33-year-old male with a suspected A-comm aneurysm rupture complicated by intraventricular hemorrhage status post EVD placement and angiography with unsuccessful vessel was intubated and sedated, likely pending surgical intervention.    - MRA completed today  - EVD open @ 10, nsgy following  - q1h neuro checks  - Nimodipine 30mg q2h  - Daily TCDs  - SBP < 140, MAP > 65  - Cardene and Norepi as needed for tight BP control, can restart home BP meds as needed  - OGT for meds  - Strict I & O  - Hold DVT Ppx for now  - EVD per NSGY  - zofran PRN for vomiting  - DAPT, although PRU not therapeutic - starting brillinta per vascular neurology recs  - Resume SQH when appropriate   - PT/OT following  - Na goal: EuNa    IVH (intraventricular hemorrhage)  See Principle problem      Other hydrocephalus  See Principle problem    Anterior communicating artery aneurysm  See Principle problem    Cardiac/Vascular  Hypertension  - Holding home Losartan 160 and Toprol 50  - Restart as appropriate          The patient is being Prophylaxed for:  Venous Thromboembolism with: None  Stress Ulcer with: None  Ventilator Pneumonia with: not applicable    Activity Orders            Diet Adult Regular  (IDDSI Level 7) Thin; Standard Tray: Regular starting at 03/07 1158    Turn patient starting at 03/05 1200    Elevate HOB starting at 03/05 1119          Full Code    Aby Moran MD  Neurocritical Care  [facility] - Neuro Critical Care

## 2024-03-08 NOTE — PLAN OF CARE
Goals remain appropriate.  Problem: Occupational Therapy  Goal: Occupational Therapy Goal  Description: Goals set 3/7 with an expiration date, 4/4:  Patient will increase functional independence with ADLs by performing:    Patient will demonstrate rolling to the right with modified independence.  Not met   Patient will demonstrate rolling to the left with modified independence.   Not met  Patient will demonstrate supine -sit with modified independence.   Not met  Patient will demonstrate stand pivot transfers with modified independence.   Not met  Patient will demonstrate grooming while standing with modified independence.   Not met  Patient will demonstrate upper body dressing with modified independence while seated EOB.   Not met  Patient will demonstrate lower body dressing with modified independence while seated EOB.   Not met  Patient will demonstrate toileting with modified independence.   Not met  Patient will demonstrate bathing while seated EOB with modified independence   Not met  Patient's family / caregiver will demonstrate independence and safety with assisting patient with self-care skills and functional mobility.     Not met      Outcome: Ongoing, Progressing

## 2024-03-08 NOTE — PROGRESS NOTES
[facility] - Neuro Critical Care  Neurosurgery  Progress Note    Subjective:     History of Present Illness: Kalia Pacheco is a 32yo man with history of known CHESTER aneurysm, not on any AP/AC who presented to [facility] this AM with headache and nausea that began 2 days ago and acutely worsened while he was at work this morning. He also reports blurry vision and has had multiple episodes of emesis. Denies any inciting factors. CTH reveals diffuse subarachnoid hemorrhage throughout the basal cisterns, HH score 1. He was transferred to [facility] [place] for Neuro Critical Care and Neurosurgical management.     Post-Op Info:  * No surgery found *       Interval History: NAEON. Pt exam stable this morning. PBD5 plan for MRA with vessell wall imaging.    Medications:  Continuous Infusions:   dexmedeTOMIDine (Precedex) infusion (titrating) Stopped (03/06/24 1044)     Scheduled Meds:   aspirin  81 mg Oral Daily    ceFAZolin (Ancef) IV (PEDS and ADULTS)  2 g Intravenous Q8H    clopidogreL  75 mg Oral Daily    gabapentin  300 mg Oral Q8H    niMODipine  30 mg Oral Q2H    polyethylene glycol  17 g Oral Daily    senna-docusate 8.6-50 mg  1 tablet Oral BID     PRN Meds:acetaminophen, bisacodyL, hydrOXYzine HCL, labetalol, magnesium oxide, magnesium oxide, metoclopramide, midazolam, ondansetron, oxyCODONE, potassium bicarbonate, potassium bicarbonate, potassium bicarbonate, potassium, sodium phosphates, potassium, sodium phosphates, potassium, sodium phosphates     Review of Systems  Objective:     Weight: 106.6 kg (235 lb)  Body mass index is 35.73 kg/m².  Vital Signs (Most Recent):  Temp: 97.6 °F (36.4 °C) (03/08/24 1101)  Pulse: 84 (03/08/24 1301)  Resp: 18 (03/08/24 1301)  BP: (!) 108/58 (03/08/24 1301)  SpO2: 98 % (03/08/24 1301) Vital Signs (24h Range):  Temp:  [97.2 °F (36.2 °C)-98.6 °F (37 °C)] 97.6 °F (36.4 °C)  Pulse:  [] 84  Resp:  [11-43] 18  SpO2:  [91 %-99 %] 98 %  BP: ()/(40-86) 108/58     Date 03/08/24 0700 -  "03/09/24 0659   Shift 3073-3653 6343-3021 0490-0184 24 Hour Total   INTAKE   IV Piggyback 1047.6   1047.6   Shift Total(mL/kg) 1047.6(9.8)   1047.6(9.8)   OUTPUT   Urine(mL/kg/hr) 425   425   Drains 43   43   Shift Total(mL/kg) 468(4.4)   468(4.4)   Weight (kg) 106.6 106.6 106.6 106.6                            ICP/Ventriculostomy 03/05/24 1143 Right Parietal region (Active)   Level of Ventriculostomy (cm above) 10 03/08/24 0701   Status Open to drainage 03/08/24 1301   Site Assessment Clean;Dry 03/08/24 1301   Site Drainage No drainage 03/08/24 1301   Waveform normal waveform 03/08/24 0701   Output (mL) 8 mL 03/08/24 1101   CSF Color red;clear 03/08/24 1301   Dressing Status Clean;Dry;Intact 03/08/24 1301   Interventions HOB degrees;bed controls locked 03/08/24 1301          Physical Exam       Neurosurgery Physical Exam    General: well developed, well nourished, no distress.   HEENT: normocephalic, atraumatic  CV: regular rate   Pulmonary: normal respirations, no signs of respiratory distress  Abdomen: soft, non-distended, not tender to palpation  Skin: Skin is warm, dry and intact  Heme: no bruising    Neuro:   Mental Status: AO x4, no aphasia, no dysarthria   CN: PERRL, EOMI, VF intact to confrontation, sensation intact bilaterally, eyebrow raise and grimace symmetric, tongue midline  Motor: moves all extremities spontaneously, full strength throughout, no pronator drift   Sensory: intact to light touch throughout  Cerebellar: finger to nose intact bilaterally  Reflexes: -Patterson's, -Babinski, no clonus. Patellar: 2+ bilaterally       Significant Labs:  Recent Labs   Lab 03/07/24 0222 03/08/24  0425   GLU 87 101    138   K 3.7 4.0   * 107   CO2 20* 20*   BUN 6 6   CREATININE 0.8 0.9   CALCIUM 8.3* 9.5   MG 1.8 1.7     Recent Labs   Lab 03/07/24 0222 03/08/24  0425   WBC 9.27 13.59*   HGB 11.4* 14.7   HCT 34.6* 42.9    229     No results for input(s): "LABPT", "INR", "APTT" in the last 48 " hours.  Microbiology Results (last 7 days)       ** No results found for the last 168 hours. **          All pertinent labs from the last 24 hours have been reviewed.    Significant Diagnostics:  CT: No results found in the last 24 hours.  I have reviewed all pertinent imaging results/findings within the past 24 hours.  Assessment/Plan:     * Nontraumatic subarachnoid hemorrhage, unspecified  Kalia Pacheco is a 34yo man with history of known CHESTER aneurysm who presented to [place] with headache and nausea. CTH demonstrated diffuse SAH throughout the basal cisterns HH and he was [place] to [facility] [place] monitoring in the Neuro ICU and Neurosurgical consultation.     R frontal EVD was placed on arrival on 3/6 and patient was taken to IR for angiogram, with no intervention upon Acomm aneurysm. Upon further review of DSA, a vertebral artery aneurysm was noted and patient was brought back to IR for a right V4/PICA irregular aneurysm stent assisted coiling.     Imaging reviewed:  CTH 3/5: Diffuse basal cistern SAH with IVH   CTA 3/5: 3mm ant projecting L Acomm, no R A1, R pcomm feeding posterior circulation   DSA 3/5: EVD in position, anteriosuperiorly projecting small L Acomm, R V4 irregular aneurysm not well visualized    DSA +embo 3/5: stent assisted coiling of R V4/PICA irregularly shaped anuerysm, 1/4 lobules still filling     - Admit to NCC with q1 neuro checks  - R EVD at 10, please record ICP and output hourly, call NSGY if ICP sustained >25  - HOB@30   - Keppra 500 BID x7d   - TCDs daily x14d   - Nimotop 60q4 x 21d   - strict I/Os, goal euvolemia to net positive for SAH patients in general, in this patient may need to be more conservative due to cardiac hx, management per NCC   - WTE per NCC   - continue ASA/plavix per neuro IR  - based on bleed pattern believe vertebral aneurysm to be the ruptured aneurysm, no plan for intervention upon Acomm at this point  -Will obtain MRA with vessel wall of the  PICA and Acom  -PRU not therapeutic. Likely needs brillinta  - Continue to follow clinically and notify NSGY immediately if any neurostatus change     Case and plan discussed with attending Neurosurgeon Dr. Saroj Santiago MD  Neurosurgery  [facility] - Neuro Critical Care

## 2024-03-08 NOTE — SUBJECTIVE & OBJECTIVE
Neurologic Chief Complaint: SAH    Subjective:     Interval History: Patient is seen for follow-up neurological assessment and treatment recommendations:     Patient stable on SAH pathway with daily TCDs and nimodipine. Exam non-focal with EVD @10 with 250ml out in last 24hrs. Awaiting MRA at this time. On DAPT with subtherapeutic PRU with possible pending switch to brillinta ameliorate.    HPI, Past Medical, Family, and Social History remains the same as documented in the initial encounter.     Review of Systems  Scheduled Meds:   aspirin  81 mg Oral Daily    ceFAZolin (Ancef) IV (PEDS and ADULTS)  2 g Intravenous Q8H    clopidogreL  75 mg Oral Daily    gabapentin  300 mg Oral Q8H    niMODipine  30 mg Oral Q2H    polyethylene glycol  17 g Oral Daily    senna-docusate 8.6-50 mg  1 tablet Oral BID    sodium chloride 0.9%  1,000 mL Intravenous Once     Continuous Infusions:   dexmedeTOMIDine (Precedex) infusion (titrating) Stopped (03/06/24 1044)     PRN Meds:acetaminophen, bisacodyL, hydrOXYzine HCL, labetalol, magnesium oxide, magnesium oxide, metoclopramide, midazolam, ondansetron, oxyCODONE, potassium bicarbonate, potassium bicarbonate, potassium bicarbonate, potassium, sodium phosphates, potassium, sodium phosphates, potassium, sodium phosphates    Objective:     Vital Signs (Most Recent):  Temp: 97.5 °F (36.4 °C) (03/08/24 0701)  Pulse: 81 (03/08/24 1001)  Resp: 16 (03/08/24 1001)  BP: 112/77 (03/08/24 1023)  SpO2: 98 % (03/08/24 1001)  BP Location: Left arm    Vital Signs Range (Last 24H):  Temp:  [97.2 °F (36.2 °C)-98.6 °F (37 °C)]   Pulse:  []   Resp:  [11-43]   BP: ()/(40-86)   SpO2:  [91 %-99 %]   BP Location: Left arm       Physical Exam  Constitutional:       Appearance: Normal appearance.   HENT:      Head: Normocephalic.      Right Ear: External ear normal.      Left Ear: External ear normal.      Nose: Nose normal. No congestion or rhinorrhea.      Mouth/Throat:      Mouth: Mucous membranes  are moist.      Pharynx: Oropharynx is clear.   Eyes:      Extraocular Movements: Extraocular movements intact.      Pupils: Pupils are equal, round, and reactive to light.   Cardiovascular:      Rate and Rhythm: Normal rate and regular rhythm.      Pulses: Normal pulses.      Heart sounds: Normal heart sounds.   Pulmonary:      Effort: Pulmonary effort is normal. No respiratory distress.   Abdominal:      General: Abdomen is flat. Bowel sounds are normal.      Palpations: Abdomen is soft.   Musculoskeletal:      Cervical back: No rigidity or tenderness.      Right lower leg: No edema.      Left lower leg: No edema.   Skin:     General: Skin is warm and dry.      Capillary Refill: Capillary refill takes less than 2 seconds.      Findings: No rash.   Neurological:      Mental Status: He is alert.   Psychiatric:         Mood and Affect: Mood normal.         Behavior: Behavior normal.              Neurological Exam:   LOC: alert  Attention Span: Good   Language: No aphasia  Orientation: Person, Place, Time   Visual Fields: Full  EOM (CN III, IV, VI): Full/intact  Pupils (CN II, III): PERRL  Facial Sensation (CN V): Normal  Facial Movement (CN VII): Symmetric facial expression    Reflexes: 2+ throughout  Motor: Arm left  Normal 5/5  Leg left  Normal 5/5  Arm right  Normal 5/5  Leg right Normal 5/5  Cerebellum: No evidence of appendicular or axial ataxia  Sensation: Intact to light touch, temperature and vibration    Laboratory:  CMP:   Recent Labs   Lab 03/08/24  0425   CALCIUM 9.5      K 4.0   CO2 20*      BUN 6   CREATININE 0.9     CBC:   Recent Labs   Lab 03/08/24  0425   WBC 13.59*   RBC 4.59*   HGB 14.7   HCT 42.9      MCV 94   MCH 32.0*   MCHC 34.3       Diagnostic Results     Brain Imaging   CTH 3/5/23: Subarachnoid hemorrhage with blood throughout the basilar cisterns.  Moderate intraventricular blood and mild ventricular prominence.     Vessel Imaging   CTA head/neck 3/5/23: 3 mm left  anterior communicating artery aneurysm again identified.  No definite additional aneurysm identified concerning for source of hemorrhage.  Clinical correlation and correlation with conventional angiography recommended. 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 Interval increase distension lateral and 3rd ventricles compatible with developing hydrocephalus. Crowding cerebral sulci at the vertex with additional crowding basilar cisterns with questionable early cerebellar tonsillar herniation.    IR Angio 3/5/24: Anterior communicating artery aneurysm 2 x 1.7 mm with a wide neck. Small irregular aneurysm from the right V 4 vertebral segment just proximal to the posteroinferior cerebellar artery.  This is not well characterized on these views.  Repeat selective angiography is planned for better evaluation.    Cardiac Imaging   TTE 3/8/24:   Left Ventricle: The left ventricle is normal in size. Normal wall thickness. Global hypokinesis present. There is mildly reduced systolic function with a visually estimated ejection fraction of 40 - 45%. There is indeterminate diastolic function.    Right Ventricle: Normal right ventricular cavity size. Wall thickness is normal. Right ventricle wall motion  is normal. Systolic function is normal.    Left Atrium: Left atrium is severely dilated.    Aortic Valve: The valve morphology is clearly abnormal though the number of leaflets is not evident.  There is severe  aortic valve stenosis. Aortic valve area by VTI is 0.8 cm². Aortic valve peak velocity is 4.5 m/s. Mean gradient is 55 mmHg. The dimensionless index is 0.17. There is mild aortic regurgitation.    IVC/SVC: Normal venous pressure at 3 mmHg.

## 2024-03-08 NOTE — SUBJECTIVE & OBJECTIVE
Interval History: NAEON. Pt exam stable this morning. PBD5 plan for MRA with vessell wall imaging.    Medications:  Continuous Infusions:   dexmedeTOMIDine (Precedex) infusion (titrating) Stopped (03/06/24 1044)     Scheduled Meds:   aspirin  81 mg Oral Daily    ceFAZolin (Ancef) IV (PEDS and ADULTS)  2 g Intravenous Q8H    clopidogreL  75 mg Oral Daily    gabapentin  300 mg Oral Q8H    niMODipine  30 mg Oral Q2H    polyethylene glycol  17 g Oral Daily    senna-docusate 8.6-50 mg  1 tablet Oral BID     PRN Meds:acetaminophen, bisacodyL, hydrOXYzine HCL, labetalol, magnesium oxide, magnesium oxide, metoclopramide, midazolam, ondansetron, oxyCODONE, potassium bicarbonate, potassium bicarbonate, potassium bicarbonate, potassium, sodium phosphates, potassium, sodium phosphates, potassium, sodium phosphates     Review of Systems  Objective:     Weight: 106.6 kg (235 lb)  Body mass index is 35.73 kg/m².  Vital Signs (Most Recent):  Temp: 97.6 °F (36.4 °C) (03/08/24 1101)  Pulse: 84 (03/08/24 1301)  Resp: 18 (03/08/24 1301)  BP: (!) 108/58 (03/08/24 1301)  SpO2: 98 % (03/08/24 1301) Vital Signs (24h Range):  Temp:  [97.2 °F (36.2 °C)-98.6 °F (37 °C)] 97.6 °F (36.4 °C)  Pulse:  [] 84  Resp:  [11-43] 18  SpO2:  [91 %-99 %] 98 %  BP: ()/(40-86) 108/58     Date 03/08/24 0700 - 03/09/24 0659   Shift 4910-7218 0322-3895 4445-0947 24 Hour Total   INTAKE   IV Piggyback 1047.6   1047.6   Shift Total(mL/kg) 1047.6(9.8)   1047.6(9.8)   OUTPUT   Urine(mL/kg/hr) 425   425   Drains 43   43   Shift Total(mL/kg) 468(4.4)   468(4.4)   Weight (kg) 106.6 106.6 106.6 106.6                            ICP/Ventriculostomy 03/05/24 1143 Right Parietal region (Active)   Level of Ventriculostomy (cm above) 10 03/08/24 0701   Status Open to drainage 03/08/24 1301   Site Assessment Clean;Dry 03/08/24 1301   Site Drainage No drainage 03/08/24 1301   Waveform normal waveform 03/08/24 0701   Output (mL) 8 mL 03/08/24 1101   CSF Color  "red;clear 03/08/24 1301   Dressing Status Clean;Dry;Intact 03/08/24 1301   Interventions HOB degrees;bed controls locked 03/08/24 1301          Physical Exam       Neurosurgery Physical Exam    General: well developed, well nourished, no distress.   HEENT: normocephalic, atraumatic  CV: regular rate   Pulmonary: normal respirations, no signs of respiratory distress  Abdomen: soft, non-distended, not tender to palpation  Skin: Skin is warm, dry and intact  Heme: no bruising    Neuro:   Mental Status: AO x4, no aphasia, no dysarthria   CN: PERRL, EOMI, VF intact to confrontation, sensation intact bilaterally, eyebrow raise and grimace symmetric, tongue midline  Motor: moves all extremities spontaneously, full strength throughout, no pronator drift   Sensory: intact to light touch throughout  Cerebellar: finger to nose intact bilaterally  Reflexes: -Patterson's, -Babinski, no clonus. Patellar: 2+ bilaterally       Significant Labs:  Recent Labs   Lab 03/07/24 0222 03/08/24  0425   GLU 87 101    138   K 3.7 4.0   * 107   CO2 20* 20*   BUN 6 6   CREATININE 0.8 0.9   CALCIUM 8.3* 9.5   MG 1.8 1.7     Recent Labs   Lab 03/07/24 0222 03/08/24  0425   WBC 9.27 13.59*   HGB 11.4* 14.7   HCT 34.6* 42.9    229     No results for input(s): "LABPT", "INR", "APTT" in the last 48 hours.  Microbiology Results (last 7 days)       ** No results found for the last 168 hours. **          All pertinent labs from the last 24 hours have been reviewed.    Significant Diagnostics:  CT: No results found in the last 24 hours.  I have reviewed all pertinent imaging results/findings within the past 24 hours.  "

## 2024-03-08 NOTE — ASSESSMENT & PLAN NOTE
33-year-old male with a suspected A-comm aneurysm rupture complicated by intraventricular hemorrhage status post EVD placement and angiography with unsuccessful vessel was intubated and sedated, likely pending surgical intervention.    - MRA completed today  - EVD open @ 10, nsgy following  - q1h neuro checks  - Nimodipine 30mg q2h  - Daily TCDs  - SBP < 140, MAP > 65  - Cardene and Norepi as needed for tight BP control, can restart home BP meds as needed  - OGT for meds  - Strict I & O  - Hold DVT Ppx for now  - EVD per NSGY  - zofran PRN for vomiting  - DAPT, although PRU not therapeutic - starting brillinta per vascular neurology recs  - Resume SQH when appropriate   - PT/OT following  - Na goal: EuNa

## 2024-03-08 NOTE — SUBJECTIVE & OBJECTIVE
Interval History:  See hospital course.     Review of Systems   Neurological:  Positive for headaches. Negative for weakness and numbness.     Objective:     Vitals:  Temp: 97.2 °F (36.2 °C)  Pulse: 75  Rhythm: normal sinus rhythm  BP: 90/65  MAP (mmHg): 73  ICP Mean (mmHg): 23 mmHg  Resp: 11  SpO2: 95 %    Temp  Min: 97.2 °F (36.2 °C)  Max: 98.2 °F (36.8 °C)  Pulse  Min: 69  Max: 102  BP  Min: 89/52  Max: 160/66  MAP (mmHg)  Min: 65  Max: 104  ICP Mean (mmHg)  Min: -13 mmHg  Max: 24 mmHg  Resp  Min: 11  Max: 29  SpO2  Min: 93 %  Max: 100 %    03/06 0701 - 03/07 0700  In: 1657.3 [P.O.:250; I.V.:266.1]  Out: 2699 [Urine:2300; Drains:289]   Unmeasured Output  Urine Occurrence: 1  Stool Occurrence: 0  Emesis Occurrence: 1        Physical Exam  Vitals and nursing note reviewed.   Constitutional:       General: He is not in acute distress.  HENT:      Head: Normocephalic and atraumatic.      Mouth/Throat:      Mouth: Mucous membranes are moist.   Eyes:      Extraocular Movements: Extraocular movements intact.      Conjunctiva/sclera: Conjunctivae normal.      Pupils: Pupils are equal, round, and reactive to light.   Cardiovascular:      Rate and Rhythm: Normal rate and regular rhythm.   Pulmonary:      Effort: Pulmonary effort is normal. No respiratory distress.   Abdominal:      General: Abdomen is flat. There is no distension.      Palpations: Abdomen is soft.      Tenderness: There is no abdominal tenderness.   Musculoskeletal:      Right lower leg: No edema.      Left lower leg: No edema.   Skin:     General: Skin is warm and dry.   Neurological:      General: No focal deficit present.      Mental Status: He is alert and oriented to person, place, and time.      Cranial Nerves: No cranial nerve deficit.      Sensory: No sensory deficit.      Motor: No weakness.      Comments:   Follows commands  Moves all extremities spontaneously  Full strength throughout            Medications:  ContinuousdexmedeTOMIDine (Precedex)  infusion (titrating), Last Rate: Stopped (03/06/24 1044)    Scheduledaspirin, 81 mg, Daily  ceFAZolin (Ancef) IV (PEDS and ADULTS), 2 g, Q8H  clopidogreL, 75 mg, Daily  gabapentin, 300 mg, Q8H  niMODipine, 30 mg, Q2H  polyethylene glycol, 17 g, Daily  senna-docusate 8.6-50 mg, 1 tablet, BID    PRNacetaminophen, 650 mg, Q6H PRN  bisacodyL, 10 mg, Daily PRN  labetalol, 10 mg, Q15 Min PRN  magnesium oxide, 800 mg, PRN  magnesium oxide, 800 mg, PRN  metoclopramide, 10 mg, Q6H PRN  ondansetron, 4 mg, Q6H PRN  oxyCODONE, 5 mg, Q6H PRN  potassium bicarbonate, 35 mEq, PRN  potassium bicarbonate, 50 mEq, PRN  potassium bicarbonate, 60 mEq, PRN  potassium, sodium phosphates, 2 packet, PRN  potassium, sodium phosphates, 2 packet, PRN  potassium, sodium phosphates, 2 packet, PRN      Today I personally reviewed pertinent medications, lines/drains/airways, imaging, cardiology results, laboratory results, microbiology results, notably:    Diet  Diet Adult Regular (IDDSI Level 7) Thin; Standard Tray  Diet Adult Regular (IDDSI Level 7) Thin; Standard Tray

## 2024-03-08 NOTE — ASSESSMENT & PLAN NOTE
Kalia Pacheco is a 34yo man with history of known CHESTER aneurysm who presented to [place] with headache and nausea. CTH demonstrated diffuse SAH throughout the basal cisterns HH and he was transferred to [facility] [place] for close monitoring in the Neuro ICU and Neurosurgical consultation.     R frontal EVD was placed on arrival on 3/6 and patient was taken to IR for angiogram, with no intervention upon Acomm aneurysm. Upon further review of DSA, a vertebral artery aneurysm was noted and patient was brought back to IR for a right V4/PICA irregular aneurysm stent assisted coiling.     Imaging reviewed:  CTH 3/5: Diffuse basal cistern SAH with IVH   CTA 3/5: 3mm ant projecting L Acomm, no R A1, R pcomm feeding posterior circulation   DSA 3/5: EVD in position, anteriosuperiorly projecting small L Acomm, R V4 irregular aneurysm not well visualized    DSA +embo 3/5: stent assisted coiling of R V4/PICA irregularly shaped anuerysm, 1/4 lobules still filling     - Admit to NCC with q1 neuro checks  - R EVD at 10, please record ICP and output hourly, call NSGY if ICP sustained >25  - HOB@30   - Keppra 500 BID x7d   - TCDs daily x14d   - Nimotop 60q4 x 21d   - strict I/Os, goal euvolemia to net positive for SAH patients in general, in this patient may need to be more conservative due to cardiac hx, management per NCC   - WTE per NCC   - continue ASA/plavix per neuro IR  - based on bleed pattern believe vertebral aneurysm to be the ruptured aneurysm, no plan for intervention upon Acomm at this point  -Will obtain MRA with vessel wall of the PICA and Acom  -PRU not therapeutic. Likely needs brillinta  - Continue to follow clinically and notify NSGY immediately if any neurostatus change     Case and plan discussed with attending Neurosurgeon Dr. Kyle

## 2024-03-08 NOTE — ASSESSMENT & PLAN NOTE
33-year-old male with a suspected A-comm aneurysm rupture complicated by intraventricular hemorrhage status post EVD placement and angiography with unsuccessful vessel was intubated and sedated, likely pending surgical intervention.    - F/u MRA  - q1h neuro checks  - Nimodipine 30mg q2h  - Daily TCDs  - sheath care per IR recs  - SBP < 140, MAP > 65  - Cardene and Norepi as needed for tight BP control, can restart home BP meds as needed  - OGT for meds  - Strict I & O  - Hold DVT Ppx for now  - EVD per NSGY  - zofran PRN for vomiting  - DAPT, although PRU not therapeutic  - Resume SQH when appropriate   - PT/OT following  - Na goal: EuNa

## 2024-03-08 NOTE — PLAN OF CARE
[facility] Care Plan    POC reviewed with Kalia Pacheco and family at 0300. Pt verbalized understanding. Questions and concerns addressed. No acute events overnight. Pt progressing toward goals. Will continue to monitor. See below and flowsheets for full assessment and VS info.     - Oxy given x2 for pain   - EVD open at 10        Is this a stroke patient? yes- Stroke booklet reviewed with patient and family, risk factors identified for patient and stroke booklet remains at bedside for ongoing education.     Care individualization:   Restraints:   Restraint Order  Length of Order: Order good for next 24 hours or when removed.  Date that the current order will : 24  Time that the current order will :   Order Upon Application: Yes    Neuro:  [place] Coma Scale  Best Eye Response: 4-->(E4) spontaneous  Best Motor Response: 6-->(M6) obeys commands  Best Verbal Response: 5-->(V5) oriented  Irving Coma Scale Score: 15  Assessment Qualifiers: patient not sedated/intubated  Pupil PERRLA: yes     24hr Temp:  [97.2 °F (36.2 °C)-98.6 °F (37 °C)]     CV:   Rhythm: normal sinus rhythm  BP goals:   SBP < 140  MAP > 65    Resp:      Vent Mode: Spont  Set Rate: 16 BPM  Oxygen Concentration (%): 40  Vt Set: 500 mL  PEEP/CPAP: 5 cmH20  Pressure Support: 8 cmH20    Plan: N/A    GI/:     Diet/Nutrition Received: regular  Last Bowel Movement: 24       Intake/Output Summary (Last 24 hours) at 3/8/2024 0629  Last data filed at 3/8/2024 0605  Gross per 24 hour   Intake 643.29 ml   Output 3401 ml   Net -2757.71 ml     Unmeasured Output  Urine Occurrence: 1  Stool Occurrence: 0  Emesis Occurrence: 1    Labs/Accuchecks:  Recent Labs   Lab 24  0425   WBC 13.59*   RBC 4.59*   HGB 14.7   HCT 42.9         Recent Labs   Lab 24  0831 24  0138 24  0425      < > 138   K 3.8   < > 4.0   CO2 20*   < > 20*      < > 107   BUN 13   < > 6   CREATININE 0.99   < > 0.9   ALKPHOS 56  --    --    ALT 44  --   --    AST 57  --   --    BILITOT 0.8  --   --     < > = values in this interval not displayed.      Recent Labs   Lab 03/05/24  1104   INR 1.1   APTT 24.2      Recent Labs   Lab 03/05/24  0831   TROPONINI 0.070       Electrolytes: N/A - electrolytes WDL  Accuchecks: none    Gtts:   dexmedeTOMIDine (Precedex) infusion (titrating) Stopped (03/06/24 1044)       LDA/Wounds:    Nurses Note -- 4 Eyes    Is there altered skin present? no     Prevention Measures Documented    Second RN/Staff Member:      STUART

## 2024-03-08 NOTE — PROGRESS NOTES
[facility] Neuro Critical Care  Vascular Neurology  Comprehensive Stroke Center  Progress Note    Assessment/Plan:     * Nontraumatic subarachnoid hemorrhage, unspecified  33 year old gentleman with history of CHESTER aneurysm transferred to [facility] for Neurosurgical evaluation after OSH-ED evaluation and workup consistent with SAH. NIHSS = 1. Hunt & Hill Grade I. Neurologic exam non-focal; notable for somnolence, discomfort, and decreased attention (requires repeated stimulation). 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     Patient stable on SAH pathway with daily TCDs and nimodipine. Exam non-focal with EVD @10 with 250ml out in last 24hrs. Awaiting MRA at this time. On DAPT with subtherapeutic PRU with possible pending switch to brillinta ameliorate     Recommendation:  SAH pathway  MRA  DAPT for stent with possible switch to brillinta given subtherapeutic PRU    Antithrombotics for secondary stroke prevention: Antiplatelets: Aspirin: 81 mg daily  Clopidogrel: 75 mg daily for intracranial stent per IR with possible switch to brillinta given subtherapeutic PRU    Statins for secondary stroke prevention and hyperlipidemia, if present:   Statins: Atorvastatin- 40 mg daily    Aggressive risk factor modification: HTN     Rehab efforts: The patient has been evaluated by a stroke team provider and the therapy needs have been fully considered based off the presenting complaints and exam findings. The following therapy evaluations are needed: PT evaluate and treat, OT evaluate and treat, SLP  evaluate and treat, PM&R evaluate for appropriate placement    Diagnostics ordered/pending: MRA    VTE prophylaxis: Mechanical prophylaxis: Place SCDs    BP parameters: SAH: Secured aneurysm, no target, increase BP to prevent vasospasm if needed         Other hydrocephalus  EVD placed by NSGY on 3/5.  Maintained by NCC.    Anterior communicating artery aneurysm  Past history of.  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  Stable.    Hypertension  Stroke Risk Factor  SBP <140  PRN labetolol           03/06/2024: Patient s/p EVD placement by NSGY secondary to hydrocephalus, s/p DSA with identification of  right vertebral artery aneurysm and confirmed stable left anterior communicating artery aneurysm, and s/p stent and coil of vertebral artery aneurysm. Patient extubated this morning, pending SLP eval. Patient complains of headache, but was improved with 5ml drained from EVD per nursing staff. Neuro exam stable. Pending ECHO.     03/07/24: Patient stable on SAH pathway with daily TCDs and nimodipine. Exam non-focal with EVD @10 with 250ml out in last 24hrs. Awaiting MRA at this time. On DAPT with subtherapeutic PRU with possible pending switch to brillinta ameliorate.    STROKE DOCUMENTATION   Acute Stroke Times   Last Known Normal Date: 03/05/24  Unknown Normal Time: Unknown Time  Symptom Onset Date: 03/05/24  Unknown Symptom Onset Time: Unknown Time  Thrombolytic Therapy Recommended: No  Thrombectomy Recommended: No    NIH Scale:  Interval: baseline  1a. Level of Consciousness: 0-->Alert, keenly responsive  1b. LOC Questions: 0-->Answers both questions correctly  1c. LOC Commands: 0-->Performs both tasks correctly  2. Best Gaze: 0-->Normal  3. Visual: 0-->No visual loss  4. Facial Palsy: 0-->Normal symmetrical movements  5a. Motor Arm, Left: 0-->No drift, limb holds 90 (or 45) degrees for full 10 secs  5b. Motor Arm, Right: 0-->No drift, limb holds 90 (or 45) degrees for full 10 secs  6a.  Motor Leg, Left: 0-->No drift, leg holds 30 degree position for full 5 secs  6b. Motor Leg, Right: 0-->No drift, leg holds 30 degree position for full 5 secs  7. Limb Ataxia: 0-->Absent  8. Sensory: 0-->Normal, no sensory loss  9. Best Language: 0-->No aphasia, normal  10. Dysarthria: 0-->Normal  11. Extinction and Inattention (formerly Neglect): 0-->No abnormality  Total (NIH Stroke Scale): 0       Modified Marquez Score: 0  [place] Coma Scale:    ABCD2 Score:    [id number] Score:   HAS -BLED Score:   ICH Score:   Hunt & Hill Classification:Grade I     Hemorrhagic change of an Ischemic Stroke: Does this patient have an ischemic stroke with hemorrhagic changes? No     Neurologic Chief Complaint: SAH    Subjective:     Interval History: Patient is seen for follow-up neurological assessment and treatment recommendations:     Patient stable on SAH pathway with daily TCDs and nimodipine. Exam non-focal with EVD @10 with 250ml out in last 24hrs. Awaiting MRA at this time. On DAPT with subtherapeutic PRU with possible pending switch to brillinta ameliorate.    HPI, Past Medical, Family, and Social History remains the same as documented in the initial encounter.     Review of Systems  Scheduled Meds:   aspirin  81 mg Oral Daily    ceFAZolin (Ancef) IV (PEDS and ADULTS)  2 g Intravenous Q8H    clopidogreL  75 mg Oral Daily    gabapentin  300 mg Oral Q8H    niMODipine  30 mg Oral Q2H    polyethylene glycol  17 g Oral Daily    senna-docusate 8.6-50 mg  1 tablet Oral BID    sodium chloride 0.9%  1,000 mL Intravenous Once     Continuous Infusions:   dexmedeTOMIDine (Precedex) infusion (titrating) Stopped (03/06/24 1044)     PRN Meds:acetaminophen, bisacodyL, hydrOXYzine HCL, labetalol, magnesium oxide, magnesium oxide, metoclopramide, midazolam, ondansetron, oxyCODONE, potassium bicarbonate, potassium bicarbonate, potassium bicarbonate, potassium, sodium phosphates, potassium, sodium phosphates, potassium, sodium  phosphates    Objective:     Vital Signs (Most Recent):  Temp: 97.5 °F (36.4 °C) (03/08/24 0701)  Pulse: 81 (03/08/24 1001)  Resp: 16 (03/08/24 1001)  BP: 112/77 (03/08/24 1023)  SpO2: 98 % (03/08/24 1001)  BP Location: Left arm    Vital Signs Range (Last 24H):  Temp:  [97.2 °F (36.2 °C)-98.6 °F (37 °C)]   Pulse:  []   Resp:  [11-43]   BP: ()/(40-86)   SpO2:  [91 %-99 %]   BP Location: Left arm       Physical Exam  Constitutional:       Appearance: Normal appearance.   HENT:      Head: Normocephalic.      Right Ear: External ear normal.      Left Ear: External ear normal.      Nose: Nose normal. No congestion or rhinorrhea.      Mouth/Throat:      Mouth: Mucous membranes are moist.      Pharynx: Oropharynx is clear.   Eyes:      Extraocular Movements: Extraocular movements intact.      Pupils: Pupils are equal, round, and reactive to light.   Cardiovascular:      Rate and Rhythm: Normal rate and regular rhythm.      Pulses: Normal pulses.      Heart sounds: Normal heart sounds.   Pulmonary:      Effort: Pulmonary effort is normal. No respiratory distress.   Abdominal:      General: Abdomen is flat. Bowel sounds are normal.      Palpations: Abdomen is soft.   Musculoskeletal:      Cervical back: No rigidity or tenderness.      Right lower leg: No edema.      Left lower leg: No edema.   Skin:     General: Skin is warm and dry.      Capillary Refill: Capillary refill takes less than 2 seconds.      Findings: No rash.   Neurological:      Mental Status: He is alert.   Psychiatric:         Mood and Affect: Mood normal.         Behavior: Behavior normal.              Neurological Exam:   LOC: alert  Attention Span: Good   Language: No aphasia  Orientation: Person, Place, Time   Visual Fields: Full  EOM (CN III, IV, VI): Full/intact  Pupils (CN II, III): PERRL  Facial Sensation (CN V): Normal  Facial Movement (CN VII): Symmetric facial expression    Reflexes: 2+ throughout  Motor: Arm left  Normal 5/5  Leg left   Normal 5/5  Arm right  Normal 5/5  Leg right Normal 5/5  Cerebellum: No evidence of appendicular or axial ataxia  Sensation: Intact to light touch, temperature and vibration    Laboratory:  CMP:   Recent Labs   Lab 03/08/24  0425   CALCIUM 9.5      K 4.0   CO2 20*      BUN 6   CREATININE 0.9     CBC:   Recent Labs   Lab 03/08/24  0425   WBC 13.59*   RBC 4.59*   HGB 14.7   HCT 42.9      MCV 94   MCH 32.0*   MCHC 34.3       Diagnostic Results     Brain Imaging   CTH 3/5/23: Subarachnoid hemorrhage with blood throughout the basilar cisterns.  Moderate intraventricular blood and mild ventricular prominence.     Vessel Imaging   CTA head/neck 3/5/23: 3 mm left anterior communicating artery aneurysm again identified.  No definite additional aneurysm identified concerning for source of hemorrhage.  Clinical correlation and correlation with conventional angiography recommended. 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 Interval increase distension lateral and 3rd ventricles compatible with developing hydrocephalus. Crowding cerebral sulci at the vertex with additional crowding basilar cisterns with questionable early cerebellar tonsillar herniation.    IR Angio 3/5/24: Anterior communicating artery aneurysm 2 x 1.7 mm with a wide neck. Small irregular aneurysm from the right V 4 vertebral segment just proximal to the posteroinferior cerebellar artery.  This is not well characterized on  these views.  Repeat selective angiography is planned for better evaluation.    Cardiac Imaging   TTE 3/8/24:   Left Ventricle: The left ventricle is normal in size. Normal wall thickness. Global hypokinesis present. There is mildly reduced systolic function with a visually estimated ejection fraction of 40 - 45%. There is indeterminate diastolic function.    Right Ventricle: Normal right ventricular cavity size. Wall thickness is normal. Right ventricle wall motion  is normal. Systolic function is normal.    Left Atrium: Left atrium is severely dilated.    Aortic Valve: The valve morphology is clearly abnormal though the number of leaflets is not evident.  There is severe aortic valve stenosis. Aortic valve area by VTI is 0.8 cm². Aortic valve peak velocity is 4.5 m/s. Mean gradient is 55 mmHg. The dimensionless index is 0.17. There is mild aortic regurgitation.    IVC/SVC: Normal venous pressure at 3 mmHg.    Severino Bustos MD  [facility]  Department of Vascular Neurology   [facility] - Neuro Critical [facility]

## 2024-03-08 NOTE — ASSESSMENT & PLAN NOTE
33 year old gentleman with history of CHESTER aneurysm transferred to [facility] for Neurosurgical evaluation after OSH-ED evaluation and workup consistent with SAH. NIHSS = 1. Hunt & Hill Grade I. Neurologic exam non-focal; notable for somnolence, discomfort, and decreased attention (requires repeated stimulation). 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 Patient extubated this morning, pending SLP eval. Patient complains of headache, but was improved with 5ml drained from EVD per nursing staff. Neuro exam stable.     Recommendation:  SAH pathway  MRA  DAPT for stent with possible switch to brillinta given subtherapeutic PRU    Antithrombotics for secondary stroke prevention: Antiplatelets: Aspirin: 81 mg daily  Clopidogrel: 75 mg daily for intracranial stent per IR with possible switch to brillinta given subtherapeutic PRU    Statins for secondary stroke prevention and hyperlipidemia, if present:   Statins: Atorvastatin- 40 mg daily    Aggressive risk factor modification: HTN     Rehab efforts: The patient has been evaluated by a stroke team provider and the therapy needs have been fully considered based off the presenting complaints and exam findings. The following therapy evaluations are needed: PT evaluate and treat, OT evaluate and treat, SLP  evaluate and treat, PM&R evaluate for appropriate placement    Diagnostics ordered/pending: MRA    VTE prophylaxis: Mechanical prophylaxis: Place SCDs    BP parameters: SAH: Secured aneurysm, no target, increase BP to prevent vasospasm if needed

## 2024-03-09 LAB
ALBUMIN SERPL BCP-MCNC: 3.5 G/DL (ref 3.5–5.2)
ALP SERPL-CCNC: 54 U/L (ref 55–135)
ALT SERPL W/O P-5'-P-CCNC: 14 U/L (ref 10–44)
ANION GAP SERPL CALC-SCNC: 11 MMOL/L (ref 8–16)
AST SERPL-CCNC: 19 U/L (ref 10–40)
BASOPHILS # BLD AUTO: 0.05 K/UL (ref 0–0.2)
BASOPHILS NFR BLD: 0.4 % (ref 0–1.9)
BILIRUB SERPL-MCNC: 1.3 MG/DL (ref 0.1–1)
BUN SERPL-MCNC: 8 MG/DL (ref 6–20)
CALCIUM SERPL-MCNC: 9.6 MG/DL (ref 8.7–10.5)
CHLORIDE SERPL-SCNC: 105 MMOL/L (ref 95–110)
CO2 SERPL-SCNC: 18 MMOL/L (ref 23–29)
CREAT SERPL-MCNC: 0.8 MG/DL (ref 0.5–1.4)
DIFFERENTIAL METHOD BLD: ABNORMAL
EOSINOPHIL # BLD AUTO: 0 K/UL (ref 0–0.5)
EOSINOPHIL NFR BLD: 0.4 % (ref 0–8)
ERYTHROCYTE [DISTWIDTH] IN BLOOD BY AUTOMATED COUNT: 13.1 % (ref 11.5–14.5)
EST. GFR  (NO RACE VARIABLE): >60 ML/MIN/1.73 M^2
GLUCOSE SERPL-MCNC: 101 MG/DL (ref 70–110)
HCT VFR BLD AUTO: 47.1 % (ref 40–54)
HGB BLD-MCNC: 16 G/DL (ref 14–18)
IMM GRANULOCYTES # BLD AUTO: 0.03 K/UL (ref 0–0.04)
IMM GRANULOCYTES NFR BLD AUTO: 0.3 % (ref 0–0.5)
LYMPHOCYTES # BLD AUTO: 1.8 K/UL (ref 1–4.8)
LYMPHOCYTES NFR BLD: 15.6 % (ref 18–48)
MAGNESIUM SERPL-MCNC: 1.7 MG/DL (ref 1.6–2.6)
MCH RBC QN AUTO: 31.7 PG (ref 27–31)
MCHC RBC AUTO-ENTMCNC: 34 G/DL (ref 32–36)
MCV RBC AUTO: 93 FL (ref 82–98)
MONOCYTES # BLD AUTO: 0.9 K/UL (ref 0.3–1)
MONOCYTES NFR BLD: 7.7 % (ref 4–15)
NEUTROPHILS # BLD AUTO: 8.6 K/UL (ref 1.8–7.7)
NEUTROPHILS NFR BLD: 75.6 % (ref 38–73)
NRBC BLD-RTO: 0 /100 WBC
PHOSPHATE SERPL-MCNC: 3.3 MG/DL (ref 2.7–4.5)
PLATELET # BLD AUTO: 248 K/UL (ref 150–450)
PMV BLD AUTO: 10 FL (ref 9.2–12.9)
POTASSIUM SERPL-SCNC: 4.1 MMOL/L (ref 3.5–5.1)
PROT SERPL-MCNC: 6.9 G/DL (ref 6–8.4)
RBC # BLD AUTO: 5.05 M/UL (ref 4.6–6.2)
SODIUM SERPL-SCNC: 134 MMOL/L (ref 136–145)
WBC # BLD AUTO: 11.31 K/UL (ref 3.9–12.7)

## 2024-03-09 PROCEDURE — 99233 SBSQ HOSP IP/OBS HIGH 50: CPT | Mod: ,,, | Performed by: NEUROLOGICAL SURGERY

## 2024-03-09 PROCEDURE — 80053 COMPREHEN METABOLIC PANEL: CPT

## 2024-03-09 PROCEDURE — 20000000 HC ICU ROOM

## 2024-03-09 PROCEDURE — 63600175 PHARM REV CODE 636 W HCPCS: Performed by: STUDENT IN AN ORGANIZED HEALTH CARE EDUCATION/TRAINING PROGRAM

## 2024-03-09 PROCEDURE — 84100 ASSAY OF PHOSPHORUS: CPT

## 2024-03-09 PROCEDURE — 99291 CRITICAL CARE FIRST HOUR: CPT | Mod: ,,, | Performed by: PSYCHIATRY & NEUROLOGY

## 2024-03-09 PROCEDURE — 25000003 PHARM REV CODE 250

## 2024-03-09 PROCEDURE — 63600175 PHARM REV CODE 636 W HCPCS

## 2024-03-09 PROCEDURE — 85025 COMPLETE CBC W/AUTO DIFF WBC: CPT

## 2024-03-09 PROCEDURE — 25000003 PHARM REV CODE 250: Performed by: STUDENT IN AN ORGANIZED HEALTH CARE EDUCATION/TRAINING PROGRAM

## 2024-03-09 PROCEDURE — 25000003 PHARM REV CODE 250: Performed by: PSYCHIATRY & NEUROLOGY

## 2024-03-09 PROCEDURE — 83735 ASSAY OF MAGNESIUM: CPT

## 2024-03-09 PROCEDURE — 99233 SBSQ HOSP IP/OBS HIGH 50: CPT | Mod: ,,, | Performed by: PSYCHIATRY & NEUROLOGY

## 2024-03-09 PROCEDURE — 25000003 PHARM REV CODE 250: Performed by: PHYSICIAN ASSISTANT

## 2024-03-09 PROCEDURE — 94761 N-INVAS EAR/PLS OXIMETRY MLT: CPT

## 2024-03-09 RX ORDER — TALC
6 POWDER (GRAM) TOPICAL NIGHTLY
Status: DISCONTINUED | OUTPATIENT
Start: 2024-03-09 | End: 2024-03-20 | Stop reason: HOSPADM

## 2024-03-09 RX ADMIN — CEFAZOLIN 2 G: 2 INJECTION, POWDER, FOR SOLUTION INTRAMUSCULAR; INTRAVENOUS at 01:03

## 2024-03-09 RX ADMIN — HEPARIN SODIUM 5000 UNITS: 5000 INJECTION INTRAVENOUS; SUBCUTANEOUS at 06:03

## 2024-03-09 RX ADMIN — NIMODIPINE 30 MG: 30 CAPSULE, LIQUID FILLED ORAL at 12:03

## 2024-03-09 RX ADMIN — Medication 6 MG: at 08:03

## 2024-03-09 RX ADMIN — NIMODIPINE 30 MG: 30 CAPSULE, LIQUID FILLED ORAL at 08:03

## 2024-03-09 RX ADMIN — NIMODIPINE 30 MG: 30 CAPSULE, LIQUID FILLED ORAL at 04:03

## 2024-03-09 RX ADMIN — HYDROXYZINE HYDROCHLORIDE 25 MG: 25 TABLET, FILM COATED ORAL at 08:03

## 2024-03-09 RX ADMIN — GABAPENTIN 300 MG: 300 CAPSULE ORAL at 02:03

## 2024-03-09 RX ADMIN — NIMODIPINE 30 MG: 30 CAPSULE, LIQUID FILLED ORAL at 02:03

## 2024-03-09 RX ADMIN — ACETAMINOPHEN 650 MG: 325 TABLET ORAL at 08:03

## 2024-03-09 RX ADMIN — HEPARIN SODIUM 5000 UNITS: 5000 INJECTION INTRAVENOUS; SUBCUTANEOUS at 10:03

## 2024-03-09 RX ADMIN — GABAPENTIN 300 MG: 300 CAPSULE ORAL at 10:03

## 2024-03-09 RX ADMIN — DOCUSATE SODIUM AND SENNOSIDES 1 TABLET: 8.6; 5 TABLET, FILM COATED ORAL at 08:03

## 2024-03-09 RX ADMIN — CEFAZOLIN 2 G: 2 INJECTION, POWDER, FOR SOLUTION INTRAMUSCULAR; INTRAVENOUS at 08:03

## 2024-03-09 RX ADMIN — NIMODIPINE 30 MG: 30 CAPSULE, LIQUID FILLED ORAL at 10:03

## 2024-03-09 RX ADMIN — TICAGRELOR 90 MG: 90 TABLET ORAL at 08:03

## 2024-03-09 RX ADMIN — GABAPENTIN 300 MG: 300 CAPSULE ORAL at 06:03

## 2024-03-09 RX ADMIN — NIMODIPINE 30 MG: 30 CAPSULE, LIQUID FILLED ORAL at 06:03

## 2024-03-09 RX ADMIN — HEPARIN SODIUM 5000 UNITS: 5000 INJECTION INTRAVENOUS; SUBCUTANEOUS at 02:03

## 2024-03-09 RX ADMIN — CEFAZOLIN 2 G: 2 INJECTION, POWDER, FOR SOLUTION INTRAMUSCULAR; INTRAVENOUS at 05:03

## 2024-03-09 RX ADMIN — ASPIRIN 81 MG CHEWABLE TABLET 81 MG: 81 TABLET CHEWABLE at 08:03

## 2024-03-09 NOTE — ASSESSMENT & PLAN NOTE
33-year-old male with a suspected A-comm aneurysm rupture complicated by intraventricular hemorrhage status post EVD placement and angiography with unsuccessful vessel was intubated and sedated, likely pending surgical intervention.    - Continued admission to [facility]  - MRA reviewed  - EVD open @ 10, nsgy following  - q1h neuro checks  - Nimodipine 30mg q2h  - Daily TCDs  - SBP < 140, MAP > 65  - Cardene and Norepi as needed for tight BP control, can restart home BP meds as needed  - TTE with reduced EF (40-45%), significantly dilated LA, and mild aortic regurgitation  - Strict I & O  - Euvolemia  - SQH for DVT ppx  - EVD per NSGY  - zofran PRN for vomiting  - DAPT, although PRU not therapeutic - brillinta in lieu of plavix per vascular neurology recs  - PT/OT following  - Na goal: EuNa  - Adding nightly melatonin

## 2024-03-09 NOTE — HOSPITAL COURSE
3/9: PBD 6. NAEON.  cc output, ICP < 23.   3/10: PBD 7. NAEON.   3/11 naeon, exam stbale, moving drain to 20 today  3/14 naeon, exam stable. EVD removed yesterday. Continue SAH protocol  3/18: NAEO. Downgraded to NPU. TCDs stable. Pending MRA brain with contrast to assess for residual flow.

## 2024-03-09 NOTE — ASSESSMENT & PLAN NOTE
Kalia Pacheco is a 32yo man with history of known CHESTER aneurysm who presented to [facility] with headache and nausea. CTH demonstrated diffuse SAH throughout the basal cisterns HH and he was transferred to [facility] for close monitoring in the Neuro ICU and Neurosurgical consultation.     R frontal EVD was placed on arrival on 3/6 and patient was taken to IR for angiogram, with no intervention upon Acomm aneurysm. Upon further review of DSA, a vertebral artery aneurysm was noted and patient was brought back to IR for a right V4/PICA irregular aneurysm stent assisted coiling.     Imaging reviewed:  CTH 3/5: Diffuse basal cistern SAH with IVH   CTA 3/5: 3mm ant projecting L Acomm, no R A1, R pcomm feeding posterior circulation   DSA 3/5: EVD in position, anteriosuperiorly projecting small L Acomm, R V4 irregular aneurysm not well visualized    DSA +embo 3/5: stent assisted coiling of R V4/PICA irregularly shaped anuerysm, 1/4 lobules still filling     - Admit to NCC with q1 neuro checks  - R EVD at 10, please record ICP and output hourly, call NSGY if ICP sustained >25  - Keppra 500 BID x7d   - TCDs daily x14d   - Nimotop 60q4 x 21d   - strict I/Os, goal euvolemia to net positive for SAH patients in general, in this patient may need to be more conservative due to cardiac hx, management per NCC   - WTE per NCC   - continue ASA/brilnta per neuro IR  - based on bleed pattern believe vertebral aneurysm to be the ruptured aneurysm, no plan for intervention upon Acomm at this point  -MRI with contrast not suggestive of vessel wall enhancement to Acomm  - Continue to follow clinically and notify NSGY immediately if any neurostatus change     Case and plan discussed with attending Neurosurgeon Dr. Kyle

## 2024-03-09 NOTE — SUBJECTIVE & OBJECTIVE
Interval History:  See hospital course.     Review of Systems   Neurological:  Negative for weakness, numbness and headaches.       Objective:     Vitals:  Temp: 98.3 °F (36.8 °C)  Pulse: 85  Rhythm: normal sinus rhythm  BP: (!) 106/52  MAP (mmHg): 83  ICP Mean (mmHg): 5 mmHg  Resp: 18  SpO2: 98 %    Temp  Min: 98.2 °F (36.8 °C)  Max: 98.5 °F (36.9 °C)  Pulse  Min: 77  Max: 100  BP  Min: 89/53  Max: 134/70  MAP (mmHg)  Min: 61  Max: 94  ICP Mean (mmHg)  Min: -1 mmHg  Max: 11 mmHg  Resp  Min: 10  Max: 64  SpO2  Min: 88 %  Max: 98 %    03/08 0701 - 03/09 0700  In: 1796.2 [P.O.:700]  Out: 2774 [Urine:2550; Drains:224]   Unmeasured Output  Urine Occurrence: 1  Stool Occurrence: 0  Emesis Occurrence: 1        Physical Exam  Vitals and nursing note reviewed.   Constitutional:       Appearance: He is not ill-appearing.   HENT:      Head: Normocephalic.      Comments: EVD     Mouth/Throat:      Mouth: Mucous membranes are moist.      Pharynx: Oropharynx is clear.   Eyes:      Extraocular Movements: Extraocular movements intact.      Conjunctiva/sclera: Conjunctivae normal.      Pupils: Pupils are equal, round, and reactive to light.   Cardiovascular:      Rate and Rhythm: Normal rate and regular rhythm.   Pulmonary:      Effort: Pulmonary effort is normal. No respiratory distress.   Abdominal:      General: Abdomen is flat. There is no distension.      Palpations: Abdomen is soft.      Tenderness: There is no abdominal tenderness.   Musculoskeletal:      Right lower leg: No edema.      Left lower leg: No edema.   Skin:     General: Skin is warm and dry.   Neurological:      General: No focal deficit present.      Mental Status: He is alert and oriented to person, place, and time.      Cranial Nerves: No cranial nerve deficit or dysarthria.      Sensory: No sensory deficit.      Motor: No weakness or pronator drift.      Comments:   Follows commands  Moves all extremities spontaneously  Full strength throughout               Medications:  ContinuousdexmedeTOMIDine (Precedex) infusion (titrating), Last Rate: Stopped (03/06/24 1044)    Scheduledaspirin, 81 mg, Daily  ceFAZolin (Ancef) IV (PEDS and ADULTS), 2 g, Q8H  gabapentin, 300 mg, Q8H  heparin (porcine), 5,000 Units, Q8H  melatonin, 6 mg, Nightly  niMODipine, 30 mg, Q2H  polyethylene glycol, 17 g, Daily  senna-docusate 8.6-50 mg, 1 tablet, BID  ticagrelor, 90 mg, BID    PRNacetaminophen, 650 mg, Q6H PRN  bisacodyL, 10 mg, Daily PRN  hydrOXYzine HCL, 25 mg, Nightly PRN  labetalol, 10 mg, Q15 Min PRN  magnesium oxide, 800 mg, PRN  magnesium oxide, 800 mg, PRN  metoclopramide, 10 mg, Q6H PRN  midazolam, 1 mg, Q15 Min PRN  ondansetron, 4 mg, Q6H PRN  oxyCODONE, 5 mg, Q6H PRN  potassium bicarbonate, 35 mEq, PRN  potassium bicarbonate, 50 mEq, PRN  potassium bicarbonate, 60 mEq, PRN  potassium, sodium phosphates, 2 packet, PRN  potassium, sodium phosphates, 2 packet, PRN  potassium, sodium phosphates, 2 packet, PRN      Today I personally reviewed pertinent medications, lines/drains/airways, imaging, cardiology results, laboratory results, microbiology results, notably:    Diet  Diet Adult Regular (IDDSI Level 7) Thin; Standard Tray  Diet Adult Regular (IDDSI Level 7) Thin; Standard Tray

## 2024-03-09 NOTE — ASSESSMENT & PLAN NOTE
33 year old gentleman with history of CHESTER aneurysm transferred to [facility] for Neurosurgical evaluation after OSH-ED evaluation and workup consistent with SAH. NIHSS = 1. Hunt & Hill Grade I. Neurologic exam non-focal; notable for somnolence, discomfort, and decreased attention (requires repeated stimulation). 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     No acute events overnight. Stable neuro exam (NIH 1 for chronic leg weakness). Remain on SAH pathway; TCD pending for today. EVD in place. MRA Brain with no vessel wall enhancement. DAPT switched to ASA and brilinta for stents; patient tolerating well.     Antithrombotics for secondary stroke prevention: Antiplatelets: Aspirin: 81 mg daily and brillinta for therapeutic PRU    Statins for secondary stroke prevention and hyperlipidemia, if present:   Statins: Atorvastatin- 40 mg daily    Aggressive risk factor modification: HTN     Rehab efforts: The patient has been evaluated by a stroke team provider and the therapy needs have been fully considered based off the presenting complaints and exam findings. The following therapy evaluations are needed: PT evaluate and treat, OT evaluate and treat, SLP evaluate and treat, PM&R evaluate for appropriate placement  Diet recommendations:  Regular Diet - IDDSI Level 7, Thin liquids - IDDSI Level 0    Discharge Recommendations: No Therapy Indicated     Diagnostics ordered/pending: None    VTE prophylaxis: Mechanical prophylaxis: Place SCDs    BP parameters: SAH: Secured aneurysm, no target, increase BP  to prevent vasospasm if needed

## 2024-03-09 NOTE — SUBJECTIVE & OBJECTIVE
Interval History:   3/9: PBD 6. NAEON.  cc output, ICP < 23.        Medications:  Continuous Infusions:   dexmedeTOMIDine (Precedex) infusion (titrating) Stopped (03/06/24 1044)     Scheduled Meds:   aspirin  81 mg Oral Daily    ceFAZolin (Ancef) IV (PEDS and ADULTS)  2 g Intravenous Q8H    gabapentin  300 mg Oral Q8H    heparin (porcine)  5,000 Units Subcutaneous Q8H    niMODipine  30 mg Oral Q2H    polyethylene glycol  17 g Oral Daily    senna-docusate 8.6-50 mg  1 tablet Oral BID    ticagrelor  90 mg Oral BID     PRN Meds:acetaminophen, bisacodyL, hydrOXYzine HCL, labetalol, magnesium oxide, magnesium oxide, metoclopramide, midazolam, ondansetron, oxyCODONE, potassium bicarbonate, potassium bicarbonate, potassium bicarbonate, potassium, sodium phosphates, potassium, sodium phosphates, potassium, sodium phosphates     Review of Systems  Objective:     Weight: 106.6 kg (235 lb)  Body mass index is 35.73 kg/m².  Vital Signs (Most Recent):  Temp: 98.5 °F (36.9 °C) (03/09/24 0305)  Pulse: 78 (03/09/24 0605)  Resp: (!) 53 (03/09/24 0605)  BP: 101/60 (03/09/24 0605)  SpO2: 96 % (03/09/24 0605) Vital Signs (24h Range):  Temp:  [97.6 °F (36.4 °C)-98.5 °F (36.9 °C)] 98.5 °F (36.9 °C)  Pulse:  [] 78  Resp:  [15-64] 53  SpO2:  [88 %-99 %] 96 %  BP: ()/(50-79) 101/60                              ICP/Ventriculostomy 03/05/24 1143 Right Parietal region (Active)   Level of Ventriculostomy (cm above) 10 03/09/24 0701   Status Open to drainage 03/09/24 0701   Site Assessment Clean;Dry 03/09/24 0701   Site Drainage No drainage 03/09/24 0701   Waveform normal waveform 03/09/24 0701   Output (mL) 14 mL 03/09/24 0505   CSF Color red;clear 03/09/24 0701   Dressing Status Clean;Dry;Intact 03/09/24 0701   Interventions HOB degrees;bed controls locked 03/09/24 0701          Physical Exam         Neurosurgery Physical Exam  Aox3  FC x 4   No pronator drift  ENCARNACION AG  PERRL, EOMI  CN intact    Significant Labs:  Recent Labs  "  Lab 03/08/24  0425 03/09/24  0514    101    134*   K 4.0 4.1    105   CO2 20* 18*   BUN 6 8   CREATININE 0.9 0.8   CALCIUM 9.5 9.6   MG 1.7 1.7     Recent Labs   Lab 03/08/24  0425 03/09/24  0514   WBC 13.59* 11.31   HGB 14.7 16.0   HCT 42.9 47.1    248     No results for input(s): "LABPT", "INR", "APTT" in the last 48 hours.  Microbiology Results (last 7 days)       ** No results found for the last 168 hours. **          All pertinent labs from the last 24 hours have been reviewed.    Significant Diagnostics:  I have reviewed all pertinent imaging results/findings within the past 24 hours.  "

## 2024-03-09 NOTE — PLAN OF CARE
[facility] Care Plan    POC reviewed with Kalia Pacheco and family at 0300. Pt verbalized understanding. Questions and concerns addressed. No acute events overnight. Pt progressing toward goals. Will continue to monitor. See below and flowsheets for full assessment and VS info.     - EVD open at 10      Is this a stroke patient? no    Neuro:  Irving Coma Scale  Best Eye Response: 4-->(E4) spontaneous  Best Motor Response: 6-->(M6) obeys commands  Best Verbal Response: 5-->(V5) oriented  Irving Coma Scale Score: 15  Assessment Qualifiers: patient not sedated/intubated  Pupil PERRLA: yes     24hr Temp:  [97.5 °F (36.4 °C)-98.5 °F (36.9 °C)]     CV:   Rhythm: normal sinus rhythm  BP goals:   SBP < 160  MAP > 65    Resp:      Vent Mode: Spont  Set Rate: 16 BPM  Oxygen Concentration (%): 40  Vt Set: 500 mL  PEEP/CPAP: 5 cmH20  Pressure Support: 8 cmH20    Plan: N/A    GI/:     Diet/Nutrition Received: regular  Last Bowel Movement: 03/07/24       Intake/Output Summary (Last 24 hours) at 3/9/2024 0629  Last data filed at 3/9/2024 0505  Gross per 24 hour   Intake 1796.18 ml   Output 2774 ml   Net -977.82 ml     Unmeasured Output  Urine Occurrence: 1  Stool Occurrence: 0  Emesis Occurrence: 1    Labs/Accuchecks:  Recent Labs   Lab 03/09/24  0514   WBC 11.31   RBC 5.05   HGB 16.0   HCT 47.1         Recent Labs   Lab 03/09/24  0514   *   K 4.1   CO2 18*      BUN 8   CREATININE 0.8   ALKPHOS 54*   ALT 14   AST 19   BILITOT 1.3*      Recent Labs   Lab 03/05/24  1104   INR 1.1   APTT 24.2      Recent Labs   Lab 03/05/24  0831   TROPONINI 0.070       Electrolytes: N/A - electrolytes WDL  Accuchecks: none    Gtts:   dexmedeTOMIDine (Precedex) infusion (titrating) Stopped (03/06/24 1044)       LDA/Wounds:    Nurses Note -- 4 Eyes    Is there altered skin present? no     Prevention Measures Documented    Second RN/Staff Member:      STUART

## 2024-03-09 NOTE — PROGRESS NOTES
[facility] - Neuro [facility] Care  Neurocritical Care  Progress Note    Admit Date: 3/5/2024  Service Date: 03/09/2024  Length of Stay: 4    Subjective:     Chief Complaint: Nontraumatic subarachnoid hemorrhage, unspecified    History of Present Illness: The patient is a 32-year-old man with a past medical history of ADHD, EtOH use and abuse, arthritis, hypertension, coarctation of the aorta (status post repair), PDA, VSD status post repair x2.  History of Legg-Perthes disease, and intracranial hemorrhage in May 2022.  presented to [facility] this AM with headache and nausea that began 2 days ago and acutely worsened while he was at work this morning. CTH demonstrated HH1mF4 SAH. S/p EVD placement by Nsx. Was taken to IR for angiography, but the vessel was not sercured during the procedure, the patient was returned to the NSICU, intubated and sedated, [facility] neurosurgery pending.     Hospital Course: 03/06/2024 Overnight, mild bradycardia and soft BPs. Weaned off fentanyl, weaning precedex. Extubated to NC this morning.   03/07/2024 AF, soft BPs. TCD with no signs of vasospasm. Neuro exam stable. Persistent HA, waxing/waning in severity. EVD with 289mL output. MRA pending.   03/08/2024 AF, HDS on RA. TCD no signs of vasospasm. Neuro exam stable. HA. EVD open @10 w/ 250mL output. MRA today.  03/09/2024 AF. BP soft. RA. TTE with reduced EF (40-45%) and mild aortic regurg. TCD w/ no signs of vasospasm. EVD open @10 w/ 224mL output. Started brillinta.     Interval History:  See hospital course.     Review of Systems   Neurological:  Negative for weakness, numbness and headaches.       Objective:     Vitals:  Temp: 98.3 °F (36.8 °C)  Pulse: 85  Rhythm: normal sinus rhythm  BP: (!) 106/52  MAP (mmHg): 83  ICP Mean (mmHg): 5 mmHg  Resp: 18  SpO2: 98 %    Temp  Min: 98.2 °F (36.8 °C)  Max: 98.5 °F (36.9 °C)  Pulse  Min: 77  Max: 100  BP  Min: 89/53  Max: 134/70  MAP (mmHg)  Min: 61  Max: 94  ICP Mean (mmHg)  Min: -1 mmHg  Max:  11 mmHg  Resp  Min: 10  Max: 64  SpO2  Min: 88 %  Max: 98 %    03/08 0701 - 03/09 0700  In: 1796.2 [P.O.:700]  Out: 2774 [Urine:2550; Drains:224]   Unmeasured Output  Urine Occurrence: 1  Stool Occurrence: 0  Emesis Occurrence: 1        Physical Exam  Vitals and nursing note reviewed.   Constitutional:       Appearance: He is not ill-appearing.   HENT:      Head: Normocephalic.      Comments: EVD     Mouth/Throat:      Mouth: Mucous membranes are moist.      Pharynx: Oropharynx is clear.   Eyes:      Extraocular Movements: Extraocular movements intact.      Conjunctiva/sclera: Conjunctivae normal.      Pupils: Pupils are equal, round, and reactive to light.   Cardiovascular:      Rate and Rhythm: Normal rate and regular rhythm.   Pulmonary:      Effort: Pulmonary effort is normal. No respiratory distress.   Abdominal:      General: Abdomen is flat. There is no distension.      Palpations: Abdomen is soft.      Tenderness: There is no abdominal tenderness.   Musculoskeletal:      Right lower leg: No edema.      Left lower leg: No edema.   Skin:     General: Skin is warm and dry.   Neurological:      General: No focal deficit present.      Mental Status: He is alert and oriented to person, place, and time.      Cranial Nerves: No cranial nerve deficit or dysarthria.      Sensory: No sensory deficit.      Motor: No weakness or pronator drift.      Comments:   Follows commands  Moves all extremities spontaneously  Full strength throughout              Medications:  ContinuousdexmedeTOMIDine (Precedex) infusion (titrating), Last Rate: Stopped (03/06/24 1044)    Scheduledaspirin, 81 mg, Daily  ceFAZolin (Ancef) IV (PEDS and ADULTS), 2 g, Q8H  gabapentin, 300 mg, Q8H  heparin (porcine), 5,000 Units, Q8H  melatonin, 6 mg, Nightly  niMODipine, 30 mg, Q2H  polyethylene glycol, 17 g, Daily  senna-docusate 8.6-50 mg, 1 tablet, BID  ticagrelor, 90 mg, BID    PRNacetaminophen, 650 mg, Q6H PRN  bisacodyL, 10 mg, Daily  PRN  hydrOXYzine HCL, 25 mg, Nightly PRN  labetalol, 10 mg, Q15 Min PRN  magnesium oxide, 800 mg, PRN  magnesium oxide, 800 mg, PRN  metoclopramide, 10 mg, Q6H PRN  midazolam, 1 mg, Q15 Min PRN  ondansetron, 4 mg, Q6H PRN  oxyCODONE, 5 mg, Q6H PRN  potassium bicarbonate, 35 mEq, PRN  potassium bicarbonate, 50 mEq, PRN  potassium bicarbonate, 60 mEq, PRN  potassium, sodium phosphates, 2 packet, PRN  potassium, sodium phosphates, 2 packet, PRN  potassium, sodium phosphates, 2 packet, PRN      Today I personally reviewed pertinent medications, lines/drains/airways, imaging, cardiology results, laboratory results, microbiology results, notably:    Diet  Diet Adult Regular (IDDSI Level 7) Thin; Standard Tray  Diet Adult Regular (IDDSI Level 7) Thin; Standard Tray    Assessment/Plan:     Neuro  * Nontraumatic subarachnoid hemorrhage, unspecified  33-year-old male with a suspected A-comm aneurysm rupture complicated by intraventricular hemorrhage status post EVD placement and angiography with unsuccessful vessel was intubated and sedated, likely pending surgical intervention.    - Continued admission to [facility]  - MRA reviewed  - EVD open @ 10, nsgy following  - q1h neuro checks  - Nimodipine 30mg q2h  - Daily TCDs  - SBP < 140, MAP > 65  - Cardene and Norepi as needed for tight BP control, can restart home BP meds as needed  - TTE with reduced EF (40-45%), significantly dilated LA, and mild aortic regurgitation  - Strict I & O  - Euvolemia  - SQH for DVT ppx  - EVD per NSGY  - zofran PRN for vomiting  - DAPT, although PRU not therapeutic - brillinta in lieu of plavix per vascular neurology recs  - PT/OT following  - Na goal: EuNa  - Adding nightly melatonin    IVH (intraventricular hemorrhage)  See Principle problem      Other hydrocephalus  See Principle problem    Anterior communicating artery aneurysm  See Principle problem    Cardiac/Vascular  Hypertension  - Holding home Losartan 160 and Toprol 50  - Restart as  appropriate          The patient is being Prophylaxed for:  Venous Thromboembolism with: Chemical  Stress Ulcer with: None  Ventilator Pneumonia with: not applicable    Activity Orders            Diet Adult Regular (IDDSI Level 7) Thin; Standard Tray: Regular starting at 03/07 1158    Turn patient starting at 03/05 1200    Elevate HOB starting at 03/05 1119          Full Code    Aby Moran MD  Neurocritical Care  [facility] - Neuro Critical Care

## 2024-03-09 NOTE — SUBJECTIVE & OBJECTIVE
Neurologic Chief Complaint: SAH    Subjective:     Interval History: Patient is seen for follow-up neurological assessment and treatment recommendations:     No acute events overnight. Stable neuro exam (NIH 1 for chronic leg weakness). Remain on SAH pathway. EVD in place. MRA Brain with no vessel wall enhancement. DAPT switched to ASA and brilinta for stents; patient tolerating well.     HPI, Past Medical, Family, and Social History remains the same as documented in the initial encounter.     Review of Systems   Constitutional:  Negative for chills and fever.   HENT:  Negative for trouble swallowing.    Eyes:  Negative for photophobia and visual disturbance.   Respiratory:  Negative for chest tightness and shortness of breath.    Cardiovascular:  Negative for chest pain.   Gastrointestinal:  Negative for abdominal pain.   Genitourinary:  Negative for difficulty urinating.   Musculoskeletal:  Negative for neck pain.   Neurological:  Negative for dizziness, speech difficulty, light-headedness, numbness and headaches.     Scheduled Meds:   aspirin  81 mg Oral Daily    ceFAZolin (Ancef) IV (PEDS and ADULTS)  2 g Intravenous Q8H    gabapentin  300 mg Oral Q8H    heparin (porcine)  5,000 Units Subcutaneous Q8H    melatonin  6 mg Oral Nightly    niMODipine  30 mg Oral Q2H    polyethylene glycol  17 g Oral Daily    senna-docusate 8.6-50 mg  1 tablet Oral BID    ticagrelor  90 mg Oral BID     Continuous Infusions:   dexmedeTOMIDine (Precedex) infusion (titrating) Stopped (03/06/24 1044)     PRN Meds:acetaminophen, bisacodyL, hydrOXYzine HCL, labetalol, magnesium oxide, magnesium oxide, metoclopramide, midazolam, ondansetron, oxyCODONE, potassium bicarbonate, potassium bicarbonate, potassium bicarbonate, potassium, sodium phosphates, potassium, sodium phosphates, potassium, sodium phosphates    Objective:     Vital Signs (Most Recent):  Temp: 98.3 °F (36.8 °C) (03/09/24 1501)  Pulse: 85 (03/09/24 1501)  Resp: (!) 36  (03/09/24 1501)  BP: (!) 107/58 (03/09/24 1501)  SpO2: 98 % (03/09/24 1501)  BP Location: Left arm    Vital Signs Range (Last 24H):  Temp:  [98.2 °F (36.8 °C)-98.5 °F (36.9 °C)]   Pulse:  []   Resp:  [10-64]   BP: ()/(50-79)   SpO2:  [88 %-99 %]   BP Location: Left arm       Physical Exam  Constitutional:       Appearance: Normal appearance.   HENT:      Head: Normocephalic.      Comments: EVD in place     Nose: Nose normal. No congestion or rhinorrhea.      Mouth/Throat:      Mouth: Mucous membranes are moist.      Pharynx: Oropharynx is clear.   Eyes:      Extraocular Movements: Extraocular movements intact.      Pupils: Pupils are equal, round, and reactive to light.   Cardiovascular:      Rate and Rhythm: Normal rate and regular rhythm.      Pulses: Normal pulses.      Heart sounds: Normal heart sounds.   Pulmonary:      Effort: Pulmonary effort is normal. No respiratory distress.   Abdominal:      General: Abdomen is flat. Bowel sounds are normal.      Palpations: Abdomen is soft.   Musculoskeletal:      Cervical back: No rigidity or tenderness.      Right lower leg: No edema.      Left lower leg: No edema.   Skin:     General: Skin is warm and dry.      Capillary Refill: Capillary refill takes less than 2 seconds.      Findings: No rash.   Neurological:      General: No focal deficit present.      Mental Status: He is alert and oriented to person, place, and time.      Comments: Chronic LLE weakness   Psychiatric:         Mood and Affect: Mood normal.         Behavior: Behavior normal.              Neurological Exam:   LOC: alert  Attention Span: Good   Language: No aphasia  Orientation: Person, Place, Time   Visual Fields: Full  EOM (CN III, IV, VI): Full/intact  Pupils (CN II, III): PERRL  Facial Sensation (CN V): Normal  Facial Movement (CN VII): Symmetric facial expression    Motor: Arm left  Normal 5/5  Leg left  Normal 4/5  Arm right  Normal 5/5  Leg right Normal 5/5  Sensation: Intact to  light touch, temperature and vibration    Laboratory:  CMP:   Recent Labs   Lab 03/09/24  0514   CALCIUM 9.6   ALBUMIN 3.5   PROT 6.9   *   K 4.1   CO2 18*      BUN 8   CREATININE 0.8   ALKPHOS 54*   ALT 14   AST 19   BILITOT 1.3*       CBC:   Recent Labs   Lab 03/09/24  0514   WBC 11.31   RBC 5.05   HGB 16.0   HCT 47.1      MCV 93   MCH 31.7*   MCHC 34.0         Diagnostic Results     Brain Imaging   CTH 3/5/23: Subarachnoid hemorrhage with blood throughout the basilar cisterns.  Moderate intraventricular blood and mild ventricular prominence.     Vessel Imaging   MRA Brain - 03/08/2024  Impression:     Examination mildly degraded by patient motion artifact.     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  No abnormal vessel wall enhancement.     No residual flow related signal within the recently treated right PICA origin aneurysm.  Prominent enhancement at this site on vessel wall imaging.  This is nonspecific in the post treatment setting but aneurysm is presumed site of rupture given distribution of hemorrhage.     Retrograde filling of the left vertebral artery, patient with known subclavian steal.      CTA head/neck 3/5/23: 3 mm left anterior communicating artery aneurysm again identified.  No definite additional aneurysm identified concerning for source of hemorrhage.  Clinical correlation and correlation with conventional angiography recommended. 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 Interval increase distension lateral and 3rd ventricles compatible with developing hydrocephalus. Crowding cerebral sulci at the vertex with additional crowding basilar cisterns with questionable early cerebellar tonsillar herniation.    IR Angio 3/5/24: Anterior communicating artery aneurysm 2 x 1.7 mm with a wide neck. Small irregular aneurysm from the right V 4 vertebral segment just proximal to the posteroinferior cerebellar artery.  This is not well characterized on these views.  Repeat selective angiography is planned for better evaluation.    Cardiac Imaging   TTE 3/8/24:   Left Ventricle: The left ventricle is normal in size. Normal wall thickness. Global hypokinesis present. There is mildly reduced systolic function with a visually estimated ejection fraction of 40 - 45%. There is indeterminate diastolic function.    Right Ventricle: Normal right ventricular cavity size. Wall thickness is normal. Right ventricle wall motion  is normal. Systolic function is normal.    Left Atrium: Left atrium is severely dilated.    Aortic Valve: The valve morphology is clearly abnormal though the number of leaflets is not evident.  There is severe aortic valve stenosis. Aortic valve area by VTI is 0.8 cm². Aortic valve peak velocity is 4.5 m/s. Mean gradient is 55 mmHg. The dimensionless index is 0.17. There is mild aortic regurgitation.    IVC/SVC: Normal venous pressure at 3 mmHg.

## 2024-03-09 NOTE — PROGRESS NOTES
[facility] - Neuro Critical Care  Vascular Neurology  Comprehensive Stroke Center  Progress Note    Assessment/Plan:     * Nontraumatic subarachnoid hemorrhage, unspecified  33 year old gentleman with history of CHESTER aneurysm transferred to [facility] for Neurosurgical evaluation after OSH-ED evaluation and workup consistent with SAH. NIHSS = 1. Hunt & Hill Grade I. Neurologic exam non-focal; notable for somnolence, discomfort, and decreased attention (requires repeated stimulation). 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     No acute events overnight. Stable neuro exam (NIH 1 for chronic leg weakness). Remain on SAH pathway; TCD pending for today. EVD in place. MRA Brain with no vessel wall enhancement. DAPT switched to ASA and brilinta for stents; patient tolerating well.     Antithrombotics for secondary stroke prevention: Antiplatelets: Aspirin: 81 mg daily and brillinta for therapeutic PRU    Statins for secondary stroke prevention and hyperlipidemia, if present:   Statins: Atorvastatin- 40 mg daily    Aggressive risk factor modification: HTN     Rehab efforts: The patient has been evaluated by a stroke team provider and the therapy needs have been fully considered based off the presenting complaints and exam findings. The following therapy evaluations are needed: PT evaluate and treat, OT evaluate and treat, SLP evaluate and treat, PM&R evaluate for appropriate placement  Diet recommendations:  Regular Diet - IDDSI Level 7, Thin liquids - IDDSI Level 0    Discharge Recommendations: No  "Therapy Indicated     Diagnostics ordered/pending: None    VTE prophylaxis: Mechanical prophylaxis: Place SCDs    BP parameters: SAH: Secured aneurysm, no target, increase BP to prevent vasospasm if needed         IVH (intraventricular hemorrhage)  See "Nontraumatic subarachnoid hemorrhage, unspecified"       Other hydrocephalus  EVD placed by NSGY on 3/5.  Maintained by NCC.    Anterior communicating artery aneurysm  Past history of.  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  Stable.    Hypertension  Stroke Risk Factor  SBP <140  PRN labetolol           03/06/2024: Patient s/p EVD placement by NSGY secondary to hydrocephalus, s/p DSA with identification of  right vertebral artery aneurysm and confirmed stable left anterior communicating artery aneurysm, and s/p stent and coil of vertebral artery aneurysm. Patient extubated this morning, pending SLP eval. Patient complains of headache, but was improved with 5ml drained from EVD per nursing staff. Neuro exam stable. Pending ECHO.   03/07/24: Patient stable on SAH pathway with daily TCDs and nimodipine. Exam non-focal with EVD @10 with 250ml out in last 24hrs. Awaiting MRA at this time. On DAPT with subtherapeutic PRU with possible pending switch to brillinta ameliorate.  03/09/2024: No acute events overnight. Stable neuro exam (NIH 1 for chronic leg weakness). Remain on SAH pathway. EVD in place. MRA Brain with no vessel wall enhancement. DAPT switched to ASA and brilinta for stents; patient tolerating well.     STROKE DOCUMENTATION   Acute Stroke Times   Last Known Normal Date: 03/05/24  Unknown Normal Time: Unknown Time  Symptom Onset Date: 03/05/24  Unknown Symptom Onset Time: Unknown Time  Thrombolytic Therapy Recommended: No  Thrombectomy Recommended: No    NIH Scale:  1a. Level of Consciousness: 0-->Alert, keenly responsive  1b. LOC Questions: 0-->Answers both questions correctly  1c. LOC Commands: 0-->Performs both tasks " correctly  2. Best Gaze: 0-->Normal  3. Visual: 0-->No visual loss  4. Facial Palsy: 0-->Normal symmetrical movements  5a. Motor Arm, Left: 0-->No drift, limb holds 90 (or 45) degrees for full 10 secs  5b. Motor Arm, Right: 0-->No drift, limb holds 90 (or 45) degrees for full 10 secs  6a. Motor Leg, Left: 1-->Drift, leg falls by the end of the 5-sec period but does not hit bed (Chronic left leg weakness per patient)  6b. Motor Leg, Right: 0-->No drift, leg holds 30 degree position for full 5 secs  7. Limb Ataxia: 0-->Absent  8. Sensory: 0-->Normal, no sensory loss  9. Best Language: 0-->No aphasia, normal  10. Dysarthria: 0-->Normal  11. Extinction and Inattention (formerly Neglect): 0-->No abnormality  Total (NIH Stroke Scale): 1       Modified Clarion Score: 0  Irving Coma Scale:15   ABCD2 Score:    [id number] Score:   HAS -BLED Score:   ICH Score:   Hunt & Hill Classification:Grade I     Hemorrhagic change of an Ischemic Stroke: Does this patient have an ischemic stroke with hemorrhagic changes? No     Neurologic Chief Complaint: SAH    Subjective:     Interval History: Patient is seen for follow-up neurological assessment and treatment recommendations:     No acute events overnight. Stable neuro exam (NIH 1 for chronic leg weakness). Remain on SAH pathway. EVD in place. MRA Brain with no vessel wall enhancement. DAPT switched to ASA and brilinta for stents; patient tolerating well.     HPI, Past Medical, Family, and Social History remains the same as documented in the initial encounter.     Review of Systems   Constitutional:  Negative for chills and fever.   HENT:  Negative for trouble swallowing.    Eyes:  Negative for photophobia and visual disturbance.   Respiratory:  Negative for chest tightness and shortness of breath.    Cardiovascular:  Negative for chest pain.   Gastrointestinal:  Negative for abdominal pain.   Genitourinary:  Negative for difficulty urinating.   Musculoskeletal:  Negative for neck  pain.   Neurological:  Negative for dizziness, speech difficulty, light-headedness, numbness and headaches.     Scheduled Meds:   aspirin  81 mg Oral Daily    ceFAZolin (Ancef) IV (PEDS and ADULTS)  2 g Intravenous Q8H    gabapentin  300 mg Oral Q8H    heparin (porcine)  5,000 Units Subcutaneous Q8H    melatonin  6 mg Oral Nightly    niMODipine  30 mg Oral Q2H    polyethylene glycol  17 g Oral Daily    senna-docusate 8.6-50 mg  1 tablet Oral BID    ticagrelor  90 mg Oral BID     Continuous Infusions:   dexmedeTOMIDine (Precedex) infusion (titrating) Stopped (03/06/24 1044)     PRN Meds:acetaminophen, bisacodyL, hydrOXYzine HCL, labetalol, magnesium oxide, magnesium oxide, metoclopramide, midazolam, ondansetron, oxyCODONE, potassium bicarbonate, potassium bicarbonate, potassium bicarbonate, potassium, sodium phosphates, potassium, sodium phosphates, potassium, sodium phosphates    Objective:     Vital Signs (Most Recent):  Temp: 98.3 °F (36.8 °C) (03/09/24 1501)  Pulse: 85 (03/09/24 1501)  Resp: (!) 36 (03/09/24 1501)  BP: (!) 107/58 (03/09/24 1501)  SpO2: 98 % (03/09/24 1501)  BP Location: Left arm    Vital Signs Range (Last 24H):  Temp:  [98.2 °F (36.8 °C)-98.5 °F (36.9 °C)]   Pulse:  []   Resp:  [10-64]   BP: ()/(50-79)   SpO2:  [88 %-99 %]   BP Location: Left arm       Physical Exam  Constitutional:       Appearance: Normal appearance.   HENT:      Head: Normocephalic.      Comments: EVD in place     Nose: Nose normal. No congestion or rhinorrhea.      Mouth/Throat:      Mouth: Mucous membranes are moist.      Pharynx: Oropharynx is clear.   Eyes:      Extraocular Movements: Extraocular movements intact.      Pupils: Pupils are equal, round, and reactive to light.   Cardiovascular:      Rate and Rhythm: Normal rate and regular rhythm.      Pulses: Normal pulses.      Heart sounds: Normal heart sounds.   Pulmonary:      Effort: Pulmonary effort is normal. No respiratory distress.   Abdominal:       General: Abdomen is flat. Bowel sounds are normal.      Palpations: Abdomen is soft.   Musculoskeletal:      Cervical back: No rigidity or tenderness.      Right lower leg: No edema.      Left lower leg: No edema.   Skin:     General: Skin is warm and dry.      Capillary Refill: Capillary refill takes less than 2 seconds.      Findings: No rash.   Neurological:      General: No focal deficit present.      Mental Status: He is alert and oriented to person, place, and time.      Comments: Chronic LLE weakness   Psychiatric:         Mood and Affect: Mood normal.         Behavior: Behavior normal.              Neurological Exam:   LOC: alert  Attention Span: Good   Language: No aphasia  Orientation: Person, Place, Time   Visual Fields: Full  EOM (CN III, IV, VI): Full/intact  Pupils (CN II, III): PERRL  Facial Sensation (CN V): Normal  Facial Movement (CN VII): Symmetric facial expression    Motor: Arm left  Normal 5/5  Leg left  Normal 4/5  Arm right  Normal 5/5  Leg right Normal 5/5  Sensation: Intact to light touch, temperature and vibration    Laboratory:  CMP:   Recent Labs   Lab 03/09/24  0514   CALCIUM 9.6   ALBUMIN 3.5   PROT 6.9   *   K 4.1   CO2 18*      BUN 8   CREATININE 0.8   ALKPHOS 54*   ALT 14   AST 19   BILITOT 1.3*       CBC:   Recent Labs   Lab 03/09/24  0514   WBC 11.31   RBC 5.05   HGB 16.0   HCT 47.1      MCV 93   MCH 31.7*   MCHC 34.0         Diagnostic Results     Brain Imaging   [facility] 3/5/23: Subarachnoid hemorrhage with blood throughout the basilar cisterns.  Moderate intraventricular blood and mild ventricular prominence.     Vessel Imaging   MRA Brain - 03/08/2024  Impression:     Examination mildly degraded by patient motion artifact.     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  No abnormal vessel wall enhancement.     No residual flow related signal within the recently treated right PICA origin aneurysm.  Prominent enhancement at this site on vessel  wall imaging.  This is nonspecific in the post treatment setting but aneurysm is presumed site of rupture given distribution of hemorrhage.     Retrograde filling of the left vertebral artery, patient with known subclavian steal.      CTA head/neck 3/5/23: 3 mm left anterior communicating artery aneurysm again identified.  No definite additional aneurysm identified concerning for source of hemorrhage.  Clinical correlation and correlation with conventional angiography recommended. 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 Interval increase distension lateral and 3rd ventricles compatible with developing hydrocephalus. Crowding cerebral sulci at the vertex with additional crowding basilar cisterns with questionable early cerebellar tonsillar herniation.    IR Angio 3/5/24: Anterior communicating artery aneurysm 2 x 1.7 mm with a wide neck. Small irregular aneurysm from the right V 4 vertebral segment just proximal to the posteroinferior cerebellar artery.  This is not well characterized on these views.  Repeat selective angiography is planned for better evaluation.    Cardiac Imaging   TTE 3/8/24:   Left Ventricle: The left ventricle is normal in size. Normal wall thickness. Global hypokinesis present. There is mildly reduced systolic function with a visually estimated ejection fraction of 40 - 45%. There is indeterminate diastolic function.    Right Ventricle: Normal right ventricular cavity size.  Wall thickness is normal. Right ventricle wall motion  is normal. Systolic function is normal.    Left Atrium: Left atrium is severely dilated.    Aortic Valve: The valve morphology is clearly abnormal though the number of leaflets is not evident.  There is severe aortic valve stenosis. Aortic valve area by VTI is 0.8 cm². Aortic valve peak velocity is 4.5 m/s. Mean gradient is 55 mmHg. The dimensionless index is 0.17. There is mild aortic regurgitation.    IVC/SVC: Normal venous pressure at 3 mmHg.    Lindy Mabry DNP  [facility]  Department of Vascular Neurology   [facility] - [facility]

## 2024-03-09 NOTE — PROGRESS NOTES
[facility] - Neuro Critical Care  Neurosurgery  Progress Note    Subjective:     History of Present Illness: Kalia Pacheco is a 32yo man with history of known CHESTER aneurysm, not on any AP/AC who presented to [facility] this AM with headache and nausea that began 2 days ago and acutely worsened while he was at work this morning. He also reports blurry vision and has had multiple episodes of emesis. Denies any inciting factors. CTH reveals diffuse subarachnoid hemorrhage throughout the basal cisterns, HH score 1. [place] [facility] [place] and Neurosurgical management.     Post-Op Info:  * No surgery found *       Interval History:   3/9: PBD 6. NAEON.  cc output, ICP < 23.        Medications:  Continuous Infusions:   dexmedeTOMIDine (Precedex) infusion (titrating) Stopped (03/06/24 1044)     Scheduled Meds:   aspirin  81 mg Oral Daily    ceFAZolin (Ancef) IV (PEDS and ADULTS)  2 g Intravenous Q8H    gabapentin  300 mg Oral Q8H    heparin (porcine)  5,000 Units Subcutaneous Q8H    niMODipine  30 mg Oral Q2H    polyethylene glycol  17 g Oral Daily    senna-docusate 8.6-50 mg  1 tablet Oral BID    ticagrelor  90 mg Oral BID     PRN Meds:acetaminophen, bisacodyL, hydrOXYzine HCL, labetalol, magnesium oxide, magnesium oxide, metoclopramide, midazolam, ondansetron, oxyCODONE, potassium bicarbonate, potassium bicarbonate, potassium bicarbonate, potassium, sodium phosphates, potassium, sodium phosphates, potassium, sodium phosphates     Review of Systems  Objective:     Weight: 106.6 kg (235 lb)  Body mass index is 35.73 kg/m².  Vital Signs (Most Recent):  Temp: 98.5 °F (36.9 °C) (03/09/24 0305)  Pulse: 78 (03/09/24 0605)  Resp: (!) 53 (03/09/24 0605)  BP: 101/60 (03/09/24 0605)  SpO2: 96 % (03/09/24 0605) Vital Signs (24h Range):  Temp:  [97.6 °F (36.4 °C)-98.5 °F (36.9 °C)] 98.5 °F (36.9 °C)  Pulse:  [] 78  Resp:  [15-64] 53  SpO2:  [88 %-99 %] 96 %  BP: ()/(50-79) 101/60  "                             ICP/Ventriculostomy 03/05/24 1143 Right Parietal region (Active)   Level of Ventriculostomy (cm above) 10 03/09/24 0701   Status Open to drainage 03/09/24 0701   Site Assessment Clean;Dry 03/09/24 0701   Site Drainage No drainage 03/09/24 0701   Waveform normal waveform 03/09/24 0701   Output (mL) 14 mL 03/09/24 0505   CSF Color red;clear 03/09/24 0701   Dressing Status Clean;Dry;Intact 03/09/24 0701   Interventions HOB degrees;bed controls locked 03/09/24 0701          Physical Exam         Neurosurgery Physical Exam  Aox3  FC x 4   No pronator drift  ENCARNACION AG  PERRL, EOMI  CN intact    Significant Labs:  Recent Labs   Lab 03/08/24 0425 03/09/24 0514    101    134*   K 4.0 4.1    105   CO2 20* 18*   BUN 6 8   CREATININE 0.9 0.8   CALCIUM 9.5 9.6   MG 1.7 1.7     Recent Labs   Lab 03/08/24 0425 03/09/24  0514   WBC 13.59* 11.31   HGB 14.7 16.0   HCT 42.9 47.1    248     No results for input(s): "LABPT", "INR", "APTT" in the last 48 hours.  Microbiology Results (last 7 days)       ** No results found for the last 168 hours. **          All pertinent labs from the last 24 hours have been reviewed.    Significant Diagnostics:  I have reviewed all pertinent imaging results/findings within the past 24 hours.  Assessment/Plan:     * Nontraumatic subarachnoid hemorrhage, unspecified  Kalia Pacheco is a 34yo man with history of known CHESTER aneurysm who presented to [place] with headache and nausea. CTH demonstrated diffuse SAH throughout the basal cisterns HH and he was transferred to [facility] for close monitoring in the Neuro ICU and Neurosurgical consultation.     R frontal EVD was placed on arrival on 3/6 and patient was taken to IR for angiogram, with no intervention upon Acomm aneurysm. Upon further review of DSA, a vertebral artery aneurysm was noted and patient was brought back to IR for a right V4/PICA irregular aneurysm stent assisted coiling.     Imaging " reviewed:  CTH 3/5: Diffuse basal cistern SAH with IVH   CTA 3/5: 3mm ant projecting L Acomm, no R A1, R pcomm feeding posterior circulation   DSA 3/5: EVD in position, anteriosuperiorly projecting small L Acomm, R V4 irregular aneurysm not well visualized    DSA +embo 3/5: stent assisted coiling of R V4/PICA irregularly shaped anuerysm, 1/4 lobules still filling     - Admit to NCC with q1 neuro checks  - R EVD at 10, please record ICP and output hourly, call NSGY if ICP sustained >25  - Keppra 500 BID x7d   - TCDs daily x14d   - Nimotop 60q4 x 21d   - strict I/Os, goal euvolemia to net positive for SAH patients in general, in this patient may need to be more conservative due to cardiac hx, management per NCC   - WTE per NCC   - continue ASA/brilnta per neuro IR  - based on bleed pattern believe vertebral aneurysm to be the ruptured aneurysm, no plan for intervention upon Acomm at this point  -MRI with contrast not suggestive of vessel wall enhancement to Acomm  - Continue to follow clinically and notify NSGY immediately if any neurostatus change     Case and plan discussed with attending Neurosurgeon Dr. Saroj Max MD  Neurosurgery  [facility] - Neuro Critical Care

## 2024-03-10 PROBLEM — I60.8 SUBARACHNOID HEMORRHAGE DUE TO RUPTURED ANEURYSM: Status: ACTIVE | Noted: 2024-03-05

## 2024-03-10 PROBLEM — G91.0 COMMUNICATING HYDROCEPHALUS: Status: ACTIVE | Noted: 2024-03-05

## 2024-03-10 LAB
ALBUMIN SERPL BCP-MCNC: 3.4 G/DL (ref 3.5–5.2)
ALP SERPL-CCNC: 47 U/L (ref 55–135)
ALT SERPL W/O P-5'-P-CCNC: 10 U/L (ref 10–44)
ANION GAP SERPL CALC-SCNC: 10 MMOL/L (ref 8–16)
AST SERPL-CCNC: 14 U/L (ref 10–40)
BASOPHILS # BLD AUTO: 0.04 K/UL (ref 0–0.2)
BASOPHILS NFR BLD: 0.4 % (ref 0–1.9)
BILIRUB SERPL-MCNC: 0.9 MG/DL (ref 0.1–1)
BUN SERPL-MCNC: 10 MG/DL (ref 6–20)
CALCIUM SERPL-MCNC: 9.6 MG/DL (ref 8.7–10.5)
CHLORIDE SERPL-SCNC: 103 MMOL/L (ref 95–110)
CO2 SERPL-SCNC: 21 MMOL/L (ref 23–29)
CREAT SERPL-MCNC: 0.8 MG/DL (ref 0.5–1.4)
DIFFERENTIAL METHOD BLD: ABNORMAL
EOSINOPHIL # BLD AUTO: 0.1 K/UL (ref 0–0.5)
EOSINOPHIL NFR BLD: 0.5 % (ref 0–8)
ERYTHROCYTE [DISTWIDTH] IN BLOOD BY AUTOMATED COUNT: 13 % (ref 11.5–14.5)
EST. GFR  (NO RACE VARIABLE): >60 ML/MIN/1.73 M^2
GLUCOSE SERPL-MCNC: 102 MG/DL (ref 70–110)
HCT VFR BLD AUTO: 48 % (ref 40–54)
HGB BLD-MCNC: 16.4 G/DL (ref 14–18)
IMM GRANULOCYTES # BLD AUTO: 0.02 K/UL (ref 0–0.04)
IMM GRANULOCYTES NFR BLD AUTO: 0.2 % (ref 0–0.5)
LYMPHOCYTES # BLD AUTO: 1.8 K/UL (ref 1–4.8)
LYMPHOCYTES NFR BLD: 19.5 % (ref 18–48)
MAGNESIUM SERPL-MCNC: 1.8 MG/DL (ref 1.6–2.6)
MCH RBC QN AUTO: 31.4 PG (ref 27–31)
MCHC RBC AUTO-ENTMCNC: 34.2 G/DL (ref 32–36)
MCV RBC AUTO: 92 FL (ref 82–98)
MONOCYTES # BLD AUTO: 0.9 K/UL (ref 0.3–1)
MONOCYTES NFR BLD: 9.3 % (ref 4–15)
NEUTROPHILS # BLD AUTO: 6.6 K/UL (ref 1.8–7.7)
NEUTROPHILS NFR BLD: 70.1 % (ref 38–73)
NRBC BLD-RTO: 0 /100 WBC
PHOSPHATE SERPL-MCNC: 4.9 MG/DL (ref 2.7–4.5)
PLATELET # BLD AUTO: 251 K/UL (ref 150–450)
PMV BLD AUTO: 9.6 FL (ref 9.2–12.9)
POTASSIUM SERPL-SCNC: 4.2 MMOL/L (ref 3.5–5.1)
PROT SERPL-MCNC: 6.8 G/DL (ref 6–8.4)
RBC # BLD AUTO: 5.22 M/UL (ref 4.6–6.2)
SODIUM SERPL-SCNC: 134 MMOL/L (ref 136–145)
WBC # BLD AUTO: 9.35 K/UL (ref 3.9–12.7)

## 2024-03-10 PROCEDURE — 63600175 PHARM REV CODE 636 W HCPCS

## 2024-03-10 PROCEDURE — 83735 ASSAY OF MAGNESIUM: CPT

## 2024-03-10 PROCEDURE — 63600175 PHARM REV CODE 636 W HCPCS: Performed by: STUDENT IN AN ORGANIZED HEALTH CARE EDUCATION/TRAINING PROGRAM

## 2024-03-10 PROCEDURE — 99291 CRITICAL CARE FIRST HOUR: CPT | Mod: ,,, | Performed by: PSYCHIATRY & NEUROLOGY

## 2024-03-10 PROCEDURE — 25000003 PHARM REV CODE 250: Performed by: PHYSICIAN ASSISTANT

## 2024-03-10 PROCEDURE — 25000003 PHARM REV CODE 250

## 2024-03-10 PROCEDURE — 97530 THERAPEUTIC ACTIVITIES: CPT

## 2024-03-10 PROCEDURE — 85025 COMPLETE CBC W/AUTO DIFF WBC: CPT

## 2024-03-10 PROCEDURE — 99233 SBSQ HOSP IP/OBS HIGH 50: CPT | Mod: ,,, | Performed by: PSYCHIATRY & NEUROLOGY

## 2024-03-10 PROCEDURE — 84100 ASSAY OF PHOSPHORUS: CPT

## 2024-03-10 PROCEDURE — 99233 SBSQ HOSP IP/OBS HIGH 50: CPT | Mod: ,,, | Performed by: NEUROLOGICAL SURGERY

## 2024-03-10 PROCEDURE — 94761 N-INVAS EAR/PLS OXIMETRY MLT: CPT

## 2024-03-10 PROCEDURE — 25000003 PHARM REV CODE 250: Performed by: PSYCHIATRY & NEUROLOGY

## 2024-03-10 PROCEDURE — 80053 COMPREHEN METABOLIC PANEL: CPT

## 2024-03-10 PROCEDURE — 97161 PT EVAL LOW COMPLEX 20 MIN: CPT

## 2024-03-10 PROCEDURE — 25000003 PHARM REV CODE 250: Performed by: STUDENT IN AN ORGANIZED HEALTH CARE EDUCATION/TRAINING PROGRAM

## 2024-03-10 PROCEDURE — 20000000 HC ICU ROOM

## 2024-03-10 RX ORDER — METHOCARBAMOL 500 MG/1
500 TABLET, FILM COATED ORAL 4 TIMES DAILY PRN
Status: DISCONTINUED | OUTPATIENT
Start: 2024-03-10 | End: 2024-03-20 | Stop reason: HOSPADM

## 2024-03-10 RX ORDER — EPINEPHRINE 1 MG/ML
INJECTION, SOLUTION, CONCENTRATE INTRAVENOUS
Status: DISPENSED
Start: 2024-03-10 | End: 2024-03-11

## 2024-03-10 RX ADMIN — METHOCARBAMOL 500 MG: 500 TABLET ORAL at 12:03

## 2024-03-10 RX ADMIN — CEFAZOLIN 2 G: 2 INJECTION, POWDER, FOR SOLUTION INTRAMUSCULAR; INTRAVENOUS at 08:03

## 2024-03-10 RX ADMIN — ASPIRIN 81 MG CHEWABLE TABLET 81 MG: 81 TABLET CHEWABLE at 08:03

## 2024-03-10 RX ADMIN — NIMODIPINE 30 MG: 30 CAPSULE, LIQUID FILLED ORAL at 12:03

## 2024-03-10 RX ADMIN — GABAPENTIN 300 MG: 300 CAPSULE ORAL at 10:03

## 2024-03-10 RX ADMIN — HEPARIN SODIUM 5000 UNITS: 5000 INJECTION INTRAVENOUS; SUBCUTANEOUS at 02:03

## 2024-03-10 RX ADMIN — ACETAMINOPHEN 650 MG: 325 TABLET ORAL at 08:03

## 2024-03-10 RX ADMIN — HYDROXYZINE HYDROCHLORIDE 25 MG: 25 TABLET, FILM COATED ORAL at 08:03

## 2024-03-10 RX ADMIN — HEPARIN SODIUM 5000 UNITS: 5000 INJECTION INTRAVENOUS; SUBCUTANEOUS at 06:03

## 2024-03-10 RX ADMIN — NIMODIPINE 30 MG: 30 CAPSULE, LIQUID FILLED ORAL at 04:03

## 2024-03-10 RX ADMIN — NIMODIPINE 30 MG: 30 CAPSULE, LIQUID FILLED ORAL at 10:03

## 2024-03-10 RX ADMIN — NIMODIPINE 30 MG: 30 CAPSULE, LIQUID FILLED ORAL at 08:03

## 2024-03-10 RX ADMIN — NIMODIPINE 30 MG: 30 CAPSULE, LIQUID FILLED ORAL at 06:03

## 2024-03-10 RX ADMIN — GABAPENTIN 300 MG: 300 CAPSULE ORAL at 02:03

## 2024-03-10 RX ADMIN — Medication 6 MG: at 08:03

## 2024-03-10 RX ADMIN — OXYCODONE 5 MG: 5 TABLET ORAL at 10:03

## 2024-03-10 RX ADMIN — TICAGRELOR 90 MG: 90 TABLET ORAL at 08:03

## 2024-03-10 RX ADMIN — NIMODIPINE 30 MG: 30 CAPSULE, LIQUID FILLED ORAL at 02:03

## 2024-03-10 RX ADMIN — DOCUSATE SODIUM AND SENNOSIDES 1 TABLET: 8.6; 5 TABLET, FILM COATED ORAL at 08:03

## 2024-03-10 RX ADMIN — CEFAZOLIN 2 G: 2 INJECTION, POWDER, FOR SOLUTION INTRAMUSCULAR; INTRAVENOUS at 04:03

## 2024-03-10 RX ADMIN — CEFAZOLIN 2 G: 2 INJECTION, POWDER, FOR SOLUTION INTRAMUSCULAR; INTRAVENOUS at 02:03

## 2024-03-10 RX ADMIN — GABAPENTIN 300 MG: 300 CAPSULE ORAL at 06:03

## 2024-03-10 RX ADMIN — OXYCODONE 5 MG: 5 TABLET ORAL at 02:03

## 2024-03-10 RX ADMIN — ONDANSETRON 4 MG: 2 INJECTION INTRAMUSCULAR; INTRAVENOUS at 09:03

## 2024-03-10 RX ADMIN — HEPARIN SODIUM 5000 UNITS: 5000 INJECTION INTRAVENOUS; SUBCUTANEOUS at 10:03

## 2024-03-10 NOTE — PROGRESS NOTES
[facility] - Neuro Critical Care  Neurocritical Care  Progress Note    Admit Date: 3/5/2024  Service Date: 03/10/2024  Length of Stay: 5    Subjective:     Chief Complaint: Subarachnoid hemorrhage due to ruptured aneurysm    History of Present Illness: The patient is a 32-year-old man with a past medical history of ADHD, EtOH use and abuse, arthritis, hypertension, coarctation of the aorta (status post repair), PDA, VSD status post repair x2.  History of Legg-Perthes disease, and intracranial hemorrhage in May 2022.  presented to [facility] this AM with headache and nausea that began 2 days ago and acutely worsened while he was at work this morning. CTH demonstrated HH1mF4 SAH. S/p EVD placement by Nsx. Was taken to IR for angiography, but the vessel was not sercured during the procedure, the patient was returned to the NSICU, intubated and sedated, [facility] neurosurgery pending.     Hospital Course: 03/06/2024 Overnight, mild bradycardia and soft BPs. Weaned off fentanyl, weaning precedex. Extubated to NC this morning.   03/07/2024 AF, soft BPs. TCD with no signs of vasospasm. Neuro exam stable. Persistent HA, waxing/waning in severity. EVD with 289mL output. MRA pending.   03/08/2024 AF, HDS on RA. TCD no signs of vasospasm. Neuro exam stable. HA. EVD open @10 w/ 250mL output. MRA today.  03/09/2024 AF. BP soft. RA. TTE with reduced EF (40-45%) and mild aortic regurg. TCD w/ no signs of vasospasm. EVD open @10 w/ 224mL output. Started brillinta.   3/10: KUB, robaxin, encourage PO    Review of Symptoms:   Constitutional: Denies fevers or chills.  ENT: no hearing difficulty, no visual changes  Pulmonary: Denies shortness of breath or cough.  Cardiology: Denies chest pain or palpitations.  GI: Denies abdominal pain or constipation.  Neurologic: Denies new weakness, headaches, or paresthesias.   : no dysuria  Musk: no muscle pain, no joint pain  Psych: no hallucinations    Physical Exam:  GA: Alert, comfortable, no acute  "distress.   HEENT: No scleral icterus or JVD.   Pulmonary: Clear to auscultation A/L. No wheezing, crackles, or rhonchi.  Cardiac: RRR S1 & S2 w/o rubs/murmurs/gallops.   Abdominal: Bowel sounds present x 4. No appreciable hepatosplenomegaly.  Skin: No jaundice, rashes, or visible lesions. EVD site clean and dry  Pulses: 2+ DP bilat    Neuro:  --sedation: none  --GCS: E4V5M6  --Mental Status: Aox3, pleasant and conversant   --CN II-XII grossly intact.   --Pupils brisk bilat  --brainstem: intact  --Motor: 5/5 throughout no drift  --sensory: intact to soft touch and pain throughout  --Reflexes: not tested  --Gait: deferred    Recent Labs   Lab 03/10/24  0449   WBC 9.35   RBC 5.22   HGB 16.4   HCT 48.0      MCV 92   MCH 31.4*   MCHC 34.2     Recent Labs   Lab 03/10/24  0449   CALCIUM 9.6   ALBUMIN 3.4*   PROT 6.8   *   K 4.2   CO2 21*      BUN 10   CREATININE 0.8   ALKPHOS 47*   ALT 10   AST 14   BILITOT 0.9     No results for input(s): "PT", "INR", "APTT" in the last 24 hours.           Temp: 97.5 °F (36.4 °C)  Pulse: 85  Rhythm: normal sinus rhythm  BP: 113/70  MAP (mmHg): 88  ICP Mean (mmHg): 14 mmHg  Resp: (!) 24  SpO2: 98 %    Temp  Min: 97.5 °F (36.4 °C)  Max: 98.6 °F (37 °C)  Pulse  Min: 68  Max: 93  BP  Min: 83/53  Max: 116/58  MAP (mmHg)  Min: 61  Max: 88  ICP Mean (mmHg)  Min: 0 mmHg  Max: 18 mmHg  Resp  Min: 15  Max: 40  SpO2  Min: 94 %  Max: 99 %    03/09 0701 - 03/10 0700  In: 797.4 [P.O.:650]  Out: 2244 [Urine:2000; Drains:244]   Unmeasured Output  Urine Occurrence: 1  Stool Occurrence: 0  Emesis Occurrence: 1    Nutrition Prescription Ordered  Current Diet Order: NPO  Last Bowel Movement: 03/07/24    Body mass index is 35.73 kg/m².      I have personally reviewed all labs, imaging, and studies today    Scheduled Meds:   aspirin  81 mg Oral Daily    ceFAZolin (Ancef) IV (PEDS and ADULTS)  2 g Intravenous Q8H    gabapentin  300 mg Oral Q8H    heparin (porcine)  5,000 Units Subcutaneous " Q8H    melatonin  6 mg Oral Nightly    niMODipine  30 mg Oral Q2H    polyethylene glycol  17 g Oral Daily    senna-docusate 8.6-50 mg  1 tablet Oral BID    ticagrelor  90 mg Oral BID     Continuous Infusions:  PRN Meds:.acetaminophen, bisacodyL, hydrOXYzine HCL, labetalol, magnesium oxide, magnesium oxide, methocarbamoL, metoclopramide, midazolam, ondansetron, oxyCODONE, potassium bicarbonate, potassium bicarbonate, potassium bicarbonate, potassium, sodium phosphates, potassium, sodium phosphates, potassium, sodium phosphates      Assessment/Plan:     Neuro  * Subarachnoid hemorrhage due to ruptured aneurysm  33-year-old male with a suspected A-comm aneurysm rupture complicated by intraventricular hemorrhage status post EVD placement and angiography with unsuccessful vessel was intubated and sedated, likely pending surgical intervention.    - Continued admission to [facility]  - MRA reviewed  - EVD open @ 10, nsgy following  - q1h neuro checks  - Nimodipine 30mg q2h  - Daily TCDs  - SBP < 140, MAP > 65  - Cardene and Norepi as needed for tight BP control, can restart home BP meds as needed  - TTE with reduced EF (40-45%), significantly dilated LA, and mild aortic regurgitation  - Strict I & O  - Euvolemia  - SQH for DVT ppx  - EVD per NSGY  - zofran PRN for vomiting  - DAPT, although PRU not therapeutic - brillinta in lieu of plavix per vascular neurology recs  - PT/OT following  - Na goal: EuNa  - nightly melatonin    IVH (intraventricular hemorrhage)  EVD in place  Draining  Monitor scans  Wean as able    Communicating hydrocephalus  EVD  ICP monitoring  Monitor drainage    Anterior communicating artery aneurysm  Unsecure  Will discuss with consulting teams on plan    Cardiac/Vascular  Hypertension  -continue current regimen          The patient is being Prophylaxed for:  Venous Thromboembolism with: Chemical  Stress Ulcer with: Not Applicable   Ventilator Pneumonia with: not applicable    Activity Orders            Diet  Adult Regular (IDDSI Level 7) Thin; Standard Tray: Regular starting at 03/07 1158    Turn patient starting at 03/05 1200    Elevate HOB starting at 03/05 1119          Full Code    Ramakrishna Eagle MD  Neurocritical Care  [facility] - Neuro Critical Care    28     minutes of Critical care time was spent personally by me on the following activities: development of treatment plan with patient or surrogate and bedside caregivers, discussions with consultants, evaluation of patient's response to treatment, examination of patient, ordering and performing treatments and interventions, ordering and review of laboratory studies, ordering and review of radiographic studies, pulse oximetry, antibiotic titration if applicable, vasopressor titration if applicable, re-evaluation of patient's condition. This critical care time did not overlap with that of any other provider or involve time for any procedures. There is potential for acute life threatening neurological and or medical decompensation therefore will continue to monitor closely. Current critical conditions posing threat to organ systems, limb, or life include: see note

## 2024-03-10 NOTE — SUBJECTIVE & OBJECTIVE
Neurologic Chief Complaint: SAH    Subjective:     Interval History: Patient is seen for follow-up neurological assessment and treatment recommendations:   NAEO, neuro exam stable. EVD remains in place and open. No current complaints/concerns. NSGY following.     HPI, Past Medical, Family, and Social History remains the same as documented in the initial encounter.     Review of Systems   Constitutional:  Negative for fever.   HENT:  Negative for trouble swallowing.    Eyes:  Negative for photophobia.   Respiratory:  Negative for shortness of breath.    Cardiovascular:  Negative for chest pain.   Gastrointestinal:  Negative for abdominal pain.   Musculoskeletal:  Negative for neck pain.   Neurological:  Negative for dizziness, speech difficulty, light-headedness, numbness and headaches.     Scheduled Meds:   aspirin  81 mg Oral Daily    ceFAZolin (Ancef) IV (PEDS and ADULTS)  2 g Intravenous Q8H    gabapentin  300 mg Oral Q8H    heparin (porcine)  5,000 Units Subcutaneous Q8H    melatonin  6 mg Oral Nightly    niMODipine  30 mg Oral Q2H    polyethylene glycol  17 g Oral Daily    senna-docusate 8.6-50 mg  1 tablet Oral BID    ticagrelor  90 mg Oral BID     Continuous Infusions:      PRN Meds:acetaminophen, bisacodyL, hydrOXYzine HCL, labetalol, magnesium oxide, magnesium oxide, methocarbamoL, metoclopramide, midazolam, ondansetron, oxyCODONE, potassium bicarbonate, potassium bicarbonate, potassium bicarbonate, potassium, sodium phosphates, potassium, sodium phosphates, potassium, sodium phosphates    Objective:     Vital Signs (Most Recent):  Temp: 97.5 °F (36.4 °C) (03/10/24 1101)  Pulse: 74 (03/10/24 1101)  Resp: 16 (03/10/24 1400)  BP: (!) 109/56 (03/10/24 1400)  SpO2: 96 % (03/10/24 1101)  BP Location: Left arm    Vital Signs Range (Last 24H):  Temp:  [97.5 °F (36.4 °C)-98.6 °F (37 °C)]   Pulse:  [68-93]   Resp:  [15-40]   BP: ()/(50-70)   SpO2:  [94 %-99 %]   BP Location: Left arm       Physical  Exam  Vitals and nursing note reviewed.   Constitutional:       Appearance: Normal appearance.   HENT:      Head: Normocephalic.      Comments: EVD in place     Nose: Nose normal.      Mouth/Throat:      Mouth: Mucous membranes are moist.   Eyes:      Extraocular Movements: Extraocular movements intact.      Pupils: Pupils are equal, round, and reactive to light.   Cardiovascular:      Rate and Rhythm: Normal rate and regular rhythm.   Pulmonary:      Effort: Pulmonary effort is normal. No respiratory distress.   Abdominal:      General: Abdomen is flat.      Palpations: Abdomen is soft.   Musculoskeletal:      Cervical back: No rigidity or tenderness.      Right lower leg: No edema.      Left lower leg: No edema.   Skin:     General: Skin is warm and dry.      Capillary Refill: Capillary refill takes less than 2 seconds.      Findings: No rash.   Neurological:      General: No focal deficit present.      Mental Status: He is alert and oriented to person, place, and time.      Comments: Chronic LLE weakness   Psychiatric:         Mood and Affect: Mood normal.         Behavior: Behavior normal.              Neurological Exam:   LOC: alert  Attention Span: Good   Language: No aphasia  Orientation: Person, Place, Time   Visual Fields: Full  EOM (CN III, IV, VI): Full/intact  Pupils (CN II, III): PERRL  Facial Sensation (CN V): Normal  Facial Movement (CN VII): Symmetric facial expression    Motor: Arm left  Normal 5/5  Leg left  Normal 4/5  Arm right  Normal 5/5  Leg right Normal 5/5  Sensation: Intact to light touch, temperature and vibration    Laboratory:  CMP:   Recent Labs   Lab 03/10/24  0449   CALCIUM 9.6   ALBUMIN 3.4*   PROT 6.8   *   K 4.2   CO2 21*      BUN 10   CREATININE 0.8   ALKPHOS 47*   ALT 10   AST 14   BILITOT 0.9       CBC:   Recent Labs   Lab 03/10/24  0449   WBC 9.35   RBC 5.22   HGB 16.4   HCT 48.0      MCV 92   MCH 31.4*   MCHC 34.2         Diagnostic Results     Brain Imaging    CTH 3/5/23:   Impression:  Subarachnoid hemorrhage with blood throughout the basilar cisterns.  Moderate intraventricular blood and mild ventricular prominence.       Vessel Imaging   MRA Brain - 03/08/2024  Impression:  Examination mildly degraded by patient motion artifact.  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  No abnormal vessel wall enhancement.  No residual flow related signal within the recently treated right PICA origin aneurysm.  Prominent enhancement at this site on vessel wall imaging.  This is nonspecific in the post treatment setting but aneurysm is presumed site of rupture given distribution of hemorrhage.  Retrograde filling of the left vertebral artery, patient with known subclavian steal.    CTA head/neck 3/5/23:   Impression:  3 mm left anterior communicating artery aneurysm again identified.  No definite additional aneurysm identified concerning for source of hemorrhage.  Clinical correlation and correlation with conventional angiography recommended. 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 Interval increase distension lateral and 3rd ventricles compatible with developing hydrocephalus. Crowding cerebral sulci at the vertex with additional crowding basilar cisterns with questionable early cerebellar tonsillar herniation.    IR Angio 3/5/24:   Impression:  Anterior communicating artery aneurysm 2 x 1.7 mm with a wide neck. Small irregular aneurysm from  the right V 4 vertebral segment just proximal to the posteroinferior cerebellar artery.  This is not well characterized on these views.  Repeat selective angiography is planned for better evaluation.      Cardiac Imaging   TTE 3/8/24:     Left Ventricle: The left ventricle is normal in size. Normal wall thickness. Global hypokinesis present. There is mildly reduced systolic function with a visually estimated ejection fraction of 40 - 45%. There is indeterminate diastolic function.    Right Ventricle: Normal right ventricular cavity size. Wall thickness is normal. Right ventricle wall motion  is normal. Systolic function is normal.    Left Atrium: Left atrium is severely dilated.    Aortic Valve: The valve morphology is clearly abnormal though the number of leaflets is not evident.  There is severe aortic valve stenosis. Aortic valve area by VTI is 0.8 cm². Aortic valve peak velocity is 4.5 m/s. Mean gradient is 55 mmHg. The dimensionless index is 0.17. There is mild aortic regurgitation.    IVC/SVC: Normal venous pressure at 3 mmHg.

## 2024-03-10 NOTE — PROGRESS NOTES
[facility] - Neuro Critical Care  Neurosurgery  Progress Note    Subjective:     History of Present Illness: Kalia Pacheco is a 34yo man with history of known CHESTER aneurysm, not on any AP/AC who presented to [facility] this AM with headache and nausea that began 2 days ago and acutely worsened while he was at work this morning. He also reports blurry vision and has had multiple episodes of emesis. Denies any inciting factors. CTH reveals diffuse subarachnoid hemorrhage throughout the basal cisterns, HH score 1. He was transferred to [facility] for Neuro Critical Care and Neurosurgical management.     Post-Op Info:  * No surgery found *       Interval History: 3/10: PBD 7. NAEON.     Medications:  Continuous Infusions:   dexmedeTOMIDine (Precedex) infusion (titrating) Stopped (03/06/24 1044)     Scheduled Meds:   aspirin  81 mg Oral Daily    ceFAZolin (Ancef) IV (PEDS and ADULTS)  2 g Intravenous Q8H    gabapentin  300 mg Oral Q8H    heparin (porcine)  5,000 Units Subcutaneous Q8H    melatonin  6 mg Oral Nightly    niMODipine  30 mg Oral Q2H    polyethylene glycol  17 g Oral Daily    senna-docusate 8.6-50 mg  1 tablet Oral BID    ticagrelor  90 mg Oral BID     PRN Meds:acetaminophen, bisacodyL, hydrOXYzine HCL, labetalol, magnesium oxide, magnesium oxide, metoclopramide, midazolam, ondansetron, oxyCODONE, potassium bicarbonate, potassium bicarbonate, potassium bicarbonate, potassium, sodium phosphates, potassium, sodium phosphates, potassium, sodium phosphates     Review of Systems  Objective:     Weight: 106.6 kg (235 lb)  Body mass index is 35.73 kg/m².  Vital Signs (Most Recent):  Temp: 98.1 °F (36.7 °C) (03/10/24 0701)  Pulse: 74 (03/10/24 0701)  Resp: 16 (03/10/24 0701)  BP: (!) 91/50 (03/10/24 0701)  SpO2: 95 % (03/10/24 0701) Vital Signs (24h Range):  Temp:  [98.1 °F (36.7 °C)-98.6 °F (37 °C)] 98.1 °F (36.7 °C)  Pulse:  [68-93] 74  Resp:  [10-57] 16  SpO2:  [94 %-99 %] 95 %  BP: ()/(50-70) 91/50               "                ICP/Ventriculostomy 03/05/24 1143 Right Parietal region (Active)   Level of Ventriculostomy (cm above) 10 03/09/24 0701   Status Open to drainage 03/10/24 0701   Site Assessment Clean;Dry 03/10/24 0701   Site Drainage No drainage 03/10/24 0701   Waveform normal waveform 03/09/24 0701   Output (mL) 11 mL 03/10/24 0604   CSF Color red;clear 03/10/24 0701   Dressing Status Clean;Dry;Intact 03/10/24 0701   Interventions HOB degrees;bed controls locked 03/10/24 0701          Physical Exam         Neurosurgery Physical Exam  GCS 15  Awake, alert  No drift  No dysmetria  5/5 strength    Gen: well nourished, normocephalic, atraumatic  CV: skin warm and dry, distal pulses present  Pulm: chest rise symmetric, no increased work of breathing  GI: abdomen soft, non-distended, non-tender  : patient voiding independently  MSK: no bony abnormalities noted  Psych: patient interacting with appropriate mood and affect       Significant Labs:  Recent Labs   Lab 03/09/24  0514 03/10/24  0449    102   * 134*   K 4.1 4.2    103   CO2 18* 21*   BUN 8 10   CREATININE 0.8 0.8   CALCIUM 9.6 9.6   MG 1.7 1.8     Recent Labs   Lab 03/09/24  0514 03/10/24  0449   WBC 11.31 9.35   HGB 16.0 16.4   HCT 47.1 48.0    251     No results for input(s): "LABPT", "INR", "APTT" in the last 48 hours.  Microbiology Results (last 7 days)       ** No results found for the last 168 hours. **          All pertinent labs from the last 24 hours have been reviewed.    Significant Diagnostics:  I have reviewed all pertinent imaging results/findings within the past 24 hours.  Assessment/Plan:     * Nontraumatic subarachnoid hemorrhage, unspecified  Kalia Pacheco is a 34yo man with history of known CHESTER aneurysm who presented to [facility] with headache and nausea. CTH demonstrated diffuse SAH throughout the basal cisterns HH and he was transferred to [facility] for close monitoring in the Neuro ICU and Neurosurgical " consultation.     R frontal EVD was placed on arrival on 3/6 and patient was taken to IR for angiogram, with no intervention upon Acomm aneurysm. Upon further review of DSA, a vertebral artery aneurysm was noted and patient was brought back to IR for a right V4/PICA irregular aneurysm stent assisted coiling.     Imaging reviewed:  CTH 3/5: Diffuse basal cistern SAH with IVH   CTA 3/5: 3mm ant projecting L Acomm, no R A1, R pcomm feeding posterior circulation   DSA 3/5: EVD in position, anteriosuperiorly projecting small L Acomm, R V4 irregular aneurysm not well visualized    DSA +embo 3/5: stent assisted coiling of R V4/PICA irregularly shaped anuerysm, 1/4 lobules still filling     - Admit to NCC with q1 neuro checks  - R EVD at 10, please record ICP and output hourly, call NSGY if ICP sustained >25  - Keppra 500 BID x7d   - TCDs daily x14d   - Nimotop 60q4 x 21d   - strict I/Os, goal euvolemia to net positive for SAH patients in general, in this patient may need to be more conservative due to cardiac hx, management per NCC   - continue ASA/brilnta per neuro IR  - based on bleed pattern believe vertebral aneurysm to be the ruptured aneurysm, no plan for intervention upon Acomm at this point  -MRI with contrast not suggestive of vessel wall enhancement to Acomm  - Continue to follow clinically and notify NSGY immediately if any neurostatus change     Case and plan discussed with attending Neurosurgeon Dr. Saroj Max MD  Neurosurgery  [facility] - Neuro Critical Care

## 2024-03-10 NOTE — ASSESSMENT & PLAN NOTE
33-year-old male with a suspected A-comm aneurysm rupture complicated by intraventricular hemorrhage status post EVD placement and angiography with unsuccessful vessel was intubated and sedated, likely pending surgical intervention.    - Continued admission to [facility]  - MRA reviewed  - EVD open @ 10, nsgy following  - q1h neuro checks  - Nimodipine 30mg q2h  - Daily TCDs  - SBP < 140, MAP > 65  - Cardene and Norepi as needed for tight BP control, can restart home BP meds as needed  - TTE with reduced EF (40-45%), significantly dilated LA, and mild aortic regurgitation  - Strict I & O  - Euvolemia  - SQH for DVT ppx  - EVD per NSGY  - zofran PRN for vomiting  - DAPT, although PRU not therapeutic - brillinta in lieu of plavix per vascular neurology recs  - PT/OT following  - Na goal: EuNa  - nightly melatonin

## 2024-03-10 NOTE — PLAN OF CARE
[facility] Care Plan    POC reviewed with Kalia Pacheco and family at 0300. Pt verbalized understanding. Questions and concerns addressed. No acute events overnight. Pt progressing toward goals. Will continue to monitor. See below and flowsheets for full assessment and VS info.       - EVD open at 10     Is this a stroke patient? no    Neuro:  Irving Coma Scale  Best Eye Response: 4-->(E4) spontaneous  Best Motor Response: 6-->(M6) obeys commands  Best Verbal Response: 5-->(V5) oriented  Irving Coma Scale Score: 15  Assessment Qualifiers: patient not sedated/intubated  Pupil PERRLA: yes     24hr Temp:  [98.2 °F (36.8 °C)-98.6 °F (37 °C)]     CV:   Rhythm: normal sinus rhythm  BP goals:   SBP < 140  MAP > 65    Resp:      Vent Mode: Spont  Set Rate: 16 BPM  Oxygen Concentration (%): 40  Vt Set: 500 mL  PEEP/CPAP: 5 cmH20  Pressure Support: 8 cmH20    Plan: N/A    GI/:     Diet/Nutrition Received: regular  Last Bowel Movement: 03/07/24       Intake/Output Summary (Last 24 hours) at 3/10/2024 0608  Last data filed at 3/10/2024 0604  Gross per 24 hour   Intake 797.42 ml   Output 2244 ml   Net -1446.58 ml     Unmeasured Output  Urine Occurrence: 1  Stool Occurrence: 0  Emesis Occurrence: 1    Labs/Accuchecks:  Recent Labs   Lab 03/10/24  0449   WBC 9.35   RBC 5.22   HGB 16.4   HCT 48.0         Recent Labs   Lab 03/10/24  0449   *   K 4.2   CO2 21*      BUN 10   CREATININE 0.8   ALKPHOS 47*   ALT 10   AST 14   BILITOT 0.9      Recent Labs   Lab 03/05/24  1104   INR 1.1   APTT 24.2      Recent Labs   Lab 03/05/24  0831   TROPONINI 0.070       Electrolytes: N/A - electrolytes WDL  Accuchecks: none    Gtts:   dexmedeTOMIDine (Precedex) infusion (titrating) Stopped (03/06/24 1044)       LDA/Wounds:    Nurses Note -- 4 Eyes    Is there altered skin present? no     Prevention Measures Documented    Second RN/Staff Member:      STUART

## 2024-03-10 NOTE — PROGRESS NOTES
[facility] - Neuro Critical Care  Vascular Neurology  Comprehensive Stroke Center  Progress Note    Assessment/Plan:     * Subarachnoid hemorrhage due to ruptured aneurysm  33yoM with PMH of HTN, coarctation of the aorta (s/p repair), PDA, VSD s/p repair, ADHD, EtOH use/abuse, arthritis, legg-perthes disease, ICH 5/2022, known CHESTER aneurysm that was transferred to [facility] [place] higher level of care of SAH (HH1). He presented to OSH with HA and nausea. CTH showed diffuse SAH throughout basal cisterns. At C neuro exam stable with NIHSS 1. CTA shows 3mm L acomm aneurysm, hypoplastic R A1, persistent fetal circulation, as well as evolving SAH/ICH now with concern for developing hydrocephalus and questionable early cerebellar tonsillar herniation. NSGY consulted, EVD placed. Taken to IR for DSA, no intervention of acomm aneurysm at that time. 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 Suspect etiology of SAH likely due to vertebral artery aneurysm.     NAEO, neuro exam stable. EVD remains in place and open. No current complaints/concerns. NSGY following, no plan for acomm intervention at this time.      Antithrombotics for secondary stroke prevention: Antiplatelets: Aspirin: 81 mg daily and Brilinta 90mg BID (plavix switched to brilinta due to plavix non-responder on PRU assay)    Statins for secondary stroke prevention and hyperlipidemia, if present: Statins: Atorvastatin- 40 mg daily    Aggressive risk factor modification: HTN     Rehab efforts: The patient has been evaluated by a stroke team provider and the therapy needs have been fully considered based off the presenting complaints and exam findings. The following therapy evaluations are needed: PT evaluate and treat, OT evaluate and treat, SLP evaluate and treat, PM&R evaluate for appropriate placement    Diagnostics ordered/pending: Daily TCDs per SAH  protocol    VTE prophylaxis: Mechanical prophylaxis: Place SCDs    BP parameters: SAH: Secured aneurysm, no target, increase BP to prevent vasospasm if needed       IVH (intraventricular hemorrhage)  See principle problem      Other hydrocephalus  EVD placed by NSGY on 3/5.  Maintained by NCC.    Anterior communicating artery aneurysm  Known hx of  Anterior communicating artery aneurysm 2 x 1.7 mm with a wide neck.  Stable.    Hypertension  Stroke Risk Factor  SBP <140, MAP >65  PRN labetalol         03/06/2024: Patient s/p EVD placement by NSGY secondary to hydrocephalus, s/p DSA with identification of  right vertebral artery aneurysm and confirmed stable left anterior communicating artery aneurysm, and s/p stent and coil of vertebral artery aneurysm. Patient extubated this morning, pending SLP eval. Patient complains of headache, but was improved with 5ml drained from EVD per nursing staff. Neuro exam stable. Pending ECHO.   03/07/24: Patient stable on SAH pathway with daily TCDs and nimodipine. Exam non-focal with EVD @10 with 250ml out in last 24hrs. Awaiting MRA at this time. On DAPT with subtherapeutic PRU with possible pending switch to brillinta ameliorate.  03/09/2024: No acute events overnight. Stable neuro exam (NIH 1 for chronic leg weakness). Remain on SAH pathway. EVD in place. MRA Brain with no vessel wall enhancement. DAPT switched to ASA and brilinta for stents; patient tolerating well.   03/10/2024 NAEO, neuro exam stable. EVD remains in place and open. No current complaints/concerns. NSGY following.    STROKE DOCUMENTATION   Acute Stroke Times   Last Known Normal Date: 03/05/24  Unknown Normal Time: Unknown Time  Symptom Onset Date: 03/05/24  Unknown Symptom Onset Time: Unknown Time  Thrombolytic Therapy Recommended: No  Thrombectomy Recommended: No    NIH Scale:  1a. Level of Consciousness: 0-->Alert, keenly responsive  1b. LOC Questions: 0-->Answers both questions correctly  1c. LOC Commands:  0-->Performs both tasks correctly  2. Best Gaze: 0-->Normal  3. Visual: 0-->No visual loss  4. Facial Palsy: 0-->Normal symmetrical movements  5a. Motor Arm, Left: 0-->No drift, limb holds 90 (or 45) degrees for full 10 secs  5b. Motor Arm, Right: 0-->No drift, limb holds 90 (or 45) degrees for full 10 secs  6a. Motor Leg, Left: 1-->Drift, leg falls by the end of the 5-sec period but does not hit bed  6b. Motor Leg, Right: 0-->No drift, leg holds 30 degree position for full 5 secs  7. Limb Ataxia: 0-->Absent  8. Sensory: 0-->Normal, no sensory loss  9. Best Language: 0-->No aphasia, normal  10. Dysarthria: 0-->Normal  11. Extinction and Inattention (formerly Neglect): 0-->No abnormality  Total (NIH Stroke Scale): 1       Modified Lemont Score: 0  Irving Coma Scale:    ABCD2 Score:    [id number] Score:   HAS -BLED Score:   ICH Score:   Hunt & Hill Classification:Grade I     Hemorrhagic change of an Ischemic Stroke: Does this patient have an ischemic stroke with hemorrhagic changes? No     Neurologic Chief Complaint: SAH    Subjective:     Interval History: Patient is seen for follow-up neurological assessment and treatment recommendations:   NAEO, neuro exam stable. EVD remains in place and open. No current complaints/concerns. NSGY following.     HPI, Past Medical, Family, and Social History remains the same as documented in the initial encounter.     Review of Systems   Constitutional:  Negative for fever.   HENT:  Negative for trouble swallowing.    Eyes:  Negative for photophobia.   Respiratory:  Negative for shortness of breath.    Cardiovascular:  Negative for chest pain.   Gastrointestinal:  Negative for abdominal pain.   Musculoskeletal:  Negative for neck pain.   Neurological:  Negative for dizziness, speech difficulty, light-headedness, numbness and headaches.     Scheduled Meds:   aspirin  81 mg Oral Daily    ceFAZolin (Ancef) IV (PEDS and ADULTS)  2 g Intravenous Q8H    gabapentin  300 mg Oral Q8H     heparin (porcine)  5,000 Units Subcutaneous Q8H    melatonin  6 mg Oral Nightly    niMODipine  30 mg Oral Q2H    polyethylene glycol  17 g Oral Daily    senna-docusate 8.6-50 mg  1 tablet Oral BID    ticagrelor  90 mg Oral BID     Continuous Infusions:      PRN Meds:acetaminophen, bisacodyL, hydrOXYzine HCL, labetalol, magnesium oxide, magnesium oxide, methocarbamoL, metoclopramide, midazolam, ondansetron, oxyCODONE, potassium bicarbonate, potassium bicarbonate, potassium bicarbonate, potassium, sodium phosphates, potassium, sodium phosphates, potassium, sodium phosphates    Objective:     Vital Signs (Most Recent):  Temp: 97.5 °F (36.4 °C) (03/10/24 1101)  Pulse: 74 (03/10/24 1101)  Resp: 16 (03/10/24 1400)  BP: (!) 109/56 (03/10/24 1400)  SpO2: 96 % (03/10/24 1101)  BP Location: Left arm    Vital Signs Range (Last 24H):  Temp:  [97.5 °F (36.4 °C)-98.6 °F (37 °C)]   Pulse:  [68-93]   Resp:  [15-40]   BP: ()/(50-70)   SpO2:  [94 %-99 %]   BP Location: Left arm       Physical Exam  Vitals and nursing note reviewed.   Constitutional:       Appearance: Normal appearance.   HENT:      Head: Normocephalic.      Comments: EVD in place     Nose: Nose normal.      Mouth/Throat:      Mouth: Mucous membranes are moist.   Eyes:      Extraocular Movements: Extraocular movements intact.      Pupils: Pupils are equal, round, and reactive to light.   Cardiovascular:      Rate and Rhythm: Normal rate and regular rhythm.   Pulmonary:      Effort: Pulmonary effort is normal. No respiratory distress.   Abdominal:      General: Abdomen is flat.      Palpations: Abdomen is soft.   Musculoskeletal:      Cervical back: No rigidity or tenderness.      Right lower leg: No edema.      Left lower leg: No edema.   Skin:     General: Skin is warm and dry.      Capillary Refill: Capillary refill takes less than 2 seconds.      Findings: No rash.   Neurological:      General: No focal deficit present.      Mental Status: He is alert and  oriented to person, place, and time.      Comments: Chronic LLE weakness   Psychiatric:         Mood and Affect: Mood normal.         Behavior: Behavior normal.              Neurological Exam:   LOC: alert  Attention Span: Good   Language: No aphasia  Orientation: Person, Place, Time   Visual Fields: Full  EOM (CN III, IV, VI): Full/intact  Pupils (CN II, III): PERRL  Facial Sensation (CN V): Normal  Facial Movement (CN VII): Symmetric facial expression    Motor: Arm left  Normal 5/5  Leg left  Normal 4/5  Arm right  Normal 5/5  Leg right Normal 5/5  Sensation: Intact to light touch, temperature and vibration    Laboratory:  CMP:   Recent Labs   Lab 03/10/24  0449   CALCIUM 9.6   ALBUMIN 3.4*   PROT 6.8   *   K 4.2   CO2 21*      BUN 10   CREATININE 0.8   ALKPHOS 47*   ALT 10   AST 14   BILITOT 0.9       CBC:   Recent Labs   Lab 03/10/24  0449   WBC 9.35   RBC 5.22   HGB 16.4   HCT 48.0      MCV 92   MCH 31.4*   MCHC 34.2         Diagnostic Results     Brain Imaging   CTH 3/5/23:   Impression:  Subarachnoid hemorrhage with blood throughout the basilar cisterns.  Moderate intraventricular blood and mild ventricular prominence.       Vessel Imaging   MRA Brain - 03/08/2024  Impression:  Examination mildly degraded by patient motion artifact.  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  No abnormal vessel wall enhancement.  No residual flow related signal within the recently treated right PICA origin aneurysm.  Prominent enhancement at this site on vessel wall imaging.  This is nonspecific in the post treatment setting but aneurysm is presumed site of rupture given distribution of hemorrhage.  Retrograde filling of the left vertebral artery, patient with known subclavian steal.    CTA head/neck 3/5/23:   Impression:  3 mm left anterior communicating artery aneurysm again identified.  No definite additional aneurysm identified concerning for source of hemorrhage.  Clinical  correlation and correlation with conventional angiography recommended. 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 Interval increase distension lateral and 3rd ventricles compatible with developing hydrocephalus. Crowding cerebral sulci at the vertex with additional crowding basilar cisterns with questionable early cerebellar tonsillar herniation.    IR Angio 3/5/24:   Impression:  Anterior communicating artery aneurysm 2 x 1.7 mm with a wide neck. Small irregular aneurysm from the right V 4 vertebral segment just proximal to the posteroinferior cerebellar artery.  This is not well characterized on these views.  Repeat selective angiography is planned for better evaluation.      Cardiac Imaging   TTE 3/8/24:     Left Ventricle: The left ventricle is normal in size. Normal wall thickness. Global hypokinesis present. There is mildly reduced systolic function with a visually estimated ejection fraction of 40 - 45%. There is indeterminate diastolic function.    Right Ventricle: Normal right ventricular cavity size. Wall thickness is normal. Right ventricle wall motion  is normal. Systolic function is normal.    Left Atrium: Left atrium is severely dilated.    Aortic Valve: The valve morphology is clearly abnormal though the number of leaflets is not evident.  There is severe aortic valve stenosis. Aortic valve area by VTI is 0.8 cm². Aortic valve peak velocity is 4.5 m/s. Mean gradient is 55 mmHg. The  dimensionless index is 0.17. There is mild aortic regurgitation.    IVC/SVC: Normal venous pressure at 3 mmHg.    Atiya Delgado PA-C  [facility]  Department of Vascular Neurology   [facility] - Neuro Critical Care

## 2024-03-10 NOTE — PLAN OF CARE
[facility] Care Plan    POC reviewed with Kalia Juan Carlos Pacheco and family at 1400. Pt verbalized understanding. Questions and concerns addressed. No acute events today. Pt progressing toward goals. Will continue to monitor. See below and flowsheets for full assessment and VS info.     - EVD open at 10  - ICPs 5-12  - No changes in neuro  - Daily TCDs continued      Is this a stroke patient? yes- Stroke booklet reviewed with patient and family, risk factors identified for patient and stroke booklet remains at bedside for ongoing education.     Care individualization: goal to ambulate more frequently as tolerated  Restraints:   Restraint Order  Length of Order: Order good for next 24 hours or when removed.  Date that the current order will : 24  Time that the current order will :   Order Upon Application: Yes    Neuro:  Winnie Coma Scale  Best Eye Response: 4-->(E4) spontaneous  Best Motor Response: 6-->(M6) obeys commands  Best Verbal Response: 5-->(V5) oriented  Winnie Coma Scale Score: 15  Assessment Qualifiers: no eye obstruction present, patient not sedated/intubated  Pupil PERRLA: yes     24 hr Temp:  [98.2 °F (36.8 °C)-98.5 °F (36.9 °C)]     CV:   Rhythm: normal sinus rhythm  BP goals:   SBP < 140  MAP > 65    Resp:      Vent Mode: Spont  Set Rate: 16 BPM  Oxygen Concentration (%): 40  Vt Set: 500 mL  PEEP/CPAP: 5 cmH20  Pressure Support: 8 cmH20    Plan: N/A    GI/:     Diet/Nutrition Received: regular  Last Bowel Movement: 24       Intake/Output Summary (Last 24 hours) at 3/9/2024 1844  Last data filed at 3/9/2024 1701  Gross per 24 hour   Intake 797.42 ml   Output 2076 ml   Net -1278.58 ml     Unmeasured Output  Urine Occurrence: 1  Stool Occurrence: 0  Emesis Occurrence: 1    Labs/Accuchecks:  Recent Labs   Lab 24  05   WBC 11.31   RBC 5.05   HGB 16.0   HCT 47.1         Recent Labs   Lab 24   *   K 4.1   CO2 18*      BUN 8   CREATININE 0.8    ALKPHOS 54*   ALT 14   AST 19   BILITOT 1.3*      Recent Labs   Lab 03/05/24  1104   INR 1.1   APTT 24.2      Recent Labs   Lab 03/05/24  0831   TROPONINI 0.070       Electrolytes: N/A - electrolytes WDL  Accuchecks: none    Gtts:   dexmedeTOMIDine (Precedex) infusion (titrating) Stopped (03/06/24 1044)       LDA/Wounds:      Nurses Note -- 4 Eyes      Is there altered skin present? no     Prevention Measures Documented    Second RN/Staff Member:  MAREN Pineda

## 2024-03-10 NOTE — ASSESSMENT & PLAN NOTE
Kalia Pacheco is a 32yo man with history of known CHESTER aneurysm who presented to [place] with headache and nausea. CTH demonstrated diffuse SAH throughout the basal cisterns HH and he was [place] [facility] [place] in the Neuro ICU and Neurosurgical consultation.     R frontal EVD was placed on arrival on 3/6 and patient was taken to IR for angiogram, with no intervention upon Acomm aneurysm. Upon further review of DSA, a vertebral artery aneurysm was noted and patient was brought back to IR for a right V4/PICA irregular aneurysm stent assisted coiling.     Imaging reviewed:  CTH 3/5: Diffuse basal cistern SAH with IVH   CTA 3/5: 3mm ant projecting L Acomm, no R A1, R pcomm feeding posterior circulation   DSA 3/5: EVD in position, anteriosuperiorly projecting small L Acomm, R V4 irregular aneurysm not well visualized    DSA +embo 3/5: stent assisted coiling of R V4/PICA irregularly shaped anuerysm, 1/4 lobules still filling     - Admit to NCC with q1 neuro checks  - R EVD at 10, please record ICP and output hourly, call NSGY if ICP sustained >25  - Keppra 500 BID x7d   - TCDs daily x14d   - Nimotop 60q4 x 21d   - strict I/Os, goal euvolemia to net positive for SAH patients in general, in this patient may need to be more conservative due to cardiac hx, management per NCC   - continue ASA/brilnta per neuro IR  - based on bleed pattern believe vertebral aneurysm to be the ruptured aneurysm, no plan for intervention upon Acomm at this point  -MRI with contrast not suggestive of vessel wall enhancement to Acomm  - Continue to follow clinically and notify NSGY immediately if any neurostatus change     Case and plan discussed with attending Neurosurgeon Dr. Kyle

## 2024-03-10 NOTE — SUBJECTIVE & OBJECTIVE
Interval History: 3/10: PBD 7. NAEON.     Medications:  Continuous Infusions:   dexmedeTOMIDine (Precedex) infusion (titrating) Stopped (03/06/24 1044)     Scheduled Meds:   aspirin  81 mg Oral Daily    ceFAZolin (Ancef) IV (PEDS and ADULTS)  2 g Intravenous Q8H    gabapentin  300 mg Oral Q8H    heparin (porcine)  5,000 Units Subcutaneous Q8H    melatonin  6 mg Oral Nightly    niMODipine  30 mg Oral Q2H    polyethylene glycol  17 g Oral Daily    senna-docusate 8.6-50 mg  1 tablet Oral BID    ticagrelor  90 mg Oral BID     PRN Meds:acetaminophen, bisacodyL, hydrOXYzine HCL, labetalol, magnesium oxide, magnesium oxide, metoclopramide, midazolam, ondansetron, oxyCODONE, potassium bicarbonate, potassium bicarbonate, potassium bicarbonate, potassium, sodium phosphates, potassium, sodium phosphates, potassium, sodium phosphates     Review of Systems  Objective:     Weight: 106.6 kg (235 lb)  Body mass index is 35.73 kg/m².  Vital Signs (Most Recent):  Temp: 98.1 °F (36.7 °C) (03/10/24 0701)  Pulse: 74 (03/10/24 0701)  Resp: 16 (03/10/24 0701)  BP: (!) 91/50 (03/10/24 0701)  SpO2: 95 % (03/10/24 0701) Vital Signs (24h Range):  Temp:  [98.1 °F (36.7 °C)-98.6 °F (37 °C)] 98.1 °F (36.7 °C)  Pulse:  [68-93] 74  Resp:  [10-57] 16  SpO2:  [94 %-99 %] 95 %  BP: ()/(50-70) 91/50                              ICP/Ventriculostomy 03/05/24 1143 Right Parietal region (Active)   Level of Ventriculostomy (cm above) 10 03/09/24 0701   Status Open to drainage 03/10/24 0701   Site Assessment Clean;Dry 03/10/24 0701   Site Drainage No drainage 03/10/24 0701   Waveform normal waveform 03/09/24 0701   Output (mL) 11 mL 03/10/24 0604   CSF Color red;clear 03/10/24 0701   Dressing Status Clean;Dry;Intact 03/10/24 0701   Interventions HOB degrees;bed controls locked 03/10/24 0701          Physical Exam         Neurosurgery Physical Exam  GCS 15  Awake, alert  No drift  No dysmetria  5/5 strength    Gen: well nourished, normocephalic,  "atraumatic  CV: skin warm and dry, distal pulses present  Pulm: chest rise symmetric, no increased work of breathing  GI: abdomen soft, non-distended, non-tender  : patient voiding independently  MSK: no bony abnormalities noted  Psych: patient interacting with appropriate mood and affect       Significant Labs:  Recent Labs   Lab 03/09/24  0514 03/10/24  0449    102   * 134*   K 4.1 4.2    103   CO2 18* 21*   BUN 8 10   CREATININE 0.8 0.8   CALCIUM 9.6 9.6   MG 1.7 1.8     Recent Labs   Lab 03/09/24 0514 03/10/24  0449   WBC 11.31 9.35   HGB 16.0 16.4   HCT 47.1 48.0    251     No results for input(s): "LABPT", "INR", "APTT" in the last 48 hours.  Microbiology Results (last 7 days)       ** No results found for the last 168 hours. **          All pertinent labs from the last 24 hours have been reviewed.    Significant Diagnostics:  I have reviewed all pertinent imaging results/findings within the past 24 hours.  "

## 2024-03-10 NOTE — PLAN OF CARE
PT evaluation complete and appropriate goals established.    Problem: Physical Therapy  Goal: Physical Therapy Goal  Description: Goals to be met by: 4/10/2024     Patient will increase functional independence with mobility by performin. Supine to sit with [place]  2. Sit to supine with [place]  3. Sit to stand transfer with [place]  4. Gait  x 300 feet with Supervision using LRAD.   5. Ascend/descend 4 stair with bilateral Handrails Stand-by Assistance using LRAD.   6. Lower extremity exercise program x15 reps per handout, with independence    Outcome: Ongoing, Progressing     3/10/2024

## 2024-03-10 NOTE — PLAN OF CARE
[facility] Care Plan    POC reviewed with Kalia Pacheco and family at 1400. Pt verbalized understanding. Questions and concerns addressed. No acute events today. Pt progressing toward goals. Will continue to monitor. See below and flowsheets for full assessment and VS info.     - Pt used stationary exercise bike  - KUB completed  - EVD opened at 15  - Daily TCDs continued  - PRN robaxin given  - PRN oxycodone given      Is this a stroke patient? yes- Stroke booklet reviewed with patient and family, risk factors identified for patient and stroke booklet remains at bedside for ongoing education.     Restraints:   Restraint Order  Length of Order: Order good for next 24 hours or when removed.  Date that the current order will : 24  Time that the current order will :   Order Upon Application: Yes    Neuro:  Irving Coma Scale  Best Eye Response: 4-->(E4) spontaneous  Best Motor Response: 6-->(M6) obeys commands  Best Verbal Response: 5-->(V5) oriented  Mont[place] Coma Scale Score: 15  Assessment Qualifiers: no eye obstruction present, patient not sedated/intubated  Pupil PERRLA: yes     24 hr Temp:  [97.5 °F (36.4 °C)-98.6 °F (37 °C)]     CV:   Rhythm: normal sinus rhythm  BP goals:   SBP < 140  MAP > 65    Resp:      Vent Mode: Spont  Set Rate: 16 BPM  Oxygen Concentration (%): 40  Vt Set: 500 mL  PEEP/CPAP: 5 cmH20  Pressure Support: 8 cmH20    Plan: N/A    GI/:     Diet/Nutrition Received: regular  Last Bowel Movement: 24       Intake/Output Summary (Last 24 hours) at 3/10/2024 1750  Last data filed at 3/10/2024 1701  Gross per 24 hour   Intake 747.87 ml   Output 2239 ml   Net -1491.13 ml     Unmeasured Output  Urine Occurrence: 1  Stool Occurrence: 0  Emesis Occurrence: 1    Labs/Accuchecks:  Recent Labs   Lab 03/10/24  0449   WBC 9.35   RBC 5.22   HGB 16.4   HCT 48.0         Recent Labs   Lab 03/10/24  0449   *   K 4.2   CO2 21*      BUN 10   CREATININE 0.8   ALKPHOS  47*   ALT 10   AST 14   BILITOT 0.9      Recent Labs   Lab 03/05/24  1104   INR 1.1   APTT 24.2      Recent Labs   Lab 03/05/24  0831   TROPONINI 0.070       Electrolytes: N/A - electrolytes WDL  Accuchecks: none    Gtts:      LDA/Wounds:      Nurses Note -- 4 Eyes      Is there altered skin present? no     Prevention Measures Documented    Second RN/Staff Member:  MAREN Pineda

## 2024-03-10 NOTE — PT/OT/SLP EVAL
"Physical Therapy Evaluation and Treatment    Patient Name:  Kalia Pacheco   MRN:  [id number]    Recommendations:     Discharge Recommendations: Low Intensity Therapy   Discharge Equipment Recommendations: none   Barriers to discharge: None    Assessment:     Kalia Pacheco is a 33 y.o. male admitted with a medical diagnosis of Nontraumatic subarachnoid hemorrhage, unspecified. He presents with the following impairments/functional limitations: weakness, impaired endurance, impaired self care skills, gait instability, impaired balance, impaired functional mobility, impaired coordination, decreased safety awareness. Pt with excellent tolerance to therapy evaluation on this date. Pt highly motivated and eager to complete OOB mobility. Pt demonstrating ability to safely ambulate greater than household distances with no assist from therapist and no LOB, SOB, or adverse reactions. Pt safe to return home with intermittent assist as needed following discharge once medically stable. Recommend low intensity therapy following discharge once medically stable. Pt would continue to benefit from skilled acute PT in order to address current deficits and progress functional mobility.     Rehab Prognosis: Good; patient would benefit from acute skilled PT services 3 x/week to address these deficits and reach maximum level of function.  Recent Surgery: * No surgery found *      Plan:     During this hospitalization, patient to be seen 3 x/week to address the identified rehab impairments via gait training, therapeutic activities, therapeutic exercises, neuromuscular re-education and progress toward the following goals:    Plan of Care Expires:  04/10/24    Subjective     Chief Complaint: Slight discomfort in neck muscles and intermittent pulsing HA at back of head  Patient/Family Comments/Goals: "I would love to get up and walk around. I kept breaking the rules and doing it by myself before."  Pain/Comfort:  Pain Rating 1: " 0/10    Patients cultural, spiritual, Mosque conflicts given the current situation: no    Living Environment:  Living Environment: Patient lives alone in a single story house with number of outside stair(s): 4 with B HR .  Prior Level of Function: Prior to admission, patient was independent.  Equipment Used at Home: none.  DME owned (not currently used): RW  Assistance Upon Discharge: family    Objective:     Communicated with nursing prior to session. Patient found HOB elevated with blood pressure cuff, pulse ox (continuous), telemetry, external ventricular drain upon PT entry to room.    General Precautions: Standard, fall  Orthopedic Precautions:N/A    Braces: N/A    Exams:  Cognitive Exam:  Patient is oriented to Person, Place, Time, Situation, follows commands 100% of the time  RLE ROM: WFL  RLE Strength: WFL  LLE ROM: WFL  LLE Strength: WFL  Sensation: Intact light touch to BLEs    Functional Mobility:  Bed Mobility:    Supine to Sit: supervision  Transfers:    Sit to Stand: supervision with no AD with cues for hand placement and foot placement  Gait: Patient ambulated 65' in hospital room with no AD and CGA-SBA.   Patient demonstrates occasional unsteady gait, decreased step length, narrow base of support, decreased weight shift, decreased foot clearance, flexed posture, decreased wale, and decreased arm swing.   Patient required cues for upright posture, gluteal activation, sequencing, obstacle navigation, increased step size, foot placement, and increased foot clearance.  All lines remained intact throughout ambulation trial, gait belt utilized.  Balance:   Static Sitting: Good, able to maintain for 2 minute(s) with stand by assistance  Dynamic Sitting: Good: Patient accepts moderate challenge, stand by assistance  Static Standing: Good, able to maintain for 5 minute(s) with stand by assistance  Pt completed oral hygiene and other ADLs while standing at sink with PT  Dynamic Standing: Good: Patient  accepts moderate challenge, contact guard assistance    Therapeutic Activities and Exercises:  Patient educated on role of acute care PT and PT POC, safety while in hospital including calling nurse for mobility, and call light usage  Pt educated on the effects of bed rest and the importance of OOB activity. Pt encouraged to sit UIC majority of day as tolerated and continue daily ambulation with nursing assist. Pt verbalized understanding.  Answered all questions within PT scope of practice and addressed functional mobility concerns.    AM-PAC 6 CLICK MOBILITY  Total Score:19     Patient left up in chair with all lines intact, call button in reach, RN notified, chair alarm on, and family present.    GOALS:   Multidisciplinary Problems       Physical Therapy Goals          Problem: Physical Therapy    Goal Priority Disciplines Outcome Goal Variances Interventions   Physical Therapy Goal     PT, PT/OT Ongoing, Progressing     Description: Goals to be met by: 4/10/2024     Patient will increase functional independence with mobility by performin. Supine to sit with [place]  2. Sit to supine with [place]  3. Sit to stand transfer with [place]  4. Gait  x 300 feet with Supervision using LRAD.   5. Ascend/descend 4 stair with bilateral Handrails Stand-by Assistance using LRAD.   6. Lower extremity exercise program x15 reps per handout, with independence                         History:     Past Medical History:   Diagnosis Date    ADHD (attention deficit hyperactivity disorder)     Aneurysm     Aorta coarctation     Arthritis     Coarctation of aorta     Depression     GERD (gastroesophageal reflux disease)     History of alcohol abuse     Hypertension     Legg-Perthes disease     Seizures 2022       Past Surgical History:   Procedure Laterality Date    hip surgeries      x 6    open heart surgery      surgery on aorta as a child , 3 procedures         Time Tracking:     PT Received On: 03/10/24  PT  Start Time: 1117     PT Stop Time: 1140  PT Total Time (min): 23 min     Billable Minutes: Evaluation 10 Therapeutic Activity 13      03/10/2024

## 2024-03-10 NOTE — ASSESSMENT & PLAN NOTE
33yoM with PMH of HTN, coarctation of the aorta (s/p repair), PDA, VSD s/p repair, ADHD, EtOH use/abuse, arthritis, legg-perthes disease, ICH 5/2022, known CHESTER aneurysm that [place] [facility] [place] of SAH (HH1). He presented to OSH with HA and nausea. CTH showed diffuse SAH throughout basal cisterns. At C neuro exam stable with NIHSS 1. CTA shows 3mm L acomm aneurysm, hypoplastic R A1, persistent fetal circulation, as well as evolving SAH/ICH now with concern for developing hydrocephalus and questionable early cerebellar tonsillar herniation. NSGY consulted, EVD placed. Taken to IR for DSA, no intervention of acomm aneurysm at that time. 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 Suspect etiology of SAH likely due to vertebral artery aneurysm.     NAEO, neuro exam stable. EVD remains in place and open. No current complaints/concerns. NSGY following, no plan for acomm intervention at this time.      Antithrombotics for secondary stroke prevention: Antiplatelets: Aspirin: 81 mg daily and Brilinta 90mg BID (plavix switched to brilinta due to plavix non-responder on PRU assay)    Statins for secondary stroke prevention and hyperlipidemia, if present: Statins: Atorvastatin- 40 mg daily    Aggressive risk factor modification: HTN     Rehab efforts: The patient has been evaluated by a stroke team provider and the therapy needs have been fully considered based off the presenting complaints and exam findings. The following therapy evaluations are needed: PT evaluate and treat, OT evaluate and treat, SLP evaluate and treat, PM&R evaluate for appropriate placement    Diagnostics ordered/pending: Daily TCDs per SAH protocol    VTE prophylaxis: Mechanical prophylaxis: Place SCDs    BP parameters: SAH: Secured aneurysm, no target, increase BP to prevent vasospasm if needed

## 2024-03-11 LAB
ALBUMIN SERPL BCP-MCNC: 3.4 G/DL (ref 3.5–5.2)
ALP SERPL-CCNC: 43 U/L (ref 55–135)
ALT SERPL W/O P-5'-P-CCNC: 10 U/L (ref 10–44)
ANION GAP SERPL CALC-SCNC: 8 MMOL/L (ref 8–16)
AST SERPL-CCNC: 15 U/L (ref 10–40)
BASOPHILS # BLD AUTO: 0.05 K/UL (ref 0–0.2)
BASOPHILS NFR BLD: 0.5 % (ref 0–1.9)
BILIRUB SERPL-MCNC: 0.9 MG/DL (ref 0.1–1)
BILIRUB UR QL STRIP: NEGATIVE
BUN SERPL-MCNC: 10 MG/DL (ref 6–20)
CALCIUM SERPL-MCNC: 9.3 MG/DL (ref 8.7–10.5)
CHLORIDE SERPL-SCNC: 100 MMOL/L (ref 95–110)
CLARITY UR REFRACT.AUTO: CLEAR
CO2 SERPL-SCNC: 23 MMOL/L (ref 23–29)
COLOR UR AUTO: COLORLESS
CREAT SERPL-MCNC: 1 MG/DL (ref 0.5–1.4)
DIFFERENTIAL METHOD BLD: ABNORMAL
EOSINOPHIL # BLD AUTO: 0 K/UL (ref 0–0.5)
EOSINOPHIL NFR BLD: 0.2 % (ref 0–8)
ERYTHROCYTE [DISTWIDTH] IN BLOOD BY AUTOMATED COUNT: 12.8 % (ref 11.5–14.5)
EST. GFR  (NO RACE VARIABLE): >60 ML/MIN/1.73 M^2
GLUCOSE SERPL-MCNC: 106 MG/DL (ref 70–110)
GLUCOSE UR QL STRIP: NEGATIVE
HCT VFR BLD AUTO: 45.9 % (ref 40–54)
HGB BLD-MCNC: 15.8 G/DL (ref 14–18)
HGB UR QL STRIP: NEGATIVE
IMM GRANULOCYTES # BLD AUTO: 0.04 K/UL (ref 0–0.04)
IMM GRANULOCYTES NFR BLD AUTO: 0.4 % (ref 0–0.5)
KETONES UR QL STRIP: NEGATIVE
LEUKOCYTE ESTERASE UR QL STRIP: NEGATIVE
LYMPHOCYTES # BLD AUTO: 1.8 K/UL (ref 1–4.8)
LYMPHOCYTES NFR BLD: 17 % (ref 18–48)
MAGNESIUM SERPL-MCNC: 1.7 MG/DL (ref 1.6–2.6)
MCH RBC QN AUTO: 31.8 PG (ref 27–31)
MCHC RBC AUTO-ENTMCNC: 34.4 G/DL (ref 32–36)
MCV RBC AUTO: 92 FL (ref 82–98)
MONOCYTES # BLD AUTO: 1 K/UL (ref 0.3–1)
MONOCYTES NFR BLD: 9.4 % (ref 4–15)
NEUTROPHILS # BLD AUTO: 7.6 K/UL (ref 1.8–7.7)
NEUTROPHILS NFR BLD: 72.5 % (ref 38–73)
NITRITE UR QL STRIP: NEGATIVE
NRBC BLD-RTO: 0 /100 WBC
OSMOLALITY SERPL: 283 MOSM/KG (ref 280–300)
PH UR STRIP: 6 [PH] (ref 5–8)
PHOSPHATE SERPL-MCNC: 3.9 MG/DL (ref 2.7–4.5)
PLATELET # BLD AUTO: 262 K/UL (ref 150–450)
PMV BLD AUTO: 9.9 FL (ref 9.2–12.9)
POTASSIUM SERPL-SCNC: 4.2 MMOL/L (ref 3.5–5.1)
PROT SERPL-MCNC: 6.9 G/DL (ref 6–8.4)
PROT UR QL STRIP: NEGATIVE
RBC # BLD AUTO: 4.97 M/UL (ref 4.6–6.2)
SODIUM SERPL-SCNC: 131 MMOL/L (ref 136–145)
SODIUM SERPL-SCNC: 132 MMOL/L (ref 136–145)
SP GR UR STRIP: 1.01 (ref 1–1.03)
URN SPEC COLLECT METH UR: ABNORMAL
WBC # BLD AUTO: 10.46 K/UL (ref 3.9–12.7)

## 2024-03-11 PROCEDURE — 83935 ASSAY OF URINE OSMOLALITY: CPT | Performed by: NURSE PRACTITIONER

## 2024-03-11 PROCEDURE — 81003 URINALYSIS AUTO W/O SCOPE: CPT | Performed by: NURSE PRACTITIONER

## 2024-03-11 PROCEDURE — 25000003 PHARM REV CODE 250: Performed by: PSYCHIATRY & NEUROLOGY

## 2024-03-11 PROCEDURE — 20000000 HC ICU ROOM

## 2024-03-11 PROCEDURE — 63600175 PHARM REV CODE 636 W HCPCS

## 2024-03-11 PROCEDURE — 97129 THER IVNTJ 1ST 15 MIN: CPT

## 2024-03-11 PROCEDURE — 97535 SELF CARE MNGMENT TRAINING: CPT

## 2024-03-11 PROCEDURE — 63600175 PHARM REV CODE 636 W HCPCS: Performed by: STUDENT IN AN ORGANIZED HEALTH CARE EDUCATION/TRAINING PROGRAM

## 2024-03-11 PROCEDURE — 99291 CRITICAL CARE FIRST HOUR: CPT | Mod: FS,,, | Performed by: PSYCHIATRY & NEUROLOGY

## 2024-03-11 PROCEDURE — 99233 SBSQ HOSP IP/OBS HIGH 50: CPT | Mod: FS,,, | Performed by: PSYCHIATRY & NEUROLOGY

## 2024-03-11 PROCEDURE — 84295 ASSAY OF SERUM SODIUM: CPT | Performed by: NURSE PRACTITIONER

## 2024-03-11 PROCEDURE — 85025 COMPLETE CBC W/AUTO DIFF WBC: CPT

## 2024-03-11 PROCEDURE — 25000003 PHARM REV CODE 250: Performed by: STUDENT IN AN ORGANIZED HEALTH CARE EDUCATION/TRAINING PROGRAM

## 2024-03-11 PROCEDURE — 25000003 PHARM REV CODE 250

## 2024-03-11 PROCEDURE — 80053 COMPREHEN METABOLIC PANEL: CPT

## 2024-03-11 PROCEDURE — 97530 THERAPEUTIC ACTIVITIES: CPT

## 2024-03-11 PROCEDURE — 84300 ASSAY OF URINE SODIUM: CPT | Performed by: NURSE PRACTITIONER

## 2024-03-11 PROCEDURE — 25000003 PHARM REV CODE 250: Performed by: PHYSICIAN ASSISTANT

## 2024-03-11 PROCEDURE — 94761 N-INVAS EAR/PLS OXIMETRY MLT: CPT

## 2024-03-11 PROCEDURE — 83930 ASSAY OF BLOOD OSMOLALITY: CPT | Performed by: NURSE PRACTITIONER

## 2024-03-11 PROCEDURE — 84100 ASSAY OF PHOSPHORUS: CPT

## 2024-03-11 PROCEDURE — 83735 ASSAY OF MAGNESIUM: CPT

## 2024-03-11 PROCEDURE — 99499 UNLISTED E&M SERVICE: CPT | Mod: ,,, | Performed by: NURSE PRACTITIONER

## 2024-03-11 RX ORDER — LABETALOL HCL 20 MG/4 ML
10 SYRINGE (ML) INTRAVENOUS
Status: DISCONTINUED | OUTPATIENT
Start: 2024-03-11 | End: 2024-03-20 | Stop reason: HOSPADM

## 2024-03-11 RX ADMIN — GABAPENTIN 300 MG: 300 CAPSULE ORAL at 02:03

## 2024-03-11 RX ADMIN — TICAGRELOR 90 MG: 90 TABLET ORAL at 08:03

## 2024-03-11 RX ADMIN — METHOCARBAMOL 500 MG: 500 TABLET ORAL at 12:03

## 2024-03-11 RX ADMIN — Medication 6 MG: at 08:03

## 2024-03-11 RX ADMIN — METHOCARBAMOL 500 MG: 500 TABLET ORAL at 03:03

## 2024-03-11 RX ADMIN — HEPARIN SODIUM 5000 UNITS: 5000 INJECTION INTRAVENOUS; SUBCUTANEOUS at 09:03

## 2024-03-11 RX ADMIN — NIMODIPINE 30 MG: 30 CAPSULE, LIQUID FILLED ORAL at 12:03

## 2024-03-11 RX ADMIN — HEPARIN SODIUM 5000 UNITS: 5000 INJECTION INTRAVENOUS; SUBCUTANEOUS at 06:03

## 2024-03-11 RX ADMIN — DOCUSATE SODIUM AND SENNOSIDES 1 TABLET: 8.6; 5 TABLET, FILM COATED ORAL at 08:03

## 2024-03-11 RX ADMIN — NIMODIPINE 30 MG: 30 CAPSULE, LIQUID FILLED ORAL at 02:03

## 2024-03-11 RX ADMIN — CEFAZOLIN 2 G: 2 INJECTION, POWDER, FOR SOLUTION INTRAMUSCULAR; INTRAVENOUS at 05:03

## 2024-03-11 RX ADMIN — NIMODIPINE 30 MG: 30 CAPSULE, LIQUID FILLED ORAL at 03:03

## 2024-03-11 RX ADMIN — NIMODIPINE 30 MG: 30 CAPSULE, LIQUID FILLED ORAL at 11:03

## 2024-03-11 RX ADMIN — NIMODIPINE 30 MG: 30 CAPSULE, LIQUID FILLED ORAL at 09:03

## 2024-03-11 RX ADMIN — HEPARIN SODIUM 5000 UNITS: 5000 INJECTION INTRAVENOUS; SUBCUTANEOUS at 02:03

## 2024-03-11 RX ADMIN — NIMODIPINE 30 MG: 30 CAPSULE, LIQUID FILLED ORAL at 04:03

## 2024-03-11 RX ADMIN — CEFAZOLIN 2 G: 2 INJECTION, POWDER, FOR SOLUTION INTRAMUSCULAR; INTRAVENOUS at 12:03

## 2024-03-11 RX ADMIN — ACETAMINOPHEN 650 MG: 325 TABLET ORAL at 12:03

## 2024-03-11 RX ADMIN — CEFAZOLIN 2 G: 2 INJECTION, POWDER, FOR SOLUTION INTRAMUSCULAR; INTRAVENOUS at 08:03

## 2024-03-11 RX ADMIN — NIMODIPINE 30 MG: 30 CAPSULE, LIQUID FILLED ORAL at 06:03

## 2024-03-11 RX ADMIN — GABAPENTIN 300 MG: 300 CAPSULE ORAL at 09:03

## 2024-03-11 RX ADMIN — ACETAMINOPHEN 650 MG: 325 TABLET ORAL at 06:03

## 2024-03-11 RX ADMIN — GABAPENTIN 300 MG: 300 CAPSULE ORAL at 06:03

## 2024-03-11 RX ADMIN — NIMODIPINE 30 MG: 30 CAPSULE, LIQUID FILLED ORAL at 08:03

## 2024-03-11 RX ADMIN — SODIUM CHLORIDE 1000 ML: 9 INJECTION, SOLUTION INTRAVENOUS at 10:03

## 2024-03-11 RX ADMIN — ASPIRIN 81 MG CHEWABLE TABLET 81 MG: 81 TABLET CHEWABLE at 08:03

## 2024-03-11 RX ADMIN — OXYCODONE 5 MG: 5 TABLET ORAL at 11:03

## 2024-03-11 RX ADMIN — NIMODIPINE 30 MG: 30 CAPSULE, LIQUID FILLED ORAL at 10:03

## 2024-03-11 RX ADMIN — SODIUM CHLORIDE 500 ML: 0.9 INJECTION, SOLUTION INTRAVENOUS at 06:03

## 2024-03-11 RX ADMIN — METHOCARBAMOL 500 MG: 500 TABLET ORAL at 08:03

## 2024-03-11 RX ADMIN — POLYETHYLENE GLYCOL 3350 17 G: 17 POWDER, FOR SOLUTION ORAL at 08:03

## 2024-03-11 NOTE — ASSESSMENT & PLAN NOTE
33yoM with PMH of HTN, coarctation of the aorta (s/p repair), PDA, VSD s/p repair, ADHD, EtOH use/abuse, arthritis, legg-perthes disease, ICH 5/2022, known CHESTER aneurysm that was [place] [facility] [place] higher level of care of SAH (HH1). He presented to OSH with HA and nausea. CTH showed diffuse SAH throughout basal cisterns. At C neuro exam stable with NIHSS 1. CTA shows 3mm L acomm aneurysm, hypoplastic R A1, persistent fetal circulation, as well as evolving SAH/ICH now with concern for developing hydrocephalus and questionable early cerebellar tonsillar herniation. NSGY consulted, EVD placed. Taken to IR for DSA, no intervention of acomm aneurysm at that time. 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 Suspect etiology of SAH likely due to vertebral artery aneurysm.     NAEO, neuro exam stable. EVD still in, drain moved to 20 today. TCDs unremarkable thus far. Hyponatremic with Na 132, urine studies ordered.       Antithrombotics for secondary stroke prevention: Antiplatelets: Aspirin: 81 mg daily and Brilinta 90mg BID (plavix switched to brilinta due to plavix non-responder on PRU assay)    Statins for secondary stroke prevention and hyperlipidemia, if present: Statins: Atorvastatin- 40 mg daily    Aggressive risk factor modification: HTN     Rehab efforts: The patient has been evaluated by a stroke team provider and the therapy needs have been fully considered based off the presenting complaints and exam findings. The following therapy evaluations are needed: PT evaluate and treat, OT evaluate and treat, SLP evaluate and treat, PM&R evaluate for appropriate placement    Diagnostics ordered/pending: Daily TCDs per SAH protocol    VTE prophylaxis: Mechanical prophylaxis: Place SCDs    BP parameters: SAH: Secured aneurysm, no target, increase BP to prevent vasospasm if needed

## 2024-03-11 NOTE — PLAN OF CARE
[facility] Care Plan    POC reviewed with Kalia Pacheco and family at 0300. Pt verbalized understanding. Questions and concerns addressed. No acute events overnight. Pt progressing toward goals. Will continue to monitor. See below and flowsheets for full assessment and VS info.     - EVD open at 15         Is this a stroke patient? no    Neuro:  [place] Coma Scale  Best Eye Response: 4-->(E4) spontaneous  Best Motor Response: 6-->(M6) obeys commands  Best Verbal Response: 5-->(V5) oriented  [place] Coma Scale Score: 15  Assessment Qualifiers: patient not sedated/intubated  Pupil PERRLA: yes     24hr Temp:  [97.5 °F (36.4 °C)-98.1 °F (36.7 °C)]     CV:   Rhythm: normal sinus rhythm  BP goals:   SBP < 160  MAP > 65    Resp:      Vent Mode: Spont  Set Rate: 16 BPM  Oxygen Concentration (%): 40  Vt Set: 500 mL  PEEP/CPAP: 5 cmH20  Pressure Support: 8 cmH20    Plan: N/A    GI/:     Diet/Nutrition Received: regular  Last Bowel Movement: 03/07/24       Intake/Output Summary (Last 24 hours) at 3/11/2024 0600  Last data filed at 3/11/2024 0525  Gross per 24 hour   Intake 2228.79 ml   Output 3017 ml   Net -788.21 ml     Unmeasured Output  Urine Occurrence: 1  Stool Occurrence: 0  Emesis Occurrence: 1    Labs/Accuchecks:  Recent Labs   Lab 03/11/24  0357   WBC 10.46   RBC 4.97   HGB 15.8   HCT 45.9         Recent Labs   Lab 03/11/24  0357   *   K 4.2   CO2 23      BUN 10   CREATININE 1.0   ALKPHOS 43*   ALT 10   AST 15   BILITOT 0.9      Recent Labs   Lab 03/05/24  1104   INR 1.1   APTT 24.2      Recent Labs   Lab 03/05/24  0831   TROPONINI 0.070       Electrolytes: N/A - electrolytes WDL  Accuchecks: none    Gtts:      LDA/Wounds:    Nurses Note -- 4 Eyes    Is there altered skin present? no     Prevention Measures Documented    Second RN/Staff Member:      STUART

## 2024-03-11 NOTE — PROGRESS NOTES
[facility]  Neurocritical Care  Progress Note    Admit Date: 3/5/2024  Service Date: 03/11/2024  Length of Stay: 6    Subjective:     Chief Complaint: Subarachnoid hemorrhage due to ruptured aneurysm    History of Present Illness: The patient is a 32-year-old man with a past medical history of ADHD, EtOH use and abuse, arthritis, hypertension, coarctation of the aorta (status post repair), PDA, VSD status post repair x2.  History of Legg-Perthes disease, and intracranial hemorrhage in May 2022.  presented to [facility] this AM with headache and nausea that began 2 days ago and acutely worsened while he was at work this morning. CTH demonstrated HH1mF4 SAH. S/p EVD placement by Nsx. Was taken to IR for angiography, but the vessel was not sercured during the procedure, the patient was returned to the NSICU, intubated and sedated, [facility] neurosurgery pending.     Hospital Course: 03/06/2024 Overnight, mild bradycardia and soft BPs. Weaned off fentanyl, weaning precedex. Extubated to NC this morning.   03/07/2024 AF, soft BPs. TCD with no signs of vasospasm. Neuro exam stable. Persistent HA, waxing/waning in severity. EVD with 289mL output. MRA pending.   03/08/2024 AF, HDS on RA. TCD no signs of vasospasm. Neuro exam stable. HA. EVD open @10 w/ 250mL output. MRA today.  03/09/2024 AF. BP soft. RA. TTE with reduced EF (40-45%) and mild aortic regurg. TCD w/ no signs of vasospasm. EVD open @10 w/ 224mL output. Started brillinta.   3/10: KUB, robaxin, encourage PO  3/11/2024: no free water intake, send Urine studies, TCD no vasospasm, Follow Na, discussed with IR- aneurysm secured and now SBP <220        Review of Systems   Unable to obtain a complete ROS due to level of consciousness.  Objective:     Vitals:  Temp: 98.2 °F (36.8 °C)  Pulse: 76  Rhythm: normal sinus rhythm  BP: 136/64  MAP (mmHg): 92  ICP Mean (mmHg): 13 mmHg  Resp: 16  SpO2: 98 %    Temp  Min: 97.6 °F (36.4 °C)  Max: 98.2 °F (36.8  °C)  Pulse  Min: 71  Max: 98  BP  Min: 74/44  Max: 157/55  MAP (mmHg)  Min: 54  Max: 111  ICP Mean (mmHg)  Min: 3 mmHg  Max: 15 mmHg  Resp  Min: 16  Max: 28  SpO2  Min: 95 %  Max: 100 %    03/10 0701 - 03/11 0700  In: 2228.8 [P.O.:2025]  Out: 2514 [Urine:2275; Drains:239]   Unmeasured Output  Urine Occurrence: 1  Stool Occurrence: 0  Emesis Occurrence: 1        Physical Exam  GA: Alert, comfortable, no acute distress.   HEENT: No scleral icterus or JVD.   Pulmonary: Clear to auscultation A/P/L. No wheezing, crackles, or rhonchi.  Cardiac: RRR S1 & S2 w/o rubs/murmurs/gallops.   Abdominal: Bowel sounds present x 4. No appreciable hepatosplenomegaly.  Skin: No jaundice, rashes, or visible lesions.  Neuro:  --GCS: E4 V5 M6  --Mental Status:  awake, oriented X4, follows commands, fluent speech. No drift in the arms  --CN II-XII grossly intact.   --Pupils 3mm, PERRL.   --Corneal reflex, gag, cough intact.  --ENCARNACION spont       Medications:  Continuous Scheduledaspirin, 81 mg, Daily  ceFAZolin (Ancef) IV (PEDS and ADULTS), 2 g, Q8H  gabapentin, 300 mg, Q8H  heparin (porcine), 5,000 Units, Q8H  melatonin, 6 mg, Nightly  niMODipine, 30 mg, Q2H  polyethylene glycol, 17 g, Daily  senna-docusate 8.6-50 mg, 1 tablet, BID  ticagrelor, 90 mg, BID    PRNacetaminophen, 650 mg, Q6H PRN  bisacodyL, 10 mg, Daily PRN  hydrOXYzine HCL, 25 mg, Nightly PRN  labetalol, 10 mg, Q15 Min PRN  magnesium oxide, 800 mg, PRN  magnesium oxide, 800 mg, PRN  methocarbamoL, 500 mg, QID PRN  metoclopramide, 10 mg, Q6H PRN  ondansetron, 4 mg, Q6H PRN  oxyCODONE, 5 mg, Q6H PRN  potassium bicarbonate, 35 mEq, PRN  potassium bicarbonate, 50 mEq, PRN  potassium bicarbonate, 60 mEq, PRN  potassium, sodium phosphates, 2 packet, PRN  potassium, sodium phosphates, 2 packet, PRN  potassium, sodium phosphates, 2 packet, PRN      Today I personally reviewed pertinent medications, lines/drains/airways, imaging, cardiology results, laboratory results, microbiology  results,   Diet  Diet Adult Regular (IDDSI Level 7) Thin; Standard Tray      Assessment/Plan:     Neuro  * Subarachnoid hemorrhage due to ruptured aneurysm  33-year-old male with a suspected A-comm aneurysm rupture complicated by intraventricular hemorrhage status post EVD placement and angiography with unsuccessful vessel was intubated and sedated, likely pending surgical intervention.    - Continued admission to [facility]  - MRA reviewed  - EVD open @ 15, nsgy following  - q1h neuro checks  - Nimodipine 30mg q2h  - Daily TCDs  - Cardene and Norepi as needed for tight BP control, can restart home BP meds as needed  - TTE with reduced EF (40-45%), significantly dilated LA, and mild aortic regurgitation  - Strict I & O  - Euvolemia  - SQH for DVT ppx  - EVD per NSGY  - zofran PRN for vomiting  - DAPT, although PRU not therapeutic - brillinta in lieu of plavix per vascular neurology recs  - PT/OT following  - Na goal: EuNa  - nightly melatonin  3/11/2024 Discussed with neuro IR- aneurysm secured- SBP <220  TCD no vasospasm  Na trending down - no free water intake, urine studies sent    Communicating hydrocephalus  EVD  ICP monitoring  Monitor drainage    Anterior communicating artery aneurysm  Unsecure  Will discuss with consulting teams on plan    Cardiac/Vascular  Hypertension  -continue current regimen          The patient is being Prophylaxed for:  Venous Thromboembolism with: Chemical  Stress Ulcer with: None  Ventilator Pneumonia with: none    Activity Orders            Diet Adult Regular (IDDSI Level 7) Thin; Standard Tray: Regular starting at 03/11 1118    Turn patient starting at 03/05 1200    Elevate HOB starting at 03/05 1119          Full Code    Radha Espinosa NP  Neurocritical Care  [facility]

## 2024-03-11 NOTE — PROGRESS NOTES
[facility] Critical [facility]  Neurosurgery  Progress Note    Subjective:     History of Present Illness: Kalia Pacheco is a 34yo man with history of known CHESTER aneurysm, not on any AP/AC who presented to [facility] this AM with headache and nausea that began 2 days ago and acutely worsened while he was at work this morning. He also reports blurry vision and has had multiple episodes of emesis. Denies any inciting factors. CTH reveals diffuse subarachnoid hemorrhage throughout the basal cisterns, HH score 1. He was transferred to [facility] [place] for Neuro Critical Care and Neurosurgical management.     Post-Op Info:  * No surgery found *       Interval History: 3/11 minervaeon, exam stbale, moving drain to 20 today    Medications:  Continuous Infusions:      Scheduled Meds:   aspirin  81 mg Oral Daily    ceFAZolin (Ancef) IV (PEDS and ADULTS)  2 g Intravenous Q8H    gabapentin  300 mg Oral Q8H    heparin (porcine)  5,000 Units Subcutaneous Q8H    melatonin  6 mg Oral Nightly    niMODipine  30 mg Oral Q2H    polyethylene glycol  17 g Oral Daily    senna-docusate 8.6-50 mg  1 tablet Oral BID    ticagrelor  90 mg Oral BID     PRN Meds:acetaminophen, bisacodyL, hydrOXYzine HCL, labetalol, magnesium oxide, magnesium oxide, methocarbamoL, metoclopramide, midazolam, ondansetron, oxyCODONE, potassium bicarbonate, potassium bicarbonate, potassium bicarbonate, potassium, sodium phosphates, potassium, sodium phosphates, potassium, sodium phosphates     Review of Systems  Objective:     Weight: 106.6 kg (235 lb)  Body mass index is 35.73 kg/m².  Vital Signs (Most Recent):  Temp: 98 °F (36.7 °C) (03/11/24 0705)  Pulse: 71 (03/11/24 0805)  Resp: 18 (03/11/24 0805)  BP: (!) 89/53 (03/11/24 0805)  SpO2: 98 % (03/11/24 0805) Vital Signs (24h Range):  Temp:  [97.5 °F (36.4 °C)-98.1 °F (36.7 °C)] 98 °F (36.7 °C)  Pulse:  [71-98] 71  Resp:  [16-28] 18  SpO2:  [95 %-100 %] 98 %  BP: ()/(44-76) 89/53     Date 03/11/24 0700 - 03/12/24 0659  "  Shift 3013-2111 4483-9663 0284-4842 24 Hour Total   INTAKE   IV Piggyback 333.3   333.3   Shift Total(mL/kg) 333.3(3.1)   333.3(3.1)   OUTPUT   Urine(mL/kg/hr) 400   400   Drains 17   17   Shift Total(mL/kg) 417(3.9)   417(3.9)   Weight (kg) 106.6 106.6 106.6 106.6                            ICP/Ventriculostomy 03/05/24 1143 Right Parietal region (Active)   Level of Ventriculostomy (cm above) 10 03/09/24 0701   Status Open to drainage 03/10/24 0701   Site Assessment Clean;Dry 03/10/24 0701   Site Drainage No drainage 03/10/24 0701   Waveform normal waveform 03/09/24 0701   Output (mL) 11 mL 03/10/24 0604   CSF Color red;clear 03/10/24 0701   Dressing Status Clean;Dry;Intact 03/10/24 0701   Interventions HOB degrees;bed controls locked 03/10/24 0701         Physical Exam         Neurosurgery Physical Exam  GCS 15  Awake, alert  No drift  No dysmetria  5/5 strength    Gen: well nourished, normocephalic, atraumatic  CV: skin warm and dry, distal pulses present  Pulm: chest rise symmetric, no increased work of breathing  GI: abdomen soft, non-distended, non-tender  : patient voiding independently  MSK: no bony abnormalities noted  Psych: patient interacting with appropriate mood and affect       Significant Labs:  Recent Labs   Lab 03/10/24  0449 03/11/24  0357    106   * 131*   K 4.2 4.2    100   CO2 21* 23   BUN 10 10   CREATININE 0.8 1.0   CALCIUM 9.6 9.3   MG 1.8 1.7       Recent Labs   Lab 03/10/24  0449 03/11/24  0357   WBC 9.35 10.46   HGB 16.4 15.8   HCT 48.0 45.9    262       No results for input(s): "LABPT", "INR", "APTT" in the last 48 hours.  Microbiology Results (last 7 days)       ** No results found for the last 168 hours. **          All pertinent labs from the last 24 hours have been reviewed.    Significant Diagnostics:  I have reviewed all pertinent imaging results/findings within the past 24 hours.  Assessment/Plan:     * Subarachnoid hemorrhage due to ruptured " aneurysm  Kalia Pacheco is a 34yo man with history of known CHESTER aneurysm who presented to [place] with headache and nausea. CTH demonstrated diffuse SAH throughout the basal cisterns HH and he was transferred to [facility] [place] for close monitoring in the Neuro ICU and Neurosurgical consultation.     R frontal EVD was placed on arrival on 3/6 and patient was taken to IR for angiogram, with no intervention upon Acomm aneurysm. Upon further review of DSA, a vertebral artery aneurysm was noted and patient was brought back to IR for a right V4/PICA irregular aneurysm stent assisted coiling.     Imaging reviewed:  CTH 3/5: Diffuse basal cistern SAH with IVH   CTA 3/5: 3mm ant projecting L Acomm, no R A1, R pcomm feeding posterior circulation   DSA 3/5: EVD in position, anteriosuperiorly projecting small L Acomm, R V4 irregular aneurysm not well visualized    DSA +embo 3/5: stent assisted coiling of R V4/PICA irregularly shaped anuerysm, 1/4 lobules still filling     - Admit to NCC with q1 neuro checks  - R EVD at 20 today, please record ICP and output hourly, call NSGY if ICP sustained >25  - Keppra 500 BID x7d   - TCDs daily x14d   - Nimotop 60q4 x 21d   - strict I/Os, goal euvolemia to net positive for SAH patients in general, in this patient may need to be more conservative due to cardiac hx, management per NCC   - continue ASA/brilnta per neuro IR  - based on bleed pattern believe vertebral aneurysm to be the ruptured aneurysm, no plan for intervention upon Acomm at this point  -MRI with contrast not suggestive of vessel wall enhancement to Acomm  - Continue to follow clinically and notify NSGY immediately if any neurostatus change     Case and plan discussed with attending Neurosurgeon Dr. Saroj Cooley MD  Neurosurgery  [facility]

## 2024-03-11 NOTE — ASSESSMENT & PLAN NOTE
Kalia Pacheco is a 34yo man with history of known CHESTER aneurysm who presented to [facility] with headache and nausea. CTH demonstrated diffuse SAH throughout the basal cisterns HH and he was transferred to [facility] for close monitoring in the Neuro ICU and Neurosurgical consultation.     R frontal EVD was placed on arrival on 3/6 and patient was taken to IR for angiogram, with no intervention upon Acomm aneurysm. Upon further review of DSA, a vertebral artery aneurysm was noted and patient was brought back to IR for a right V4/PICA irregular aneurysm stent assisted coiling.     Imaging reviewed:  CTH 3/5: Diffuse basal cistern SAH with IVH   CTA 3/5: 3mm ant projecting L Acomm, no R A1, R pcomm feeding posterior circulation   DSA 3/5: EVD in position, anteriosuperiorly projecting small L Acomm, R V4 irregular aneurysm not well visualized    DSA +embo 3/5: stent assisted coiling of R V4/PICA irregularly shaped anuerysm, 1/4 lobules still filling     - Admit to NCC with q1 neuro checks  - R EVD at 20 today, please record ICP and output hourly, call NSGY if ICP sustained >25  - Keppra 500 BID x7d   - TCDs daily x14d   - Nimotop 60q4 x 21d   - strict I/Os, goal euvolemia to net positive for SAH patients in general, in this patient may need to be more conservative due to cardiac hx, management per NCC   - continue ASA/brilnta per neuro IR  - based on bleed pattern believe vertebral aneurysm to be the ruptured aneurysm, no plan for intervention upon Acomm at this point  -MRI with contrast not suggestive of vessel wall enhancement to Acomm  - Continue to follow clinically and notify NSGY immediately if any neurostatus change     Case and plan discussed with attending Neurosurgeon Dr. Kyle

## 2024-03-11 NOTE — SUBJECTIVE & OBJECTIVE
Review of Systems   Unable to obtain a complete ROS due to level of consciousness.  Objective:     Vitals:  Temp: 98.2 °F (36.8 °C)  Pulse: 76  Rhythm: normal sinus rhythm  BP: 136/64  MAP (mmHg): 92  ICP Mean (mmHg): 13 mmHg  Resp: 16  SpO2: 98 %    Temp  Min: 97.6 °F (36.4 °C)  Max: 98.2 °F (36.8 °C)  Pulse  Min: 71  Max: 98  BP  Min: 74/44  Max: 157/55  MAP (mmHg)  Min: 54  Max: 111  ICP Mean (mmHg)  Min: 3 mmHg  Max: 15 mmHg  Resp  Min: 16  Max: 28  SpO2  Min: 95 %  Max: 100 %    03/10 0701 - 03/11 0700  In: 2228.8 [P.O.:2025]  Out: 2514 [Urine:2275; Drains:239]   Unmeasured Output  Urine Occurrence: 1  Stool Occurrence: 0  Emesis Occurrence: 1        Physical Exam  GA: Alert, comfortable, no acute distress.   HEENT: No scleral icterus or JVD.   Pulmonary: Clear to auscultation A/P/L. No wheezing, crackles, or rhonchi.  Cardiac: RRR S1 & S2 w/o rubs/murmurs/gallops.   Abdominal: Bowel sounds present x 4. No appreciable hepatosplenomegaly.  Skin: No jaundice, rashes, or visible lesions.  Neuro:  --GCS: E4 V5 M6  --Mental Status:  awake, oriented X4, follows commands, fluent speech. No drift in the arms  --CN II-XII grossly intact.   --Pupils 3mm, PERRL.   --Corneal reflex, gag, cough intact.  --ENCARNACION spont       Medications:  Continuous Scheduledaspirin, 81 mg, Daily  ceFAZolin (Ancef) IV (PEDS and ADULTS), 2 g, Q8H  gabapentin, 300 mg, Q8H  heparin (porcine), 5,000 Units, Q8H  melatonin, 6 mg, Nightly  niMODipine, 30 mg, Q2H  polyethylene glycol, 17 g, Daily  senna-docusate 8.6-50 mg, 1 tablet, BID  ticagrelor, 90 mg, BID    PRNacetaminophen, 650 mg, Q6H PRN  bisacodyL, 10 mg, Daily PRN  hydrOXYzine HCL, 25 mg, Nightly PRN  labetalol, 10 mg, Q15 Min PRN  magnesium oxide, 800 mg, PRN  magnesium oxide, 800 mg, PRN  methocarbamoL, 500 mg, QID PRN  metoclopramide, 10 mg, Q6H PRN  ondansetron, 4 mg, Q6H PRN  oxyCODONE, 5 mg, Q6H PRN  potassium bicarbonate, 35 mEq, PRN  potassium bicarbonate, 50 mEq, PRN  potassium  bicarbonate, 60 mEq, PRN  potassium, sodium phosphates, 2 packet, PRN  potassium, sodium phosphates, 2 packet, PRN  potassium, sodium phosphates, 2 packet, PRN      Today I personally reviewed pertinent medications, lines/drains/airways, imaging, cardiology results, laboratory results, microbiology results,   Diet  Diet Adult Regular (IDDSI Level 7) Thin; Standard Tray

## 2024-03-11 NOTE — ASSESSMENT & PLAN NOTE
33-year-old male with a suspected A-comm aneurysm rupture complicated by intraventricular hemorrhage status post EVD placement and angiography with unsuccessful vessel was intubated and sedated, likely pending surgical intervention.    - Continued admission to [facility]  - MRA reviewed  - EVD open @ 15, nsgy following  - q1h neuro checks  - Nimodipine 30mg q2h  - Daily TCDs  - Cardene and Norepi as needed for tight BP control, can restart home BP meds as needed  - TTE with reduced EF (40-45%), significantly dilated LA, and mild aortic regurgitation  - Strict I & O  - Euvolemia  - SQH for DVT ppx  - EVD per NSGY  - zofran PRN for vomiting  - DAPT, although PRU not therapeutic - brillinta in lieu of plavix per vascular neurology recs  - PT/OT following  - Na goal: EuNa  - nightly melatonin  3/11/2024 Discussed with neuro IR- aneurysm secured- SBP <220  TCD no vasospasm  Na trending down - no free water intake, urine studies sent

## 2024-03-11 NOTE — PLAN OF CARE
Problem: SLP  Goal: SLP Goal  Description: Speech Language Pathology Goals  Goals expected to be met by 3/14:  1. Pt will recall 3/3 novel items after a 2 minute delay given mod cues.   2. Pt will complete moderate to complex problem solving tasks with 80% accuracy given min cues.   3. Pt will complete mental manipulation tasks with 80% accuracy.   4. Pt will participate in evaluation of reading, writing, visual spatial, and functional math abilities.           Outcome: Met    Pt meeting tx goals and appearing to have returned to cognitive baseline.   No further skilled SLP services warranted at this time.

## 2024-03-11 NOTE — SUBJECTIVE & OBJECTIVE
Neurologic Chief Complaint: SAH    Subjective:     Interval History: Patient is seen for follow-up neurological assessment and treatment recommendations:   NAEO, neuro exam stable. EVD still in, drain moved to 20 today. TCDs unremarkable thus far. Hyponatremic with Na 132, urine studies ordered.     HPI, Past Medical, Family, and Social History remains the same as documented in the initial encounter.     Review of Systems   Constitutional:  Negative for fever.   HENT:  Negative for trouble swallowing.    Eyes:  Negative for photophobia.   Respiratory:  Negative for shortness of breath.    Cardiovascular:  Negative for chest pain.   Gastrointestinal:  Negative for abdominal pain.   Musculoskeletal:  Negative for neck pain.   Neurological:  Negative for dizziness, speech difficulty, light-headedness, numbness and headaches.     Scheduled Meds:   aspirin  81 mg Oral Daily    ceFAZolin (Ancef) IV (PEDS and ADULTS)  2 g Intravenous Q8H    gabapentin  300 mg Oral Q8H    heparin (porcine)  5,000 Units Subcutaneous Q8H    melatonin  6 mg Oral Nightly    niMODipine  30 mg Oral Q2H    polyethylene glycol  17 g Oral Daily    senna-docusate 8.6-50 mg  1 tablet Oral BID    ticagrelor  90 mg Oral BID     Continuous Infusions:      PRN Meds:acetaminophen, bisacodyL, hydrOXYzine HCL, labetalol, magnesium oxide, magnesium oxide, methocarbamoL, metoclopramide, ondansetron, oxyCODONE, potassium bicarbonate, potassium bicarbonate, potassium bicarbonate, potassium, sodium phosphates, potassium, sodium phosphates, potassium, sodium phosphates    Objective:     Vital Signs (Most Recent):  Temp: 98.3 °F (36.8 °C) (03/11/24 1505)  Pulse: 77 (03/11/24 1505)  Resp: (!) 25 (03/11/24 1505)  BP: (!) 155/67 (03/11/24 1505)  SpO2: 100 % (03/11/24 1505)  BP Location: Left arm    Vital Signs Range (Last 24H):  Temp:  [97.8 °F (36.6 °C)-98.3 °F (36.8 °C)]   Pulse:  [71-98]   Resp:  [16-28]   BP: ()/(44-95)   SpO2:  [95 %-100 %]   BP Location:  Left arm       Physical Exam  Vitals and nursing note reviewed.   Constitutional:       Appearance: Normal appearance.   HENT:      Head: Normocephalic.      Comments: EVD in place     Nose: Nose normal.      Mouth/Throat:      Mouth: Mucous membranes are moist.   Eyes:      Extraocular Movements: Extraocular movements intact.      Pupils: Pupils are equal, round, and reactive to light.   Cardiovascular:      Rate and Rhythm: Normal rate and regular rhythm.   Pulmonary:      Effort: Pulmonary effort is normal. No respiratory distress.   Abdominal:      General: Abdomen is flat.      Palpations: Abdomen is soft.   Musculoskeletal:      Cervical back: No rigidity or tenderness.      Right lower leg: No edema.      Left lower leg: No edema.   Skin:     General: Skin is warm and dry.      Capillary Refill: Capillary refill takes less than 2 seconds.      Findings: No rash.   Neurological:      General: No focal deficit present.      Mental Status: He is alert and oriented to person, place, and time.      Comments: Chronic LLE weakness   Psychiatric:         Mood and Affect: Mood normal.         Behavior: Behavior normal.              Neurological Exam:   LOC: alert  Attention Span: Good   Language: No aphasia  Orientation: Person, Place, Time   Visual Fields: Full  EOM (CN III, IV, VI): Full/intact  Pupils (CN II, III): PERRL  Facial Sensation (CN V): Normal  Facial Movement (CN VII): Symmetric facial expression    Motor: Arm left  Normal 5/5  Leg left  Normal 4/5  Arm right  Normal 5/5  Leg right Normal 5/5  Sensation: Intact to light touch, temperature and vibration    Laboratory:  CMP:   Recent Labs   Lab 03/11/24  0357 03/11/24  1158   CALCIUM 9.3  --    ALBUMIN 3.4*  --    PROT 6.9  --    * 132*   K 4.2  --    CO2 23  --      --    BUN 10  --    CREATININE 1.0  --    ALKPHOS 43*  --    ALT 10  --    AST 15  --    BILITOT 0.9  --        CBC:   Recent Labs   Lab 03/11/24  0357   WBC 10.46   RBC 4.97   HGB  15.8   HCT 45.9      MCV 92   MCH 31.8*   MCHC 34.4         Diagnostic Results     Brain Imaging   CTH 3/5/23:   Impression:  Subarachnoid hemorrhage with blood throughout the basilar cisterns.  Moderate intraventricular blood and mild ventricular prominence.       Vessel Imaging   MRA Brain - 03/08/2024  Impression:  Examination mildly degraded by patient motion artifact.  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  No abnormal vessel wall enhancement.  No residual flow related signal within the recently treated right PICA origin aneurysm.  Prominent enhancement at this site on vessel wall imaging.  This is nonspecific in the post treatment setting but aneurysm is presumed site of rupture given distribution of hemorrhage.  Retrograde filling of the left vertebral artery, patient with known subclavian steal.    CTA head/neck 3/5/23:   Impression:  3 mm left anterior communicating artery aneurysm again identified.  No definite additional aneurysm identified concerning for source of hemorrhage.  Clinical correlation and correlation with conventional angiography recommended. 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 Interval increase distension lateral and 3rd ventricles compatible with developing hydrocephalus. Crowding cerebral sulci at the vertex with additional crowding basilar cisterns with questionable early cerebellar tonsillar herniation.    IR Angio 3/5/24:    Impression:  Anterior communicating artery aneurysm 2 x 1.7 mm with a wide neck. Small irregular aneurysm from the right V 4 vertebral segment just proximal to the posteroinferior cerebellar artery.  This is not well characterized on these views.  Repeat selective angiography is planned for better evaluation.      Cardiac Imaging   TTE 3/8/24:     Left Ventricle: The left ventricle is normal in size. Normal wall thickness. Global hypokinesis present. There is mildly reduced systolic function with a visually estimated ejection fraction of 40 - 45%. There is indeterminate diastolic function.    Right Ventricle: Normal right ventricular cavity size. Wall thickness is normal. Right ventricle wall motion  is normal. Systolic function is normal.    Left Atrium: Left atrium is severely dilated.    Aortic Valve: The valve morphology is clearly abnormal though the number of leaflets is not evident.  There is severe aortic valve stenosis. Aortic valve area by VTI is 0.8 cm². Aortic valve peak velocity is 4.5 m/s. Mean gradient is 55 mmHg. The dimensionless index is 0.17. There is mild aortic regurgitation.    IVC/SVC: Normal venous pressure at 3 mmHg.

## 2024-03-11 NOTE — PLAN OF CARE
[facility] Care Plan    POC reviewed with Kalia Juan Carlos Pacheco and family at 1400. Pt verbalized understanding. Questions and concerns addressed. No acute events today. Pt progressing toward goals. Will continue to monitor. See below and flowsheets for full assessment and VS info.      - EVD open at 15, ICP 1-12, output 0-13   - Robaxin given X 1 for pain   - Tylenol given X 1 for pain          Is this a stroke patient? no    Neuro:  Irving Coma Scale  Best Eye Response: 4-->(E4) spontaneous  Best Motor Response: 6-->(M6) obeys commands  Best Verbal Response: 5-->(V5) oriented  Irving Coma Scale Score: 15  Assessment Qualifiers: patient not sedated/intubated  Pupil PERRLA: yes     24 hr Temp:  [97.8 °F (36.6 °C)-98.3 °F (36.8 °C)]     CV:   Rhythm: normal sinus rhythm  BP goals:   SBP < 220  MAP > 65    Resp:      Vent Mode: Spont  Set Rate: 16 BPM  Oxygen Concentration (%): 40  Vt Set: 500 mL  PEEP/CPAP: 5 cmH20  Pressure Support: 8 cmH20    Plan: N/A    GI/:     Diet/Nutrition Received: regular  Last Bowel Movement: 03/07/24       Intake/Output Summary (Last 24 hours) at 3/11/2024 1824  Last data filed at 3/11/2024 1805  Gross per 24 hour   Intake 1645.58 ml   Output 2804 ml   Net -1158.42 ml     Unmeasured Output  Urine Occurrence: 1  Stool Occurrence: 0  Emesis Occurrence: 1    Labs/Accuchecks:  Recent Labs   Lab 03/11/24  0357   WBC 10.46   RBC 4.97   HGB 15.8   HCT 45.9         Recent Labs   Lab 03/11/24  0357 03/11/24  1158   * 132*   K 4.2  --    CO2 23  --      --    BUN 10  --    CREATININE 1.0  --    ALKPHOS 43*  --    ALT 10  --    AST 15  --    BILITOT 0.9  --       Recent Labs   Lab 03/05/24  1104   INR 1.1   APTT 24.2      Recent Labs   Lab 03/05/24  0831   TROPONINI 0.070       Electrolytes: No replacement orders  Accuchecks: none    Gtts:      LDA/Wounds:      Nurses Note -- 4 Eyes      Is there altered skin present? yes   Please check the following boxes that apply:   [x] LDA Added  if Not in Epic (Describe Wound)   [] New Altered Skin Integrity was Present on Admit and Documented in LDA   [] Wound Image Taken    Wound Care Consulted? No    Second RN/Staff Member:      Restraints:   Restraint Order  Length of Order: Order good for next 24 hours or when removed.  Date that the current order will : 24  Time that the current order will : 152  Order Upon Application: Yes    [facility]

## 2024-03-11 NOTE — SUBJECTIVE & OBJECTIVE
Interval History: 3/11 naeon, exam stbale, moving drain to 20 today    Medications:  Continuous Infusions:      Scheduled Meds:   aspirin  81 mg Oral Daily    ceFAZolin (Ancef) IV (PEDS and ADULTS)  2 g Intravenous Q8H    gabapentin  300 mg Oral Q8H    heparin (porcine)  5,000 Units Subcutaneous Q8H    melatonin  6 mg Oral Nightly    niMODipine  30 mg Oral Q2H    polyethylene glycol  17 g Oral Daily    senna-docusate 8.6-50 mg  1 tablet Oral BID    ticagrelor  90 mg Oral BID     PRN Meds:acetaminophen, bisacodyL, hydrOXYzine HCL, labetalol, magnesium oxide, magnesium oxide, methocarbamoL, metoclopramide, midazolam, ondansetron, oxyCODONE, potassium bicarbonate, potassium bicarbonate, potassium bicarbonate, potassium, sodium phosphates, potassium, sodium phosphates, potassium, sodium phosphates     Review of Systems  Objective:     Weight: 106.6 kg (235 lb)  Body mass index is 35.73 kg/m².  Vital Signs (Most Recent):  Temp: 98 °F (36.7 °C) (03/11/24 0705)  Pulse: 71 (03/11/24 0805)  Resp: 18 (03/11/24 0805)  BP: (!) 89/53 (03/11/24 0805)  SpO2: 98 % (03/11/24 0805) Vital Signs (24h Range):  Temp:  [97.5 °F (36.4 °C)-98.1 °F (36.7 °C)] 98 °F (36.7 °C)  Pulse:  [71-98] 71  Resp:  [16-28] 18  SpO2:  [95 %-100 %] 98 %  BP: ()/(44-76) 89/53     Date 03/11/24 0700 - 03/12/24 0659   Shift 4290-7069 8081-5489 1518-1103 24 Hour Total   INTAKE   IV Piggyback 333.3   333.3   Shift Total(mL/kg) 333.3(3.1)   333.3(3.1)   OUTPUT   Urine(mL/kg/hr) 400   400   Drains 17   17   Shift Total(mL/kg) 417(3.9)   417(3.9)   Weight (kg) 106.6 106.6 106.6 106.6                            ICP/Ventriculostomy 03/05/24 1143 Right Parietal region (Active)   Level of Ventriculostomy (cm above) 10 03/09/24 0701   Status Open to drainage 03/10/24 0701   Site Assessment Clean;Dry 03/10/24 0701   Site Drainage No drainage 03/10/24 0701   Waveform normal waveform 03/09/24 0701   Output (mL) 11 mL 03/10/24 0604   CSF Color red;clear 03/10/24 0701  "  Dressing Status Clean;Dry;Intact 03/10/24 0701   Interventions HOB degrees;bed controls locked 03/10/24 0701          Physical Exam         Neurosurgery Physical Exam  GCS 15  Awake, alert  No drift  No dysmetria  5/5 strength    Gen: well nourished, normocephalic, atraumatic  CV: skin warm and dry, distal pulses present  Pulm: chest rise symmetric, no increased work of breathing  GI: abdomen soft, non-distended, non-tender  : patient voiding independently  MSK: no bony abnormalities noted  Psych: patient interacting with appropriate mood and affect       Significant Labs:  Recent Labs   Lab 03/10/24  0449 03/11/24  0357    106   * 131*   K 4.2 4.2    100   CO2 21* 23   BUN 10 10   CREATININE 0.8 1.0   CALCIUM 9.6 9.3   MG 1.8 1.7       Recent Labs   Lab 03/10/24  0449 03/11/24  0357   WBC 9.35 10.46   HGB 16.4 15.8   HCT 48.0 45.9    262       No results for input(s): "LABPT", "INR", "APTT" in the last 48 hours.  Microbiology Results (last 7 days)       ** No results found for the last 168 hours. **          All pertinent labs from the last 24 hours have been reviewed.    Significant Diagnostics:  I have reviewed all pertinent imaging results/findings within the past 24 hours.  "

## 2024-03-11 NOTE — PROGRESS NOTES
[facility] Neuro Critical Care  Vascular Neurology  Comprehensive Stroke [facility]  Progress Note    Assessment/Plan:     * Subarachnoid hemorrhage due to ruptured aneurysm  33yoM with PMH of HTN, coarctation of the aorta (s/p repair), PDA, VSD s/p repair, ADHD, EtOH use/abuse, arthritis, legg-perthes disease, ICH 5/2022, known CHESTER aneurysm that was [place] [facility] [place] level of care of SAH (HH1). He presented to OSH with HA and nausea. CTH showed diffuse SAH throughout basal cisterns. At C neuro exam stable with NIHSS 1. CTA shows 3mm L acomm aneurysm, hypoplastic R A1, persistent fetal circulation, as well as evolving SAH/ICH now with concern for developing hydrocephalus and questionable early cerebellar tonsillar herniation. NSGY consulted, EVD placed. Taken to IR for DSA, no intervention of acomm aneurysm at that time. 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 Suspect etiology of SAH likely due to vertebral artery aneurysm.     NAEO, neuro exam stable. EVD still in, drain moved to 20 today. TCDs unremarkable thus far. Hyponatremic with Na 132, urine studies ordered.       Antithrombotics for secondary stroke prevention: Antiplatelets: Aspirin: 81 mg daily and Brilinta 90mg BID (plavix switched to brilinta due to plavix non-responder on PRU assay)    Statins for secondary stroke prevention and hyperlipidemia, if present: Statins: Atorvastatin- 40 mg daily    Aggressive risk factor modification: HTN     Rehab efforts: The patient has been evaluated by a stroke team provider and the therapy needs have been fully considered based off the presenting complaints and exam findings. The following therapy evaluations are needed: PT evaluate and treat, OT evaluate and treat, SLP evaluate and treat, PM&R evaluate for appropriate placement    Diagnostics ordered/pending: Daily TCDs per SAH protocol    VTE  prophylaxis: Mechanical prophylaxis: Place SCDs    BP parameters: SAH: Secured aneurysm, no target, increase BP to prevent vasospasm if needed       IVH (intraventricular hemorrhage)  See principle problem    Communicating hydrocephalus  EVD placed by NSGY on 3/5.  Maintained by [facility].    Anterior communicating artery aneurysm  Known hx of  Anterior communicating artery aneurysm 2 x 1.7 mm with a wide neck.  Stable.    Hypertension  Stroke Risk Factor  SBP <220 given aneurysm now secured, MAP >65  PRN labetalol         03/06/2024: Patient s/p EVD placement by NSGY secondary to hydrocephalus, s/p DSA with identification of  right vertebral artery aneurysm and confirmed stable left anterior communicating artery aneurysm, and s/p stent and coil of vertebral artery aneurysm. Patient extubated this morning, pending SLP eval. Patient complains of headache, but was improved with 5ml drained from EVD per nursing staff. Neuro exam stable. Pending ECHO.   03/07/24: Patient stable on SAH pathway with daily TCDs and nimodipine. Exam non-focal with EVD @10 with 250ml out in last 24hrs. Awaiting MRA at this time. On DAPT with subtherapeutic PRU with possible pending switch to brillinta ameliorate.  03/09/2024: No acute events overnight. Stable neuro exam (NIH 1 for chronic leg weakness). Remain on SAH pathway. EVD in place. MRA Brain with no vessel wall enhancement. DAPT switched to ASA and brilinta for stents; patient tolerating well.   03/10/2024 NAEO, neuro exam stable. EVD remains in place and open. No current complaints/concerns. NSGY following.  03/11/2024 NAEO, neuro exam stable. EVD still in, drain moved to 20 today. TCDs unremarkable thus far. Hyponatremic with Na 132, urine studies ordered.       STROKE DOCUMENTATION   Acute Stroke Times   Last Known Normal Date: 03/05/24  Unknown Normal Time: Unknown Time  Symptom Onset Date: 03/05/24  Unknown Symptom Onset Time: Unknown Time  Thrombolytic Therapy Recommended:  No  Thrombectomy Recommended: No    NIH Scale:  1a. Level of Consciousness: 0-->Alert, keenly responsive  1b. LOC Questions: 0-->Answers both questions correctly  1c. LOC Commands: 0-->Performs both tasks correctly  2. Best Gaze: 0-->Normal  3. Visual: 0-->No visual loss  4. Facial Palsy: 0-->Normal symmetrical movements  5a. Motor Arm, Left: 0-->No drift, limb holds 90 (or 45) degrees for full 10 secs  5b. Motor Arm, Right: 0-->No drift, limb holds 90 (or 45) degrees for full 10 secs  6a. Motor Leg, Left: 1-->Drift, leg falls by the end of the 5-sec period but does not hit bed  6b. Motor Leg, Right: 0-->No drift, leg holds 30 degree position for full 5 secs  7. Limb Ataxia: 0-->Absent  8. Sensory: 0-->Normal, no sensory loss  9. Best Language: 0-->No aphasia, normal  10. Dysarthria: 0-->Normal  11. Extinction and Inattention (formerly Neglect): 0-->No abnormality  Total (NIH Stroke Scale): 1       Modified Hooker Score: 0  Allen Coma Scale:    ABCD2 Score:    [id number] Score:   HAS -BLED Score:   ICH Score:   Hunt & Hill Classification:Grade I     Hemorrhagic change of an Ischemic Stroke: Does this patient have an ischemic stroke with hemorrhagic changes? No     Neurologic Chief Complaint: SAH    Subjective:     Interval History: Patient is seen for follow-up neurological assessment and treatment recommendations:   NAEO, neuro exam stable. EVD still in, drain moved to 20 today. TCDs unremarkable thus far. Hyponatremic with Na 132, urine studies ordered.     HPI, Past Medical, Family, and Social History remains the same as documented in the initial encounter.     Review of Systems   Constitutional:  Negative for fever.   HENT:  Negative for trouble swallowing.    Eyes:  Negative for photophobia.   Respiratory:  Negative for shortness of breath.    Cardiovascular:  Negative for chest pain.   Gastrointestinal:  Negative for abdominal pain.   Musculoskeletal:  Negative for neck pain.   Neurological:  Negative for  dizziness, speech difficulty, light-headedness, numbness and headaches.     Scheduled Meds:   aspirin  81 mg Oral Daily    ceFAZolin (Ancef) IV (PEDS and ADULTS)  2 g Intravenous Q8H    gabapentin  300 mg Oral Q8H    heparin (porcine)  5,000 Units Subcutaneous Q8H    melatonin  6 mg Oral Nightly    niMODipine  30 mg Oral Q2H    polyethylene glycol  17 g Oral Daily    senna-docusate 8.6-50 mg  1 tablet Oral BID    ticagrelor  90 mg Oral BID     Continuous Infusions:      PRN Meds:acetaminophen, bisacodyL, hydrOXYzine HCL, labetalol, magnesium oxide, magnesium oxide, methocarbamoL, metoclopramide, ondansetron, oxyCODONE, potassium bicarbonate, potassium bicarbonate, potassium bicarbonate, potassium, sodium phosphates, potassium, sodium phosphates, potassium, sodium phosphates    Objective:     Vital Signs (Most Recent):  Temp: 98.3 °F (36.8 °C) (03/11/24 1505)  Pulse: 77 (03/11/24 1505)  Resp: (!) 25 (03/11/24 1505)  BP: (!) 155/67 (03/11/24 1505)  SpO2: 100 % (03/11/24 1505)  BP Location: Left arm    Vital Signs Range (Last 24H):  Temp:  [97.8 °F (36.6 °C)-98.3 °F (36.8 °C)]   Pulse:  [71-98]   Resp:  [16-28]   BP: ()/(44-95)   SpO2:  [95 %-100 %]   BP Location: Left arm       Physical Exam  Vitals and nursing note reviewed.   Constitutional:       Appearance: Normal appearance.   HENT:      Head: Normocephalic.      Comments: EVD in place     Nose: Nose normal.      Mouth/Throat:      Mouth: Mucous membranes are moist.   Eyes:      Extraocular Movements: Extraocular movements intact.      Pupils: Pupils are equal, round, and reactive to light.   Cardiovascular:      Rate and Rhythm: Normal rate and regular rhythm.   Pulmonary:      Effort: Pulmonary effort is normal. No respiratory distress.   Abdominal:      General: Abdomen is flat.      Palpations: Abdomen is soft.   Musculoskeletal:      Cervical back: No rigidity or tenderness.      Right lower leg: No edema.      Left lower leg: No edema.   Skin:      General: Skin is warm and dry.      Capillary Refill: Capillary refill takes less than 2 seconds.      Findings: No rash.   Neurological:      General: No focal deficit present.      Mental Status: He is alert and oriented to person, place, and time.      Comments: Chronic LLE weakness   Psychiatric:         Mood and Affect: Mood normal.         Behavior: Behavior normal.              Neurological Exam:   LOC: alert  Attention Span: Good   Language: No aphasia  Orientation: Person, Place, Time   Visual Fields: Full  EOM (CN III, IV, VI): Full/intact  Pupils (CN II, III): PERRL  Facial Sensation (CN V): Normal  Facial Movement (CN VII): Symmetric facial expression    Motor: Arm left  Normal 5/5  Leg left  Normal 4/5  Arm right  Normal 5/5  Leg right Normal 5/5  Sensation: Intact to light touch, temperature and vibration    Laboratory:  CMP:   Recent Labs   Lab 03/11/24  0357 03/11/24  1158   CALCIUM 9.3  --    ALBUMIN 3.4*  --    PROT 6.9  --    * 132*   K 4.2  --    CO2 23  --      --    BUN 10  --    CREATININE 1.0  --    ALKPHOS 43*  --    ALT 10  --    AST 15  --    BILITOT 0.9  --        CBC:   Recent Labs   Lab 03/11/24  0357   WBC 10.46   RBC 4.97   HGB 15.8   HCT 45.9      MCV 92   MCH 31.8*   MCHC 34.4         Diagnostic Results     Brain Imaging   [facility] 3/5/23:   Impression:  Subarachnoid hemorrhage with blood throughout the basilar cisterns.  Moderate intraventricular blood and mild ventricular prominence.       Vessel Imaging   MRA Brain - 03/08/2024  Impression:  Examination mildly degraded by patient motion artifact.  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  No abnormal vessel wall enhancement.  No residual flow related signal within the recently treated right PICA origin aneurysm.  Prominent enhancement at this site on vessel wall imaging.  This is nonspecific in the post treatment setting but aneurysm is presumed site of rupture given distribution of  hemorrhage.  Retrograde filling of the left vertebral artery, patient with known subclavian steal.    CTA head/neck 3/5/23:   Impression:  3 mm left anterior communicating artery aneurysm again identified.  No definite additional aneurysm identified concerning for source of hemorrhage.  Clinical correlation and correlation with conventional angiography recommended. 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 Interval increase distension lateral and 3rd ventricles compatible with developing hydrocephalus. Crowding cerebral sulci at the vertex with additional crowding basilar cisterns with questionable early cerebellar tonsillar herniation.    IR Angio 3/5/24:   Impression:  Anterior communicating artery aneurysm 2 x 1.7 mm with a wide neck. Small irregular aneurysm from the right V 4 vertebral segment just proximal to the posteroinferior cerebellar artery.  This is not well characterized on these views.  Repeat selective angiography is planned for better evaluation.      Cardiac Imaging   TTE 3/8/24:     Left Ventricle: The left ventricle is normal in size. Normal wall thickness. Global hypokinesis present. There is mildly reduced systolic function with a visually estimated ejection fraction of 40 - 45%. There is indeterminate diastolic function.    Right Ventricle: Normal right ventricular cavity size. Wall thickness is normal. Right ventricle wall motion  is normal. Systolic function is normal.    Left  Atrium: Left atrium is severely dilated.    Aortic Valve: The valve morphology is clearly abnormal though the number of leaflets is not evident.  There is severe aortic valve stenosis. Aortic valve area by VTI is 0.8 cm². Aortic valve peak velocity is 4.5 m/s. Mean gradient is 55 mmHg. The dimensionless index is 0.17. There is mild aortic regurgitation.    IVC/SVC: Normal venous pressure at 3 mmHg.    Atiya Delgado PA-C  [facility]  Department of Vascular Neurology   [facility] - Neuro Critical Care

## 2024-03-11 NOTE — PT/OT/SLP PROGRESS
"Speech Language Pathology Treatment/Discharge    Patient Name:  Kalia Pacheco   MRN:  [id number]  Admitting Diagnosis: Subarachnoid hemorrhage due to ruptured aneurysm    Recommendations:                 General Recommendations:  Follow-up not indicated  Diet recommendations:  Regular Diet - IDDSI Level 7, Liquid Diet Level: Thin liquids - IDDSI Level 0   Aspiration Precautions: Monitor for s/s of aspiration and Standard aspiration precautions   General Precautions: Standard, fall (EVD drain)  Communication strategies:  none    Assessment:     Kalia Pacheco is a 33 y.o. male.   Mild cognitive impairments revealed at time of evaluation appear to have resolved. Pt appears to be at/near baseline for cognition.  No further skilled SLP services warranted at this time.     Subjective     "You're going to make me remember those 3 words!" Pt remembered SLP conducting delayed memory task during initial evaluation.     Pain/Comfort:  Pain Rating 1: 0/10    Respiratory Status: Room air    Objective:     Has the patient been evaluated by SLP for swallowing?   Yes  Keep patient NPO? No   Current Respiratory Status:        Pt seen for follow up to address cognitive tx goals.  Pt was able to recall 3/3 unrelated words after a>2 minute delay IND.  Reading skills at the paragraph level were assessed and found to be WFL/WNL. No visual scanning limitations evident.  Pt solved functional math word problems related to money and time on 5 out of 6 trials.  Pt feels his cognition has returned to baseline. SLP agrees that cognition is WFL/WNL and pt is appropriate to discharge from SLP services. Pt is encouraged to report any concerns for worsening cognition.  No further skilled SLP services warranted at this time.       Plan:     Plan of Care reviewed with:  patient, family   SLP Follow-Up:  No       Discharge recommendations:   (no further SLP needs at this time)     Time Tracking:     SLP Treatment Date:   03/11/24  Speech " Start Time:  1549  Speech Stop Time:  1601     Speech Total Time (min):  12 min    Billable Minutes: Speech Therapy Individual 12    03/11/2024

## 2024-03-11 NOTE — PT/OT/SLP PROGRESS
"Occupational Therapy   Treatment    Name: Kalia Pacheco  MRN: [id number]  Admitting Diagnosis:  Subarachnoid hemorrhage due to ruptured aneurysm       Recommendations:     Discharge Recommendations: No Therapy Indicated  Discharge Equipment Recommendations:  none  Barriers to discharge:  None    Assessment:     Kalia Pacheco is a 33 y.o. male with a medical diagnosis of Subarachnoid hemorrhage due to ruptured aneurysm.  He presents with performance deficits affecting function are weakness, abnormal tone, decreased ROM, impaired cognition, impaired endurance, impaired sensation, decreased coordination, decreased upper extremity function, impaired self care skills, impaired functional mobility, impaired balance.     Rehab Prognosis:  Good; patient would benefit from acute skilled OT services to address these deficits and reach maximum level of function.       Plan:     Patient to be seen 3 x/week to address the above listed problems via sensory integration, cognitive retraining, neuromuscular re-education, therapeutic exercises, therapeutic activities, self-care/home management  Plan of Care Expires: 04/03/24  Plan of Care Reviewed with: patient    Subjective     Patient:  "I had a headache all day yesterday; today I'm a lot better."  "That took a lot out of me."    Pain/Comfort:  Pain Rating 1: 8/10  Location:  Neck and low back  Addressed by gentle movement  Pain Rating Post-Intervention 1: 8/10    Objective:     Communicated with: Nurse prior to session.  Patient found supine with blood pressure cuff, pulse ox (continuous), telemetry, external ventricular drain upon OT entry to room.    General Precautions: Standard, aspiration, fall    Orthopedic Precautions:N/A  Braces: N/A  Respiratory Status: Room air     Occupational Performance:     Bed Mobility:    Patient completed Rolling/Turning to Left with  supervision  Patient completed Rolling/Turning to Right with supervision  Patient completed Supine to " Sit with supervision  Patient completed Sit to Supine with supervision     Functional Mobility/Transfers:  Patient completed Sit <> Stand Transfer with supervision  with  no assistive device   Patient completed Bed <> Chair Transfer using Stand Pivot technique with supervision with no assistive device    Activities of Daily Living:  Grooming: supervision    Upper Body Dressing: supervision    Lower Body Dressing: stand by assistance      [facility] 6 Click ADL: 18    Treatment & Education:  Patient alert and oriented x 3; able to follow 4/4 one step commands.  Patient attentive and interactive throughout the session.  Addressed standing ther ex: marching, heel raises, shoulder rolls, head turns.    Patient left supine with all lines intact, call button in reach, and bed alarm on    GOALS:   Multidisciplinary Problems       Occupational Therapy Goals          Problem: Occupational Therapy    Goal Priority Disciplines Outcome Interventions   Occupational Therapy Goal     OT, PT/OT Ongoing, Progressing    Description: Goals set 3/7 with an expiration date, 4/4:  Patient will increase functional independence with ADLs by performing:    Patient will demonstrate rolling to the right with modified independence.  Not met   Patient will demonstrate rolling to the left with modified independence.   Not met  Patient will demonstrate supine -sit with modified independence.   Not met  Patient will demonstrate stand pivot transfers with modified independence.   Not met  Patient will demonstrate grooming while standing with modified independence.   Not met  Patient will demonstrate upper body dressing with modified independence while seated EOB.   Not met  Patient will demonstrate lower body dressing with modified independence while seated EOB.   Not met  Patient will demonstrate toileting with modified independence.   Not met  Patient will demonstrate bathing while seated EOB with modified independence   Not met  Patient's family /  caregiver will demonstrate independence and safety with assisting patient with self-care skills and functional mobility.     Not met                           Time Tracking:     OT Date of Treatment: 03/08/24  OT Start Time: 0550  OT Stop Time: 0615  OT Total Time (min): 25 min    Billable Minutes:Self Care/Home Management 15  Therapeutic Activity 10    OT/ZULEYMA: OT          3/11/2024

## 2024-03-11 NOTE — PLAN OF CARE
[facility] - Neuro Critical Care  Discharge Reassessment    Primary Care Provider: Christiano Baldwin MD    Expected Discharge Date: 3/14/2024    Patient pending medical clearance for stepdown.    3/11/2024: no free water intake, send Urine studies, TCD no vasospasm, Follow Na, discussed with IR- aneurysm secured and now SBP <220     Reassessment (most recent)       Discharge Reassessment - 03/11/24 1520          Discharge Reassessment    Assessment Type Discharge Planning Reassessment     Did the patient's condition or plan change since previous assessment? Yes     Discharge Plan A Home with family;Home Health     Discharge Plan B Home with family     DME Needed Upon Discharge  --   TBD    Transition of Care Barriers Underinsured        Post-Acute Status    Coverage MEDICAID - [employer] [place] [place] -

## 2024-03-12 LAB
ALBUMIN SERPL BCP-MCNC: 2.9 G/DL (ref 3.5–5.2)
ALP SERPL-CCNC: 36 U/L (ref 55–135)
ALT SERPL W/O P-5'-P-CCNC: 6 U/L (ref 10–44)
ANION GAP SERPL CALC-SCNC: 6 MMOL/L (ref 8–16)
AST SERPL-CCNC: 14 U/L (ref 10–40)
BASOPHILS # BLD AUTO: 0.04 K/UL (ref 0–0.2)
BASOPHILS NFR BLD: 0.5 % (ref 0–1.9)
BILIRUB SERPL-MCNC: 0.7 MG/DL (ref 0.1–1)
BUN SERPL-MCNC: 9 MG/DL (ref 6–20)
CALCIUM SERPL-MCNC: 8.7 MG/DL (ref 8.7–10.5)
CHLORIDE SERPL-SCNC: 104 MMOL/L (ref 95–110)
CO2 SERPL-SCNC: 23 MMOL/L (ref 23–29)
CREAT SERPL-MCNC: 0.8 MG/DL (ref 0.5–1.4)
DIFFERENTIAL METHOD BLD: ABNORMAL
EOSINOPHIL # BLD AUTO: 0 K/UL (ref 0–0.5)
EOSINOPHIL NFR BLD: 0.1 % (ref 0–8)
ERYTHROCYTE [DISTWIDTH] IN BLOOD BY AUTOMATED COUNT: 12.7 % (ref 11.5–14.5)
EST. GFR  (NO RACE VARIABLE): >60 ML/MIN/1.73 M^2
GLUCOSE SERPL-MCNC: 105 MG/DL (ref 70–110)
HCT VFR BLD AUTO: 38.5 % (ref 40–54)
HGB BLD-MCNC: 13.2 G/DL (ref 14–18)
IMM GRANULOCYTES # BLD AUTO: 0.03 K/UL (ref 0–0.04)
IMM GRANULOCYTES NFR BLD AUTO: 0.4 % (ref 0–0.5)
LYMPHOCYTES # BLD AUTO: 1.4 K/UL (ref 1–4.8)
LYMPHOCYTES NFR BLD: 16.9 % (ref 18–48)
MAGNESIUM SERPL-MCNC: 1.7 MG/DL (ref 1.6–2.6)
MCH RBC QN AUTO: 32 PG (ref 27–31)
MCHC RBC AUTO-ENTMCNC: 34.3 G/DL (ref 32–36)
MCV RBC AUTO: 93 FL (ref 82–98)
MONOCYTES # BLD AUTO: 0.7 K/UL (ref 0.3–1)
MONOCYTES NFR BLD: 8.3 % (ref 4–15)
NEUTROPHILS # BLD AUTO: 6.1 K/UL (ref 1.8–7.7)
NEUTROPHILS NFR BLD: 73.8 % (ref 38–73)
NRBC BLD-RTO: 0 /100 WBC
OSMOLALITY UR: 134 MOSM/KG (ref 50–1200)
PHOSPHATE SERPL-MCNC: 3.3 MG/DL (ref 2.7–4.5)
PLATELET # BLD AUTO: 211 K/UL (ref 150–450)
PMV BLD AUTO: 10.2 FL (ref 9.2–12.9)
POTASSIUM SERPL-SCNC: 4 MMOL/L (ref 3.5–5.1)
PROT SERPL-MCNC: 5.8 G/DL (ref 6–8.4)
RBC # BLD AUTO: 4.13 M/UL (ref 4.6–6.2)
SODIUM SERPL-SCNC: 133 MMOL/L (ref 136–145)
SODIUM UR-SCNC: 10 MMOL/L (ref 20–250)
WBC # BLD AUTO: 8.32 K/UL (ref 3.9–12.7)

## 2024-03-12 PROCEDURE — 97116 GAIT TRAINING THERAPY: CPT | Mod: CQ

## 2024-03-12 PROCEDURE — 25000003 PHARM REV CODE 250: Performed by: STUDENT IN AN ORGANIZED HEALTH CARE EDUCATION/TRAINING PROGRAM

## 2024-03-12 PROCEDURE — 83735 ASSAY OF MAGNESIUM: CPT

## 2024-03-12 PROCEDURE — 25000003 PHARM REV CODE 250: Performed by: PHYSICIAN ASSISTANT

## 2024-03-12 PROCEDURE — 85025 COMPLETE CBC W/AUTO DIFF WBC: CPT

## 2024-03-12 PROCEDURE — 97530 THERAPEUTIC ACTIVITIES: CPT | Mod: CQ

## 2024-03-12 PROCEDURE — 94761 N-INVAS EAR/PLS OXIMETRY MLT: CPT

## 2024-03-12 PROCEDURE — 99900035 HC TECH TIME PER 15 MIN (STAT)

## 2024-03-12 PROCEDURE — 20000000 HC ICU ROOM

## 2024-03-12 PROCEDURE — 63600175 PHARM REV CODE 636 W HCPCS: Performed by: STUDENT IN AN ORGANIZED HEALTH CARE EDUCATION/TRAINING PROGRAM

## 2024-03-12 PROCEDURE — 97535 SELF CARE MNGMENT TRAINING: CPT

## 2024-03-12 PROCEDURE — 25000003 PHARM REV CODE 250: Performed by: PSYCHIATRY & NEUROLOGY

## 2024-03-12 PROCEDURE — 99233 SBSQ HOSP IP/OBS HIGH 50: CPT | Mod: ,,, | Performed by: PSYCHIATRY & NEUROLOGY

## 2024-03-12 PROCEDURE — 99291 CRITICAL CARE FIRST HOUR: CPT | Mod: ,,, | Performed by: PSYCHIATRY & NEUROLOGY

## 2024-03-12 PROCEDURE — 80053 COMPREHEN METABOLIC PANEL: CPT

## 2024-03-12 PROCEDURE — 84100 ASSAY OF PHOSPHORUS: CPT

## 2024-03-12 PROCEDURE — 97110 THERAPEUTIC EXERCISES: CPT

## 2024-03-12 PROCEDURE — 25000003 PHARM REV CODE 250

## 2024-03-12 PROCEDURE — 63600175 PHARM REV CODE 636 W HCPCS

## 2024-03-12 RX ADMIN — NIMODIPINE 30 MG: 30 CAPSULE, LIQUID FILLED ORAL at 06:03

## 2024-03-12 RX ADMIN — GABAPENTIN 300 MG: 300 CAPSULE ORAL at 06:03

## 2024-03-12 RX ADMIN — TICAGRELOR 90 MG: 90 TABLET ORAL at 08:03

## 2024-03-12 RX ADMIN — ASPIRIN 81 MG CHEWABLE TABLET 81 MG: 81 TABLET CHEWABLE at 08:03

## 2024-03-12 RX ADMIN — METHOCARBAMOL 500 MG: 500 TABLET ORAL at 08:03

## 2024-03-12 RX ADMIN — NIMODIPINE 30 MG: 30 CAPSULE, LIQUID FILLED ORAL at 12:03

## 2024-03-12 RX ADMIN — NIMODIPINE 30 MG: 30 CAPSULE, LIQUID FILLED ORAL at 04:03

## 2024-03-12 RX ADMIN — CEFAZOLIN 2 G: 2 INJECTION, POWDER, FOR SOLUTION INTRAMUSCULAR; INTRAVENOUS at 09:03

## 2024-03-12 RX ADMIN — NIMODIPINE 30 MG: 30 CAPSULE, LIQUID FILLED ORAL at 08:03

## 2024-03-12 RX ADMIN — HEPARIN SODIUM 5000 UNITS: 5000 INJECTION INTRAVENOUS; SUBCUTANEOUS at 09:03

## 2024-03-12 RX ADMIN — METHOCARBAMOL 500 MG: 500 TABLET ORAL at 03:03

## 2024-03-12 RX ADMIN — NIMODIPINE 30 MG: 30 CAPSULE, LIQUID FILLED ORAL at 10:03

## 2024-03-12 RX ADMIN — GABAPENTIN 300 MG: 300 CAPSULE ORAL at 01:03

## 2024-03-12 RX ADMIN — CEFAZOLIN 2 G: 2 INJECTION, POWDER, FOR SOLUTION INTRAMUSCULAR; INTRAVENOUS at 01:03

## 2024-03-12 RX ADMIN — HEPARIN SODIUM 5000 UNITS: 5000 INJECTION INTRAVENOUS; SUBCUTANEOUS at 06:03

## 2024-03-12 RX ADMIN — HEPARIN SODIUM 5000 UNITS: 5000 INJECTION INTRAVENOUS; SUBCUTANEOUS at 01:03

## 2024-03-12 RX ADMIN — GABAPENTIN 300 MG: 300 CAPSULE ORAL at 09:03

## 2024-03-12 RX ADMIN — Medication 6 MG: at 08:03

## 2024-03-12 RX ADMIN — DOCUSATE SODIUM AND SENNOSIDES 1 TABLET: 8.6; 5 TABLET, FILM COATED ORAL at 08:03

## 2024-03-12 RX ADMIN — SODIUM CHLORIDE 1000 ML: 0.9 INJECTION, SOLUTION INTRAVENOUS at 09:03

## 2024-03-12 RX ADMIN — Medication 800 MG: at 12:03

## 2024-03-12 RX ADMIN — NIMODIPINE 30 MG: 30 CAPSULE, LIQUID FILLED ORAL at 09:03

## 2024-03-12 RX ADMIN — NIMODIPINE 30 MG: 30 CAPSULE, LIQUID FILLED ORAL at 02:03

## 2024-03-12 RX ADMIN — ACETAMINOPHEN 650 MG: 325 TABLET ORAL at 01:03

## 2024-03-12 RX ADMIN — OXYCODONE 5 MG: 5 TABLET ORAL at 09:03

## 2024-03-12 RX ADMIN — NIMODIPINE 30 MG: 30 CAPSULE, LIQUID FILLED ORAL at 01:03

## 2024-03-12 RX ADMIN — NIMODIPINE 30 MG: 30 CAPSULE, LIQUID FILLED ORAL at 03:03

## 2024-03-12 RX ADMIN — CEFAZOLIN 2 G: 2 INJECTION, POWDER, FOR SOLUTION INTRAMUSCULAR; INTRAVENOUS at 06:03

## 2024-03-12 RX ADMIN — Medication 800 MG: at 04:03

## 2024-03-12 NOTE — PT/OT/SLP PROGRESS
"Occupational Therapy   Treatment    Name: Kalia Pacheco  MRN: [id number]  Admitting Diagnosis:  Subarachnoid hemorrhage due to ruptured aneurysm       Recommendations:     Discharge Recommendations: No Therapy Indicated  Discharge Equipment Recommendations:  none  Barriers to discharge:  None    Assessment:     Kalia Pacheco is a 33 y.o. male with a medical diagnosis of Subarachnoid hemorrhage due to ruptured aneurysm.  He presents with performance deficits affecting function are weakness, impaired endurance, impaired self care skills, impaired functional mobility.     Rehab Prognosis:  Good; patient would benefit from acute skilled OT services to address these deficits and reach maximum level of function.       Plan:     Patient to be seen 4 x/week to address the above listed problems via neuromuscular re-education, therapeutic exercises, therapeutic activities, self-care/home management  Plan of Care Expires: 04/03/24  Plan of Care Reviewed with: patient    Subjective     Patient:  "I am so glad you are here.  I need to get up."  Pain/Comfort:  Pain Rating 1: 0/10  Pain Rating Post-Intervention 1: 0/10  Pain Rating 2: 0/10    Objective:     Communicated with: Nurse prior to session.  Patient found supine with blood pressure cuff, pulse ox (continuous), telemetry, external ventricular drain upon OT entry to room.    General Precautions: Standard, fall    Orthopedic Precautions:N/A  Braces: N/A  Respiratory Status: Room air     Occupational Performance:     Bed Mobility:    Patient completed Rolling/Turning to Left with  modified independence  Patient completed Rolling/Turning to Right with modified independence  Patient completed Supine to Sit with modified independence  Patient completed Sit to Supine with modified independence     Functional Mobility/Transfers:  Patient completed Sit <> Stand Transfer with supervision  with  no assistive device   Patient completed Bed <> Chair Transfer using Stand " Pivot technique with supervision with no assistive device    Activities of Daily Living:  Grooming: supervision while standing  Upper Body Dressing: supervision    Lower Body Dressing: SBA      [facility] 6 Click ADL: 19    Treatment & Education:  Patient alert and oriented x 3; able to follow 4/4 one step commands.  Patient attentive and interactive throughout the session. Addressed  UE/LE therex. while standing    Performed trunk mobilizations while supine.      Patient left supine with all lines intact, call button in reach, and bed alarm on    GOALS:   Multidisciplinary Problems       Occupational Therapy Goals          Problem: Occupational Therapy    Goal Priority Disciplines Outcome Interventions   Occupational Therapy Goal     OT, PT/OT Ongoing, Progressing    Description: Goals set 3/7 with an expiration date, 4/4:  Patient will increase functional independence with ADLs by performing:    Patient will demonstrate rolling to the right with modified independence.  Not met   Patient will demonstrate rolling to the left with modified independence.   Not met  Patient will demonstrate supine -sit with modified independence.   Not met  Patient will demonstrate stand pivot transfers with modified independence.   Not met  Patient will demonstrate grooming while standing with modified independence.   Not met  Patient will demonstrate upper body dressing with modified independence while seated EOB.   Not met  Patient will demonstrate lower body dressing with modified independence while seated EOB.   Not met  Patient will demonstrate toileting with modified independence.   Not met  Patient will demonstrate bathing while seated EOB with modified independence   Not met  Patient's family / caregiver will demonstrate independence and safety with assisting patient with self-care skills and functional mobility.     Not met                           Time Tracking:     OT Date of Treatment: 03/12/24  OT Start Time: 0455  OT Stop  Time: 0534  OT Total Time (min): 39 min    Billable Minutes:Self Care/Home Management 14  Therapeutic Exercise 25    OT/ZULEYMA: OT          3/12/2024

## 2024-03-12 NOTE — PT/OT/SLP PROGRESS
"Physical Therapy Treatment    Patient Name:  Kalia Pacheco   MRN:  [id number]    Recommendations:     Discharge Recommendations: Low intensity therapy  Discharge Equipment Recommendations: none  Barriers to discharge: None    Assessment:     Kalia Pacheco is a 33 y.o. male admitted with a medical diagnosis of Subarachnoid hemorrhage due to ruptured aneurysm.  He presents with the following impairments/functional limitations: weakness, impaired endurance, impaired self care skills, impaired functional mobility, gait instability, impaired balance, decreased ROM, impaired joint extensibility.    Rehab Prognosis: Good; patient would benefit from acute skilled PT services to address these deficits and reach maximum level of function.    Recent Surgery: * No surgery found *      Plan:     During this hospitalization, patient to be seen 3 x/week to address the identified rehab impairments via gait training, therapeutic activities, therapeutic exercises, neuromuscular re-education and progress toward the following goals:    Plan of Care Expires:  04/10/24    Subjective     Chief Complaint: no c/o   Patient/Family Comments/goals: "Oh I get to see the world outside of the room."  Pain/Comfort:  Pain Rating 1: 0/10  Pain Rating Post-Intervention 1: 0/10      Objective:     Communicated with RN prior to session.  Patient found HOB elevated with telemetry, blood pressure cuff, external ventricular drain, pulse ox (continuous) upon PTA entry to room. Pts EVD clamped per RN and pt disconnected from telemetry by RN for ambulation out into hallway.     General Precautions: Standard, fall  Orthopedic Precautions: N/A  Braces: N/A  Respiratory Status: Room air     Functional Mobility:  Bed Mobility:     Rolling Left:  modified independence  Supine to Sit: modified independence  Sit to Supine: modified independence  Transfers:     Sit to Stand:  stand by assistance with no AD  Gait: Pt ambulates ~400 ft with no AD and " CGA/HHA to begin followed by SBA with no AD on second half of trial. Pt with baseline gait impairments d/t prior hip surgeries. Intermittent scissoring and hip hike.        AM-PAC 6 CLICK MOBILITY  Turning over in bed (including adjusting bedclothes, sheets and blankets)?: 4  Sitting down on and standing up from a chair with arms (e.g., wheelchair, bedside commode, etc.): 3  Moving from lying on back to sitting on the side of the bed?: 3  Moving to and from a bed to a chair (including a wheelchair)?: 3  Need to walk in hospital room?: 3  Climbing 3-5 steps with a railing?: 3  Basic Mobility Total Score: 19     Patient left HOB elevated with all lines intact, call button in reach, and RN present.    GOALS:   Multidisciplinary Problems       Physical Therapy Goals          Problem: Physical Therapy    Goal Priority Disciplines Outcome Goal Variances Interventions   Physical Therapy Goal     PT, PT/OT Ongoing, Progressing     Description: Goals to be met by: 4/10/2024     Patient will increase functional independence with mobility by performin. Supine to sit with [place]  2. Sit to supine with [place]  3. Sit to stand transfer with [place]  4. Gait  x 300 feet with Supervision using LRAD.   5. Ascend/descend 4 stair with bilateral Handrails Stand-by Assistance using LRAD.   6. Lower extremity exercise program x15 reps per handout, with independence                         Time Tracking:     PT Received On: 24  PT Start Time: 1247     PT Stop Time: 1312  PT Total Time (min): 25 min     Billable Minutes: Gait Training 15 and Therapeutic Activity 10    Treatment Type: Treatment  PT/PTA: PTA     Number of PTA visits since last PT visit: 2024

## 2024-03-12 NOTE — SUBJECTIVE & OBJECTIVE
Interval History: NAEON. Pt exam stable. Plan to clamp EVD today.     Medications:  Continuous Infusions:  Scheduled Meds:   aspirin  81 mg Oral Daily    ceFAZolin (Ancef) IV (PEDS and ADULTS)  2 g Intravenous Q8H    gabapentin  300 mg Oral Q8H    heparin (porcine)  5,000 Units Subcutaneous Q8H    melatonin  6 mg Oral Nightly    niMODipine  30 mg Oral Q2H    polyethylene glycol  17 g Oral Daily    senna-docusate 8.6-50 mg  1 tablet Oral BID    [COMPLETED] sodium chloride 0.9% infusion  1,000 mL Intravenous Once    ticagrelor  90 mg Oral BID     PRN Meds:acetaminophen, bisacodyL, hydrOXYzine HCL, labetalol, magnesium oxide, magnesium oxide, methocarbamoL, metoclopramide, ondansetron, oxyCODONE, potassium bicarbonate, potassium bicarbonate, potassium bicarbonate, potassium, sodium phosphates, potassium, sodium phosphates, potassium, sodium phosphates     Review of Systems  Objective:     Weight: 106.6 kg (235 lb)  Body mass index is 35.73 kg/m².  Vital Signs (Most Recent):  Temp: 98.2 °F (36.8 °C) (03/12/24 0705)  Pulse: 78 (03/12/24 0905)  Resp: 18 (03/12/24 0905)  BP: (!) 98/54 (03/12/24 0905)  SpO2: 100 % (03/12/24 0905) Vital Signs (24h Range):  Temp:  [98.2 °F (36.8 °C)-98.9 °F (37.2 °C)] 98.2 °F (36.8 °C)  Pulse:  [71-87] 78  Resp:  [13-28] 18  SpO2:  [98 %-100 %] 100 %  BP: ()/(37-95) 98/54     Date 03/12/24 0700 - 03/13/24 0659   Shift 8228-2812 1372-1598 1499-4795 24 Hour Total   INTAKE   IV Piggyback 49.2   49.2   Shift Total(mL/kg) 49.2(0.5)   49.2(0.5)   OUTPUT   Drains 2   2   Shift Total(mL/kg) 2(0)   2(0)   Weight (kg) 106.6 106.6 106.6 106.6                            ICP/Ventriculostomy 03/05/24 1143 Right Parietal region (Active)   Level of Ventriculostomy (cm above) 15 03/11/24 1901   Status Open to drainage 03/12/24 0905   Site Assessment Clean;Dry 03/12/24 0905   Site Drainage No drainage 03/12/24 0905   Waveform normal waveform 03/11/24 1901   Output (mL) 1 mL 03/12/24 0905   CSF Color  "red;clear 03/12/24 0905   Dressing Status Clean;Dry;Intact 03/12/24 0905   Interventions HOB degrees 03/12/24 0905          Physical Exam         Neurosurgery Physical Exam  GCS 15  Awake, alert  No drift  No dysmetria  5/5 strength    Gen: well nourished, normocephalic, atraumatic  CV: skin warm and dry, distal pulses present  Pulm: chest rise symmetric, no increased work of breathing  GI: abdomen soft, non-distended, non-tender  : patient voiding independently  MSK: no bony abnormalities noted  Psych: patient interacting with appropriate mood and affect  Significant Labs:  Recent Labs   Lab 03/11/24  0357 03/11/24  1158 03/12/24  0304     --  105   * 132* 133*   K 4.2  --  4.0     --  104   CO2 23  --  23   BUN 10  --  9   CREATININE 1.0  --  0.8   CALCIUM 9.3  --  8.7   MG 1.7  --  1.7     Recent Labs   Lab 03/11/24  0357 03/12/24  0304   WBC 10.46 8.32   HGB 15.8 13.2*   HCT 45.9 38.5*    211     No results for input(s): "LABPT", "INR", "APTT" in the last 48 hours.  Microbiology Results (last 7 days)       ** No results found for the last 168 hours. **          All pertinent labs from the last 24 hours have been reviewed.    Significant Diagnostics:  I have reviewed all pertinent imaging results/findings within the past 24 hours.  "

## 2024-03-12 NOTE — PROGRESS NOTES
[facility] - Neuro Critical Care  Vascular Neurology  Comprehensive Stroke Center  Progress Note    Assessment/Plan:     * Subarachnoid hemorrhage due to ruptured aneurysm  33yoM with PMH of HTN, coarctation of the aorta (s/p repair), PDA, VSD s/p repair, ADHD, EtOH use/abuse, arthritis, legg-perthes disease, ICH 5/2022, known CHESTER aneurysm that was transferred to [facility] for higher level of care of SAH (HH1). He presented to OSH with HA and nausea. CTH showed diffuse SAH throughout basal cisterns. At C neuro exam stable with NIHSS 1. CTA shows 3mm L acomm aneurysm, hypoplastic R A1, persistent fetal circulation, as well as evolving SAH/ICH now with concern for developing hydrocephalus and questionable early cerebellar tonsillar herniation. NSGY consulted, EVD placed. Taken to IR for DSA, no intervention of acomm aneurysm at that time. 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 Suspect etiology of SAH likely due to vertebral artery aneurysm.     3/12/2024  EVD clamped. TCDs today w/signs of early vasospasm in the L CHESTER. Neuro exam stable.      Antithrombotics for secondary stroke prevention: Antiplatelets: Aspirin: 81 mg daily and Brilinta 90mg BID (plavix switched to brilinta due to plavix non-responder on PRU assay)    Statins for secondary stroke prevention and hyperlipidemia, if present: Statins: Atorvastatin- 40 mg daily, continue    Aggressive risk factor modification: HTN     Rehab efforts: The patient has been evaluated by a stroke team provider and the therapy needs have been fully considered based off the presenting complaints and exam findings. The following therapy evaluations are needed: PT evaluate and treat, OT evaluate and treat, SLP evaluate and treat, PM&R evaluate for appropriate placement-evaluated.  Currently no therapy needs.  Regular diet.    Diagnostics ordered/pending: Daily TCDs per SAH  protocol    VTE prophylaxis: Mechanical prophylaxis: Place SCDs    BP parameters: SAH: Secured aneurysm, no target, increase BP to prevent vasospasm if needed       Communicating hydrocephalus  EVD placed by NSGY on 3/5.  Maintained by [facility].  EVD currently clamped.  Neuro exam and imaging stable.    Hypertension  Stroke Risk Factor  SBP <220 given aneurysm now secured, MAP >65  PRN labetalol  24° SBP     IVH (intraventricular hemorrhage)  See Subarachnoid hemorrhage due to ruptured aneurysm for full plan.    Anterior communicating artery aneurysm  Known hx of  Anterior communicating artery aneurysm 2 x 1.7 mm with a wide neck.  Stable.         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 Patient extubated this morning, pending SLP eval. Patient complains of headache, but was improved with 5ml drained from EVD per nursing staff. Neuro exam stable. Pending ECHO.   03/07/24: Patient stable on SAH pathway with daily TCDs and nimodipine. Exam non-focal with EVD @10 with 250ml out in last 24hrs. Awaiting MRA at this time. On DAPT with subtherapeutic PRU with possible pending switch to brillinta ameliorate.  03/09/2024: No acute events overnight. Stable neuro exam (NIH 1 for chronic leg weakness). Remain on SAH pathway. EVD in place. MRA Brain with no vessel wall enhancement. DAPT switched to ASA and brilinta for stents; patient tolerating well.   03/10/2024 NAEO, neuro exam stable. EVD remains in place and open. No current complaints/concerns. NSGY following.  03/11/2024 NAEO, neuro exam stable. EVD still in, drain moved to 20 today. TCDs unremarkable thus far. Hyponatremic with Na 132, urine studies ordered.   3/12/2024 EVD clamped. TCDs today w/signs of early vasospasm in the L CHESTER. Neuro exam  stable.    STROKE DOCUMENTATION   Acute Stroke Times   Last Known Normal Date: 03/05/24  Unknown Normal Time: Unknown Time  Symptom Onset Date: 03/05/24  Unknown Symptom Onset Time: Unknown Time  Thrombolytic Therapy Recommended: No  Thrombectomy Recommended: No    NIH Scale:  1a. Level of Consciousness: 0-->Alert, keenly responsive  1b. LOC Questions: 0-->Answers both questions correctly  1c. LOC Commands: 0-->Performs both tasks correctly  2. Best Gaze: 0-->Normal  3. Visual: 0-->No visual loss  4. Facial Palsy: 0-->Normal symmetrical movements  5a. Motor Arm, Left: 0-->No drift, limb holds 90 (or 45) degrees for full 10 secs  5b. Motor Arm, Right: 0-->No drift, limb holds 90 (or 45) degrees for full 10 secs  6a. Motor Leg, Left: 0-->No drift, leg holds 30 degree position for full 5 secs  6b. Motor Leg, Right: 0-->No drift, leg holds 30 degree position for full 5 secs  7. Limb Ataxia: 0-->Absent  8. Sensory: 0-->Normal, no sensory loss  9. Best Language: 0-->No aphasia, normal  10. Dysarthria: 0-->Normal  11. Extinction and Inattention (formerly Neglect): 0-->No abnormality  Total (NIH Stroke Scale): 0       Modified [place] Score: 0  [place] Coma Scale:15   ABCD2 Score:    [id number] Score:   HAS -BLED Score:   ICH Score:   Hunt & Hill Classification:Grade I     Hemorrhagic change of an Ischemic Stroke: Does this patient have an ischemic stroke with hemorrhagic changes? No     Neurologic Chief Complaint: SAH    Subjective:     Interval History: Patient is seen for follow-up neurological assessment and treatment recommendations: EVD clamped. TCDs today w/signs of early vasospasm in the L CHESTER. Neuro exam stable.    HPI, Past Medical, Family, and Social History remains the same as documented in the initial encounter.     Review of Systems   Constitutional:  Negative for fever.   HENT:  Negative for trouble swallowing.    Eyes:  Negative for visual disturbance.   Respiratory:  Negative for cough.     Gastrointestinal:  Negative for nausea.   Musculoskeletal:  Negative for neck stiffness.   Neurological:  Negative for speech difficulty and weakness.     Scheduled Meds:   aspirin  81 mg Oral Daily    ceFAZolin (Ancef) IV (PEDS and ADULTS)  2 g Intravenous Q8H    gabapentin  300 mg Oral Q8H    heparin (porcine)  5,000 Units Subcutaneous Q8H    melatonin  6 mg Oral Nightly    niMODipine  30 mg Oral Q2H    polyethylene glycol  17 g Oral Daily    senna-docusate 8.6-50 mg  1 tablet Oral BID    ticagrelor  90 mg Oral BID     Continuous Infusions:  PRN Meds:acetaminophen, bisacodyL, hydrOXYzine HCL, labetalol, magnesium oxide, magnesium oxide, methocarbamoL, metoclopramide, ondansetron, oxyCODONE, potassium bicarbonate, potassium bicarbonate, potassium bicarbonate, potassium, sodium phosphates, potassium, sodium phosphates, potassium, sodium phosphates    Objective:     Vital Signs (Most Recent):  Temp: 98.3 °F (36.8 °C) (03/12/24 1505)  Pulse: 75 (03/12/24 1505)  Resp: (!) 25 (03/12/24 1505)  BP: (!) 83/52 (03/12/24 1505)  SpO2: 100 % (03/12/24 1505)  BP Location: Right arm    Vital Signs Range (Last 24H):  Temp:  [98.2 °F (36.8 °C)-98.9 °F (37.2 °C)]   Pulse:  [75-87]   Resp:  [13-28]   BP: ()/(37-66)   SpO2:  [98 %-100 %]   BP Location: Right arm       Physical Exam  Vitals and nursing note reviewed.   HENT:      Head:      Comments: EVD in place, clamped  Eyes:      General:         Right eye: No discharge.         Left eye: No discharge.      Conjunctiva/sclera: Conjunctivae normal.   Cardiovascular:      Rate and Rhythm: Normal rate.   Pulmonary:      Effort: Pulmonary effort is normal. No respiratory distress.   Abdominal:      General: There is no distension.   Musculoskeletal:      Cervical back: Normal range of motion and neck supple.   Skin:     General: Skin is warm and dry.          Neurological Exam:   LOC: alert  Attention Span: Good   Articulation: No dysarthria  Orientation: Person, Place, Time    EOM (CN III, IV, VI): Full/intact  Facial Movement (CN VII): Symmetric facial expression    Tone: Normal tone throughout    Laboratory:  CMP:   Recent Labs   Lab 03/12/24  0304   CALCIUM 8.7   ALBUMIN 2.9*   PROT 5.8*   *   K 4.0   CO2 23      BUN 9   CREATININE 0.8   ALKPHOS 36*   ALT 6*   AST 14   BILITOT 0.7     CBC:   Recent Labs   Lab 03/12/24  0304   WBC 8.32   RBC 4.13*   HGB 13.2*   HCT 38.5*      MCV 93   MCH 32.0*   MCHC 34.3       Diagnostic Results     Brain Imaging   CTH 3/5/23:   Impression:  Subarachnoid hemorrhage with blood throughout the basilar cisterns.  Moderate intraventricular blood and mild ventricular prominence.        Vessel Imaging     TCD 3/12/2024 -newly acquired and reviewed by Vascular Neurology provider, increasing velocities in the L CHESTER. Impression:  No significant vasospasm is identified.  Velocities are increasing in the left CHESTER which may represent mild early vasospasm    MRA Brain - 03/08/2024  Impression:  Examination mildly degraded by patient motion artifact.  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  No abnormal vessel wall enhancement.  No residual flow related signal within the recently treated right PICA origin aneurysm.  Prominent enhancement at this site on vessel wall imaging.  This is nonspecific in the post treatment setting but aneurysm is presumed site of rupture given distribution of hemorrhage.  Retrograde filling of the left vertebral artery, patient with known subclavian steal.     CTA head/neck 3/5/23:   Impression:  3 mm left anterior communicating artery aneurysm again identified.  No definite additional aneurysm identified concerning for source of hemorrhage.  Clinical correlation and correlation with conventional angiography recommended. Developmental variant with hypoplastic posterior circulation and essentially persistent fetal circulation right PCA via right posterior communicating artery.Occlusion left  subclavian artery at its origin with reconstitution proximal left subclavian artery concerning for left subclavian artery steal phenomena. 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 Interval increase distension lateral and 3rd ventricles compatible with developing hydrocephalus. Crowding cerebral sulci at the vertex with additional crowding basilar cisterns with questionable early cerebellar tonsillar herniation.     IR Angio 3/5/24:   Impression:  Anterior communicating artery aneurysm 2 x 1.7 mm with a wide neck. Small irregular aneurysm from the right V 4 vertebral segment just proximal to the posteroinferior cerebellar artery.  This is not well characterized on these views.  Repeat selective angiography is planned for better evaluation.        Cardiac Imaging   TTE 3/8/24:     Left Ventricle: The left ventricle is normal in size. Normal wall thickness. Global hypokinesis present. There is mildly reduced systolic function with a visually estimated ejection fraction of 40 - 45%. There is indeterminate diastolic function.    Right Ventricle: Normal right ventricular cavity size. Wall thickness is normal. Right ventricle wall motion  is normal. Systolic function is normal.    Left Atrium: Left atrium is severely dilated.    Aortic Valve: The valve morphology is clearly abnormal though the number of leaflets is not evident.  There is severe aortic valve stenosis. Aortic valve area by VTI is 0.8 cm². Aortic valve peak velocity is 4.5 m/s. Mean gradient is 55 mmHg. The dimensionless index is 0.17. There is mild aortic regurgitation.    IVC/SVC: Normal venous pressure at 3 mmHg.    Maine Kolb DNP  [facility]  Department of Vascular Neurology   [facility] - Neuro Critical Care

## 2024-03-12 NOTE — ASSESSMENT & PLAN NOTE
33yoM with PMH of HTN, coarctation of the aorta (s/p repair), PDA, VSD s/p repair, ADHD, EtOH use/abuse, arthritis, legg-perthes disease, ICH 5/2022, known CHESTER aneurysm that was transferred to [facility] [place] for higher level of care of SAH (HH1). He presented to OSH with HA and nausea. CTH showed diffuse SAH throughout basal cisterns. At C neuro exam stable with NIHSS 1. CTA shows 3mm L acomm aneurysm, hypoplastic R A1, persistent fetal circulation, as well as evolving SAH/ICH now with concern for developing hydrocephalus and questionable early cerebellar tonsillar herniation. NSGY consulted, EVD placed. Taken to IR for DSA, no intervention of acomm aneurysm at that time. 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 Suspect etiology of SAH likely due to vertebral artery aneurysm.     3/12/2024  EVD clamped. TCDs today w/signs of early vasospasm in the L CHESTER. Neuro exam stable.      Antithrombotics for secondary stroke prevention: Antiplatelets: Aspirin: 81 mg daily and Brilinta 90mg BID (plavix switched to brilinta due to plavix non-responder on PRU assay)    Statins for secondary stroke prevention and hyperlipidemia, if present: Statins: Atorvastatin- 40 mg daily, continue    Aggressive risk factor modification: HTN     Rehab efforts: The patient has been evaluated by a stroke team provider and the therapy needs have been fully considered based off the presenting complaints and exam findings. The following therapy evaluations are needed: PT evaluate and treat, OT evaluate and treat, SLP evaluate and treat, PM&R evaluate for appropriate placement-evaluated.  Currently no therapy needs.  Regular diet.    Diagnostics ordered/pending: Daily TCDs per SAH protocol    VTE prophylaxis: Mechanical prophylaxis: Place SCDs    BP parameters: SAH: Secured aneurysm, no target, increase BP to prevent vasospasm if needed

## 2024-03-12 NOTE — SUBJECTIVE & OBJECTIVE
Objective:     Vitals:  Temp: 98.3 °F (36.8 °C)  Pulse: 77  Rhythm: normal sinus rhythm  BP: (!) 89/55  MAP (mmHg): 67  ICP Mean (mmHg): 6 mmHg  Resp: 16  SpO2: 100 %    Temp  Min: 98.2 °F (36.8 °C)  Max: 98.9 °F (37.2 °C)  Pulse  Min: 71  Max: 87  BP  Min: 68/37  Max: 155/67  MAP (mmHg)  Min: 48  Max: 96  ICP Mean (mmHg)  Min: 3 mmHg  Max: 21 mmHg  Resp  Min: 13  Max: 28  SpO2  Min: 98 %  Max: 100 %    03/11 0701 - 03/12 0700  In: 1621 [P.O.:200]  Out: 3253 [Urine:3225; Drains:28]   Unmeasured Output  Urine Occurrence: 1  Stool Occurrence: 1  Emesis Occurrence: 1  Pad Count: 2        Physical Exam  Vital signs, lab studies, and imaging reviewed by me  Alert, oriented x3, concentration intact (spells HEART backwards) cranial II-XII intact, sensation full, moves all extremities with full strength, no dysmetria  EVD site c/d/I, intermittently poor compliance waveform   Warm and well perfused, regular rate and rhythm, no murmurs  No dependent edema  Breathing comfortably, breath sounds clear  Belly soft, nontender, no hepatosplenomegaly       Medications:  Continuous Scheduledaspirin, 81 mg, Daily  ceFAZolin (Ancef) IV (PEDS and ADULTS), 2 g, Q8H  gabapentin, 300 mg, Q8H  heparin (porcine), 5,000 Units, Q8H  melatonin, 6 mg, Nightly  niMODipine, 30 mg, Q2H  polyethylene glycol, 17 g, Daily  senna-docusate 8.6-50 mg, 1 tablet, BID  ticagrelor, 90 mg, BID    PRNacetaminophen, 650 mg, Q6H PRN  bisacodyL, 10 mg, Daily PRN  hydrOXYzine HCL, 25 mg, Nightly PRN  labetalol, 10 mg, Q15 Min PRN  magnesium oxide, 800 mg, PRN  magnesium oxide, 800 mg, PRN  methocarbamoL, 500 mg, QID PRN  metoclopramide, 10 mg, Q6H PRN  ondansetron, 4 mg, Q6H PRN  oxyCODONE, 5 mg, Q6H PRN  potassium bicarbonate, 35 mEq, PRN  potassium bicarbonate, 50 mEq, PRN  potassium bicarbonate, 60 mEq, PRN  potassium, sodium phosphates, 2 packet, PRN  potassium, sodium phosphates, 2 packet, PRN  potassium, sodium phosphates, 2 packet, PRN

## 2024-03-12 NOTE — ASSESSMENT & PLAN NOTE
EVD placed by NSLESLY on 3/5.  Maintained by [facility].  EVD currently clamped.  Neuro exam and imaging stable.

## 2024-03-12 NOTE — PLAN OF CARE
[facility] Care Plan    POC reviewed with Kalia Pacheco and family at 1400. Pt verbalized understanding. Questions and concerns addressed. No acute events today. Pt progressing toward goals. Will continue to monitor. See below and flowsheets for full assessment and VS info.      - EVD open at 15 til 1030, then clamped per neurosurgery order   - ICP 6-19 , OP 0-1   - PRN tylenol given X 1 for mild pain   - PRN robaxin given X 1 for muscle spasms   - 1L bolus given after hypotensive episode   - Magnesium replaced   - No free water          Is this a stroke patient? yes- Stroke booklet reviewed with patient and family, risk factors identified for patient and stroke booklet remains at bedside for ongoing education.     Care individualization:     Neuro:  Irving Coma Scale  Best Eye Response: 4-->(E4) spontaneous  Best Motor Response: 6-->(M6) obeys commands  Best Verbal Response: 5-->(V5) oriented  Irving Coma Scale Score: 15  Assessment Qualifiers: patient not sedated/intubated  Pupil PERRLA: yes     24 hr Temp:  [98.2 °F (36.8 °C)-98.9 °F (37.2 °C)]     CV:   Rhythm: normal sinus rhythm  BP goals:   SBP < 220  MAP > 65    Resp:      Vent Mode: Spont  Set Rate: 16 BPM  Oxygen Concentration (%): 40  Vt Set: 500 mL  PEEP/CPAP: 5 cmH20  Pressure Support: 8 cmH20    Plan: N/A    GI/:     Diet/Nutrition Received: regular  Last Bowel Movement: 03/11/24  Voiding Characteristics: voids spontaneously without difficulty    Intake/Output Summary (Last 24 hours) at 3/12/2024 1731  Last data filed at 3/12/2024 1605  Gross per 24 hour   Intake 2129.67 ml   Output 3407 ml   Net -1277.33 ml     Unmeasured Output  Urine Occurrence: 1  Stool Occurrence: 1  Emesis Occurrence: 1  Pad Count: 2    Labs/Accuchecks:  Recent Labs   Lab 03/12/24  0304   WBC 8.32   RBC 4.13*   HGB 13.2*   HCT 38.5*         Recent Labs   Lab 03/12/24  0304   *   K 4.0   CO2 23      BUN 9   CREATININE 0.8   ALKPHOS 36*   ALT 6*   AST 14  "  BILITOT 0.7    No results for input(s): "PROTIME", "INR", "APTT", "HEPANTIXA" in the last 168 hours. No results for input(s): "CPK", "CPKMB", "TROPONINI", "MB" in the last 168 hours.    Electrolytes: Electrolytes replaced  Accuchecks: none    Gtts:      LDA/Wounds:      Nurses Note -- 4 Eyes      Is there altered skin present? yes   Please check the following boxes that apply:   [x] LDA Added if Not in Epic (Describe Wound)   [] New Altered Skin Integrity was Present on Admit and Documented in LDA   [] Wound Image Taken    Wound Care Consulted? No    Second RN/Staff Member:        Restraints:   Restraint Order  Length of Order: Order good for next 24 hours or when removed.  Date that the current order will : 24  Time that the current order will : 1529  Order Upon Application: Yes    WCTM   "

## 2024-03-12 NOTE — ASSESSMENT & PLAN NOTE
Kalia Pacheco is a 34yo man with history of known CHESTER aneurysm who presented to [facility] with headache and nausea. CTH demonstrated diffuse SAH throughout the basal cisterns HH and he was transferred to [facility] for close monitoring in the Neuro ICU and Neurosurgical consultation.     R frontal EVD was placed on arrival on 3/6 and patient was taken to IR for angiogram, with no intervention upon Acomm aneurysm. Upon further review of DSA, a vertebral artery aneurysm was noted and patient was brought back to IR for a right V4/PICA irregular aneurysm stent assisted coiling.     Imaging reviewed:  CTH 3/5: Diffuse basal cistern SAH with IVH   CTA 3/5: 3mm ant projecting L Acomm, no R A1, R pcomm feeding posterior circulation   DSA 3/5: EVD in position, anteriosuperiorly projecting small L Acomm, R V4 irregular aneurysm not well visualized    DSA +embo 3/5: stent assisted coiling of R V4/PICA irregularly shaped anuerysm, 1/4 lobules still filling     - Admit to NCC with q1 neuro checks  - R EVD at Clamped today, please record ICP and output hourly, call NSGY if ICP sustained >25  - Keppra 500 BID x7d   - TCDs daily x14d   - Nimotop 60q4 x 21d   - strict I/Os, goal euvolemia to net positive for SAH patients in general, in this patient may need to be more conservative due to cardiac hx, management per NCC   - continue ASA/brilnta per neuro IR  - based on bleed pattern believe vertebral aneurysm to be the ruptured aneurysm, no plan for intervention upon Acomm at this point  -MRI with contrast not suggestive of vessel wall enhancement to Acomm  - Continue to follow clinically and notify NSGY immediately if any neurostatus change     Case and plan discussed with attending Neurosurgeon Dr. Kyle

## 2024-03-12 NOTE — PROGRESS NOTES
[facility]  Neurocritical Care  Progress Note    Admit Date: 3/5/2024  Service Date: 03/12/2024  Length of Stay: 7    Subjective:     Chief Complaint: Subarachnoid hemorrhage due to ruptured aneurysm    History of Present Illness: The patient is a 32-year-old man with a past medical history of ADHD, EtOH use and abuse, arthritis, hypertension, coarctation of the aorta (status post repair), PDA, VSD status post repair x2.  History of Legg-Perthes disease, and intracranial hemorrhage in May 2022.  presented to [facility] this AM with headache and nausea that began 2 days ago and acutely worsened while he was at work this morning. CTH demonstrated HH1mF4 SAH. S/p EVD placement by Nsx. Was taken to IR for angiography, but the vessel was not sercured during the procedure, the patient was returned to the NSICU, intubated and sedated, [facility] neurosurgery pending.     Hospital Course: 03/06/2024 Overnight, mild bradycardia and soft BPs. Weaned off fentanyl, weaning precedex. Extubated to NC this morning.   03/07/2024 AF, soft BPs. TCD with no signs of vasospasm. Neuro exam stable. Persistent HA, waxing/waning in severity. EVD with 289mL output. MRA pending.   03/08/2024 AF, HDS on RA. TCD no signs of vasospasm. Neuro exam stable. HA. EVD open @10 w/ 250mL output. MRA today.  03/09/2024 AF. BP soft. RA. TTE with reduced EF (40-45%) and mild aortic regurg. TCD w/ no signs of vasospasm. EVD open @10 w/ 224mL output. Started brillinta.   3/10: KUB, robaxin, encourage PO  3/11/2024: no free water intake, send Urine studies, TCD no vasospasm, Follow Na, discussed with IR- aneurysm secured and now SBP <220  03/12/2024: mild hyponatremia, mild hypotension fluid responsive, evd clamped        Objective:     Vitals:  Temp: 98.3 °F (36.8 °C)  Pulse: 77  Rhythm: normal sinus rhythm  BP: (!) 89/55  MAP (mmHg): 67  ICP Mean (mmHg): 6 mmHg  Resp: 16  SpO2: 100 %    Temp  Min: 98.2 °F (36.8 °C)  Max: 98.9 °F (37.2  °C)  Pulse  Min: 71  Max: 87  BP  Min: 68/37  Max: 155/67  MAP (mmHg)  Min: 48  Max: 96  ICP Mean (mmHg)  Min: 3 mmHg  Max: 21 mmHg  Resp  Min: 13  Max: 28  SpO2  Min: 98 %  Max: 100 %    03/11 0701 - 03/12 0700  In: 1621 [P.O.:200]  Out: 3253 [Urine:3225; Drains:28]   Unmeasured Output  Urine Occurrence: 1  Stool Occurrence: 1  Emesis Occurrence: 1  Pad Count: 2        Physical Exam  Vital signs, lab studies, and imaging reviewed by me  Alert, oriented x3, concentration intact (spells HEART backwards) cranial II-XII intact, sensation full, moves all extremities with full strength, no dysmetria  EVD site c/d/I, intermittently poor compliance waveform   Warm and well perfused, regular rate and rhythm, no murmurs  No dependent edema  Breathing comfortably, breath sounds clear  Belly soft, nontender, no hepatosplenomegaly       Medications:  Continuous Scheduledaspirin, 81 mg, Daily  ceFAZolin (Ancef) IV (PEDS and ADULTS), 2 g, Q8H  gabapentin, 300 mg, Q8H  heparin (porcine), 5,000 Units, Q8H  melatonin, 6 mg, Nightly  niMODipine, 30 mg, Q2H  polyethylene glycol, 17 g, Daily  senna-docusate 8.6-50 mg, 1 tablet, BID  ticagrelor, 90 mg, BID    PRNacetaminophen, 650 mg, Q6H PRN  bisacodyL, 10 mg, Daily PRN  hydrOXYzine HCL, 25 mg, Nightly PRN  labetalol, 10 mg, Q15 Min PRN  magnesium oxide, 800 mg, PRN  magnesium oxide, 800 mg, PRN  methocarbamoL, 500 mg, QID PRN  metoclopramide, 10 mg, Q6H PRN  ondansetron, 4 mg, Q6H PRN  oxyCODONE, 5 mg, Q6H PRN  potassium bicarbonate, 35 mEq, PRN  potassium bicarbonate, 50 mEq, PRN  potassium bicarbonate, 60 mEq, PRN  potassium, sodium phosphates, 2 packet, PRN  potassium, sodium phosphates, 2 packet, PRN  potassium, sodium phosphates, 2 packet, PRN        Assessment/Plan:     Neuro  * Subarachnoid hemorrhage due to ruptured aneurysm  Hh2 mf4 R vert anx rupture, S/p stent coil of R v4 anx 3/5, unruptured acomm anx not secured  Obs hydro s/p evd, clamped today  Hyponatremia, normal  urine/serum osm   Mild hypotension, fluid responsive    Cont spasm watch, nimotop 30mg q2, dapt  Eunatremia (cont fw restriction today), euvolemia, permissive htn, avoid fever  Multimodal pain control  Evd clamped per nsgy, cth tomorrow  pt/ot/oob, monitor in icu    The patient is being Prophylaxed for:  Venous Thromboembolism with: Mechanical or Chemical  Stress Ulcer with: Not Applicable   Ventilator Pneumonia with: not applicable    Activity Orders            Diet Adult Regular (IDDSI Level 7) Thin; Standard Tray: Regular starting at 03/11 1118    Turn patient starting at 03/05 1200    Elevate HOB starting at 03/05 1119          Full Code    Critical condition in that Patient has a condition that poses threat to life and bodily function: as above     31 minutes of Critical care time was spent personally by me on the following activities: development of treatment plan with patient or surrogate and bedside caregivers, discussions with consultants, evaluation of patient's response to treatment, examination of patient, ordering and performing treatments and interventions, ordering and review of laboratory studies, ordering and review of radiographic studies, pulse oximetry, antibiotic titration if applicable, vasopressor titration if applicable, re-evaluation of patient's condition. This critical care time did not overlap with that of any other provider or involve time for any procedures. There is high probability for acute neurological change leading to clinical and possibly life-threatening deterioration requiring highest level of physician preparedness for urgent intervention.      Lukas Reveles MD  Neurocritical Care  [facility]

## 2024-03-12 NOTE — ASSESSMENT & PLAN NOTE
Hh2 mf4 R vert anx rupture, S/p stent coil of R v4 anx 3/5, unruptured acomm anx not secured  Obs hydro s/p evd, clamped today  Hyponatremia, normal urine/serum osm      Cont spasm watch, nimotop, eunatremia (cont fw restriction today), pt/ot/oob  Evd clamped per nsgy, cth tomorrow  Monitor in icu

## 2024-03-12 NOTE — PROGRESS NOTES
[facility] Neuro Critical Care  Neurosurgery  Progress Note    Subjective:     History of Present Illness: Kalia Pacheco is a 32yo man with history of known CHESTER aneurysm, not on any AP/AC who presented to [facility] this AM with headache and nausea that began 2 days ago and acutely worsened while he was at work this morning. He also reports blurry vision and has had multiple episodes of emesis. Denies any inciting factors. CTH reveals diffuse subarachnoid hemorrhage throughout the basal cisterns, HH score 1. He was transferred to [facility] for Neuro Critical Care and Neurosurgical management.     Post-Op Info:  * No surgery found *       Interval History: NAEON. Pt exam stable. Plan to clamp EVD today.     Medications:  Continuous Infusions:  Scheduled Meds:   aspirin  81 mg Oral Daily    ceFAZolin (Ancef) IV (PEDS and ADULTS)  2 g Intravenous Q8H    gabapentin  300 mg Oral Q8H    heparin (porcine)  5,000 Units Subcutaneous Q8H    melatonin  6 mg Oral Nightly    niMODipine  30 mg Oral Q2H    polyethylene glycol  17 g Oral Daily    senna-docusate 8.6-50 mg  1 tablet Oral BID    [COMPLETED] sodium chloride 0.9% infusion  1,000 mL Intravenous Once    ticagrelor  90 mg Oral BID     PRN Meds:acetaminophen, bisacodyL, hydrOXYzine HCL, labetalol, magnesium oxide, magnesium oxide, methocarbamoL, metoclopramide, ondansetron, oxyCODONE, potassium bicarbonate, potassium bicarbonate, potassium bicarbonate, potassium, sodium phosphates, potassium, sodium phosphates, potassium, sodium phosphates     Review of Systems  Objective:     Weight: 106.6 kg (235 lb)  Body mass index is 35.73 kg/m².  Vital Signs (Most Recent):  Temp: 98.2 °F (36.8 °C) (03/12/24 0705)  Pulse: 78 (03/12/24 0905)  Resp: 18 (03/12/24 0905)  BP: (!) 98/54 (03/12/24 0905)  SpO2: 100 % (03/12/24 0905) Vital Signs (24h Range):  Temp:  [98.2 °F (36.8 °C)-98.9 °F (37.2 °C)] 98.2 °F (36.8 °C)  Pulse:  [71-87] 78  Resp:  [13-28] 18  SpO2:  [98 %-100 %] 100 %  BP:  "()/(06-95) 98/54     Date 03/12/24 0700 - 03/13/24 0659   Shift 4253-4937 8831-0452 5167-9855 24 Hour Total   INTAKE   IV Piggyback 49.2   49.2   Shift Total(mL/kg) 49.2(0.5)   49.2(0.5)   OUTPUT   Drains 2   2   Shift Total(mL/kg) 2(0)   2(0)   Weight (kg) 106.6 106.6 106.6 106.6                            ICP/Ventriculostomy 03/05/24 1143 Right Parietal region (Active)   Level of Ventriculostomy (cm above) 15 03/11/24 1901   Status Open to drainage 03/12/24 0905   Site Assessment Clean;Dry 03/12/24 0905   Site Drainage No drainage 03/12/24 0905   Waveform normal waveform 03/11/24 1901   Output (mL) 1 mL 03/12/24 0905   CSF Color red;clear 03/12/24 0905   Dressing Status Clean;Dry;Intact 03/12/24 0905   Interventions HOB degrees 03/12/24 0905          Physical Exam         Neurosurgery Physical Exam  GCS 15  Awake, alert  No drift  No dysmetria  5/5 strength    Gen: well nourished, normocephalic, atraumatic  CV: skin warm and dry, distal pulses present  Pulm: chest rise symmetric, no increased work of breathing  GI: abdomen soft, non-distended, non-tender  : patient voiding independently  MSK: no bony abnormalities noted  Psych: patient interacting with appropriate mood and affect  Significant Labs:  Recent Labs   Lab 03/11/24  0357 03/11/24  1158 03/12/24  0304     --  105   * 132* 133*   K 4.2  --  4.0     --  104   CO2 23  --  23   BUN 10  --  9   CREATININE 1.0  --  0.8   CALCIUM 9.3  --  8.7   MG 1.7  --  1.7     Recent Labs   Lab 03/11/24  0357 03/12/24  0304   WBC 10.46 8.32   HGB 15.8 13.2*   HCT 45.9 38.5*    211     No results for input(s): "LABPT", "INR", "APTT" in the last 48 hours.  Microbiology Results (last 7 days)       ** No results found for the last 168 hours. **          All pertinent labs from the last 24 hours have been reviewed.    Significant Diagnostics:  I have reviewed all pertinent imaging results/findings within the past 24 hours.  Assessment/Plan: "     * Subarachnoid hemorrhage due to ruptured aneurysm  Kalia Pacheco is a 34yo man with history of known CHESTER aneurysm who presented to [place] with headache and nausea. CTH demonstrated diffuse SAH throughout the basal cisterns HH and he was transferred to [facility] for close monitoring in the Neuro ICU and Neurosurgical consultation.     R frontal EVD was placed on arrival on 3/6 and patient was taken to IR for angiogram, with no intervention upon Acomm aneurysm. Upon further review of DSA, a vertebral artery aneurysm was noted and patient was brought back to IR for a right V4/PICA irregular aneurysm stent assisted coiling.     Imaging reviewed:  CTH 3/5: Diffuse basal cistern SAH with IVH   CTA 3/5: 3mm ant projecting L Acomm, no R A1, R pcomm feeding posterior circulation   DSA 3/5: EVD in position, anteriosuperiorly projecting small L Acomm, R V4 irregular aneurysm not well visualized    DSA +embo 3/5: stent assisted coiling of R V4/PICA irregularly shaped anuerysm, 1/4 lobules still filling     - Admit to NCC with q1 neuro checks  - R EVD at Clamped today, please record ICP and output hourly, call NSGY if ICP sustained >25  - Keppra 500 BID x7d   - TCDs daily x14d   - Nimotop 60q4 x 21d   - strict I/Os, goal euvolemia to net positive for SAH patients in general, in this patient may need to be more conservative due to cardiac hx, management per NCC   - continue ASA/brilnta per neuro IR  - based on bleed pattern believe vertebral aneurysm to be the ruptured aneurysm, no plan for intervention upon Acomm at this point  -MRI with contrast not suggestive of vessel wall enhancement to Acomm  - Continue to follow clinically and notify NSGY immediately if any neurostatus change     Case and plan discussed with attending Neurosurgeon Dr. Saroj Santiago MD  Neurosurgery  [facility] - Neuro Critical Care

## 2024-03-12 NOTE — SUBJECTIVE & OBJECTIVE
Neurologic Chief Complaint: SAH    Subjective:     Interval History: Patient is seen for follow-up neurological assessment and treatment recommendations: EVD clamped. TCDs today w/signs of early vasospasm in the L CHESTER. Neuro exam stable.    HPI, Past Medical, Family, and Social History remains the same as documented in the initial encounter.     Review of Systems   Constitutional:  Negative for fever.   HENT:  Negative for trouble swallowing.    Eyes:  Negative for visual disturbance.   Respiratory:  Negative for cough.    Gastrointestinal:  Negative for nausea.   Musculoskeletal:  Negative for neck stiffness.   Neurological:  Negative for speech difficulty and weakness.     Scheduled Meds:   aspirin  81 mg Oral Daily    ceFAZolin (Ancef) IV (PEDS and ADULTS)  2 g Intravenous Q8H    gabapentin  300 mg Oral Q8H    heparin (porcine)  5,000 Units Subcutaneous Q8H    melatonin  6 mg Oral Nightly    niMODipine  30 mg Oral Q2H    polyethylene glycol  17 g Oral Daily    senna-docusate 8.6-50 mg  1 tablet Oral BID    ticagrelor  90 mg Oral BID     Continuous Infusions:  PRN Meds:acetaminophen, bisacodyL, hydrOXYzine HCL, labetalol, magnesium oxide, magnesium oxide, methocarbamoL, metoclopramide, ondansetron, oxyCODONE, potassium bicarbonate, potassium bicarbonate, potassium bicarbonate, potassium, sodium phosphates, potassium, sodium phosphates, potassium, sodium phosphates    Objective:     Vital Signs (Most Recent):  Temp: 98.3 °F (36.8 °C) (03/12/24 1505)  Pulse: 75 (03/12/24 1505)  Resp: (!) 25 (03/12/24 1505)  BP: (!) 83/52 (03/12/24 1505)  SpO2: 100 % (03/12/24 1505)  BP Location: Right arm    Vital Signs Range (Last 24H):  Temp:  [98.2 °F (36.8 °C)-98.9 °F (37.2 °C)]   Pulse:  [75-87]   Resp:  [13-28]   BP: ()/(37-66)   SpO2:  [98 %-100 %]   BP Location: Right arm       Physical Exam  Vitals and nursing note reviewed.   HENT:      Head:      Comments: EVD in place, clamped  Eyes:      General:         Right  eye: No discharge.         Left eye: No discharge.      Conjunctiva/sclera: Conjunctivae normal.   Cardiovascular:      Rate and Rhythm: Normal rate.   Pulmonary:      Effort: Pulmonary effort is normal. No respiratory distress.   Abdominal:      General: There is no distension.   Musculoskeletal:      Cervical back: Normal range of motion and neck supple.   Skin:     General: Skin is warm and dry.          Neurological Exam:   LOC: alert  Attention Span: Good   Articulation: No dysarthria  Orientation: Person, Place, Time   EOM (CN III, IV, VI): Full/intact  Facial Movement (CN VII): Symmetric facial expression    Tone: Normal tone throughout    Laboratory:  CMP:   Recent Labs   Lab 03/12/24  0304   CALCIUM 8.7   ALBUMIN 2.9*   PROT 5.8*   *   K 4.0   CO2 23      BUN 9   CREATININE 0.8   ALKPHOS 36*   ALT 6*   AST 14   BILITOT 0.7     CBC:   Recent Labs   Lab 03/12/24  0304   WBC 8.32   RBC 4.13*   HGB 13.2*   HCT 38.5*      MCV 93   MCH 32.0*   MCHC 34.3       Diagnostic Results     Brain Imaging   CTH 3/5/23:   Impression:  Subarachnoid hemorrhage with blood throughout the basilar cisterns.  Moderate intraventricular blood and mild ventricular prominence.        Vessel Imaging     TCD 3/12/2024 -newly acquired and reviewed by Vascular Neurology provider, increasing velocities in the L CHESTER. Impression:  No significant vasospasm is identified.  Velocities are increasing in the left CHESTER which may represent mild early vasospasm    MRA Brain - 03/08/2024  Impression:  Examination mildly degraded by patient motion artifact.  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  No abnormal vessel wall enhancement.  No residual flow related signal within the recently treated right PICA origin aneurysm.  Prominent enhancement at this site on vessel wall imaging.  This is nonspecific in the post treatment setting but aneurysm is presumed site of rupture given distribution of  hemorrhage.  Retrograde filling of the left vertebral artery, patient with known subclavian steal.     CTA head/neck 3/5/23:   Impression:  3 mm left anterior communicating artery aneurysm again identified.  No definite additional aneurysm identified concerning for source of hemorrhage.  Clinical correlation and correlation with conventional angiography recommended. 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 Interval increase distension lateral and 3rd ventricles compatible with developing hydrocephalus. Crowding cerebral sulci at the vertex with additional crowding basilar cisterns with questionable early cerebellar tonsillar herniation.     IR Angio 3/5/24:   Impression:  Anterior communicating artery aneurysm 2 x 1.7 mm with a wide neck. Small irregular aneurysm from the right V 4 vertebral segment just proximal to the posteroinferior cerebellar artery.  This is not well characterized on these views.  Repeat selective angiography is planned for better evaluation.        Cardiac Imaging   TTE 3/8/24:     Left Ventricle: The left ventricle is normal in size. Normal wall thickness. Global hypokinesis present. There is mildly reduced systolic function with a visually estimated ejection fraction of 40 - 45%. There is indeterminate diastolic function.    Right Ventricle: Normal right ventricular cavity size. Wall thickness is normal. Right ventricle wall motion  is normal. Systolic function is normal.     Left Atrium: Left atrium is severely dilated.    Aortic Valve: The valve morphology is clearly abnormal though the number of leaflets is not evident.  There is severe aortic valve stenosis. Aortic valve area by VTI is 0.8 cm². Aortic valve peak velocity is 4.5 m/s. Mean gradient is 55 mmHg. The dimensionless index is 0.17. There is mild aortic regurgitation.    IVC/SVC: Normal venous pressure at 3 mmHg.

## 2024-03-12 NOTE — PLAN OF CARE
[facility] Care Plan    POC reviewed with Kalia Pacheco and family at 0300. Pt verbalized understanding. Questions and concerns addressed. No acute events overnight. Pt progressing toward goals. Will continue to monitor. See below and flowsheets for full assessment and VS info.     EVD open at 15  Bath completed  New PIV      Is this a stroke patient? yes- Stroke booklet reviewed with patient, risk factors identified for patient and stroke booklet remains at bedside for ongoing education.     Care individualization: EVD drain care    Neuro:  [place] Coma Scale  Best Eye Response: 4-->(E4) spontaneous  Best Motor Response: 6-->(M6) obeys commands  Best Verbal Response: 5-->(V5) oriented  [place] Coma Scale Score: 15  Assessment Qualifiers: patient not sedated/intubated, no eye obstruction present  Pupil PERRLA: yes     24hr Temp:  [98 °F (36.7 °C)-98.9 °F (37.2 °C)]     CV:   Rhythm: normal sinus rhythm  BP goals:   SBP < 220  MAP > 65    Resp:      Vent Mode: Spont  Set Rate: 16 BPM  Oxygen Concentration (%): 40  Vt Set: 500 mL  PEEP/CPAP: 5 cmH20  Pressure Support: 8 cmH20    Plan: N/A    GI/:     Diet/Nutrition Received: regular  Last Bowel Movement: 03/11/24  Voiding Characteristics: voids spontaneously without difficulty    Intake/Output Summary (Last 24 hours) at 3/12/2024 0649  Last data filed at 3/12/2024 0601  Gross per 24 hour   Intake 1620.97 ml   Output 3253 ml   Net -1632.03 ml     Unmeasured Output  Urine Occurrence: 1  Stool Occurrence: 1  Emesis Occurrence: 1  Pad Count: 2    Labs/Accuchecks:  Recent Labs   Lab 03/12/24  0304   WBC 8.32   RBC 4.13*   HGB 13.2*   HCT 38.5*         Recent Labs   Lab 03/12/24  0304   *   K 4.0   CO2 23      BUN 9   CREATININE 0.8   ALKPHOS 36*   ALT 6*   AST 14   BILITOT 0.7      Recent Labs   Lab 03/05/24  1104   INR 1.1   APTT 24.2      Recent Labs   Lab 03/05/24  0831   TROPONINI 0.070       Electrolytes: N/A - electrolytes WDL  Accuchecks:  none    Gtts:      LDA/Wounds:    Nurses Note -- 4 Eyes    Is there altered skin present? no     Prevention Measures Documented    Second RN/Staff Member:  na    Restraints:   Restraint Order  Length of Order: Order good for next 24 hours or when removed.  Date that the current order will : 24  Time that the current order will :   Order Upon Application: Yes    [facility]

## 2024-03-13 LAB
ALBUMIN SERPL BCP-MCNC: 2.8 G/DL (ref 3.5–5.2)
ALP SERPL-CCNC: 33 U/L (ref 55–135)
ALT SERPL W/O P-5'-P-CCNC: 6 U/L (ref 10–44)
ANION GAP SERPL CALC-SCNC: 6 MMOL/L (ref 8–16)
AST SERPL-CCNC: 13 U/L (ref 10–40)
BASOPHILS # BLD AUTO: 0.04 K/UL (ref 0–0.2)
BASOPHILS NFR BLD: 0.6 % (ref 0–1.9)
BILIRUB SERPL-MCNC: 0.4 MG/DL (ref 0.1–1)
BUN SERPL-MCNC: 11 MG/DL (ref 6–20)
CALCIUM SERPL-MCNC: 8.3 MG/DL (ref 8.7–10.5)
CHLORIDE SERPL-SCNC: 105 MMOL/L (ref 95–110)
CO2 SERPL-SCNC: 23 MMOL/L (ref 23–29)
CREAT SERPL-MCNC: 0.8 MG/DL (ref 0.5–1.4)
DIFFERENTIAL METHOD BLD: ABNORMAL
EOSINOPHIL # BLD AUTO: 0 K/UL (ref 0–0.5)
EOSINOPHIL NFR BLD: 0.4 % (ref 0–8)
ERYTHROCYTE [DISTWIDTH] IN BLOOD BY AUTOMATED COUNT: 13 % (ref 11.5–14.5)
EST. GFR  (NO RACE VARIABLE): >60 ML/MIN/1.73 M^2
GLUCOSE SERPL-MCNC: 97 MG/DL (ref 70–110)
HCT VFR BLD AUTO: 36.8 % (ref 40–54)
HGB BLD-MCNC: 12.5 G/DL (ref 14–18)
IMM GRANULOCYTES # BLD AUTO: 0.03 K/UL (ref 0–0.04)
IMM GRANULOCYTES NFR BLD AUTO: 0.4 % (ref 0–0.5)
LYMPHOCYTES # BLD AUTO: 1.5 K/UL (ref 1–4.8)
LYMPHOCYTES NFR BLD: 20.5 % (ref 18–48)
MAGNESIUM SERPL-MCNC: 1.8 MG/DL (ref 1.6–2.6)
MCH RBC QN AUTO: 32 PG (ref 27–31)
MCHC RBC AUTO-ENTMCNC: 34 G/DL (ref 32–36)
MCV RBC AUTO: 94 FL (ref 82–98)
MONOCYTES # BLD AUTO: 0.6 K/UL (ref 0.3–1)
MONOCYTES NFR BLD: 8.7 % (ref 4–15)
NEUTROPHILS # BLD AUTO: 5 K/UL (ref 1.8–7.7)
NEUTROPHILS NFR BLD: 69.4 % (ref 38–73)
NRBC BLD-RTO: 0 /100 WBC
PHOSPHATE SERPL-MCNC: 3.3 MG/DL (ref 2.7–4.5)
PLATELET # BLD AUTO: 178 K/UL (ref 150–450)
PMV BLD AUTO: 9.9 FL (ref 9.2–12.9)
POTASSIUM SERPL-SCNC: 4 MMOL/L (ref 3.5–5.1)
PROT SERPL-MCNC: 5.5 G/DL (ref 6–8.4)
RBC # BLD AUTO: 3.91 M/UL (ref 4.6–6.2)
SODIUM SERPL-SCNC: 134 MMOL/L (ref 136–145)
WBC # BLD AUTO: 7.13 K/UL (ref 3.9–12.7)

## 2024-03-13 PROCEDURE — 63600175 PHARM REV CODE 636 W HCPCS: Performed by: STUDENT IN AN ORGANIZED HEALTH CARE EDUCATION/TRAINING PROGRAM

## 2024-03-13 PROCEDURE — 99499 UNLISTED E&M SERVICE: CPT | Mod: ,,, | Performed by: NURSE PRACTITIONER

## 2024-03-13 PROCEDURE — 99291 CRITICAL CARE FIRST HOUR: CPT | Mod: FS,,, | Performed by: PSYCHIATRY & NEUROLOGY

## 2024-03-13 PROCEDURE — 99233 SBSQ HOSP IP/OBS HIGH 50: CPT | Mod: FS,,, | Performed by: PSYCHIATRY & NEUROLOGY

## 2024-03-13 PROCEDURE — 25000003 PHARM REV CODE 250: Performed by: PHYSICIAN ASSISTANT

## 2024-03-13 PROCEDURE — 83735 ASSAY OF MAGNESIUM: CPT

## 2024-03-13 PROCEDURE — 63600175 PHARM REV CODE 636 W HCPCS

## 2024-03-13 PROCEDURE — 80053 COMPREHEN METABOLIC PANEL: CPT

## 2024-03-13 PROCEDURE — 25000003 PHARM REV CODE 250: Performed by: PSYCHIATRY & NEUROLOGY

## 2024-03-13 PROCEDURE — 97535 SELF CARE MNGMENT TRAINING: CPT

## 2024-03-13 PROCEDURE — 25000003 PHARM REV CODE 250

## 2024-03-13 PROCEDURE — 20000000 HC ICU ROOM

## 2024-03-13 PROCEDURE — 85025 COMPLETE CBC W/AUTO DIFF WBC: CPT

## 2024-03-13 PROCEDURE — 25000003 PHARM REV CODE 250: Performed by: STUDENT IN AN ORGANIZED HEALTH CARE EDUCATION/TRAINING PROGRAM

## 2024-03-13 PROCEDURE — 84100 ASSAY OF PHOSPHORUS: CPT

## 2024-03-13 PROCEDURE — 94761 N-INVAS EAR/PLS OXIMETRY MLT: CPT

## 2024-03-13 RX ADMIN — TICAGRELOR 90 MG: 90 TABLET ORAL at 08:03

## 2024-03-13 RX ADMIN — NIMODIPINE 30 MG: 30 CAPSULE, LIQUID FILLED ORAL at 11:03

## 2024-03-13 RX ADMIN — OXYCODONE 5 MG: 5 TABLET ORAL at 06:03

## 2024-03-13 RX ADMIN — NIMODIPINE 30 MG: 30 CAPSULE, LIQUID FILLED ORAL at 04:03

## 2024-03-13 RX ADMIN — NIMODIPINE 30 MG: 30 CAPSULE, LIQUID FILLED ORAL at 05:03

## 2024-03-13 RX ADMIN — NIMODIPINE 30 MG: 30 CAPSULE, LIQUID FILLED ORAL at 08:03

## 2024-03-13 RX ADMIN — ACETAMINOPHEN 650 MG: 325 TABLET ORAL at 06:03

## 2024-03-13 RX ADMIN — METHOCARBAMOL 500 MG: 500 TABLET ORAL at 08:03

## 2024-03-13 RX ADMIN — CEFAZOLIN 2 G: 2 INJECTION, POWDER, FOR SOLUTION INTRAMUSCULAR; INTRAVENOUS at 04:03

## 2024-03-13 RX ADMIN — GABAPENTIN 300 MG: 300 CAPSULE ORAL at 01:03

## 2024-03-13 RX ADMIN — GABAPENTIN 300 MG: 300 CAPSULE ORAL at 05:03

## 2024-03-13 RX ADMIN — NIMODIPINE 30 MG: 30 CAPSULE, LIQUID FILLED ORAL at 07:03

## 2024-03-13 RX ADMIN — ACETAMINOPHEN 650 MG: 325 TABLET ORAL at 01:03

## 2024-03-13 RX ADMIN — POLYETHYLENE GLYCOL 3350 17 G: 17 POWDER, FOR SOLUTION ORAL at 08:03

## 2024-03-13 RX ADMIN — Medication 6 MG: at 08:03

## 2024-03-13 RX ADMIN — NIMODIPINE 30 MG: 30 CAPSULE, LIQUID FILLED ORAL at 12:03

## 2024-03-13 RX ADMIN — DOCUSATE SODIUM AND SENNOSIDES 1 TABLET: 8.6; 5 TABLET, FILM COATED ORAL at 08:03

## 2024-03-13 RX ADMIN — OXYCODONE 5 MG: 5 TABLET ORAL at 07:03

## 2024-03-13 RX ADMIN — METHOCARBAMOL 500 MG: 500 TABLET ORAL at 04:03

## 2024-03-13 RX ADMIN — NIMODIPINE 30 MG: 30 CAPSULE, LIQUID FILLED ORAL at 10:03

## 2024-03-13 RX ADMIN — HEPARIN SODIUM 5000 UNITS: 5000 INJECTION INTRAVENOUS; SUBCUTANEOUS at 09:03

## 2024-03-13 RX ADMIN — ACETAMINOPHEN 650 MG: 325 TABLET ORAL at 08:03

## 2024-03-13 RX ADMIN — NIMODIPINE 30 MG: 30 CAPSULE, LIQUID FILLED ORAL at 09:03

## 2024-03-13 RX ADMIN — NIMODIPINE 30 MG: 30 CAPSULE, LIQUID FILLED ORAL at 01:03

## 2024-03-13 RX ADMIN — ASPIRIN 81 MG CHEWABLE TABLET 81 MG: 81 TABLET CHEWABLE at 08:03

## 2024-03-13 RX ADMIN — NIMODIPINE 30 MG: 30 CAPSULE, LIQUID FILLED ORAL at 02:03

## 2024-03-13 RX ADMIN — HEPARIN SODIUM 5000 UNITS: 5000 INJECTION INTRAVENOUS; SUBCUTANEOUS at 05:03

## 2024-03-13 RX ADMIN — HEPARIN SODIUM 5000 UNITS: 5000 INJECTION INTRAVENOUS; SUBCUTANEOUS at 01:03

## 2024-03-13 RX ADMIN — GABAPENTIN 300 MG: 300 CAPSULE ORAL at 09:03

## 2024-03-13 NOTE — PROGRESS NOTES
[facility] - Neuro Critical Care  Neurosurgery  Progress Note    Subjective:     History of Present Illness: Kalia Pacheco is a 32yo man with history of known CHESTER aneurysm, not on any AP/AC who presented to [facility] this AM with headache and nausea that began 2 days ago and acutely worsened while he was at work this morning. He also reports blurry vision and has had multiple episodes of emesis. Denies any inciting factors. CTH reveals diffuse subarachnoid hemorrhage throughout the basal cisterns, HH score 1. He was transferred to [facility] for Neuro Critical Care and Neurosurgical management.     Post-Op Info:  * No surgery found *       Interval History: NAEON. Pt exam stable this morning. PBD9, plan to pull EVD today. CTH overnight was stable with EVD clamped. Will get rCTH tomorrow.    Medications:  Continuous Infusions:  Scheduled Meds:   aspirin  81 mg Oral Daily    ceFAZolin (Ancef) IV (PEDS and ADULTS)  2 g Intravenous Q8H    gabapentin  300 mg Oral Q8H    heparin (porcine)  5,000 Units Subcutaneous Q8H    melatonin  6 mg Oral Nightly    niMODipine  30 mg Oral Q2H    polyethylene glycol  17 g Oral Daily    senna-docusate 8.6-50 mg  1 tablet Oral BID    ticagrelor  90 mg Oral BID     PRN Meds:acetaminophen, bisacodyL, hydrOXYzine HCL, labetalol, magnesium oxide, magnesium oxide, methocarbamoL, metoclopramide, ondansetron, oxyCODONE, potassium bicarbonate, potassium bicarbonate, potassium bicarbonate, potassium, sodium phosphates, potassium, sodium phosphates, potassium, sodium phosphates     Review of Systems  Objective:     Weight: 106.6 kg (235 lb)  Body mass index is 35.73 kg/m².  Vital Signs (Most Recent):  Temp: 98.1 °F (36.7 °C) (03/13/24 0701)  Pulse: 73 (03/13/24 0701)  Resp: 16 (03/13/24 0733)  BP: 132/63 (03/13/24 0733)  SpO2: 100 % (03/13/24 0701) Vital Signs (24h Range):  Temp:  [98.1 °F (36.7 °C)-98.7 °F (37.1 °C)] 98.1 °F (36.7 °C)  Pulse:  [73-96] 73  Resp:  [16-31] 16  SpO2:  [98 %-100 %] 100  %  BP: ()/(37-82) 132/63     Date 03/13/24 0700 - 03/14/24 0659   Shift 9213-1455 7336-5778 5887-4751 24 Hour Total   INTAKE   IV Piggyback 0   0   Shift Total(mL/kg) 0(0)   0(0)   OUTPUT   Shift Total(mL/kg)       Weight (kg) 106.6 106.6 106.6 106.6                            ICP/Ventriculostomy 03/05/24 1143 Right Parietal region (Active)   Level of Ventriculostomy (cm above) 15 03/13/24 0743   Status Clamped 03/13/24 0743   Site Assessment Clean;Dry 03/13/24 0743   Site Drainage No drainage 03/13/24 0743   Waveform normal waveform 03/13/24 0743   Output (mL) 1 mL 03/12/24 0905   CSF Color clear 03/13/24 0601   Dressing Status Clean;Dry;Intact 03/13/24 0743   Interventions HOB degrees 03/13/24 0743          Physical Exam         Neurosurgery Physical Exam    General: well developed, well nourished, no distress.   HEENT: normocephalic, atraumatic  CV: regular rate   Pulmonary: normal respirations, no signs of respiratory distress  Abdomen: soft, non-distended, not tender to palpation  Skin: Skin is warm, dry and intact  Heme: no bruising    Neuro:   Mental Status: AO x4, no aphasia, no dysarthria   CN: PERRL, EOMI, VF intact to confrontation, sensation intact bilaterally, eyebrow raise and grimace symmetric, tongue midline  Motor: moves all extremities spontaneously, full strength throughout, no pronator drift   Sensory: intact to light touch throughout  Cerebellar: finger to nose intact bilaterally  Reflexes: -Patterson's, -Babinski, no clonus. Patellar: 2+ bilaterally        Incision: Clean, dry, intact. Skin edges well approximated. No surrounding erythema or edema. No drainage or TTP.      Significant Labs:  Recent Labs   Lab 03/11/24  1158 03/12/24  0304 03/13/24  0030   GLU  --  105 97   * 133* 134*   K  --  4.0 4.0   CL  --  104 105   CO2  --  23 23   BUN  --  9 11   CREATININE  --  0.8 0.8   CALCIUM  --  8.7 8.3*   MG  --  1.7 1.8     Recent Labs   Lab 03/12/24  0304 03/13/24  0030   WBC 8.32 7.13  "  HGB 13.2* 12.5*   HCT 38.5* 36.8*    178     No results for input(s): "LABPT", "INR", "APTT" in the last 48 hours.  Microbiology Results (last 7 days)       ** No results found for the last 168 hours. **          All pertinent labs from the last 24 hours have been reviewed.    Significant Diagnostics:  CT: CT Head Without Contrast    Result Date: 3/13/2024  IVC placement and aneurysm coiling. The ventricles are mildly decreased in size.  Decreased intraventricular hemorrhage. Electronically signed by: Floyd Rutherford Date:    03/13/2024 Time:    07:41   Assessment/Plan:     * Subarachnoid hemorrhage due to ruptured aneurysm  Kalia Pacheco is a 32yo man with history of known CHESTER aneurysm who presented to [facility] with headache and nausea. CTH demonstrated diffuse SAH throughout the basal cisterns HH and he was transferred to [facility] for close monitoring in the Neuro ICU and Neurosurgical consultation.     R frontal EVD was placed on arrival on 3/6 and patient was taken to IR for angiogram, with no intervention upon Acomm aneurysm. Upon further review of DSA, a vertebral artery aneurysm was noted and patient was brought back to IR for a right V4/PICA irregular aneurysm stent assisted coiling.     Imaging reviewed:  CTH 3/5: Diffuse basal cistern SAH with IVH   CTA 3/5: 3mm ant projecting L Acomm, no R A1, R pcomm feeding posterior circulation   DSA 3/5: EVD in position, anteriosuperiorly projecting small L Acomm, R V4 irregular aneurysm not well visualized    DSA +embo 3/5: stent assisted coiling of R V4/PICA irregularly shaped anuerysm, 1/4 lobules still filling     - Admit to NCC with q1 neuro checks  - Will Pull EVD today     -CTH tomorrow am  - Keppra 500 BID x7d   - TCDs daily x14d   - Nimotop 60q4 x 21d   - strict I/Os, goal euvolemia to net positive for SAH patients in general, in this patient may need to be more conservative due to cardiac hx, management per NCC   - continue ASA/brilnta per neuro " IR  - based on bleed pattern believe vertebral aneurysm to be the ruptured aneurysm, no plan for intervention upon Acomm at this point  -MRI with contrast not suggestive of vessel wall enhancement to Acomm  - Continue to follow clinically and notify NSGY immediately if any neurostatus change     Case and plan discussed with attending Neurosurgeon Dr. Saroj Santiago MD  Neurosurgery  [facility] - Neuro Critical Care

## 2024-03-13 NOTE — PROGRESS NOTES
[facility] Neuro Critical Care  Neurocritical Care  Progress Note    Admit Date: 3/5/2024  Service Date: 03/13/2024  Length of Stay: 8    Subjective:     Chief Complaint: Subarachnoid hemorrhage due to ruptured aneurysm    History of Present Illness: The patient is a 32-year-old man with a past medical history of ADHD, EtOH use and abuse, arthritis, hypertension, coarctation of the aorta (status post repair), PDA, VSD status post repair x2.  History of Legg-Perthes disease, and intracranial hemorrhage in May 2022.  presented to [facility] this AM with headache and nausea that began 2 days ago and acutely worsened while he was at work this morning. CTH demonstrated HH1mF4 SAH. S/p EVD placement by Nsx. Was taken to IR for angiography, but the vessel was not sercured during the procedure, the patient was returned to the NSICU, intubated and sedated, [facility] neurosurgery pending.     Hospital Course: 03/06/2024 Overnight, mild bradycardia and soft BPs. Weaned off fentanyl, weaning precedex. Extubated to NC this morning.   03/07/2024 AF, soft BPs. TCD with no signs of vasospasm. Neuro exam stable. Persistent HA, waxing/waning in severity. EVD with 289mL output. MRA pending.   03/08/2024 AF, HDS on RA. TCD no signs of vasospasm. Neuro exam stable. HA. EVD open @10 w/ 250mL output. MRA today.  03/09/2024 AF. BP soft. RA. TTE with reduced EF (40-45%) and mild aortic regurg. TCD w/ no signs of vasospasm. EVD open @10 w/ 224mL output. Started brillinta.   3/10: KUB, robaxin, encourage PO  3/11/2024: no free water intake, send Urine studies, TCD no vasospasm, Follow Na, discussed with IR- aneurysm secured and now SBP <220  03/12/2024: mild hyponatremia, mild hypotension fluid responsive, evd clamped  3/13/24: Pull EVD per NSGY    Interval History:  EVD pulled per NSGY, CTH in am,     Review of Systems   Constitutional: Negative.    HENT: Negative.     Eyes: Negative.    Respiratory: Negative.     Cardiovascular: Negative.     Gastrointestinal: Negative.    Endocrine: Negative.    Genitourinary: Negative.    Musculoskeletal: Negative.    Skin: Negative.      2 systems   Objective:     Vitals:  Temp: 98.2 °F (36.8 °C)  Pulse: 69  Rhythm: normal sinus rhythm  BP: (!) 148/85  MAP (mmHg): 118  ICP Mean (mmHg): 10 mmHg  Resp: (!) 24  SpO2: 100 %    Temp  Min: 98.1 °F (36.7 °C)  Max: 98.7 °F (37.1 °C)  Pulse  Min: 69  Max: 96  BP  Min: 83/52  Max: 178/85  MAP (mmHg)  Min: 62  Max: 118  ICP Mean (mmHg)  Min: 6 mmHg  Max: 30 mmHg  Resp  Min: 16  Max: 31  SpO2  Min: 98 %  Max: 100 %    03/12 0701 - 03/13 0700  In: 1295 [P.O.:100]  Out: 3552 [Urine:3550; Drains:2]   Unmeasured Output  Urine Occurrence: 1  Stool Occurrence: 1  Emesis Occurrence: 1  Pad Count: 2        Physical Exam  Vitals and nursing note reviewed.   Constitutional:       Appearance: Normal appearance.   HENT:      Head: Normocephalic.      Nose: Nose normal.      Mouth/Throat:      Mouth: Mucous membranes are moist.      Pharynx: Oropharynx is clear.   Eyes:      Pupils: Pupils are equal, round, and reactive to light.   Cardiovascular:      Rate and Rhythm: Normal rate and regular rhythm.      Pulses: Normal pulses.      Heart sounds: Normal heart sounds.   Pulmonary:      Effort: Pulmonary effort is normal.      Breath sounds: Normal breath sounds.   Abdominal:      General: Bowel sounds are normal.      Palpations: Abdomen is soft.   Musculoskeletal:         General: Normal range of motion.   Skin:     General: Skin is warm and dry.      Capillary Refill: Capillary refill takes 2 to 3 seconds.   Neurological:      Mental Status: He is alert.      Comments: AAO X4  GCS 15  ENCARNACION to command and spontaneously  Sensation Intact             Medications:  Continuous Scheduledaspirin, 81 mg, Daily  gabapentin, 300 mg, Q8H  heparin (porcine), 5,000 Units, Q8H  melatonin, 6 mg, Nightly  niMODipine, 30 mg, Q2H  polyethylene glycol, 17 g, Daily  senna-docusate 8.6-50 mg, 1 tablet,  BID  ticagrelor, 90 mg, BID    PRNacetaminophen, 650 mg, Q6H PRN  bisacodyL, 10 mg, Daily PRN  hydrOXYzine HCL, 25 mg, Nightly PRN  labetalol, 10 mg, Q15 Min PRN  magnesium oxide, 800 mg, PRN  magnesium oxide, 800 mg, PRN  methocarbamoL, 500 mg, QID PRN  metoclopramide, 10 mg, Q6H PRN  ondansetron, 4 mg, Q6H PRN  oxyCODONE, 5 mg, Q6H PRN  potassium bicarbonate, 35 mEq, PRN  potassium bicarbonate, 50 mEq, PRN  potassium bicarbonate, 60 mEq, PRN  potassium, sodium phosphates, 2 packet, PRN  potassium, sodium phosphates, 2 packet, PRN  potassium, sodium phosphates, 2 packet, PRN      Today I personally reviewed pertinent medications, lines/drains/airways, imaging, cardiology results, laboratory results, microbiology results, notably:    Diet  Diet Adult Regular (IDDSI Level 7) Thin; Standard Tray  Diet Adult Regular (IDDSI Level 7) Thin; Standard Tray         Assessment/Plan:     Neuro  * Subarachnoid hemorrhage due to ruptured aneurysm  Hh2 mf4 R vert anx rupture, S/p stent coil of R v4 anx 3/5, unruptured acomm anx not secured  Obs hydro s/p evd, clamped today  Hyponatremia, normal urine/serum osm      Cont spasm watch, nimotop, eunatremia (cont fw restriction today), pt/ot/oob  Evd clamped per nsgy, cth tomorrow  Monitor in icu    IVH (intraventricular hemorrhage)  EVD in place removed per NSGY  Draining  Monitor scans  Wean as able    Communicating hydrocephalus  EVD  ICP monitoring  Monitor drainage    Anterior communicating artery aneurysm  Unsecure  Will discuss with consulting teams on plan    Cardiac/Vascular  Hypertension  -continue current regimen          The patient is being Prophylaxed for:  Venous Thromboembolism with: Mechanical or Chemical  Stress Ulcer with: Not Applicable   Ventilator Pneumonia with: not applicable    Activity Orders            Diet Adult Regular (IDDSI Level 7) Thin; Standard Tray: Regular starting at 03/11 1118    Turn patient starting at 03/05 1200    Elevate HOB starting at 03/05  1119          Full Code  Critical care time 45 min  Clara Stroud, ANDREW  Neurocritical Care  [facility] - Neuro Critical Care

## 2024-03-13 NOTE — SUBJECTIVE & OBJECTIVE
Interval History:  EVD pulled per NSGY, CTH in am,     Review of Systems   Constitutional: Negative.    HENT: Negative.     Eyes: Negative.    Respiratory: Negative.     Cardiovascular: Negative.    Gastrointestinal: Negative.    Endocrine: Negative.    Genitourinary: Negative.    Musculoskeletal: Negative.    Skin: Negative.      2 systems   Objective:     Vitals:  Temp: 98.2 °F (36.8 °C)  Pulse: 69  Rhythm: normal sinus rhythm  BP: (!) 148/85  MAP (mmHg): 118  ICP Mean (mmHg): 10 mmHg  Resp: (!) 24  SpO2: 100 %    Temp  Min: 98.1 °F (36.7 °C)  Max: 98.7 °F (37.1 °C)  Pulse  Min: 69  Max: 96  BP  Min: 83/52  Max: 178/85  MAP (mmHg)  Min: 62  Max: 118  ICP Mean (mmHg)  Min: 6 mmHg  Max: 30 mmHg  Resp  Min: 16  Max: 31  SpO2  Min: 98 %  Max: 100 %    03/12 0701 - 03/13 0700  In: 1295 [P.O.:100]  Out: 3552 [Urine:3550; Drains:2]   Unmeasured Output  Urine Occurrence: 1  Stool Occurrence: 1  Emesis Occurrence: 1  Pad Count: 2        Physical Exam  Vitals and nursing note reviewed.   Constitutional:       Appearance: Normal appearance.   HENT:      Head: Normocephalic.      Nose: Nose normal.      Mouth/Throat:      Mouth: Mucous membranes are moist.      Pharynx: Oropharynx is clear.   Eyes:      Pupils: Pupils are equal, round, and reactive to light.   Cardiovascular:      Rate and Rhythm: Normal rate and regular rhythm.      Pulses: Normal pulses.      Heart sounds: Normal heart sounds.   Pulmonary:      Effort: Pulmonary effort is normal.      Breath sounds: Normal breath sounds.   Abdominal:      General: Bowel sounds are normal.      Palpations: Abdomen is soft.   Musculoskeletal:         General: Normal range of motion.   Skin:     General: Skin is warm and dry.      Capillary Refill: Capillary refill takes 2 to 3 seconds.   Neurological:      Mental Status: He is alert.      Comments: AAO X4  GCS 15  ENCARNACION to command and spontaneously  Sensation Intact             Medications:  Continuous Scheduledaspirin, 81 mg,  Daily  gabapentin, 300 mg, Q8H  heparin (porcine), 5,000 Units, Q8H  melatonin, 6 mg, Nightly  niMODipine, 30 mg, Q2H  polyethylene glycol, 17 g, Daily  senna-docusate 8.6-50 mg, 1 tablet, BID  ticagrelor, 90 mg, BID    PRNacetaminophen, 650 mg, Q6H PRN  bisacodyL, 10 mg, Daily PRN  hydrOXYzine HCL, 25 mg, Nightly PRN  labetalol, 10 mg, Q15 Min PRN  magnesium oxide, 800 mg, PRN  magnesium oxide, 800 mg, PRN  methocarbamoL, 500 mg, QID PRN  metoclopramide, 10 mg, Q6H PRN  ondansetron, 4 mg, Q6H PRN  oxyCODONE, 5 mg, Q6H PRN  potassium bicarbonate, 35 mEq, PRN  potassium bicarbonate, 50 mEq, PRN  potassium bicarbonate, 60 mEq, PRN  potassium, sodium phosphates, 2 packet, PRN  potassium, sodium phosphates, 2 packet, PRN  potassium, sodium phosphates, 2 packet, PRN      Today I personally reviewed pertinent medications, lines/drains/airways, imaging, cardiology results, laboratory results, microbiology results, notably:    Diet  Diet Adult Regular (IDDSI Level 7) Thin; Standard Tray  Diet Adult Regular (IDDSI Level 7) Thin; Standard Tray

## 2024-03-13 NOTE — PT/OT/SLP PROGRESS
"Occupational Therapy   Treatment    Name: Kalia Pacheco  MRN: [id number]  Admitting Diagnosis:  Subarachnoid hemorrhage due to ruptured aneurysm       Recommendations:     Discharge Recommendations: No Therapy Indicated  Discharge Equipment Recommendations:  none  Barriers to discharge:  None    Assessment:     Kalia Pacheco is a 33 y.o. male with a medical diagnosis of Subarachnoid hemorrhage due to ruptured aneurysm.  He presents with performance deficits affecting function are impaired self care skills, impaired functional mobility.     Rehab Prognosis:  Good; patient would benefit from acute skilled OT services to address these deficits and reach maximum level of function.       Plan:     Patient to be seen 4 x/week to address the above listed problems via neuromuscular re-education, therapeutic exercises, therapeutic activities, self-care/home management  Plan of Care Expires: 04/03/24  Plan of Care Reviewed with: patient    Subjective     Patient:  "My tailbone is really hurting.  I am very stiff."  Pain/Comfort:  Pain Rating 1: 9/10  Location:  tailbone  Addressed by mobility, notifying nurse, suggesting heat/ice  Pain Rating Post-Intervention 1: 9/10    Objective:     Communicated with: Nurse prior to session.  Patient found supine with telemetry, pulse ox (continuous), external ventricular drain, peripheral IV, blood pressure cuff upon OT entry to room.    General Precautions: Standard, fall; EVD clamped    Orthopedic Precautions:N/A  Braces: N/A  Respiratory Status: Room air     Occupational Performance:     Bed Mobility:    Patient completed Rolling/Turning to Left with  supervision  Patient completed Rolling/Turning to Right with supervision  Patient completed Supine to Sit with supervision  Patient completed Sit to Supine with supervision     Functional Mobility/Transfers:  Patient completed Sit <> Stand Transfer with supervision  with  no assistive device   Patient completed Bed <> Chair " Transfer using Stand Pivot technique with supervision with no assistive device    Activities of Daily Living:  Grooming: supervision while standing  Upper Body Dressing: supervision    Lower Body Dressing: min assist for left sock     AMPAC 6 Click ADL: 18    Treatment & Education:  Patient alert and oriented x 3; able to follow 4/4 one step commands.  Patient attentive and interactive throughout the session.     Patient left supine with all lines intact, call button in reach, and bed alarm on    GOALS:   Multidisciplinary Problems       Occupational Therapy Goals          Problem: Occupational Therapy    Goal Priority Disciplines Outcome Interventions   Occupational Therapy Goal     OT, PT/OT Ongoing, Progressing    Description: Goals set 3/7 with an expiration date, 4/4:  Patient will increase functional independence with ADLs by performing:    Patient will demonstrate rolling to the right with modified independence.  Not met   Patient will demonstrate rolling to the left with modified independence.   Not met  Patient will demonstrate supine -sit with modified independence.   Not met  Patient will demonstrate stand pivot transfers with modified independence.   Not met  Patient will demonstrate grooming while standing with modified independence.   Not met  Patient will demonstrate upper body dressing with modified independence while seated EOB.   Not met  Patient will demonstrate lower body dressing with modified independence while seated EOB.   Not met  Patient will demonstrate toileting with modified independence.   Not met  Patient will demonstrate bathing while seated EOB with modified independence   Not met  Patient's family / caregiver will demonstrate independence and safety with assisting patient with self-care skills and functional mobility.     Not met                           Time Tracking:     OT Date of Treatment: 03/13/24  OT Start Time: 0545  OT Stop Time: 0609  OT Total Time (min): 24  min    Billable Minutes:Self Care/Home Management 24    OT/ZULEYMA: OT          3/13/2024

## 2024-03-13 NOTE — ASSESSMENT & PLAN NOTE
Kalia Pacheco is a 34yo man with history of known CHESTER aneurysm who presented to [place] with headache and nausea. CTH demonstrated diffuse SAH throughout the basal cisterns HH and he was transferred to [facility] for close monitoring in the Neuro ICU and Neurosurgical consultation.     R frontal EVD was placed on arrival on 3/6 and patient was taken to IR for angiogram, with no intervention upon Acomm aneurysm. Upon further review of DSA, a vertebral artery aneurysm was noted and patient was brought back to IR for a right V4/PICA irregular aneurysm stent assisted coiling.     Imaging reviewed:  CTH 3/5: Diffuse basal cistern SAH with IVH   CTA 3/5: 3mm ant projecting L Acomm, no R A1, R pcomm feeding posterior circulation   DSA 3/5: EVD in position, anteriosuperiorly projecting small L Acomm, R V4 irregular aneurysm not well visualized    DSA +embo 3/5: stent assisted coiling of R V4/PICA irregularly shaped anuerysm, 1/4 lobules still filling     - Admit to NCC with q1 neuro checks  - Will Pull EVD today     -CTH tomorrow am  - Keppra 500 BID x7d   - TCDs daily x14d   - Nimotop 60q4 x 21d   - strict I/Os, goal euvolemia to net positive for SAH patients in general, in this patient may need to be more conservative due to cardiac hx, management per NCC   - continue ASA/brilnta per neuro IR  - based on bleed pattern believe vertebral aneurysm to be the ruptured aneurysm, no plan for intervention upon Acomm at this point  -MRI with contrast not suggestive of vessel wall enhancement to Acomm  - Continue to follow clinically and notify NSGY immediately if any neurostatus change     Case and plan discussed with attending Neurosurgeon Dr. Kyle

## 2024-03-13 NOTE — PLAN OF CARE
"[facility] Care Plan    POC reviewed with Kalia Pacheco and family at 1400. Pt verbalized understanding. Questions and concerns addressed. No acute events today. Pt progressing toward goals. Will continue to monitor. See below and flowsheets for full assessment and VS info.   EVD out.  Patient stable            Is this a stroke patient? yes- Stroke booklet reviewed with patient and family, risk factors identified for patient and stroke booklet remains at bedside for ongoing education.     Care individualization: safety    Neuro:  [place] Coma Scale  Best Eye Response: 4-->(E4) spontaneous  Best Motor Response: 6-->(M6) obeys commands  Best Verbal Response: 5-->(V5) oriented  [place] Coma Scale Score: 15  Assessment Qualifiers: patient not sedated/intubated  Pupil PERRLA: yes     24 hr Temp:  [98 °F (36.7 °C)-98.7 °F (37.1 °C)]     CV:   Rhythm: normal sinus rhythm  BP goals:   SBP < 220  MAP > 65    Resp:      Vent Mode: Spont  Set Rate: 16 BPM  Oxygen Concentration (%): 40  Vt Set: 500 mL  PEEP/CPAP: 5 cmH20  Pressure Support: 8 cmH20    Plan: N/A    GI/:     Diet/Nutrition Received: regular  Last Bowel Movement: 03/13/24  Voiding Characteristics: frequency    Intake/Output Summary (Last 24 hours) at 3/13/2024 1530  Last data filed at 3/13/2024 1501  Gross per 24 hour   Intake 646.91 ml   Output 3195 ml   Net -2548.09 ml     Unmeasured Output  Urine Occurrence: 1  Stool Occurrence: 1  Emesis Occurrence: 1  Pad Count: 2    Labs/Accuchecks:  Recent Labs   Lab 03/13/24  0030   WBC 7.13   RBC 3.91*   HGB 12.5*   HCT 36.8*         Recent Labs   Lab 03/13/24  0030   *   K 4.0   CO2 23      BUN 11   CREATININE 0.8   ALKPHOS 33*   ALT 6*   AST 13   BILITOT 0.4    No results for input(s): "PROTIME", "INR", "APTT", "HEPANTIXA" in the last 168 hours. No results for input(s): "CPK", "CPKMB", "TROPONINI", "MB" in the last 168 hours.    Electrolytes: N/A - electrolytes WDL  Accuchecks: " none    Gtts:      LDA/Wounds:      Nurses Note -- 4 Eyes      Is there altered skin present? no     Prevention Measures Documented    Second RN/Staff Member: Erendira      Restraints:   Restraint Order  Length of Order: Order good for next 24 hours or when removed.  Date that the current order will : 24  Time that the current order will :   Order Upon Application: Yes    [facility]

## 2024-03-13 NOTE — SUBJECTIVE & OBJECTIVE
Interval History: NAEON. Pt exam stable this morning. PBD9, plan to pull EVD today. CTH overnight was stable with EVD clamped. Will get rCTH tomorrow.    Medications:  Continuous Infusions:  Scheduled Meds:   aspirin  81 mg Oral Daily    ceFAZolin (Ancef) IV (PEDS and ADULTS)  2 g Intravenous Q8H    gabapentin  300 mg Oral Q8H    heparin (porcine)  5,000 Units Subcutaneous Q8H    melatonin  6 mg Oral Nightly    niMODipine  30 mg Oral Q2H    polyethylene glycol  17 g Oral Daily    senna-docusate 8.6-50 mg  1 tablet Oral BID    ticagrelor  90 mg Oral BID     PRN Meds:acetaminophen, bisacodyL, hydrOXYzine HCL, labetalol, magnesium oxide, magnesium oxide, methocarbamoL, metoclopramide, ondansetron, oxyCODONE, potassium bicarbonate, potassium bicarbonate, potassium bicarbonate, potassium, sodium phosphates, potassium, sodium phosphates, potassium, sodium phosphates     Review of Systems  Objective:     Weight: 106.6 kg (235 lb)  Body mass index is 35.73 kg/m².  Vital Signs (Most Recent):  Temp: 98.1 °F (36.7 °C) (03/13/24 0701)  Pulse: 73 (03/13/24 0701)  Resp: 16 (03/13/24 0733)  BP: 132/63 (03/13/24 0733)  SpO2: 100 % (03/13/24 0701) Vital Signs (24h Range):  Temp:  [98.1 °F (36.7 °C)-98.7 °F (37.1 °C)] 98.1 °F (36.7 °C)  Pulse:  [73-96] 73  Resp:  [16-31] 16  SpO2:  [98 %-100 %] 100 %  BP: ()/(37-82) 132/63     Date 03/13/24 0700 - 03/14/24 0659   Shift 8799-2883 8589-6028 8981-1553 24 Hour Total   INTAKE   IV Piggyback 0   0   Shift Total(mL/kg) 0(0)   0(0)   OUTPUT   Shift Total(mL/kg)       Weight (kg) 106.6 106.6 106.6 106.6                            ICP/Ventriculostomy 03/05/24 1143 Right Parietal region (Active)   Level of Ventriculostomy (cm above) 15 03/13/24 0743   Status Clamped 03/13/24 0743   Site Assessment Clean;Dry 03/13/24 0743   Site Drainage No drainage 03/13/24 0743   Waveform normal waveform 03/13/24 0743   Output (mL) 1 mL 03/12/24 0905   CSF Color clear 03/13/24 0601   Dressing Status  "Clean;Dry;Intact 03/13/24 0743   Interventions HOB degrees 03/13/24 0743          Physical Exam         Neurosurgery Physical Exam    General: well developed, well nourished, no distress.   HEENT: normocephalic, atraumatic  CV: regular rate   Pulmonary: normal respirations, no signs of respiratory distress  Abdomen: soft, non-distended, not tender to palpation  Skin: Skin is warm, dry and intact  Heme: no bruising    Neuro:   Mental Status: AO x4, no aphasia, no dysarthria   CN: PERRL, EOMI, VF intact to confrontation, sensation intact bilaterally, eyebrow raise and grimace symmetric, tongue midline  Motor: moves all extremities spontaneously, full strength throughout, no pronator drift   Sensory: intact to light touch throughout  Cerebellar: finger to nose intact bilaterally  Reflexes: -Patterson's, -Babinski, no clonus. Patellar: 2+ bilaterally        Incision: Clean, dry, intact. Skin edges well approximated. No surrounding erythema or edema. No drainage or TTP.      Significant Labs:  Recent Labs   Lab 03/11/24  1158 03/12/24  0304 03/13/24  0030   GLU  --  105 97   * 133* 134*   K  --  4.0 4.0   CL  --  104 105   CO2  --  23 23   BUN  --  9 11   CREATININE  --  0.8 0.8   CALCIUM  --  8.7 8.3*   MG  --  1.7 1.8     Recent Labs   Lab 03/12/24  0304 03/13/24  0030   WBC 8.32 7.13   HGB 13.2* 12.5*   HCT 38.5* 36.8*    178     No results for input(s): "LABPT", "INR", "APTT" in the last 48 hours.  Microbiology Results (last 7 days)       ** No results found for the last 168 hours. **          All pertinent labs from the last 24 hours have been reviewed.    Significant Diagnostics:  CT: CT Head Without Contrast    Result Date: 3/13/2024  IVC placement and aneurysm coiling. The ventricles are mildly decreased in size.  Decreased intraventricular hemorrhage. Electronically signed by: Floyd Rutherford Date:    03/13/2024 Time:    07:41   "

## 2024-03-14 PROBLEM — R25.2 SPASM: Status: ACTIVE | Noted: 2024-03-14

## 2024-03-14 LAB
ALBUMIN SERPL BCP-MCNC: 2.4 G/DL (ref 3.5–5.2)
ALP SERPL-CCNC: 31 U/L (ref 55–135)
ALT SERPL W/O P-5'-P-CCNC: 11 U/L (ref 10–44)
ANION GAP SERPL CALC-SCNC: 7 MMOL/L (ref 8–16)
AST SERPL-CCNC: 23 U/L (ref 10–40)
BASOPHILS # BLD AUTO: 0.03 K/UL (ref 0–0.2)
BASOPHILS NFR BLD: 0.4 % (ref 0–1.9)
BILIRUB SERPL-MCNC: 0.4 MG/DL (ref 0.1–1)
BUN SERPL-MCNC: 6 MG/DL (ref 6–20)
CALCIUM SERPL-MCNC: 7.1 MG/DL (ref 8.7–10.5)
CHLORIDE SERPL-SCNC: 110 MMOL/L (ref 95–110)
CO2 SERPL-SCNC: 18 MMOL/L (ref 23–29)
CREAT SERPL-MCNC: 0.7 MG/DL (ref 0.5–1.4)
DIFFERENTIAL METHOD BLD: ABNORMAL
EOSINOPHIL # BLD AUTO: 0.1 K/UL (ref 0–0.5)
EOSINOPHIL NFR BLD: 1.3 % (ref 0–8)
ERYTHROCYTE [DISTWIDTH] IN BLOOD BY AUTOMATED COUNT: 13 % (ref 11.5–14.5)
EST. GFR  (NO RACE VARIABLE): >60 ML/MIN/1.73 M^2
GLUCOSE SERPL-MCNC: 78 MG/DL (ref 70–110)
HCT VFR BLD AUTO: 33.2 % (ref 40–54)
HGB BLD-MCNC: 11.4 G/DL (ref 14–18)
IMM GRANULOCYTES # BLD AUTO: 0.02 K/UL (ref 0–0.04)
IMM GRANULOCYTES NFR BLD AUTO: 0.3 % (ref 0–0.5)
LYMPHOCYTES # BLD AUTO: 1.8 K/UL (ref 1–4.8)
LYMPHOCYTES NFR BLD: 22.7 % (ref 18–48)
MAGNESIUM SERPL-MCNC: 1.5 MG/DL (ref 1.6–2.6)
MCH RBC QN AUTO: 32.6 PG (ref 27–31)
MCHC RBC AUTO-ENTMCNC: 34.3 G/DL (ref 32–36)
MCV RBC AUTO: 95 FL (ref 82–98)
MONOCYTES # BLD AUTO: 0.7 K/UL (ref 0.3–1)
MONOCYTES NFR BLD: 9 % (ref 4–15)
NEUTROPHILS # BLD AUTO: 5.1 K/UL (ref 1.8–7.7)
NEUTROPHILS NFR BLD: 66.3 % (ref 38–73)
NRBC BLD-RTO: 0 /100 WBC
PHOSPHATE SERPL-MCNC: 3.3 MG/DL (ref 2.7–4.5)
PLATELET # BLD AUTO: 192 K/UL (ref 150–450)
PMV BLD AUTO: 10.6 FL (ref 9.2–12.9)
POTASSIUM SERPL-SCNC: 3.6 MMOL/L (ref 3.5–5.1)
PROT SERPL-MCNC: 4.8 G/DL (ref 6–8.4)
RBC # BLD AUTO: 3.5 M/UL (ref 4.6–6.2)
SODIUM SERPL-SCNC: 135 MMOL/L (ref 136–145)
WBC # BLD AUTO: 7.75 K/UL (ref 3.9–12.7)

## 2024-03-14 PROCEDURE — 83735 ASSAY OF MAGNESIUM: CPT

## 2024-03-14 PROCEDURE — 25000003 PHARM REV CODE 250: Performed by: NURSE PRACTITIONER

## 2024-03-14 PROCEDURE — 97535 SELF CARE MNGMENT TRAINING: CPT

## 2024-03-14 PROCEDURE — 99233 SBSQ HOSP IP/OBS HIGH 50: CPT | Mod: ,,, | Performed by: NURSE PRACTITIONER

## 2024-03-14 PROCEDURE — 25000003 PHARM REV CODE 250

## 2024-03-14 PROCEDURE — 25000003 PHARM REV CODE 250: Performed by: PHYSICIAN ASSISTANT

## 2024-03-14 PROCEDURE — 84100 ASSAY OF PHOSPHORUS: CPT

## 2024-03-14 PROCEDURE — 85025 COMPLETE CBC W/AUTO DIFF WBC: CPT

## 2024-03-14 PROCEDURE — 20000000 HC ICU ROOM

## 2024-03-14 PROCEDURE — 25000003 PHARM REV CODE 250: Performed by: STUDENT IN AN ORGANIZED HEALTH CARE EDUCATION/TRAINING PROGRAM

## 2024-03-14 PROCEDURE — 80053 COMPREHEN METABOLIC PANEL: CPT

## 2024-03-14 PROCEDURE — 63600175 PHARM REV CODE 636 W HCPCS

## 2024-03-14 PROCEDURE — 97530 THERAPEUTIC ACTIVITIES: CPT

## 2024-03-14 PROCEDURE — 99233 SBSQ HOSP IP/OBS HIGH 50: CPT | Mod: ,,, | Performed by: PSYCHIATRY & NEUROLOGY

## 2024-03-14 PROCEDURE — 25000003 PHARM REV CODE 250: Performed by: PSYCHIATRY & NEUROLOGY

## 2024-03-14 RX ORDER — DIAZEPAM 2 MG/1
2 TABLET ORAL EVERY 6 HOURS PRN
Status: DISCONTINUED | OUTPATIENT
Start: 2024-03-14 | End: 2024-03-20 | Stop reason: HOSPADM

## 2024-03-14 RX ORDER — ATORVASTATIN CALCIUM 40 MG/1
40 TABLET, FILM COATED ORAL DAILY
Status: DISCONTINUED | OUTPATIENT
Start: 2024-03-15 | End: 2024-03-14

## 2024-03-14 RX ADMIN — GABAPENTIN 300 MG: 300 CAPSULE ORAL at 06:03

## 2024-03-14 RX ADMIN — GABAPENTIN 300 MG: 300 CAPSULE ORAL at 01:03

## 2024-03-14 RX ADMIN — OXYCODONE 5 MG: 5 TABLET ORAL at 08:03

## 2024-03-14 RX ADMIN — GABAPENTIN 300 MG: 300 CAPSULE ORAL at 09:03

## 2024-03-14 RX ADMIN — NIMODIPINE 30 MG: 30 CAPSULE, LIQUID FILLED ORAL at 12:03

## 2024-03-14 RX ADMIN — NIMODIPINE 30 MG: 30 CAPSULE, LIQUID FILLED ORAL at 01:03

## 2024-03-14 RX ADMIN — POLYETHYLENE GLYCOL 3350 17 G: 17 POWDER, FOR SOLUTION ORAL at 08:03

## 2024-03-14 RX ADMIN — HEPARIN SODIUM 5000 UNITS: 5000 INJECTION INTRAVENOUS; SUBCUTANEOUS at 09:03

## 2024-03-14 RX ADMIN — METHOCARBAMOL 500 MG: 500 TABLET ORAL at 01:03

## 2024-03-14 RX ADMIN — TICAGRELOR 90 MG: 90 TABLET ORAL at 08:03

## 2024-03-14 RX ADMIN — NIMODIPINE 30 MG: 30 CAPSULE, LIQUID FILLED ORAL at 09:03

## 2024-03-14 RX ADMIN — OXYCODONE 5 MG: 5 TABLET ORAL at 06:03

## 2024-03-14 RX ADMIN — NIMODIPINE 30 MG: 30 CAPSULE, LIQUID FILLED ORAL at 05:03

## 2024-03-14 RX ADMIN — NIMODIPINE 30 MG: 30 CAPSULE, LIQUID FILLED ORAL at 02:03

## 2024-03-14 RX ADMIN — NIMODIPINE 30 MG: 30 CAPSULE, LIQUID FILLED ORAL at 11:03

## 2024-03-14 RX ADMIN — NIMODIPINE 30 MG: 30 CAPSULE, LIQUID FILLED ORAL at 04:03

## 2024-03-14 RX ADMIN — METHOCARBAMOL 500 MG: 500 TABLET ORAL at 05:03

## 2024-03-14 RX ADMIN — METHOCARBAMOL 500 MG: 500 TABLET ORAL at 06:03

## 2024-03-14 RX ADMIN — HEPARIN SODIUM 5000 UNITS: 5000 INJECTION INTRAVENOUS; SUBCUTANEOUS at 01:03

## 2024-03-14 RX ADMIN — NIMODIPINE 30 MG: 30 CAPSULE, LIQUID FILLED ORAL at 10:03

## 2024-03-14 RX ADMIN — ACETAMINOPHEN 650 MG: 325 TABLET ORAL at 04:03

## 2024-03-14 RX ADMIN — POTASSIUM BICARBONATE 50 MEQ: 978 TABLET, EFFERVESCENT ORAL at 11:03

## 2024-03-14 RX ADMIN — ASPIRIN 81 MG CHEWABLE TABLET 81 MG: 81 TABLET CHEWABLE at 08:03

## 2024-03-14 RX ADMIN — OXYCODONE 5 MG: 5 TABLET ORAL at 12:03

## 2024-03-14 RX ADMIN — Medication 6 MG: at 08:03

## 2024-03-14 RX ADMIN — Medication 800 MG: at 11:03

## 2024-03-14 RX ADMIN — NIMODIPINE 30 MG: 30 CAPSULE, LIQUID FILLED ORAL at 08:03

## 2024-03-14 RX ADMIN — DOCUSATE SODIUM AND SENNOSIDES 1 TABLET: 8.6; 5 TABLET, FILM COATED ORAL at 08:03

## 2024-03-14 RX ADMIN — HEPARIN SODIUM 5000 UNITS: 5000 INJECTION INTRAVENOUS; SUBCUTANEOUS at 06:03

## 2024-03-14 RX ADMIN — Medication 800 MG: at 04:03

## 2024-03-14 RX ADMIN — DIAZEPAM 2 MG: 2 TABLET ORAL at 11:03

## 2024-03-14 RX ADMIN — NIMODIPINE 30 MG: 30 CAPSULE, LIQUID FILLED ORAL at 06:03

## 2024-03-14 NOTE — SUBJECTIVE & OBJECTIVE
Neurologic Chief Complaint: SAH    Subjective:     Interval History: Patient is seen for follow-up neurological assessment and treatment recommendations: No acute events overnight. Stable neuro exam. Continue to remain on SAH protocol. TCD from today with Mild MCA and left CHESTER territory vasospasm. On nimodipine.      HPI, Past Medical, Family, and Social History remains the same as documented in the initial encounter.     Review of Systems   Constitutional:  Negative for chills and fever.   HENT:  Negative for trouble swallowing.    Respiratory:  Negative for cough, chest tightness and shortness of breath.    Cardiovascular:  Negative for chest pain and palpitations.   Gastrointestinal:  Negative for abdominal pain and vomiting.   Genitourinary:  Negative for dysuria.   Musculoskeletal:  Negative for neck pain and neck stiffness.   Neurological:  Negative for dizziness, speech difficulty, weakness, numbness and headaches.   Psychiatric/Behavioral:  Negative for agitation and confusion.      Scheduled Meds:   aspirin  81 mg Oral Daily    gabapentin  300 mg Oral Q8H    heparin (porcine)  5,000 Units Subcutaneous Q8H    melatonin  6 mg Oral Nightly    niMODipine  30 mg Oral Q2H    polyethylene glycol  17 g Oral Daily    senna-docusate 8.6-50 mg  1 tablet Oral BID    ticagrelor  90 mg Oral BID     Continuous Infusions:  PRN Meds:acetaminophen, bisacodyL, diazePAM, hydrOXYzine HCL, labetalol, magnesium oxide, magnesium oxide, methocarbamoL, metoclopramide, ondansetron, oxyCODONE, potassium bicarbonate, potassium bicarbonate, potassium bicarbonate, potassium, sodium phosphates, potassium, sodium phosphates, potassium, sodium phosphates    Objective:     Vital Signs (Most Recent):  Temp: 98.2 °F (36.8 °C) (03/14/24 1501)  Pulse: 90 (03/14/24 1801)  Resp: 20 (03/14/24 1801)  BP: 135/65 (03/14/24 1801)  SpO2: 100 % (03/14/24 1801)  BP Location: Right arm    Vital Signs Range (Last 24H):  Temp:  [98.1 °F (36.7 °C)-100 °F  (37.8 °C)]   Pulse:  []   Resp:  [12-41]   BP: (108-157)/(56-95)   SpO2:  [95 %-100 %]   BP Location: Right arm       Physical Exam  Vitals and nursing note reviewed.   HENT:      Head:      Comments: EVD d/c'd yesterday. Site with no signs of infection     Mouth/Throat:      Mouth: Mucous membranes are moist.   Eyes:      General:         Right eye: No discharge.         Left eye: No discharge.      Extraocular Movements: Extraocular movements intact.   Cardiovascular:      Rate and Rhythm: Normal rate.   Pulmonary:      Effort: Pulmonary effort is normal. No respiratory distress.   Abdominal:      General: There is no distension.   Musculoskeletal:      Cervical back: Normal range of motion and neck supple.   Skin:     General: Skin is warm and dry.   Neurological:      Mental Status: He is oriented to person, place, and time.      Motor: No weakness.              Neurological Exam:   LOC: alert  Language: No aphasia  Articulation: No dysarthria  Orientation: Person, Place, Time   Motor: Arm left  Normal 5/5  Leg left  Normal 5/5  Arm right  Normal 5/5  Leg right Normal 5/5    Laboratory:  CMP:   Recent Labs   Lab 03/14/24  0305   CALCIUM 7.1*   ALBUMIN 2.4*   PROT 4.8*   *   K 3.6   CO2 18*      BUN 6   CREATININE 0.7   ALKPHOS 31*   ALT 11   AST 23   BILITOT 0.4       CBC:   Recent Labs   Lab 03/14/24  0305   WBC 7.75   RBC 3.50*   HGB 11.4*   HCT 33.2*      MCV 95   MCH 32.6*   MCHC 34.3         Diagnostic Results     Brain Imaging     CTH 3/14/2024 - Post EVD removal  Impression:     No new abnormalities are seen following removal of the EVD.  No hydrocephalus or new hemorrhage.      CTH 3/13/2024-newly acquired and reviewed by Vascular Neurology provider. Impression: IVC placement and aneurysm coiling.  The ventricles are mildly decreased in size.  Decreased intraventricular hemorrhage.    CTH 3/5/23:   Impression:  Subarachnoid hemorrhage with blood throughout the basilar cisterns.   Moderate intraventricular blood and mild ventricular prominence.     Vessel Imaging      TCD 3/14/2024  Impression:        Mild MCA and left CHESTER territory vasospasm.    TCD 3/12/2024 Impression: No significant vasospasm is identified.  Velocities are increasing in the left CHESTER which may represent mild early vasospasm     MRA Brain - 03/08/2024  Impression:  Examination mildly degraded by patient motion artifact.  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  No abnormal vessel wall enhancement.  No residual flow related signal within the recently treated right PICA origin aneurysm.  Prominent enhancement at this site on vessel wall imaging.  This is nonspecific in the post treatment setting but aneurysm is presumed site of rupture given distribution of hemorrhage.  Retrograde filling of the left vertebral artery, patient with known subclavian steal.     CTA head/neck 3/5/23:   Impression: 3 mm left anterior communicating artery aneurysm again identified.  No definite additional aneurysm identified concerning for source of hemorrhage.  Clinical correlation and correlation with conventional angiography recommended. 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 Interval increase distension lateral and 3rd ventricles compatible with developing hydrocephalus. Crowding cerebral sulci at the vertex with additional crowding basilar cisterns with questionable early cerebellar tonsillar  herniation.     IR Angio 3/5/24:   Impression:  Anterior communicating artery aneurysm 2 x 1.7 mm with a wide neck. Small irregular aneurysm from the right V 4 vertebral segment just proximal to the posteroinferior cerebellar artery.  This is not well characterized on these views.  Repeat selective angiography is planned for better evaluation.     Cardiac Imaging   TTE 3/8/24:     Left Ventricle: The left ventricle is normal in size. Normal wall thickness. Global hypokinesis present. There is mildly reduced systolic function with a visually estimated ejection fraction of 40 - 45%. There is indeterminate diastolic function.    Right Ventricle: Normal right ventricular cavity size. Wall thickness is normal. Right ventricle wall motion  is normal. Systolic function is normal.    Left Atrium: Left atrium is severely dilated.    Aortic Valve: The valve morphology is clearly abnormal though the number of leaflets is not evident.  There is severe aortic valve stenosis. Aortic valve area by VTI is 0.8 cm². Aortic valve peak velocity is 4.5 m/s. Mean gradient is 55 mmHg. The dimensionless index is 0.17. There is mild aortic regurgitation.    IVC/SVC: Normal venous pressure at 3 mmHg.

## 2024-03-14 NOTE — ASSESSMENT & PLAN NOTE
33yoM with PMH of HTN, coarctation of the aorta (s/p repair), PDA, VSD s/p repair, ADHD, EtOH use/abuse, arthritis, legg-perthes disease, ICH 5/2022, known CHESTER aneurysm that was transferred to [facility] for higher level of care of SAH (HH1). He presented to OSH with HA and nausea. CTH showed diffuse SAH throughout basal cisterns. At C neuro exam stable with NIHSS 1. CTA shows 3mm L acomm aneurysm, hypoplastic R A1, persistent fetal circulation, as well as evolving SAH/ICH now with concern for developing hydrocephalus and questionable early cerebellar tonsillar herniation. NSGY consulted, EVD placed. Taken to IR for DSA, no intervention of acomm aneurysm at that time. 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 Suspect etiology of SAH likely due to vertebral artery aneurysm.     3/13/2024  NAEON, neuro exam remains stable. Remains on SAH protocol. No evidence of vasospasm on today's TCD.    Antithrombotics for secondary stroke prevention: Antiplatelets: Aspirin: 81 mg daily and Brilinta 90mg BID (plavix switched to brilinta due to plavix non-responder on PRU assay)    Statins for secondary stroke prevention and hyperlipidemia, if present: Statins: Atorvastatin- 40 mg daily, continue    Aggressive risk factor modification: HTN     Rehab efforts: The patient has been evaluated by a stroke team provider and the therapy needs have been fully considered based off the presenting complaints and exam findings. The following therapy evaluations are needed: PT evaluate and treat, OT evaluate and treat, SLP evaluate and treat, PM&R evaluate for appropriate placement-evaluated.  Currently no therapy needs.  Regular diet.    Diagnostics ordered/pending: Daily TCDs per SAH protocol    VTE prophylaxis: Mechanical prophylaxis: Place SCDs    BP parameters: SAH: Secured aneurysm, no target, increase BP to prevent vasospasm if needed

## 2024-03-14 NOTE — ASSESSMENT & PLAN NOTE
Kalia Pacheco is a 32yo man with history of known CHESTER aneurysm who presented to [place] with headache and nausea. CTH demonstrated diffuse SAH throughout the basal cisterns HH and he was [place] [facility] [place] monitoring in the Neuro ICU and Neurosurgical consultation.     R frontal EVD was placed on arrival on 3/6 and patient was taken to IR for angiogram, with no intervention upon Acomm aneurysm. Upon further review of DSA, a vertebral artery aneurysm was noted and patient was brought back to IR for a right V4/PICA irregular aneurysm stent assisted coiling.     Imaging reviewed:  CTH 3/5: Diffuse basal cistern SAH with IVH   CTA 3/5: 3mm ant projecting L Acomm, no R A1, R pcomm feeding posterior circulation   DSA 3/5: EVD in position, anteriosuperiorly projecting small L Acomm, R V4 irregular aneurysm not well visualized    DSA +embo 3/5: stent assisted coiling of R V4/PICA irregularly shaped anuerysm, 1/4 lobules still filling     - Admit to NCC with q1 neuro checks  - EVD removed 3/13, CTH post pull stable  - Keppra 500 BID x7d   - TCDs daily x14d   - Nimotop 60q4 x 21d   - strict I/Os, goal euvolemia to net positive for SAH patients in general, in this patient may need to be more conservative due to cardiac hx, management per NCC   - continue ASA/brilnta per neuro IR  - based on bleed pattern believe vertebral aneurysm to be the ruptured aneurysm, no plan for intervention upon Acomm at this point  -MRI with contrast not suggestive of vessel wall enhancement to Acomm  - Continue to follow clinically and notify NSGY immediately if any neurostatus change     Case and plan discussed with attending Neurosurgeon Dr. Kyle

## 2024-03-14 NOTE — ASSESSMENT & PLAN NOTE
Hh2 mf4 R vert anx rupture, S/p stent coil of R v4 anx 3/5, unruptured acomm anx not secured  Obs hydro s/p evd, clamped today  Hyponatremia, normal urine/serum osm      Cont spasm watch, nimotop, eunatremia (cont fw restriction today), pt/ot/oob  Evd clamped per nsgy, cth tomorrow  Monitor in icu  Q2h neuros, added valium 2 mg q6h prn for spasms

## 2024-03-14 NOTE — PROGRESS NOTES
[facility]  Neurocritical Care  Progress Note    Admit Date: 3/5/2024  Service Date: 03/14/2024  Length of Stay: 9    Subjective:     Chief Complaint: Subarachnoid hemorrhage due to ruptured aneurysm    History of Present Illness: The patient is a 32-year-old man with a past medical history of ADHD, EtOH use and abuse, arthritis, hypertension, coarctation of the aorta (status post repair), PDA, VSD status post repair x2.  History of Legg-Perthes disease, and intracranial hemorrhage in May 2022.  presented to [facility] this AM with headache and nausea that began 2 days ago and acutely worsened while he was at work this morning. CTH demonstrated HH1mF4 SAH. S/p EVD placement by Nsx. Was taken to IR for angiography, but the vessel was not sercured during the procedure, the patient was returned to the NSICU, intubated and sedated, [facility] neurosurgery pending.     Hospital Course: 03/06/2024 Overnight, mild bradycardia and soft BPs. Weaned off fentanyl, weaning precedex. Extubated to NC this morning.   03/07/2024 AF, soft BPs. TCD with no signs of vasospasm. Neuro exam stable. Persistent HA, waxing/waning in severity. EVD with 289mL output. MRA pending.   03/08/2024 AF, HDS on RA. TCD no signs of vasospasm. Neuro exam stable. HA. EVD open @10 w/ 250mL output. MRA today.  03/09/2024 AF. BP soft. RA. TTE with reduced EF (40-45%) and mild aortic regurg. TCD w/ no signs of vasospasm. EVD open @10 w/ 224mL output. Started brillinta.   3/10: KUB, robaxin, encourage PO  3/11/2024: no free water intake, send Urine studies, TCD no vasospasm, Follow Na, discussed with IR- aneurysm secured and now SBP <220  03/12/2024: mild hyponatremia, mild hypotension fluid responsive, evd clamped  3/13/24: Pull EVD per NSGY  3/14/2024 Q2h neuros, added valium 2 mg q6h prn for spasms       Review of Systems   Constitutional: Negative.    HENT: Negative.     Eyes: Negative.    Respiratory: Negative.     Cardiovascular:  Negative.    Gastrointestinal: Negative.    Endocrine: Negative.    Genitourinary: Negative.    Musculoskeletal: Negative.    Skin: Negative.      2 systems   Objective:     Vitals:  Temp: 98.9 °F (37.2 °C)  Pulse: 75  Rhythm: normal sinus rhythm  BP: 132/60  MAP (mmHg): 88  Resp: (!) 25  SpO2: 100 %    Temp  Min: 98.1 °F (36.7 °C)  Max: 100 °F (37.8 °C)  Pulse  Min: 73  Max: 101  BP  Min: 108/88  Max: 157/73  MAP (mmHg)  Min: 73  Max: 100  Resp  Min: 12  Max: 41  SpO2  Min: 95 %  Max: 100 %    03/13 0701 - 03/14 0700  In: 910 [P.O.:910]  Out: 2520 [Urine:2520]   Unmeasured Output  Urine Occurrence: 1  Stool Occurrence: 1  Emesis Occurrence: 1  Pad Count: 2        Physical Exam  Vitals and nursing note reviewed.   Constitutional:       Appearance: Normal appearance.   HENT:      Head: Normocephalic.      Nose: Nose normal.      Mouth/Throat:      Mouth: Mucous membranes are moist.      Pharynx: Oropharynx is clear.   Eyes:      Pupils: Pupils are equal, round, and reactive to light.   Cardiovascular:      Rate and Rhythm: Normal rate and regular rhythm.      Pulses: Normal pulses.      Heart sounds: Normal heart sounds.   Pulmonary:      Effort: Pulmonary effort is normal.      Breath sounds: Normal breath sounds.   Abdominal:      General: Bowel sounds are normal.      Palpations: Abdomen is soft.   Musculoskeletal:         General: Normal range of motion.   Skin:     General: Skin is warm and dry.      Capillary Refill: Capillary refill takes 2 to 3 seconds.   Neurological:      Mental Status: He is alert.      Comments: AAO X4  GCS 15  ENCARNACION to command and spontaneously  Sensation Intact               Medications:  Continuous Scheduledaspirin, 81 mg, Daily  gabapentin, 300 mg, Q8H  heparin (porcine), 5,000 Units, Q8H  melatonin, 6 mg, Nightly  niMODipine, 30 mg, Q2H  polyethylene glycol, 17 g, Daily  senna-docusate 8.6-50 mg, 1 tablet, BID  ticagrelor, 90 mg, BID    PRNacetaminophen, 650 mg, Q6H PRN  bisacodyL, 10  mg, Daily PRN  diazePAM, 2 mg, Q6H PRN  hydrOXYzine HCL, 25 mg, Nightly PRN  labetalol, 10 mg, Q15 Min PRN  magnesium oxide, 800 mg, PRN  magnesium oxide, 800 mg, PRN  methocarbamoL, 500 mg, QID PRN  metoclopramide, 10 mg, Q6H PRN  ondansetron, 4 mg, Q6H PRN  oxyCODONE, 5 mg, Q6H PRN  potassium bicarbonate, 35 mEq, PRN  potassium bicarbonate, 50 mEq, PRN  potassium bicarbonate, 60 mEq, PRN  potassium, sodium phosphates, 2 packet, PRN  potassium, sodium phosphates, 2 packet, PRN  potassium, sodium phosphates, 2 packet, PRN      Today I personally reviewed pertinent medications, lines/drains/airways, imaging, cardiology results, laboratory results, microbiology results, notably:    Diet  Diet Adult Regular (IDDSI Level 7) Thin; Standard Tray  Diet Adult Regular (IDDSI Level 7) Thin; Standard Tray        Assessment/Plan:     Neuro  * Subarachnoid hemorrhage due to ruptured aneurysm  Hh2 mf4 R vert anx rupture, S/p stent coil of R v4 anx 3/5, unruptured acomm anx not secured  Obs hydro s/p evd, clamped today  Hyponatremia, normal urine/serum osm      Cont spasm watch, nimotop, eunatremia (cont fw restriction today), pt/ot/oob  Evd clamped per nsgy, cth tomorrow  Monitor in icu  Q2h neuros, added valium 2 mg q6h prn for spasms     IVH (intraventricular hemorrhage)  EVD removed per NSGY  Monitor scans    Communicating hydrocephalus  EVD out 3/13  Stable    Anterior communicating artery aneurysm  Unsecure  - continue ASA/brilnta per neuro IR  - based on bleed pattern believe vertebral aneurysm to be the ruptured aneurysm, no plan for intervention upon Acomm at this point  - MRI with contrast not suggestive of vessel wall enhancement to Acomm    Cardiac/Vascular  Hypertension  -continue current regimen    Other  Spasm  Valium 2 mg q6h prn started              Activity Orders            Diet Adult Regular (IDDSI Level 7) Thin; Standard Tray: Regular starting at 03/11 1118    Turn patient starting at 03/05 1200    Elevate HOB  starting at 03/05 1119          Full Code    Wang Gomes NP  Neurocritical Care  [facility] Neuro Critical [facility]

## 2024-03-14 NOTE — ASSESSMENT & PLAN NOTE
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  Stable.  NSGY plan for possible coil vs clip of aneurysm.

## 2024-03-14 NOTE — PROGRESS NOTES
[facility] - Neuro Critical Care  Vascular Neurology  Comprehensive Stroke Center  Progress Note    Assessment/Plan:     * Subarachnoid hemorrhage due to ruptured aneurysm  33yoM with PMH of HTN, coarctation of the aorta (s/p repair), PDA, VSD s/p repair, ADHD, EtOH use/abuse, arthritis, legg-perthes disease, ICH 5/2022, known CHESTER aneurysm that was transferred to [facility] for higher level of care of SAH (HH1). He presented to OSH with HA and nausea. CTH showed diffuse SAH throughout basal cisterns. At C neuro exam stable with NIHSS 1. CTA shows 3mm L acomm aneurysm, hypoplastic R A1, persistent fetal circulation, as well as evolving SAH/ICH now with concern for developing hydrocephalus and questionable early cerebellar tonsillar herniation. NSGY consulted, EVD placed. Taken to IR for DSA, no intervention of acomm aneurysm at that time. 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 Suspect etiology of SAH likely due to vertebral artery aneurysm.     No acute events overnight. Stable neuro exam. EVD d/c'd yesterday. Repeat CTH stable. Continue to remain on SAH protocol. TCD from today with Mild MCA and left CHESTER territory vasospasm. On nimodipine.      Antithrombotics for secondary stroke prevention: Antiplatelets: Aspirin: 81 mg daily and Brilinta 90mg BID (plavix switched to brilinta due to plavix non-responder on PRU assay)    Statins for secondary stroke prevention and hyperlipidemia, if present: Statins: Atorvastatin- 40 mg daily, continue    Aggressive risk factor modification: HTN     Rehab efforts: The patient has been evaluated by a stroke team provider and the therapy needs have been fully considered based off the presenting complaints and exam findings. The following therapy evaluations are needed: PT evaluate and treat, OT evaluate and treat, SLP evaluate and treat, PM&R evaluate for appropriate  placement-evaluated.  Currently no therapy needs.  Regular diet.    Diagnostics ordered/pending: Daily TCDs per SAH protocol    VTE prophylaxis: Mechanical prophylaxis: Place SCDs    BP parameters: SAH: Secured aneurysm, no target, increase BP to prevent vasospasm if needed       IVH (intraventricular hemorrhage)  See Subarachnoid hemorrhage due to ruptured aneurysm for full plan.    Communicating hydrocephalus  EVD placed by NSGY on 3/5, d/c'd on 3/13.  Repeat CTH stable post d/c      Anterior communicating artery aneurysm  Known hx of  Anterior communicating artery aneurysm 2 x 1.7 mm with a wide neck.  Stable.  NSGY plan for possible coil vs clip of aneurysm.    Hypertension  Stroke Risk Factor  SBP <220 given aneurysm now secured, MAP >65  PRN labetalol  24° -178         03/06/2024: Patient s/p EVD placement by NSGY secondary to hydrocephalus, s/p DSA with identification of  right vertebral artery aneurysm and confirmed stable left anterior communicating artery aneurysm, and s/p stent and coil of vertebral artery aneurysm. Patient extubated this morning, pending SLP eval. Patient complains of headache, but was improved with 5ml drained from EVD per nursing staff. Neuro exam stable. Pending ECHO.   03/07/24: Patient stable on SAH pathway with daily TCDs and nimodipine. Exam non-focal with EVD @10 with 250ml out in last 24hrs. Awaiting MRA at this time. On DAPT with subtherapeutic PRU with possible pending switch to brillinta ameliorate.  03/09/2024: No acute events overnight. Stable neuro exam (NIH 1 for chronic leg weakness). Remain on SAH pathway. EVD in place. MRA Brain with no vessel wall enhancement. DAPT switched to ASA and brilinta for stents; patient tolerating well.   03/10/2024 NAEO, neuro exam stable. EVD remains in place and open. No current complaints/concerns. NSGY following.  03/11/2024 NAEO, neuro exam stable. EVD still in, drain moved to 20 today. TCDs unremarkable thus far. Hyponatremic  with Na 132, urine studies ordered.   3/12/2024 EVD clamped. TCDs today w/signs of early vasospasm in the L CHESTER. Neuro exam stable.  3/13/2024 NAEON, neuro exam remains stable. Remains on SAH protocol. No evidence of vasospasm on today's TCD.  3/14/2024 No acute events overnight. Stable neuro exam. EVD d/c'd yesterday. Repeat CTH stable. Continue to remain on SAH protocol. TCD from today with Mild MCA and left CHESTER territory vasospasm. On nimodipine.      STROKE DOCUMENTATION   Acute Stroke Times   Last Known Normal Date: 03/05/24  Unknown Normal Time: Unknown Time  Symptom Onset Date: 03/05/24  Unknown Symptom Onset Time: Unknown Time  Thrombolytic Therapy Recommended: No  Thrombectomy Recommended: No    NIH Scale:  1a. Level of Consciousness: 0-->Alert, keenly responsive  1b. LOC Questions: 0-->Answers both questions correctly  1c. LOC Commands: 0-->Performs both tasks correctly  2. Best Gaze: 0-->Normal  3. Visual: 0-->No visual loss  4. Facial Palsy: 0-->Normal symmetrical movements  5a. Motor Arm, Left: 0-->No drift, limb holds 90 (or 45) degrees for full 10 secs  5b. Motor Arm, Right: 0-->No drift, limb holds 90 (or 45) degrees for full 10 secs  6a. Motor Leg, Left: 0-->No drift, leg holds 30 degree position for full 5 secs  6b. Motor Leg, Right: 0-->No drift, leg holds 30 degree position for full 5 secs  7. Limb Ataxia: 0-->Absent  8. Sensory: 0-->Normal, no sensory loss  9. Best Language: 0-->No aphasia, normal  10. Dysarthria: 0-->Normal  11. Extinction and Inattention (formerly Neglect): 0-->No abnormality  Total (NIH Stroke Scale): 0       Modified Meade Score: 0  Irving Coma Scale:    ABCD2 Score:    [id number] Score:   HAS -BLED Score:   ICH Score:   Hunt & Hill Classification:Grade I     Hemorrhagic change of an Ischemic Stroke: Does this patient have an ischemic stroke with hemorrhagic changes? No     Neurologic Chief Complaint: SAH    Subjective:     Interval History: Patient is seen for follow-up  neurological assessment and treatment recommendations: No acute events overnight. Stable neuro exam. Continue to remain on SAH protocol. TCD from today with Mild MCA and left CHESTER territory vasospasm. On nimodipine.      HPI, Past Medical, Family, and Social History remains the same as documented in the initial encounter.     Review of Systems   Constitutional:  Negative for chills and fever.   HENT:  Negative for trouble swallowing.    Respiratory:  Negative for cough, chest tightness and shortness of breath.    Cardiovascular:  Negative for chest pain and palpitations.   Gastrointestinal:  Negative for abdominal pain and vomiting.   Genitourinary:  Negative for dysuria.   Musculoskeletal:  Negative for neck pain and neck stiffness.   Neurological:  Negative for dizziness, speech difficulty, weakness, numbness and headaches.   Psychiatric/Behavioral:  Negative for agitation and confusion.      Scheduled Meds:   aspirin  81 mg Oral Daily    gabapentin  300 mg Oral Q8H    heparin (porcine)  5,000 Units Subcutaneous Q8H    melatonin  6 mg Oral Nightly    niMODipine  30 mg Oral Q2H    polyethylene glycol  17 g Oral Daily    senna-docusate 8.6-50 mg  1 tablet Oral BID    ticagrelor  90 mg Oral BID     Continuous Infusions:  PRN Meds:acetaminophen, bisacodyL, diazePAM, hydrOXYzine HCL, labetalol, magnesium oxide, magnesium oxide, methocarbamoL, metoclopramide, ondansetron, oxyCODONE, potassium bicarbonate, potassium bicarbonate, potassium bicarbonate, potassium, sodium phosphates, potassium, sodium phosphates, potassium, sodium phosphates    Objective:     Vital Signs (Most Recent):  Temp: 98.2 °F (36.8 °C) (03/14/24 1501)  Pulse: 90 (03/14/24 1801)  Resp: 20 (03/14/24 1801)  BP: 135/65 (03/14/24 1801)  SpO2: 100 % (03/14/24 1801)  BP Location: Right arm    Vital Signs Range (Last 24H):  Temp:  [98.1 °F (36.7 °C)-100 °F (37.8 °C)]   Pulse:  []   Resp:  [12-41]   BP: (108-157)/(56-95)   SpO2:  [95 %-100 %]   BP  Location: Right arm       Physical Exam  Vitals and nursing note reviewed.   HENT:      Head:      Comments: EVD d/c'd yesterday. Site with no signs of infection     Mouth/Throat:      Mouth: Mucous membranes are moist.   Eyes:      General:         Right eye: No discharge.         Left eye: No discharge.      Extraocular Movements: Extraocular movements intact.   Cardiovascular:      Rate and Rhythm: Normal rate.   Pulmonary:      Effort: Pulmonary effort is normal. No respiratory distress.   Abdominal:      General: There is no distension.   Musculoskeletal:      Cervical back: Normal range of motion and neck supple.   Skin:     General: Skin is warm and dry.   Neurological:      Mental Status: He is oriented to person, place, and time.      Motor: No weakness.              Neurological Exam:   LOC: alert  Language: No aphasia  Articulation: No dysarthria  Orientation: Person, Place, Time   Motor: Arm left  Normal 5/5  Leg left  Normal 5/5  Arm right  Normal 5/5  Leg right Normal 5/5    Laboratory:  CMP:   Recent Labs   Lab 03/14/24  0305   CALCIUM 7.1*   ALBUMIN 2.4*   PROT 4.8*   *   K 3.6   CO2 18*      BUN 6   CREATININE 0.7   ALKPHOS 31*   ALT 11   AST 23   BILITOT 0.4       CBC:   Recent Labs   Lab 03/14/24  0305   WBC 7.75   RBC 3.50*   HGB 11.4*   HCT 33.2*      MCV 95   MCH 32.6*   MCHC 34.3         Diagnostic Results     Brain Imaging     CTH 3/14/2024 - Post EVD removal  Impression:     No new abnormalities are seen following removal of the EVD.  No hydrocephalus or new hemorrhage.      CTH 3/13/2024-newly acquired and reviewed by Vascular Neurology provider. Impression: IVC placement and aneurysm coiling.  The ventricles are mildly decreased in size.  Decreased intraventricular hemorrhage.    CTH 3/5/23:   Impression:  Subarachnoid hemorrhage with blood throughout the basilar cisterns.  Moderate intraventricular blood and mild ventricular prominence.     Vessel Imaging      TCD  3/14/2024  Impression:        Mild MCA and left CHESTER territory vasospasm.    TCD 3/12/2024 Impression: No significant vasospasm is identified.  Velocities are increasing in the left CHESTER which may represent mild early vasospasm     MRA Brain - 03/08/2024  Impression:  Examination mildly degraded by patient motion artifact.  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  No abnormal vessel wall enhancement.  No residual flow related signal within the recently treated right PICA origin aneurysm.  Prominent enhancement at this site on vessel wall imaging.  This is nonspecific in the post treatment setting but aneurysm is presumed site of rupture given distribution of hemorrhage.  Retrograde filling of the left vertebral artery, patient with known subclavian steal.     CTA head/neck 3/5/23:   Impression: 3 mm left anterior communicating artery aneurysm again identified.  No definite additional aneurysm identified concerning for source of hemorrhage.  Clinical correlation and correlation with conventional angiography recommended. 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 Interval increase distension lateral and 3rd ventricles compatible with developing hydrocephalus. Crowding cerebral sulci at the vertex with additional crowding basilar cisterns with questionable early cerebellar tonsillar herniation.     IR Angio 3/5/24:   Impression:  Anterior communicating artery aneurysm 2 x 1.7 mm with  a wide neck. Small irregular aneurysm from the right V 4 vertebral segment just proximal to the posteroinferior cerebellar artery.  This is not well characterized on these views.  Repeat selective angiography is planned for better evaluation.     Cardiac Imaging   TTE 3/8/24:     Left Ventricle: The left ventricle is normal in size. Normal wall thickness. Global hypokinesis present. There is mildly reduced systolic function with a visually estimated ejection fraction of 40 - 45%. There is indeterminate diastolic function.    Right Ventricle: Normal right ventricular cavity size. Wall thickness is normal. Right ventricle wall motion  is normal. Systolic function is normal.    Left Atrium: Left atrium is severely dilated.    Aortic Valve: The valve morphology is clearly abnormal though the number of leaflets is not evident.  There is severe aortic valve stenosis. Aortic valve area by VTI is 0.8 cm². Aortic valve peak velocity is 4.5 m/s. Mean gradient is 55 mmHg. The dimensionless index is 0.17. There is mild aortic regurgitation.    IVC/SVC: Normal venous pressure at 3 mmHg.    Lindy Mabry [facility]  Department of Vascular Neurology   [facility] - Neuro Critical Care

## 2024-03-14 NOTE — PLAN OF CARE
"[facility] Care Plan    POC reviewed with Kalia Pacheco and family at 0300. Pt verbalized understanding. Questions and concerns addressed. No acute events overnight. Pt progressing toward goals. Will continue to monitor. See below and flowsheets for full assessment and VS info.           Is this a stroke patient? yes- Stroke booklet reviewed with patient, risk factors identified for patient and stroke booklet remains at bedside for ongoing education.     Care individualization:     Neuro:  [place] Coma Scale  Best Eye Response: 4-->(E4) spontaneous  Best Motor Response: 6-->(M6) obeys commands  Best Verbal Response: 5-->(V5) oriented  Irving Coma Scale Score: 15  Assessment Qualifiers: patient not sedated/intubated  Pupil PERRLA: yes     24hr Temp:  [98 °F (36.7 °C)-100 °F (37.8 °C)]     CV:   Rhythm: normal sinus rhythm  BP goals:   SBP < 220  MAP > 65    Resp:      Vent Mode: Spont  Set Rate: 16 BPM  Oxygen Concentration (%): 40  Vt Set: 500 mL  PEEP/CPAP: 5 cmH20  Pressure Support: 8 cmH20    Plan: N/A    GI/:     Diet/Nutrition Received: regular  Last Bowel Movement: 03/13/24  Voiding Characteristics: voids spontaneously without difficulty    Intake/Output Summary (Last 24 hours) at 3/14/2024 0541  Last data filed at 3/14/2024 0416  Gross per 24 hour   Intake 960 ml   Output 2920 ml   Net -1960 ml     Unmeasured Output  Urine Occurrence: 1  Stool Occurrence: 1  Emesis Occurrence: 1  Pad Count: 2    Labs/Accuchecks:  Recent Labs   Lab 03/13/24  0030   WBC 7.13   RBC 3.91*   HGB 12.5*   HCT 36.8*         Recent Labs   Lab 03/13/24  0030   *   K 4.0   CO2 23      BUN 11   CREATININE 0.8   ALKPHOS 33*   ALT 6*   AST 13   BILITOT 0.4    No results for input(s): "PROTIME", "INR", "APTT", "HEPANTIXA" in the last 168 hours. No results for input(s): "CPK", "CPKMB", "TROPONINI", "MB" in the last 168 hours.    Electrolytes: N/A - electrolytes WDL  Accuchecks: none    Gtts:      LDA/Wounds:    Nurses " Note -- 4 Eyes    Is there altered skin present? no     Prevention Measures Documented    Second RN/Staff Member:  Wang RN    Restraints:   Restraint Order  Length of Order: Order good for next 24 hours or when removed.  Date that the current order will : 24  Time that the current order will :   Order Upon Application: Yes    [facility]

## 2024-03-14 NOTE — PROGRESS NOTES
[facility] - Neuro Critical Care  Vascular Neurology  [facility]  Progress Note    Assessment/Plan:     * Subarachnoid hemorrhage due to ruptured aneurysm  33yoM with PMH of HTN, coarctation of the aorta (s/p repair), PDA, VSD s/p repair, ADHD, EtOH use/abuse, arthritis, legg-perthes disease, ICH 5/2022, known CHESTER aneurysm that was transferred to [facility] for higher level of care of SAH (HH1). He presented to OSH with HA and nausea. CTH showed diffuse SAH throughout basal cisterns. At C neuro exam stable with NIHSS 1. CTA shows 3mm L acomm aneurysm, hypoplastic R A1, persistent fetal circulation, as well as evolving SAH/ICH now with concern for developing hydrocephalus and questionable early cerebellar tonsillar herniation. NSGY consulted, EVD placed. Taken to IR for DSA, no intervention of acomm aneurysm at that time. 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 Suspect etiology of SAH likely due to vertebral artery aneurysm.     3/13/2024  NAEON, neuro exam remains stable. Remains on SAH protocol. No evidence of vasospasm on today's TCD.    Antithrombotics for secondary stroke prevention: Antiplatelets: Aspirin: 81 mg daily and Brilinta 90mg BID (plavix switched to brilinta due to plavix non-responder on PRU assay)    Statins for secondary stroke prevention and hyperlipidemia, if present: Statins: Atorvastatin- 40 mg daily, continue    Aggressive risk factor modification: HTN     Rehab efforts: The patient has been evaluated by a stroke team provider and the therapy needs have been fully considered based off the presenting complaints and exam findings. The following therapy evaluations are needed: PT evaluate and treat, OT evaluate and treat, SLP evaluate and treat, PM&R evaluate for appropriate placement-evaluated.  Currently no therapy needs.  Regular diet.    Diagnostics ordered/pending: Daily  TCDs per SAH protocol    VTE prophylaxis: Mechanical prophylaxis: Place SCDs    BP parameters: SAH: Secured aneurysm, no target, increase BP to prevent vasospasm if needed       Communicating hydrocephalus  EVD placed by NSGY on 3/5.  Maintained by [facility].  EVD currently clamped.  Neuro exam and imaging stable.      Hypertension  Stroke Risk Factor  SBP <220 given aneurysm now secured, MAP >65  PRN labetalol  24° -178    IVH (intraventricular hemorrhage)  See Subarachnoid hemorrhage due to ruptured aneurysm for full plan.    Anterior communicating artery aneurysm  Known hx of  Anterior communicating artery aneurysm 2 x 1.7 mm with a wide neck.  Stable.  NSGY plan for possible coil vs clip of aneurysm.         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 Patient extubated this morning, pending SLP eval. Patient complains of headache, but was improved with 5ml drained from EVD per nursing staff. Neuro exam stable. Pending ECHO.   03/07/24: Patient stable on SAH pathway with daily TCDs and nimodipine. Exam non-focal with EVD @10 with 250ml out in last 24hrs. Awaiting MRA at this time. On DAPT with subtherapeutic PRU with possible pending switch to brillinta ameliorate.  03/09/2024: No acute events overnight. Stable neuro exam (NIH 1 for chronic leg weakness). Remain on SAH pathway. EVD in place. MRA Brain with no vessel wall enhancement. DAPT switched to ASA and brilinta for stents; patient tolerating well.   03/10/2024 NAEO, neuro exam stable. EVD remains in place and open. No current complaints/concerns. NSGY following.  03/11/2024 NAEO, neuro exam stable. EVD still in, drain moved to 20 today. TCDs unremarkable thus far. Hyponatremic with Na 132, urine studies ordered.   3/12/2024 EVD clamped. TCDs today  w/signs of early vasospasm in the L CHESTER. Neuro exam stable.  3/13/2024 DEENA, neuro exam remains stable. Remains on SAH protocol. No evidence of vasospasm on today's TCD.    STROKE DOCUMENTATION   Acute Stroke Times   Last Known Normal Date: 03/05/24  Unknown Normal Time: Unknown Time  Symptom Onset Date: 03/05/24  Unknown Symptom Onset Time: Unknown Time  Thrombolytic Therapy Recommended: No  Thrombectomy Recommended: No    NIH Scale:  1a. Level of Consciousness: 0-->Alert, keenly responsive  1b. LOC Questions: 0-->Answers both questions correctly  1c. LOC Commands: 0-->Performs both tasks correctly  2. Best Gaze: 0-->Normal  3. Visual: 0-->No visual loss  4. Facial Palsy: 0-->Normal symmetrical movements  5a. Motor Arm, Left: 0-->No drift, limb holds 90 (or 45) degrees for full 10 secs  5b. Motor Arm, Right: 0-->No drift, limb holds 90 (or 45) degrees for full 10 secs  6a. Motor Leg, Left: 0-->No drift, leg holds 30 degree position for full 5 secs  6b. Motor Leg, Right: 0-->No drift, leg holds 30 degree position for full 5 secs  7. Limb Ataxia: 0-->Absent  8. Sensory: 0-->Normal, no sensory loss  9. Best Language: 0-->No aphasia, normal  10. Dysarthria: 0-->Normal  11. Extinction and Inattention (formerly Neglect): 0-->No abnormality  Total (NIH Stroke Scale): 0       Modified Stefanie Score: 0  [place] Coma Scale:15   ABCD2 Score:    [id number] Score:   HAS -BLED Score:   ICH Score:   Hunt & Hill Classification:Grade I     Hemorrhagic change of an Ischemic Stroke: Does this patient have an ischemic stroke with hemorrhagic changes? No     Neurologic Chief Complaint: SAH    Subjective:     Interval History: Patient is seen for follow-up neurological assessment and treatment recommendations: NSGY plan for possible coil vs clip of aneurysm.    HPI, Past Medical, Family, and Social History remains the same as documented in the initial encounter.     Review of Systems   Constitutional:  Negative for fever.    Respiratory:  Negative for cough and shortness of breath.    Gastrointestinal:  Negative for vomiting.   Neurological:  Negative for speech difficulty and weakness.   Psychiatric/Behavioral:  Negative for agitation and confusion.      Scheduled Meds:   aspirin  81 mg Oral Daily    gabapentin  300 mg Oral Q8H    heparin (porcine)  5,000 Units Subcutaneous Q8H    melatonin  6 mg Oral Nightly    niMODipine  30 mg Oral Q2H    polyethylene glycol  17 g Oral Daily    senna-docusate 8.6-50 mg  1 tablet Oral BID    ticagrelor  90 mg Oral BID     Continuous Infusions:  PRN Meds:acetaminophen, bisacodyL, hydrOXYzine HCL, labetalol, magnesium oxide, magnesium oxide, methocarbamoL, metoclopramide, ondansetron, oxyCODONE, potassium bicarbonate, potassium bicarbonate, potassium bicarbonate, potassium, sodium phosphates, potassium, sodium phosphates, potassium, sodium phosphates    Objective:     Vital Signs (Most Recent):  Temp: 98 °F (36.7 °C) (03/13/24 1501)  Pulse: 92 (03/13/24 1952)  Resp: 15 (03/13/24 1952)  BP: (!) 111/59 (03/13/24 1801)  SpO2: 100 % (03/13/24 1952)  BP Location: Right arm    Vital Signs Range (Last 24H):  Temp:  [98 °F (36.7 °C)-98.7 °F (37.1 °C)]   Pulse:  [69-96]   Resp:  [13-40]   BP: (111-178)/()   SpO2:  [98 %-100 %]   BP Location: Right arm       Physical Exam  Vitals and nursing note reviewed.   HENT:      Mouth/Throat:      Mouth: Mucous membranes are moist.   Eyes:      General:         Right eye: No discharge.         Left eye: No discharge.      Extraocular Movements: Extraocular movements intact.   Cardiovascular:      Rate and Rhythm: Normal rate.   Pulmonary:      Effort: Pulmonary effort is normal. No respiratory distress.   Abdominal:      General: There is no distension.   Musculoskeletal:      Cervical back: Normal range of motion and neck supple.   Skin:     General: Skin is warm and dry.   Neurological:      Mental Status: He is oriented to person, place, and time.      Motor:  No weakness.              Neurological Exam:   LOC: alert  Language: No aphasia  Articulation: No dysarthria  Orientation: Person, Place, Time   Motor: Arm left  Normal 5/5  Leg left  Normal 5/5  Arm right  Normal 5/5  Leg right Normal 5/5    Laboratory:  CMP:   Recent Labs   Lab 03/13/24  0030   CALCIUM 8.3*   ALBUMIN 2.8*   PROT 5.5*   *   K 4.0   CO2 23      BUN 11   CREATININE 0.8   ALKPHOS 33*   ALT 6*   AST 13   BILITOT 0.4     CBC:   Recent Labs   Lab 03/13/24  0030   WBC 7.13   RBC 3.91*   HGB 12.5*   HCT 36.8*      MCV 94   MCH 32.0*   MCHC 34.0       Diagnostic Results     Brain Imaging     CTH 3/13/2024-newly acquired and reviewed by Vascular Neurology provider. Impression: IVC placement and aneurysm coiling.  The ventricles are mildly decreased in size.  Decreased intraventricular hemorrhage.    CTH 3/5/23:   Impression:  Subarachnoid hemorrhage with blood throughout the basilar cisterns.  Moderate intraventricular blood and mild ventricular prominence.     Vessel Imaging      TCD 3/12/2024 Impression: No significant vasospasm is identified.  Velocities are increasing in the left CHESTER which may represent mild early vasospasm     MRA Brain - 03/08/2024  Impression:  Examination mildly degraded by patient motion artifact.  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  No abnormal vessel wall enhancement.  No residual flow related signal within the recently treated right PICA origin aneurysm.  Prominent enhancement at this site on vessel wall imaging.  This is nonspecific in the post treatment setting but aneurysm is presumed site of rupture given distribution of hemorrhage.  Retrograde filling of the left vertebral artery, patient with known subclavian steal.     CTA head/neck 3/5/23:   Impression: 3 mm left anterior communicating artery aneurysm again identified.  No definite additional aneurysm identified concerning for source of hemorrhage.  Clinical correlation and  correlation with conventional angiography recommended. 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 Interval increase distension lateral and 3rd ventricles compatible with developing hydrocephalus. Crowding cerebral sulci at the vertex with additional crowding basilar cisterns with questionable early cerebellar tonsillar herniation.     IR Angio 3/5/24:   Impression:  Anterior communicating artery aneurysm 2 x 1.7 mm with a wide neck. Small irregular aneurysm from the right V 4 vertebral segment just proximal to the posteroinferior cerebellar artery.  This is not well characterized on these views.  Repeat selective angiography is planned for better evaluation.     Cardiac Imaging   TTE 3/8/24:     Left Ventricle: The left ventricle is normal in size. Normal wall thickness. Global hypokinesis present. There is mildly reduced systolic function with a visually estimated ejection fraction of 40 - 45%. There is indeterminate diastolic function.    Right Ventricle: Normal right ventricular cavity size. Wall thickness is normal. Right ventricle wall motion  is normal. Systolic function is normal.    Left Atrium: Left atrium is severely dilated.    Aortic Valve: The valve morphology is clearly abnormal though the number of leaflets is not evident.  There is severe aortic valve stenosis. Aortic valve area by VTI is 0.8 cm². Aortic valve peak velocity is 4.5 m/s. Mean gradient is 55 mmHg. The dimensionless index  is 0.17. There is mild aortic regurgitation.    IVC/SVC: Normal venous pressure at 3 mmHg.    Maine Kolb DNP  [facility]  Department of Vascular Neurology   [facility] - Neuro Critical Care

## 2024-03-14 NOTE — PLAN OF CARE
Recommendations    1. Continue Regular diet.   2. Recommend ONS Boost Plus (or alternative) BID to optimize nutrition.   3. Consider an appetite stimulant if PO intake does not improve.   4. Monitor wt, labs and PO intake.   5. RD to monitor.     If TF warranted: Impact Peptide 1.5 @ 55ml/hr -Or hospital alternative Pivot 1.5 @ 55ml/hr    Goals: Meet % een/epn by next RD f/u  Nutrition Goal Status: new  Communication of RD Recs: other (comment) (poc)

## 2024-03-14 NOTE — PLAN OF CARE
"[facility] Care Plan    POC reviewed with Kalia Pacheco and family at 1400. Pt verbalized understanding. Questions and concerns addressed. No acute events today. Pt progressing toward goals. Will continue to monitor. See below and flowsheets for full assessment and VS info.     -PRN valium and methocarbamol given for back spasms  -Mag and K replaced  -PRN tylenol given for back/bottom pain    Is this a stroke patient? yes- Stroke booklet reviewed with patient, risk factors identified for patient and stroke booklet remains at bedside for ongoing education.     Care individualization: phone at bedside, xbox remotes accessible, frequent repositioning    Neuro:  [place] Coma Scale  Best Eye Response: 4-->(E4) spontaneous  Best Motor Response: 6-->(M6) obeys commands  Best Verbal Response: 5-->(V5) oriented  [place] Coma Scale Score: 15  Assessment Qualifiers: patient not sedated/intubated, no eye obstruction present  Pupil PERRLA: yes     24 hr Temp:  [98.1 °F (36.7 °C)-100 °F (37.8 °C)]     CV:   Rhythm: normal sinus rhythm  BP goals:   SBP < 220  MAP > 65    Resp:      Vent Mode: Spont  Set Rate: 16 BPM  Oxygen Concentration (%): 40  Vt Set: 500 mL  PEEP/CPAP: 5 cmH20  Pressure Support: 8 cmH20    Plan: N/A    GI/:     Diet/Nutrition Received: regular  Last Bowel Movement: 03/13/24  Voiding Characteristics: voids spontaneously without difficulty    Intake/Output Summary (Last 24 hours) at 3/14/2024 1836  Last data filed at 3/14/2024 1305  Gross per 24 hour   Intake 250 ml   Output 2750 ml   Net -2500 ml     Unmeasured Output  Urine Occurrence: 1  Stool Occurrence: 1  Emesis Occurrence: 1  Pad Count: 2    Labs/Accuchecks:  Recent Labs   Lab 03/14/24  0305   WBC 7.75   RBC 3.50*   HGB 11.4*   HCT 33.2*         Recent Labs   Lab 03/14/24  0305   *   K 3.6   CO2 18*      BUN 6   CREATININE 0.7   ALKPHOS 31*   ALT 11   AST 23   BILITOT 0.4    No results for input(s): "PROTIME", "INR", "APTT", " ""HEPANTIXA" in the last 168 hours. No results for input(s): "CPK", "CPKMB", "TROPONINI", "MB" in the last 168 hours.    Electrolytes: Electrolytes replaced  Accuchecks: none    Gtts:      LDA/Wounds:      Nurses Note -- 4 Eyes      Is there altered skin present? no     Prevention Measures Documented    Second RN/Staff Member:  MAREN Young      "

## 2024-03-14 NOTE — ASSESSMENT & PLAN NOTE
33yoM with PMH of HTN, coarctation of the aorta (s/p repair), PDA, VSD s/p repair, ADHD, EtOH use/abuse, arthritis, legg-perthes disease, ICH 5/2022, known CHESTER aneurysm that was transferred to [facility] for higher level of care of SAH (HH1). He presented to OSH with HA and nausea. CTH showed diffuse SAH throughout basal cisterns. At C neuro exam stable with NIHSS 1. CTA shows 3mm L acomm aneurysm, hypoplastic R A1, persistent fetal circulation, as well as evolving SAH/ICH now with concern for developing hydrocephalus and questionable early cerebellar tonsillar herniation. NSGY consulted, EVD placed. Taken to IR for DSA, no intervention of acomm aneurysm at that time. 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 Suspect etiology of SAH likely due to vertebral artery aneurysm.     No acute events overnight. Stable neuro exam. EVD d/c'd yesterday. Repeat CTH stable. Continue to remain on SAH protocol. TCD from today with Mild MCA and left CHESTER territory vasospasm. On nimodipine.      Antithrombotics for secondary stroke prevention: Antiplatelets: Aspirin: 81 mg daily and Brilinta 90mg BID (plavix switched to brilinta due to plavix non-responder on PRU assay)    Statins for secondary stroke prevention and hyperlipidemia, if present: Statins: Atorvastatin- 40 mg daily, continue    Aggressive risk factor modification: HTN     Rehab efforts: The patient has been evaluated by a stroke team provider and the therapy needs have been fully considered based off the presenting complaints and exam findings. The following therapy evaluations are needed: PT evaluate and treat, OT evaluate and treat, SLP evaluate and treat, PM&R evaluate for appropriate placement-evaluated.  Currently no therapy needs.  Regular diet.    Diagnostics ordered/pending: Daily TCDs per SAH protocol    VTE prophylaxis: Mechanical prophylaxis: Place  SCDs    BP parameters: SAH: Secured aneurysm, no target, increase BP to prevent vasospasm if needed

## 2024-03-14 NOTE — ASSESSMENT & PLAN NOTE
Stroke Risk Factor  SBP <220 given aneurysm now secured, MAP >65  PRN labetalol  24° -178   12-Aug-2020 15:22

## 2024-03-14 NOTE — SUBJECTIVE & OBJECTIVE
Review of Systems   Constitutional: Negative.    HENT: Negative.     Eyes: Negative.    Respiratory: Negative.     Cardiovascular: Negative.    Gastrointestinal: Negative.    Endocrine: Negative.    Genitourinary: Negative.    Musculoskeletal: Negative.    Skin: Negative.      2 systems   Objective:     Vitals:  Temp: 98.9 °F (37.2 °C)  Pulse: 75  Rhythm: normal sinus rhythm  BP: 132/60  MAP (mmHg): 88  Resp: (!) 25  SpO2: 100 %    Temp  Min: 98.1 °F (36.7 °C)  Max: 100 °F (37.8 °C)  Pulse  Min: 73  Max: 101  BP  Min: 108/88  Max: 157/73  MAP (mmHg)  Min: 73  Max: 100  Resp  Min: 12  Max: 41  SpO2  Min: 95 %  Max: 100 %    03/13 0701 - 03/14 0700  In: 910 [P.O.:910]  Out: 2520 [Urine:2520]   Unmeasured Output  Urine Occurrence: 1  Stool Occurrence: 1  Emesis Occurrence: 1  Pad Count: 2        Physical Exam  Vitals and nursing note reviewed.   Constitutional:       Appearance: Normal appearance.   HENT:      Head: Normocephalic.      Nose: Nose normal.      Mouth/Throat:      Mouth: Mucous membranes are moist.      Pharynx: Oropharynx is clear.   Eyes:      Pupils: Pupils are equal, round, and reactive to light.   Cardiovascular:      Rate and Rhythm: Normal rate and regular rhythm.      Pulses: Normal pulses.      Heart sounds: Normal heart sounds.   Pulmonary:      Effort: Pulmonary effort is normal.      Breath sounds: Normal breath sounds.   Abdominal:      General: Bowel sounds are normal.      Palpations: Abdomen is soft.   Musculoskeletal:         General: Normal range of motion.   Skin:     General: Skin is warm and dry.      Capillary Refill: Capillary refill takes 2 to 3 seconds.   Neurological:      Mental Status: He is alert.      Comments: AAO X4  GCS 15  ENCARNACION to command and spontaneously  Sensation Intact               Medications:  Continuous Scheduledaspirin, 81 mg, Daily  gabapentin, 300 mg, Q8H  heparin (porcine), 5,000 Units, Q8H  melatonin, 6 mg, Nightly  niMODipine, 30 mg, Q2H  polyethylene  glycol, 17 g, Daily  senna-docusate 8.6-50 mg, 1 tablet, BID  ticagrelor, 90 mg, BID    PRNacetaminophen, 650 mg, Q6H PRN  bisacodyL, 10 mg, Daily PRN  diazePAM, 2 mg, Q6H PRN  hydrOXYzine HCL, 25 mg, Nightly PRN  labetalol, 10 mg, Q15 Min PRN  magnesium oxide, 800 mg, PRN  magnesium oxide, 800 mg, PRN  methocarbamoL, 500 mg, QID PRN  metoclopramide, 10 mg, Q6H PRN  ondansetron, 4 mg, Q6H PRN  oxyCODONE, 5 mg, Q6H PRN  potassium bicarbonate, 35 mEq, PRN  potassium bicarbonate, 50 mEq, PRN  potassium bicarbonate, 60 mEq, PRN  potassium, sodium phosphates, 2 packet, PRN  potassium, sodium phosphates, 2 packet, PRN  potassium, sodium phosphates, 2 packet, PRN      Today I personally reviewed pertinent medications, lines/drains/airways, imaging, cardiology results, laboratory results, microbiology results, notably:    Diet  Diet Adult Regular (IDDSI Level 7) Thin; Standard Tray  Diet Adult Regular (IDDSI Level 7) Thin; Standard Tray

## 2024-03-14 NOTE — SUBJECTIVE & OBJECTIVE
Neurologic Chief Complaint: SAH    Subjective:     Interval History: Patient is seen for follow-up neurological assessment and treatment recommendations: NSGY plan for possible coil vs clip of aneurysm.    HPI, Past Medical, Family, and Social History remains the same as documented in the initial encounter.     Review of Systems   Constitutional:  Negative for fever.   Respiratory:  Negative for cough and shortness of breath.    Gastrointestinal:  Negative for vomiting.   Neurological:  Negative for speech difficulty and weakness.   Psychiatric/Behavioral:  Negative for agitation and confusion.      Scheduled Meds:   aspirin  81 mg Oral Daily    gabapentin  300 mg Oral Q8H    heparin (porcine)  5,000 Units Subcutaneous Q8H    melatonin  6 mg Oral Nightly    niMODipine  30 mg Oral Q2H    polyethylene glycol  17 g Oral Daily    senna-docusate 8.6-50 mg  1 tablet Oral BID    ticagrelor  90 mg Oral BID     Continuous Infusions:  PRN Meds:acetaminophen, bisacodyL, hydrOXYzine HCL, labetalol, magnesium oxide, magnesium oxide, methocarbamoL, metoclopramide, ondansetron, oxyCODONE, potassium bicarbonate, potassium bicarbonate, potassium bicarbonate, potassium, sodium phosphates, potassium, sodium phosphates, potassium, sodium phosphates    Objective:     Vital Signs (Most Recent):  Temp: 98 °F (36.7 °C) (03/13/24 1501)  Pulse: 92 (03/13/24 1952)  Resp: 15 (03/13/24 1952)  BP: (!) 111/59 (03/13/24 1801)  SpO2: 100 % (03/13/24 1952)  BP Location: Right arm    Vital Signs Range (Last 24H):  Temp:  [98 °F (36.7 °C)-98.7 °F (37.1 °C)]   Pulse:  [69-96]   Resp:  [13-40]   BP: (111-178)/()   SpO2:  [98 %-100 %]   BP Location: Right arm       Physical Exam  Vitals and nursing note reviewed.   HENT:      Mouth/Throat:      Mouth: Mucous membranes are moist.   Eyes:      General:         Right eye: No discharge.         Left eye: No discharge.      Extraocular Movements: Extraocular movements intact.   Cardiovascular:      Rate  Pt calling in regards to his lab results , please advise and Rhythm: Normal rate.   Pulmonary:      Effort: Pulmonary effort is normal. No respiratory distress.   Abdominal:      General: There is no distension.   Musculoskeletal:      Cervical back: Normal range of motion and neck supple.   Skin:     General: Skin is warm and dry.   Neurological:      Mental Status: He is oriented to person, place, and time.      Motor: No weakness.              Neurological Exam:   LOC: alert  Language: No aphasia  Articulation: No dysarthria  Orientation: Person, Place, Time   Motor: Arm left  Normal 5/5  Leg left  Normal 5/5  Arm right  Normal 5/5  Leg right Normal 5/5    Laboratory:  CMP:   Recent Labs   Lab 03/13/24  0030   CALCIUM 8.3*   ALBUMIN 2.8*   PROT 5.5*   *   K 4.0   CO2 23      BUN 11   CREATININE 0.8   ALKPHOS 33*   ALT 6*   AST 13   BILITOT 0.4     CBC:   Recent Labs   Lab 03/13/24  0030   WBC 7.13   RBC 3.91*   HGB 12.5*   HCT 36.8*      MCV 94   MCH 32.0*   MCHC 34.0       Diagnostic Results     Brain Imaging     CTH 3/13/2024-newly acquired and reviewed by Vascular Neurology provider. Impression: IVC placement and aneurysm coiling.  The ventricles are mildly decreased in size.  Decreased intraventricular hemorrhage.    CTH 3/5/23:   Impression:  Subarachnoid hemorrhage with blood throughout the basilar cisterns.  Moderate intraventricular blood and mild ventricular prominence.     Vessel Imaging      TCD 3/12/2024 Impression: No significant vasospasm is identified.  Velocities are increasing in the left CHESTER which may represent mild early vasospasm     MRA Brain - 03/08/2024  Impression:  Examination mildly degraded by patient motion artifact.  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  No abnormal vessel wall enhancement.  No residual flow related signal within the recently treated right PICA origin aneurysm.  Prominent enhancement at this site on vessel wall imaging.  This is nonspecific in the post treatment setting but  aneurysm is presumed site of rupture given distribution of hemorrhage.  Retrograde filling of the left vertebral artery, patient with known subclavian steal.     CTA head/neck 3/5/23:   Impression: 3 mm left anterior communicating artery aneurysm again identified.  No definite additional aneurysm identified concerning for source of hemorrhage.  Clinical correlation and correlation with conventional angiography recommended. 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 Interval increase distension lateral and 3rd ventricles compatible with developing hydrocephalus. Crowding cerebral sulci at the vertex with additional crowding basilar cisterns with questionable early cerebellar tonsillar herniation.     IR Angio 3/5/24:   Impression:  Anterior communicating artery aneurysm 2 x 1.7 mm with a wide neck. Small irregular aneurysm from the right V 4 vertebral segment just proximal to the posteroinferior cerebellar artery.  This is not well characterized on these views.  Repeat selective angiography is planned for better evaluation.     Cardiac Imaging   TTE 3/8/24:     Left Ventricle: The left ventricle is normal in size. Normal wall thickness. Global hypokinesis present. There is mildly reduced systolic function with a visually estimated ejection fraction of 40 - 45%. There is indeterminate diastolic function.    Right Ventricle: Normal right ventricular cavity size. Wall thickness is normal. Right ventricle  wall motion  is normal. Systolic function is normal.    Left Atrium: Left atrium is severely dilated.    Aortic Valve: The valve morphology is clearly abnormal though the number of leaflets is not evident.  There is severe aortic valve stenosis. Aortic valve area by VTI is 0.8 cm². Aortic valve peak velocity is 4.5 m/s. Mean gradient is 55 mmHg. The dimensionless index is 0.17. There is mild aortic regurgitation.    IVC/SVC: Normal venous pressure at 3 mmHg.

## 2024-03-14 NOTE — SUBJECTIVE & OBJECTIVE
Interval History:3/14 naeon, exam stable. EVD removed yesterday. Continue SAH protocol    Medications:  Continuous Infusions:  Scheduled Meds:   aspirin  81 mg Oral Daily    gabapentin  300 mg Oral Q8H    heparin (porcine)  5,000 Units Subcutaneous Q8H    melatonin  6 mg Oral Nightly    niMODipine  30 mg Oral Q2H    polyethylene glycol  17 g Oral Daily    senna-docusate 8.6-50 mg  1 tablet Oral BID    ticagrelor  90 mg Oral BID     PRN Meds:acetaminophen, bisacodyL, diazePAM, hydrOXYzine HCL, labetalol, magnesium oxide, magnesium oxide, methocarbamoL, metoclopramide, ondansetron, oxyCODONE, potassium bicarbonate, potassium bicarbonate, potassium bicarbonate, potassium, sodium phosphates, potassium, sodium phosphates, potassium, sodium phosphates     Review of Systems  Objective:     Weight: 106.6 kg (235 lb)  Body mass index is 35.73 kg/m².  Vital Signs (Most Recent):  Temp: 98.9 °F (37.2 °C) (03/14/24 1101)  Pulse: 75 (03/14/24 1301)  Resp: (!) 25 (03/14/24 1301)  BP: 122/63 (03/14/24 1301)  SpO2: 100 % (03/14/24 1301) Vital Signs (24h Range):  Temp:  [98 °F (36.7 °C)-100 °F (37.8 °C)] 98.9 °F (37.2 °C)  Pulse:  [] 75  Resp:  [12-41] 25  SpO2:  [95 %-100 %] 100 %  BP: (108-158)/() 122/63     Date 03/14/24 0700 - 03/15/24 0659   Shift 7890-8565 1104-8003 1677-7663 24 Hour Total   INTAKE   P.O. 250   250   Shift Total(mL/kg) 250(2.3)   250(2.3)   OUTPUT   Urine(mL/kg/hr) 1400   1400   Shift Total(mL/kg) 1400(13.1)   1400(13.1)   Weight (kg) 106.6 106.6 106.6 106.6                              ICP/Ventriculostomy 03/05/24 1143 Right Parietal region (Active)   Level of Ventriculostomy (cm above) 15 03/13/24 0743   Status Clamped 03/13/24 0743   Site Assessment Clean;Dry 03/13/24 0743   Site Drainage No drainage 03/13/24 0743   Waveform normal waveform 03/13/24 0743   Output (mL) 1 mL 03/12/24 0905   CSF Color clear 03/13/24 0601   Dressing Status Clean;Dry;Intact 03/13/24 0743   Interventions HOB degrees  "03/13/24 0743          Physical Exam         Neurosurgery Physical Exam    General: well developed, well nourished, no distress.   HEENT: normocephalic, atraumatic  CV: regular rate   Pulmonary: normal respirations, no signs of respiratory distress  Abdomen: soft, non-distended, not tender to palpation  Skin: Skin is warm, dry and intact  Heme: no bruising    Neuro:   Mental Status: AO x4, no aphasia, no dysarthria   CN: PERRL, EOMI, VF intact to confrontation, sensation intact bilaterally, eyebrow raise and grimace symmetric, tongue midline  Motor: moves all extremities spontaneously, full strength throughout, no pronator drift   Sensory: intact to light touch throughout  Cerebellar: finger to nose intact bilaterally  Reflexes: -Patterson's, -Babinski, no clonus. Patellar: 2+ bilaterally        Incision: Clean, dry, intact. Skin edges well approximated. No surrounding erythema or edema. No drainage or TTP.      Significant Labs:  Recent Labs   Lab 03/13/24  0030 03/14/24  0305   GLU 97 78   * 135*   K 4.0 3.6    110   CO2 23 18*   BUN 11 6   CREATININE 0.8 0.7   CALCIUM 8.3* 7.1*   MG 1.8 1.5*       Recent Labs   Lab 03/13/24  0030 03/14/24  0305   WBC 7.13 7.75   HGB 12.5* 11.4*   HCT 36.8* 33.2*    192       No results for input(s): "LABPT", "INR", "APTT" in the last 48 hours.  Microbiology Results (last 7 days)       ** No results found for the last 168 hours. **          All pertinent labs from the last 24 hours have been reviewed.    Significant Diagnostics:  CT: CT Head Without Contrast    Result Date: 3/13/2024  IVC placement and aneurysm coiling. The ventricles are mildly decreased in size.  Decreased intraventricular hemorrhage. Electronically signed by: Floyd Rutherford Date:    03/13/2024 Time:    07:41   Neurosurgery Physical Exam  "

## 2024-03-14 NOTE — PT/OT/SLP PROGRESS
"Occupational Therapy   Treatment    Name: Kalia Pacheco  MRN: [id number]  Admitting Diagnosis:  Subarachnoid hemorrhage due to ruptured aneurysm       Recommendations:     Discharge Recommendations: No Therapy Indicated  Discharge Equipment Recommendations:  none  Barriers to discharge:  None    Assessment:     Kalia Pacheco is a 33 y.o. male with a medical diagnosis of Subarachnoid hemorrhage due to ruptured aneurysm.  He presents with performance deficits affecting function are impaired endurance, impaired self care skills, impaired functional mobility, pain.     Rehab Prognosis:  Good; patient would benefit from acute skilled OT services to address these deficits and reach maximum level of function.       Plan:     Patient to be seen 4 x/week to address the above listed problems via neuromuscular re-education, therapeutic exercises, therapeutic activities, self-care/home management  Plan of Care Expires: 04/03/24  Plan of Care Reviewed with: patient, significant other    Subjective     Patient:  "Last night was my worse night so far.  My muscles keep jose."  "Thank you for coming; I feel 79 years old and so debilitated."  Pain/Comfort:  Pain Rating 1: 9/10  Location 1: back  Pain Addressed 1: Reposition, Nurse notified  Pain Rating Post-Intervention 1: 9/10    Objective:     Communicated with: Nurse prior to session.  Patient found supine with telemetry, pulse ox (continuous), external ventricular drain, peripheral IV, bed alarm, blood pressure cuff upon OT entry to room.    General Precautions: Standard, aspiration, fall    Orthopedic Precautions:N/A  Braces: N/A  Respiratory Status: Room air     Occupational Performance:     Bed Mobility:    Patient completed Rolling/Turning to Left with  modified independence  Patient completed Rolling/Turning to Right with modified independence  Patient completed Supine to Sit with modified independence  Patient completed Sit to Supine with modified " independence     Functional Mobility/Transfers:  Patient completed Sit <> Stand Transfer with supervision  with  no assistive device   Patient completed Bed <> Chair Transfer using Stand Pivot technique with supervision with rolling walker  Patient completed Toilet Transfer Stand Pivot technique with supervision with  rolling walker    Activities of Daily Living:  Grooming: supervision while standing  Lower Body Dressing: minimum assistance donning left sock  Toileting: supervision with use of std commode    AMPAC 6 Click ADL: 18    Treatment & Education:  Patient alert and oriented x 3; able to follow 4/4 one step commands.  Patient attentive and interactive throughout the session.  Patient performed shoulder rolls/neck rotation and lateral flexion while standing.  Gentle trunk rotation stretch performed while side-lying.    Patient left supine with all lines intact, call button in reach, and bed alarm on    GOALS:   Multidisciplinary Problems       Occupational Therapy Goals          Problem: Occupational Therapy    Goal Priority Disciplines Outcome Interventions   Occupational Therapy Goal     OT, PT/OT Ongoing, Progressing    Description: Goals set 3/7 with an expiration date, 4/4:  Patient will increase functional independence with ADLs by performing:    Patient will demonstrate rolling to the right with modified independence.  Not met   Patient will demonstrate rolling to the left with modified independence.   Not met  Patient will demonstrate supine -sit with modified independence.   Not met  Patient will demonstrate stand pivot transfers with modified independence.   Not met  Patient will demonstrate grooming while standing with modified independence.   Not met  Patient will demonstrate upper body dressing with modified independence while seated EOB.   Not met  Patient will demonstrate lower body dressing with modified independence while seated EOB.   Not met  Patient will demonstrate toileting with modified  independence.   Not met  Patient will demonstrate bathing while seated EOB with modified independence   Not met  Patient's family / caregiver will demonstrate independence and safety with assisting patient with self-care skills and functional mobility.     Not met                           Time Tracking:     OT Date of Treatment: 03/14/24  OT Start Time: 0616  OT Stop Time: 0641  OT Total Time (min): 25 min    Billable Minutes:Self Care/Home Management 15  Therapeutic Activity 10    OT/ZULEYMA: OT          3/14/2024

## 2024-03-14 NOTE — PROGRESS NOTES
[facility] - Neuro Critical Care  Neurosurgery  Progress Note    Subjective:     History of Present Illness: Kalia Pacheco is a 34yo man with history of known CHESTER aneurysm, not on any AP/AC who presented to [facility] this AM with headache and nausea that began 2 days ago and acutely worsened while he was at work this morning. He also reports blurry vision and has had multiple episodes of emesis. Denies any inciting factors. CTH reveals diffuse subarachnoid hemorrhage throughout the basal cisterns, HH score 1. He was [place] to [facility] [place] for Neuro Critical Care and Neurosurgical management.     Post-Op Info:  * No surgery found *       Interval History:3/14 naeon, exam stable. EVD removed yesterday. Continue SAH protocol    Medications:  Continuous Infusions:  Scheduled Meds:   aspirin  81 mg Oral Daily    gabapentin  300 mg Oral Q8H    heparin (porcine)  5,000 Units Subcutaneous Q8H    melatonin  6 mg Oral Nightly    niMODipine  30 mg Oral Q2H    polyethylene glycol  17 g Oral Daily    senna-docusate 8.6-50 mg  1 tablet Oral BID    ticagrelor  90 mg Oral BID     PRN Meds:acetaminophen, bisacodyL, diazePAM, hydrOXYzine HCL, labetalol, magnesium oxide, magnesium oxide, methocarbamoL, metoclopramide, ondansetron, oxyCODONE, potassium bicarbonate, potassium bicarbonate, potassium bicarbonate, potassium, sodium phosphates, potassium, sodium phosphates, potassium, sodium phosphates     Review of Systems  Objective:     Weight: 106.6 kg (235 lb)  Body mass index is 35.73 kg/m².  Vital Signs (Most Recent):  Temp: 98.9 °F (37.2 °C) (03/14/24 1101)  Pulse: 75 (03/14/24 1301)  Resp: (!) 25 (03/14/24 1301)  BP: 122/63 (03/14/24 1301)  SpO2: 100 % (03/14/24 1301) Vital Signs (24h Range):  Temp:  [98 °F (36.7 °C)-100 °F (37.8 °C)] 98.9 °F (37.2 °C)  Pulse:  [] 75  Resp:  [12-41] 25  SpO2:  [95 %-100 %] 100 %  BP: (108-158)/() 122/63     Date 03/14/24 0700 - 03/15/24 0659   Shift 1206-5909 2922-2309 9374-0415 06  "Hour Total   INTAKE   P.O. 250   250   Shift Total(mL/kg) 250(2.3)   250(2.3)   OUTPUT   Urine(mL/kg/hr) 1400   1400   Shift Total(mL/kg) 1400(13.1)   1400(13.1)   Weight (kg) 106.6 106.6 106.6 106.6                              ICP/Ventriculostomy 03/05/24 1143 Right Parietal region (Active)   Level of Ventriculostomy (cm above) 15 03/13/24 0743   Status Clamped 03/13/24 0743   Site Assessment Clean;Dry 03/13/24 0743   Site Drainage No drainage 03/13/24 0743   Waveform normal waveform 03/13/24 0743   Output (mL) 1 mL 03/12/24 0905   CSF Color clear 03/13/24 0601   Dressing Status Clean;Dry;Intact 03/13/24 0743   Interventions HOB degrees 03/13/24 0743         Physical Exam         Neurosurgery Physical Exam    General: well developed, well nourished, no distress.   HEENT: normocephalic, atraumatic  CV: regular rate   Pulmonary: normal respirations, no signs of respiratory distress  Abdomen: soft, non-distended, not tender to palpation  Skin: Skin is warm, dry and intact  Heme: no bruising    Neuro:   Mental Status: AO x4, no aphasia, no dysarthria   CN: PERRL, EOMI, VF intact to confrontation, sensation intact bilaterally, eyebrow raise and grimace symmetric, tongue midline  Motor: moves all extremities spontaneously, full strength throughout, no pronator drift   Sensory: intact to light touch throughout  Cerebellar: finger to nose intact bilaterally  Reflexes: -Patterson's, -Babinski, no clonus. Patellar: 2+ bilaterally        Incision: Clean, dry, intact. Skin edges well approximated. No surrounding erythema or edema. No drainage or TTP.      Significant Labs:  Recent Labs   Lab 03/13/24  0030 03/14/24  0305   GLU 97 78   * 135*   K 4.0 3.6    110   CO2 23 18*   BUN 11 6   CREATININE 0.8 0.7   CALCIUM 8.3* 7.1*   MG 1.8 1.5*       Recent Labs   Lab 03/13/24  0030 03/14/24  0305   WBC 7.13 7.75   HGB 12.5* 11.4*   HCT 36.8* 33.2*    192       No results for input(s): "LABPT", "INR", "APTT" in the " last 48 hours.  Microbiology Results (last 7 days)       ** No results found for the last 168 hours. **          All pertinent labs from the last 24 hours have been reviewed.    Significant Diagnostics:  CT: CT Head Without Contrast    Result Date: 3/13/2024  IVC placement and aneurysm coiling. The ventricles are mildly decreased in size.  Decreased intraventricular hemorrhage. Electronically signed by: Floyd Rutherford Date:    03/13/2024 Time:    07:41   Neurosurgery Physical Exam  Assessment/Plan:     * Subarachnoid hemorrhage due to ruptured aneurysm  Kalia Pacheco is a 34yo man with history of known CHESTER aneurysm who presented to [facility] with headache and nausea. CTH demonstrated diffuse SAH throughout the basal cisterns HH and he was transferred to [facility] [place] for close monitoring in the Neuro ICU and Neurosurgical consultation.     R frontal EVD was placed on arrival on 3/6 and patient was taken to IR for angiogram, with no intervention upon Acomm aneurysm. Upon further review of DSA, a vertebral artery aneurysm was noted and patient was brought back to IR for a right V4/PICA irregular aneurysm stent assisted coiling.     Imaging reviewed:  CTH 3/5: Diffuse basal cistern SAH with IVH   CTA 3/5: 3mm ant projecting L Acomm, no R A1, R pcomm feeding posterior circulation   DSA 3/5: EVD in position, anteriosuperiorly projecting small L Acomm, R V4 irregular aneurysm not well visualized    DSA +embo 3/5: stent assisted coiling of R V4/PICA irregularly shaped anuerysm, 1/4 lobules still filling     - Admit to NCC with q1 neuro checks  - EVD removed 3/13, CTH post pull stable  - Keppra 500 BID x7d   - TCDs daily x14d   - Nimotop 60q4 x 21d   - strict I/Os, goal euvolemia to net positive for SAH patients in general, in this patient may need to be more conservative due to cardiac hx, management per NCC   - continue ASA/brilnta per neuro IR  - based on bleed pattern believe vertebral aneurysm to be the ruptured  aneurysm, no plan for intervention upon Acomm at this point  -MRI with contrast not suggestive of vessel wall enhancement to Acomm  - Continue to follow clinically and notify NSGY immediately if any neurostatus change     Case and plan discussed with attending Neurosurgeon Dr. Saroj Cooley MD  Neurosurgery  [facility]

## 2024-03-14 NOTE — PROGRESS NOTES
"[facility] - Neuro Critical Care  Adult Nutrition  Progress Note    SUMMARY       Recommendations    1. Continue Regular diet.   2. Recommend ONS Boost Plus (or alternative) BID to optimize nutrition.   3. Consider an appetite stimulant if PO intake does not improve.   4. Monitor wt, labs and PO intake.   5. RD to monitor.     If TF warranted: Impact Peptide 1.5 @ 55ml/hr -Or hospital alternative Pivot 1.5 @ 55ml/hr    Goals: Meet % een/epn by next RD f/u  Nutrition Goal Status: new  Communication of RD Recs: other (comment) (poc)    Assessment and Plan    Nutrition Problem  Inadequate oral intake    Related to (etiology):   Inability to consume sufficient energy     Signs and Symptoms (as evidenced by):   Decreased appetite    Interventions/Recommendations (treatment strategy):  Collaboration with other providers    Nutrition Diagnosis Status:   New      Reason for Assessment    Reason For Assessment: RD follow-up  Diagnosis: hemorrhage  Relevant Medical History: GERD, HTN  Interdisciplinary Rounds: did not attend  General Information Comments: RD follow-up. RD spoke with family member at bedside, reports patient eating light meal options soups, sandwich, salads) No difficulty chewing/swallowing. No c/o N/V/C/D at this time. RD provided Ensure coupons if patient PO intake inadequate. Appetite poor at this time.  Nutrition Discharge Planning: pending medical course    Nutrition Risk Screen    Nutrition Risk Screen: no indicators present    Nutrition/Diet History    Spiritual, Cultural Beliefs, Church Practices, Values that Affect Care: no  Food Allergies: NKFA  Factors Affecting Nutritional Intake: decreased appetite    Anthropometrics    Temp: 98.9 °F (37.2 °C)  Height Method: Measured  Height: 5' 8" (172.7 cm)  Height (inches): 68 in  Weight Method: Bed Scale  Weight: 106.6 kg (235 lb)  Weight (lb): 235 lb  Ideal Body Weight (IBW), Male: 154 lb  % Ideal Body Weight, Male (lb): 152.6 %  BMI (Calculated): " 35.7  BMI Grade: greater than 40 - morbid obesity       Lab/Procedures/Meds    Pertinent Labs Reviewed: reviewed  Pertinent Labs Comments: Na 135, CO2 18, Ca 7.1, Mg 1.5, TP 4.8, ALP 31  Pertinent Medications Reviewed: reviewed  Pertinent Medications Comments: Clopidogrel, famotidine, senna-docusate, precedex, fentanyl, heparin, gabapentin, polyethylene glycol    Estimated/Assessed Needs    Weight Used For Calorie Calculations: 100.7 kg (222 lb) (ABW 3/5/24)  Energy Calorie Requirements (kcal): 1921 (msj)  Energy Need Method: Amite-St Jeor  Protein Requirements:  (.8-1 g/kg)  Weight Used For Protein Calculations: 100.7 kg (222 lb) (ABW 3/5/24)     Estimated Fluid Requirement Method: RDA Method  RDA Method (mL): 1921         Nutrition Prescription Ordered    Current Diet Order: Regular    Evaluation of Received Nutrient/Fluid Intake    I/O: -1.35 L  Energy Calories Required: not meeting needs  Protein Required: not meeting needs  Comments: LBM: 3/13  Tolerance: tolerating  % Intake of Estimated Energy Needs: 75 - 100 %  % Meal Intake: 0 - 25 %    Nutrition Risk    Level of Risk/Frequency of Follow-up: low - moderate (f/u 1x/week)     Monitor and Evaluation    Food and Nutrient Intake: energy intake, food and beverage intake  Food and Nutrient Adminstration: diet order  Physical Activity and Function: nutrition-related ADLs and IADLs, factors affecting access to physical activity  Anthropometric Measurements: height/length, weight, weight change, body mass index  Biochemical Data, Medical Tests and Procedures: electrolyte and renal panel, gastrointestinal profile, glucose/endocrine profile, inflammatory profile, lipid profile     Nutrition Follow-Up    RD Follow-up?: Yes    Porfirio Mckeon Registration Eligible, Provisional LDN

## 2024-03-14 NOTE — ASSESSMENT & PLAN NOTE
Unsecure  - continue ASA/brilnta per neuro IR  - based on bleed pattern believe vertebral aneurysm to be the ruptured aneurysm, no plan for intervention upon Acomm at this point  - MRI with contrast not suggestive of vessel wall enhancement to Acomm

## 2024-03-15 PROBLEM — I73.9 VASOSPASM: Status: ACTIVE | Noted: 2024-03-15

## 2024-03-15 LAB
ALBUMIN SERPL BCP-MCNC: 2.9 G/DL (ref 3.5–5.2)
ALP SERPL-CCNC: 37 U/L (ref 55–135)
ALT SERPL W/O P-5'-P-CCNC: 24 U/L (ref 10–44)
ANION GAP SERPL CALC-SCNC: 8 MMOL/L (ref 8–16)
AST SERPL-CCNC: 33 U/L (ref 10–40)
BASOPHILS # BLD AUTO: 0.03 K/UL (ref 0–0.2)
BASOPHILS NFR BLD: 0.4 % (ref 0–1.9)
BILIRUB SERPL-MCNC: 0.5 MG/DL (ref 0.1–1)
BUN SERPL-MCNC: 7 MG/DL (ref 6–20)
CALCIUM SERPL-MCNC: 8.6 MG/DL (ref 8.7–10.5)
CHLORIDE SERPL-SCNC: 105 MMOL/L (ref 95–110)
CO2 SERPL-SCNC: 22 MMOL/L (ref 23–29)
CREAT SERPL-MCNC: 0.8 MG/DL (ref 0.5–1.4)
DIFFERENTIAL METHOD BLD: ABNORMAL
EOSINOPHIL # BLD AUTO: 0.2 K/UL (ref 0–0.5)
EOSINOPHIL NFR BLD: 2 % (ref 0–8)
ERYTHROCYTE [DISTWIDTH] IN BLOOD BY AUTOMATED COUNT: 12.7 % (ref 11.5–14.5)
EST. GFR  (NO RACE VARIABLE): >60 ML/MIN/1.73 M^2
GLUCOSE SERPL-MCNC: 88 MG/DL (ref 70–110)
HCT VFR BLD AUTO: 37.5 % (ref 40–54)
HGB BLD-MCNC: 12.6 G/DL (ref 14–18)
IMM GRANULOCYTES # BLD AUTO: 0.01 K/UL (ref 0–0.04)
IMM GRANULOCYTES NFR BLD AUTO: 0.1 % (ref 0–0.5)
LYMPHOCYTES # BLD AUTO: 2.1 K/UL (ref 1–4.8)
LYMPHOCYTES NFR BLD: 28.2 % (ref 18–48)
MAGNESIUM SERPL-MCNC: 1.9 MG/DL (ref 1.6–2.6)
MCH RBC QN AUTO: 31.6 PG (ref 27–31)
MCHC RBC AUTO-ENTMCNC: 33.6 G/DL (ref 32–36)
MCV RBC AUTO: 94 FL (ref 82–98)
MONOCYTES # BLD AUTO: 0.6 K/UL (ref 0.3–1)
MONOCYTES NFR BLD: 7.3 % (ref 4–15)
NEUTROPHILS # BLD AUTO: 4.7 K/UL (ref 1.8–7.7)
NEUTROPHILS NFR BLD: 62 % (ref 38–73)
NRBC BLD-RTO: 0 /100 WBC
PHOSPHATE SERPL-MCNC: 4.3 MG/DL (ref 2.7–4.5)
PLATELET # BLD AUTO: 218 K/UL (ref 150–450)
PMV BLD AUTO: 9.8 FL (ref 9.2–12.9)
POTASSIUM SERPL-SCNC: 4.4 MMOL/L (ref 3.5–5.1)
PROT SERPL-MCNC: 5.6 G/DL (ref 6–8.4)
RBC # BLD AUTO: 3.99 M/UL (ref 4.6–6.2)
SODIUM SERPL-SCNC: 135 MMOL/L (ref 136–145)
WBC # BLD AUTO: 7.55 K/UL (ref 3.9–12.7)

## 2024-03-15 PROCEDURE — 20000000 HC ICU ROOM

## 2024-03-15 PROCEDURE — 25000003 PHARM REV CODE 250

## 2024-03-15 PROCEDURE — 99233 SBSQ HOSP IP/OBS HIGH 50: CPT | Mod: ,,, | Performed by: NURSE PRACTITIONER

## 2024-03-15 PROCEDURE — 25000003 PHARM REV CODE 250: Performed by: PSYCHIATRY & NEUROLOGY

## 2024-03-15 PROCEDURE — 84100 ASSAY OF PHOSPHORUS: CPT

## 2024-03-15 PROCEDURE — 83735 ASSAY OF MAGNESIUM: CPT

## 2024-03-15 PROCEDURE — 85025 COMPLETE CBC W/AUTO DIFF WBC: CPT

## 2024-03-15 PROCEDURE — 63600175 PHARM REV CODE 636 W HCPCS

## 2024-03-15 PROCEDURE — 25000003 PHARM REV CODE 250: Performed by: NURSE PRACTITIONER

## 2024-03-15 PROCEDURE — 97116 GAIT TRAINING THERAPY: CPT | Mod: CQ

## 2024-03-15 PROCEDURE — 25000003 PHARM REV CODE 250: Performed by: STUDENT IN AN ORGANIZED HEALTH CARE EDUCATION/TRAINING PROGRAM

## 2024-03-15 PROCEDURE — 80053 COMPREHEN METABOLIC PANEL: CPT

## 2024-03-15 PROCEDURE — 25000003 PHARM REV CODE 250: Performed by: PHYSICIAN ASSISTANT

## 2024-03-15 PROCEDURE — 97535 SELF CARE MNGMENT TRAINING: CPT

## 2024-03-15 RX ORDER — LIDOCAINE 50 MG/G
1 PATCH TOPICAL NIGHTLY
Status: DISCONTINUED | OUTPATIENT
Start: 2024-03-15 | End: 2024-03-20 | Stop reason: HOSPADM

## 2024-03-15 RX ADMIN — TICAGRELOR 90 MG: 90 TABLET ORAL at 08:03

## 2024-03-15 RX ADMIN — ACETAMINOPHEN 650 MG: 325 TABLET ORAL at 01:03

## 2024-03-15 RX ADMIN — DOCUSATE SODIUM AND SENNOSIDES 1 TABLET: 8.6; 5 TABLET, FILM COATED ORAL at 08:03

## 2024-03-15 RX ADMIN — LIDOCAINE 5% 1 PATCH: 700 PATCH TOPICAL at 08:03

## 2024-03-15 RX ADMIN — SODIUM CHLORIDE 1000 ML: 9 INJECTION, SOLUTION INTRAVENOUS at 02:03

## 2024-03-15 RX ADMIN — NIMODIPINE 30 MG: 30 CAPSULE, LIQUID FILLED ORAL at 04:03

## 2024-03-15 RX ADMIN — NIMODIPINE 30 MG: 30 CAPSULE, LIQUID FILLED ORAL at 08:03

## 2024-03-15 RX ADMIN — ASPIRIN 81 MG CHEWABLE TABLET 81 MG: 81 TABLET CHEWABLE at 08:03

## 2024-03-15 RX ADMIN — HEPARIN SODIUM 5000 UNITS: 5000 INJECTION INTRAVENOUS; SUBCUTANEOUS at 09:03

## 2024-03-15 RX ADMIN — Medication 6 MG: at 08:03

## 2024-03-15 RX ADMIN — GABAPENTIN 300 MG: 300 CAPSULE ORAL at 09:03

## 2024-03-15 RX ADMIN — NIMODIPINE 30 MG: 30 CAPSULE, LIQUID FILLED ORAL at 01:03

## 2024-03-15 RX ADMIN — NIMODIPINE 30 MG: 30 CAPSULE, LIQUID FILLED ORAL at 05:03

## 2024-03-15 RX ADMIN — DIAZEPAM 2 MG: 2 TABLET ORAL at 09:03

## 2024-03-15 RX ADMIN — ACETAMINOPHEN 650 MG: 325 TABLET ORAL at 08:03

## 2024-03-15 RX ADMIN — NIMODIPINE 30 MG: 30 CAPSULE, LIQUID FILLED ORAL at 12:03

## 2024-03-15 RX ADMIN — GABAPENTIN 300 MG: 300 CAPSULE ORAL at 01:03

## 2024-03-15 RX ADMIN — HEPARIN SODIUM 5000 UNITS: 5000 INJECTION INTRAVENOUS; SUBCUTANEOUS at 05:03

## 2024-03-15 RX ADMIN — METHOCARBAMOL 500 MG: 500 TABLET ORAL at 07:03

## 2024-03-15 RX ADMIN — GABAPENTIN 300 MG: 300 CAPSULE ORAL at 05:03

## 2024-03-15 RX ADMIN — HEPARIN SODIUM 5000 UNITS: 5000 INJECTION INTRAVENOUS; SUBCUTANEOUS at 01:03

## 2024-03-15 RX ADMIN — NIMODIPINE 30 MG: 30 CAPSULE, LIQUID FILLED ORAL at 09:03

## 2024-03-15 RX ADMIN — NIMODIPINE 30 MG: 30 CAPSULE, LIQUID FILLED ORAL at 06:03

## 2024-03-15 RX ADMIN — METHOCARBAMOL 500 MG: 500 TABLET ORAL at 01:03

## 2024-03-15 RX ADMIN — NIMODIPINE 30 MG: 30 CAPSULE, LIQUID FILLED ORAL at 10:03

## 2024-03-15 RX ADMIN — ACETAMINOPHEN 650 MG: 325 TABLET ORAL at 07:03

## 2024-03-15 RX ADMIN — POLYETHYLENE GLYCOL 3350 17 G: 17 POWDER, FOR SOLUTION ORAL at 08:03

## 2024-03-15 RX ADMIN — NIMODIPINE 30 MG: 30 CAPSULE, LIQUID FILLED ORAL at 03:03

## 2024-03-15 NOTE — ASSESSMENT & PLAN NOTE
Hh2 mf4 R vert anx rupture, S/p stent coil of R v4 anx 3/5, unruptured acomm anx not secured  Obs hydro s/p evd, clamped today  Hyponatremia, normal urine/serum osm   -Cont spasm watch, nimotop, eunatremia (cont fw restriction today), pt/ot/oob  -Evd out 3/13  Monitor in icu  3/14 Q2h neuros, added valium 2 mg q6h prn for spasms   3/15/2024 TCDs pending, 3/14 TCD with mild MCA/CHESTER vasospasm, NS 1 L bolus x 1

## 2024-03-15 NOTE — PROGRESS NOTES
[facility] - Neuro Critical Care  Neurosurgery  Progress Note    Subjective:     History of Present Illness: Kalia Pacheco is a 32yo man with history of known CHESTER aneurysm, not on any AP/AC who presented to [facility] this AM with headache and nausea that began 2 days ago and acutely worsened while he was at work this morning. He also reports blurry vision and has had multiple episodes of emesis. Denies any inciting factors. CTH reveals diffuse subarachnoid hemorrhage throughout the basal cisterns, HH score 1. He was transferred to [facility] for Neuro Critical Care and Neurosurgical management.     Post-Op Info:  * No surgery found *       Interval History: TCDS uptrending yesterday, remains neuro intact and doing well    Medications:  Continuous Infusions:  Scheduled Meds:   aspirin  81 mg Oral Daily    gabapentin  300 mg Oral Q8H    heparin (porcine)  5,000 Units Subcutaneous Q8H    melatonin  6 mg Oral Nightly    niMODipine  30 mg Oral Q2H    polyethylene glycol  17 g Oral Daily    senna-docusate 8.6-50 mg  1 tablet Oral BID    ticagrelor  90 mg Oral BID     PRN Meds:acetaminophen, bisacodyL, diazePAM, hydrOXYzine HCL, labetalol, magnesium oxide, magnesium oxide, methocarbamoL, metoclopramide, ondansetron, oxyCODONE, potassium bicarbonate, potassium bicarbonate, potassium bicarbonate, potassium, sodium phosphates, potassium, sodium phosphates, potassium, sodium phosphates     Review of Systems  Objective:     Weight: 106.6 kg (235 lb)  Body mass index is 35.73 kg/m².  Vital Signs (Most Recent):  Temp: 98 °F (36.7 °C) (03/15/24 0803)  Pulse: 69 (03/15/24 0905)  Resp: 19 (03/15/24 0905)  BP: (!) 99/44 (03/15/24 0905)  SpO2: 96 % (03/15/24 0905) Vital Signs (24h Range):  Temp:  [98 °F (36.7 °C)-98.9 °F (37.2 °C)] 98 °F (36.7 °C)  Pulse:  [68-95] 69  Resp:  [15-45] 19  SpO2:  [91 %-100 %] 96 %  BP: ()/(44-95) 99/44                                 Physical Exam         Neurosurgery Physical Exam    General: well  "developed, well nourished, no distress.   HEENT: normocephalic, atraumatic  CV: regular rate   Pulmonary: normal respirations, no signs of respiratory distress  Abdomen: soft, non-distended, not tender to palpation  Skin: Skin is warm, dry and intact  Heme: no bruising     Neuro:   Mental Status: AO x4, no aphasia, no dysarthria   CN: PERRL, EOMI, VF intact to confrontation, sensation intact bilaterally, eyebrow raise and grimace symmetric, tongue midline  Motor: moves all extremities spontaneously, full strength throughout, no pronator drift   Sensory: intact to light touch throughout  Cerebellar: finger to nose intact bilaterally  Reflexes: -Patterson's, -Babinski, no clonus. Patellar: 2+ bilaterally         Incision: EVD site Clean, dry, intact. No drainage or TTP.      Significant Labs:  Recent Labs   Lab 03/14/24  0305 03/15/24  0108   GLU 78 88   * 135*   K 3.6 4.4    105   CO2 18* 22*   BUN 6 7   CREATININE 0.7 0.8   CALCIUM 7.1* 8.6*   MG 1.5* 1.9     Recent Labs   Lab 03/14/24  0305 03/15/24  0108   WBC 7.75 7.55   HGB 11.4* 12.6*   HCT 33.2* 37.5*    218     No results for input(s): "LABPT", "INR", "APTT" in the last 48 hours.  Microbiology Results (last 7 days)       ** No results found for the last 168 hours. **          All pertinent labs from the last 24 hours have been reviewed.    Significant Diagnostics:  CT: No results found in the last 24 hours.  MRI: No results found in the last 24 hours.  Assessment/Plan:     * Subarachnoid hemorrhage due to ruptured aneurysm  Kalia Pacheco is a 34yo man with history of known CHESTER aneurysm who presented to [facility] with headache and nausea. CTH demonstrated diffuse SAH throughout the basal cisterns HH and he was transferred to [facility] for close monitoring in the Neuro ICU and Neurosurgical consultation.     R frontal EVD was placed on arrival on 3/6 and patient was taken to IR for angiogram, with no intervention upon Acomm aneurysm. Upon " further review of DSA, a vertebral artery aneurysm was noted and patient was brought back to IR for a right V4/PICA irregular aneurysm stent assisted coiling.     Imaging reviewed:  CTH 3/5: Diffuse basal cistern SAH with IVH   CTA 3/5: 3mm ant projecting L Acomm, no R A1, R pcomm feeding posterior circulation   DSA 3/5: EVD in position, anteriosuperiorly projecting small L Acomm, R V4 irregular aneurysm not well visualized    DSA +embo 3/5: stent assisted coiling of R V4/PICA irregularly shaped anuerysm, 1/4 lobules still filling     - Admit to NCC with q1 neuro checks  - EVD removed 3/13, CTH post pull stable  - Keppra 500 BID x7d   - TCDs daily x14d   - Nimotop 60q4 x 21d   - strict I/Os, goal euvolemia to net positive for SAH patients in general, in this patient may need to be more conservative due to cardiac hx, management per NCC. F/u TCDs  - continue ASA/brilnta per neuro IR  - based on bleed pattern believe vertebral aneurysm to be the ruptured aneurysm, no plan for intervention upon Acomm at this point  -MRI with contrast not suggestive of vessel wall enhancement to Acomm  - Continue to follow clinically and notify NSGY immediately if any neurostatus change     Case and plan discussed with attending Neurosurgeon Dr. Saroj Lange MD  Neurosurgery  [facility] - Neuro Critical Care

## 2024-03-15 NOTE — SUBJECTIVE & OBJECTIVE
Interval History: TCDS uptrending yesterday, remains neuro intact and doing well    Medications:  Continuous Infusions:  Scheduled Meds:   aspirin  81 mg Oral Daily    gabapentin  300 mg Oral Q8H    heparin (porcine)  5,000 Units Subcutaneous Q8H    melatonin  6 mg Oral Nightly    niMODipine  30 mg Oral Q2H    polyethylene glycol  17 g Oral Daily    senna-docusate 8.6-50 mg  1 tablet Oral BID    ticagrelor  90 mg Oral BID     PRN Meds:acetaminophen, bisacodyL, diazePAM, hydrOXYzine HCL, labetalol, magnesium oxide, magnesium oxide, methocarbamoL, metoclopramide, ondansetron, oxyCODONE, potassium bicarbonate, potassium bicarbonate, potassium bicarbonate, potassium, sodium phosphates, potassium, sodium phosphates, potassium, sodium phosphates     Review of Systems  Objective:     Weight: 106.6 kg (235 lb)  Body mass index is 35.73 kg/m².  Vital Signs (Most Recent):  Temp: 98 °F (36.7 °C) (03/15/24 0803)  Pulse: 69 (03/15/24 0905)  Resp: 19 (03/15/24 0905)  BP: (!) 99/44 (03/15/24 0905)  SpO2: 96 % (03/15/24 0905) Vital Signs (24h Range):  Temp:  [98 °F (36.7 °C)-98.9 °F (37.2 °C)] 98 °F (36.7 °C)  Pulse:  [68-95] 69  Resp:  [15-45] 19  SpO2:  [91 %-100 %] 96 %  BP: ()/(44-95) 99/44                                 Physical Exam         Neurosurgery Physical Exam    General: well developed, well nourished, no distress.   HEENT: normocephalic, atraumatic  CV: regular rate   Pulmonary: normal respirations, no signs of respiratory distress  Abdomen: soft, non-distended, not tender to palpation  Skin: Skin is warm, dry and intact  Heme: no bruising     Neuro:   Mental Status: AO x4, no aphasia, no dysarthria   CN: PERRL, EOMI, VF intact to confrontation, sensation intact bilaterally, eyebrow raise and grimace symmetric, tongue midline  Motor: moves all extremities spontaneously, full strength throughout, no pronator drift   Sensory: intact to light touch throughout  Cerebellar: finger to nose intact  "bilaterally  Reflexes: -Patterson's, -Babinski, no clonus. Patellar: 2+ bilaterally         Incision: EVD site Clean, dry, intact. No drainage or TTP.      Significant Labs:  Recent Labs   Lab 03/14/24  0305 03/15/24  0108   GLU 78 88   * 135*   K 3.6 4.4    105   CO2 18* 22*   BUN 6 7   CREATININE 0.7 0.8   CALCIUM 7.1* 8.6*   MG 1.5* 1.9     Recent Labs   Lab 03/14/24  0305 03/15/24  0108   WBC 7.75 7.55   HGB 11.4* 12.6*   HCT 33.2* 37.5*    218     No results for input(s): "LABPT", "INR", "APTT" in the last 48 hours.  Microbiology Results (last 7 days)       ** No results found for the last 168 hours. **          All pertinent labs from the last 24 hours have been reviewed.    Significant Diagnostics:  CT: No results found in the last 24 hours.  MRI: No results found in the last 24 hours.  "

## 2024-03-15 NOTE — PT/OT/SLP PROGRESS
"Physical Therapy Treatment    Patient Name:  Kalia Pacheco   MRN:  [id number]    Recommendations:     Discharge Recommendations: Low intensity therapy  Discharge Equipment Recommendations: none  Barriers to discharge: None    Assessment:     Kalia Pacheco is a 33 y.o. male admitted with a medical diagnosis of Subarachnoid hemorrhage due to ruptured aneurysm.  He presents with the following impairments/functional limitations: weakness, impaired endurance, impaired self care skills, impaired functional mobility, impaired balance, impaired joint extensibility, gait instability, decreased ROM.    Pt tolerates session well with focus on gait training. Pt meeting his mobility goals and demonstrating near independence with mobility. No physical assistance needed and only supervision for gait and stair training d/t pt c/o pain in low back with prolonged out of bed mobility requiring multiple short rest breaks. Pt will be assigned for PT-reassessment on next visit.     Rehab Prognosis: Good; patient would benefit from acute skilled PT services to address these deficits and reach maximum level of function.    Recent Surgery: * No surgery found *      Plan:     During this hospitalization, patient to be seen 3 x/week to address the identified rehab impairments via gait training, therapeutic activities, therapeutic exercises, neuromuscular re-education and progress toward the following goals:    Plan of Care Expires:  04/10/24    Subjective     Chief Complaint: no c/o   Patient/Family Comments/goals: "Oh I get to see the world outside of the room."  Pain/Comfort:  Pain Rating 1: other (see comments) (unrated)  Location - Orientation 1: lower  Location 1: back  Pain Addressed 1: Reposition, Distraction  Pain Rating Post-Intervention 1: other (see comments) (unrated)      Objective:     Communicated with RN prior to session.  Patient found HOB elevated with telemetry, blood pressure cuff, external ventricular drain, " pulse ox (continuous) upon PTA entry to room.     General Precautions: Standard, fall  Orthopedic Precautions: N/A  Braces: N/A  Respiratory Status: Room air     Functional Mobility:  Bed Mobility:     Rolling Left:  modified independence  Supine to Sit: modified independence  Sit to Supine: modified independence  Transfers:     Sit to Stand:  independence with no AD  Bed to Chair: independence with  no AD  using  Step Transfer  Gait: Pt ambulates over 500 ft with no AD and supervision with baseline gait impairments d/t prior hip surgeries; Intermittent scissoring and hip hike.    Stairs:  Pt ascended/descended 6 stair(s) with No Assistive Device with no handrails with Supervision or Set-up Assistance.       AM-PAC 6 CLICK MOBILITY  Turning over in bed (including adjusting bedclothes, sheets and blankets)?: 4  Sitting down on and standing up from a chair with arms (e.g., wheelchair, bedside commode, etc.): 4  Moving from lying on back to sitting on the side of the bed?: 4  Moving to and from a bed to a chair (including a wheelchair)?: 4  Need to walk in hospital room?: 3  Climbing 3-5 steps with a railing?: 3  Basic Mobility Total Score: 22     Patient left up in chair with all lines intact, call button in reach, and RN present.    GOALS:   Multidisciplinary Problems       Physical Therapy Goals          Problem: Physical Therapy    Goal Priority Disciplines Outcome Goal Variances Interventions   Physical Therapy Goal     PT, PT/OT Ongoing, Progressing     Description: Goals to be met by: 4/10/2024     Patient will increase functional independence with mobility by performin. Supine to sit with Larue  2. Sit to supine with Larue  3. Sit to stand transfer with Larue  4. Gait  x 300 feet with Supervision using LRAD.   5. Ascend/descend 4 stair with bilateral Handrails Stand-by Assistance using LRAD.   6. Lower extremity exercise program x15 reps per handout, with independence                          Time Tracking:     PT Received On: 03/15/24  PT Start Time: 0725     PT Stop Time: 0744  PT Total Time (min): 19 min     Billable Minutes: Gait Training 19    Treatment Type: Treatment  PT/PTA: PTA     Number of PTA visits since last PT visit: 2     03/15/2024

## 2024-03-15 NOTE — PROGRESS NOTES
[facility] - Neuro Critical Care  Neurocritical Care  Progress Note    Admit Date: 3/5/2024  Service Date: 03/15/2024  Length of Stay: 10    Subjective:     Chief Complaint: Subarachnoid hemorrhage due to ruptured aneurysm    History of Present Illness: The patient is a 32-year-old man with a past medical history of ADHD, EtOH use and abuse, arthritis, hypertension, coarctation of the aorta (status post repair), PDA, VSD status post repair x2.  History of Legg-Perthes disease, and intracranial hemorrhage in May 2022.  presented to [facility] this AM with headache and nausea that began 2 days ago and acutely worsened while he was at work this morning. CTH demonstrated HH1mF4 SAH. S/p EVD placement by Nsx. Was taken to IR for angiography, but the vessel was not sercured during the procedure, the patient was returned to the NSICU, intubated and sedated, [facility] neurosurgery pending.     Hospital Course: 03/06/2024 Overnight, mild bradycardia and soft BPs. Weaned off fentanyl, weaning precedex. Extubated to NC this morning.   03/07/2024 AF, soft BPs. TCD with no signs of vasospasm. Neuro exam stable. Persistent HA, waxing/waning in severity. EVD with 289mL output. MRA pending.   03/08/2024 AF, HDS on RA. TCD no signs of vasospasm. Neuro exam stable. HA. EVD open @10 w/ 250mL output. MRA today.  03/09/2024 AF. BP soft. RA. TTE with reduced EF (40-45%) and mild aortic regurg. TCD w/ no signs of vasospasm. EVD open @10 w/ 224mL output. Started brillinta.   3/10: KUB, robaxin, encourage PO  3/11/2024: no free water intake, send Urine studies, TCD no vasospasm, Follow Na, discussed with IR- aneurysm secured and now SBP <220  03/12/2024: mild hyponatremia, mild hypotension fluid responsive, evd clamped  3/13/24: Pull EVD per NSGY  3/14/2024 Q2h neuros, added valium 2 mg q6h prn for spasms   3/15/2024 TCDs pending, 3/14 TCD with mild MCA/CHESTER vasospasm, NS 1 L bolus x 1      Review of Systems   Constitutional: Negative.    HENT:  Negative.     Eyes: Negative.    Respiratory: Negative.     Cardiovascular: Negative.    Gastrointestinal: Negative.    Endocrine: Negative.    Genitourinary: Negative.    Musculoskeletal: Negative.    Skin: Negative.      2 systems   Objective:     Vitals:  Temp: 98.2 °F (36.8 °C)  Pulse: (!) 58  Rhythm: normal sinus rhythm  BP: 134/60  MAP (mmHg): 86  Resp: 15  SpO2: 97 %    Temp  Min: 98 °F (36.7 °C)  Max: 98.3 °F (36.8 °C)  Pulse  Min: 58  Max: 95  BP  Min: 99/44  Max: 160/70  MAP (mmHg)  Min: 63  Max: 109  Resp  Min: 15  Max: 45  SpO2  Min: 91 %  Max: 100 %    03/14 0701 - 03/15 0700  In: 250 [P.O.:250]  Out: 3150 [Urine:3150]   Unmeasured Output  Urine Occurrence: 1  Stool Occurrence: 1  Emesis Occurrence: 1  Pad Count: 2        Physical Exam  Vitals and nursing note reviewed.   Constitutional:       Appearance: Normal appearance.   HENT:      Head: Normocephalic.      Nose: Nose normal.      Mouth/Throat:      Mouth: Mucous membranes are moist.      Pharynx: Oropharynx is clear.   Eyes:      Pupils: Pupils are equal, round, and reactive to light.   Cardiovascular:      Rate and Rhythm: Normal rate and regular rhythm.      Pulses: Normal pulses.      Heart sounds: Normal heart sounds.   Pulmonary:      Effort: Pulmonary effort is normal.      Breath sounds: Normal breath sounds.   Abdominal:      General: Bowel sounds are normal.      Palpations: Abdomen is soft.   Musculoskeletal:         General: Normal range of motion.   Skin:     General: Skin is warm and dry.      Capillary Refill: Capillary refill takes 2 to 3 seconds.   Neurological:      Mental Status: He is alert.      Comments:   AAO X4  GCS 15  ENCARNACION to command and spontaneously  Sensation Intact               Medications:  Continuous Scheduledaspirin, 81 mg, Daily  gabapentin, 300 mg, Q8H  heparin (porcine), 5,000 Units, Q8H  melatonin, 6 mg, Nightly  niMODipine, 30 mg, Q2H  polyethylene glycol, 17 g, Daily  senna-docusate 8.6-50 mg, 1 tablet,  BID  sodium chloride 0.9% infusion, 1,000 mL, Once  ticagrelor, 90 mg, BID    PRNacetaminophen, 650 mg, Q6H PRN  bisacodyL, 10 mg, Daily PRN  diazePAM, 2 mg, Q6H PRN  hydrOXYzine HCL, 25 mg, Nightly PRN  labetalol, 10 mg, Q15 Min PRN  magnesium oxide, 800 mg, PRN  magnesium oxide, 800 mg, PRN  methocarbamoL, 500 mg, QID PRN  metoclopramide, 10 mg, Q6H PRN  ondansetron, 4 mg, Q6H PRN  oxyCODONE, 5 mg, Q6H PRN  potassium bicarbonate, 35 mEq, PRN  potassium bicarbonate, 50 mEq, PRN  potassium bicarbonate, 60 mEq, PRN  potassium, sodium phosphates, 2 packet, PRN  potassium, sodium phosphates, 2 packet, PRN  potassium, sodium phosphates, 2 packet, PRN      Today I personally reviewed pertinent medications, lines/drains/airways, imaging, cardiology results, laboratory results, microbiology results, notably:    Diet  Diet Adult Regular (IDDSI Level 7) Thin; Standard Tray  Diet Adult Regular (IDDSI Level 7) Thin; Standard Tray        Assessment/Plan:     Neuro  * Subarachnoid hemorrhage due to ruptured aneurysm  Hh2 mf4 R vert anx rupture, S/p stent coil of R v4 anx 3/5, unruptured acomm anx not secured  Obs hydro s/p evd, clamped today  Hyponatremia, normal urine/serum osm   -Cont spasm watch, nimotop, eunatremia (cont fw restriction today), pt/ot/oob  -Evd out 3/13  Monitor in icu  3/14 Q2h neuros, added valium 2 mg q6h prn for spasms   3/15/2024 TCDs pending, 3/14 TCD with mild MCA/CHESTER vasospasm, NS 1 L bolus x 1    IVH (intraventricular hemorrhage)  EVD removed per NSGY  Monitor scans    Communicating hydrocephalus  EVD out 3/13  Stable    Anterior communicating artery aneurysm  Unsecure  - continue ASA/brilnta per neuro IR  - based on bleed pattern believe vertebral aneurysm to be the ruptured aneurysm, no plan for intervention upon Acomm at this point  - MRI with contrast not suggestive of vessel wall enhancement to Acomm  3/15/2024 TCDs pending, 3/14 TCD with mild MCA/CHESTER vasospasm, NS 1 L bolus x  1    Cardiac/Vascular  Vasospasm  3/15/2024 TCDs pending, 3/14 TCD with mild MCA/CHESTER vasospasm, NS 1 L bolus x 1    Hypertension  -continue current regimen    Other  Spasm  Valium 2 mg q6h prn          Activity Orders            Diet Adult Regular (IDDSI Level 7) Thin; Standard Tray: Regular starting at 03/11 1118    Turn patient starting at 03/05 1200    Elevate HOB starting at 03/05 1119          Full Code    Wang Gomes NP  Neurocritical Care  [facility] - Neuro Critical Care

## 2024-03-15 NOTE — PLAN OF CARE
"[facility] Care Plan    POC reviewed with Kalia Pacheco and family at 1400. Pt verbalized understanding. Questions and concerns addressed. No acute events today. Pt progressing toward goals. Will continue to monitor. See below and flowsheets for full assessment and VS info.     -Pt walked unit x2  -1000 ml bolus NS   -Peripheral IV started x1        Is this a stroke patient? yes- Stroke booklet reviewed with patient and family, risk factors identified for patient and stroke booklet remains at bedside for ongoing education.     Care individualization:     Neuro:  [place] Coma Scale  Best Eye Response: 4-->(E4) spontaneous  Best Motor Response: 6-->(M6) obeys commands  Best Verbal Response: 5-->(V5) oriented  [place] Coma Scale Score: 15  Assessment Qualifiers: patient not sedated/intubated  Pupil PERRLA: yes     24 hr Temp:  [98 °F (36.7 °C)-98.3 °F (36.8 °C)]     CV:   Rhythm: normal sinus rhythm  BP goals:   SBP < 220  MAP > 65    Resp:      Vent Mode: Spont  Set Rate: 16 BPM  Oxygen Concentration (%): 40  Vt Set: 500 mL  PEEP/CPAP: 5 cmH20  Pressure Support: 8 cmH20    Plan: N/A    GI/:     Diet/Nutrition Received: regular  Last Bowel Movement: 03/15/24  Voiding Characteristics: voids spontaneously without difficulty    Intake/Output Summary (Last 24 hours) at 3/15/2024 1829  Last data filed at 3/15/2024 1417  Gross per 24 hour   Intake 240 ml   Output 1750 ml   Net -1510 ml     Unmeasured Output  Urine Occurrence: 1  Stool Occurrence: 1  Emesis Occurrence: 1  Pad Count: 3    Labs/Accuchecks:  Recent Labs   Lab 03/15/24  0108   WBC 7.55   RBC 3.99*   HGB 12.6*   HCT 37.5*         Recent Labs   Lab 03/15/24  0108   *   K 4.4   CO2 22*      BUN 7   CREATININE 0.8   ALKPHOS 37*   ALT 24   AST 33   BILITOT 0.5    No results for input(s): "PROTIME", "INR", "APTT", "HEPANTIXA" in the last 168 hours. No results for input(s): "CPK", "CPKMB", "TROPONINI", "MB" in the last 168 hours.    Electrolytes: " N/A - electrolytes WDL  Accuchecks: none    Gtts:      LDA/Wounds:      Nurses Note -- 4 Eyes      Is there altered skin present? yes   Please check the following boxes that apply:   [x] LDA Added if Not in Epic (Describe Wound)   [] New Altered Skin Integrity was Present on Admit and Documented in LDA   [] Wound Image Taken    Wound Care Consulted? No    Second RN/Staff Member:  ICU RN      Restraints:   Restraint Order  Length of Order: Order good for next 24 hours or when removed.  Date that the current order will : 24  Time that the current order will : 1529  Order Upon Application: Yes    [facility]

## 2024-03-15 NOTE — ASSESSMENT & PLAN NOTE
Unsecure  - continue ASA/brilnta per neuro IR  - based on bleed pattern believe vertebral aneurysm to be the ruptured aneurysm, no plan for intervention upon Acomm at this point  - MRI with contrast not suggestive of vessel wall enhancement to Acomm  3/15/2024 TCDs pending, 3/14 TCD with mild MCA/CHESTER vasospasm, NS 1 L bolus x 1

## 2024-03-15 NOTE — ASSESSMENT & PLAN NOTE
Kalia Pacheco is a 32yo man with history of known CHESTER aneurysm who presented to [facility] with headache and nausea. CTH demonstrated diffuse SAH throughout the basal cisterns HH and he was transferred to [facility] for close monitoring in the Neuro ICU and Neurosurgical consultation.     R frontal EVD was placed on arrival on 3/6 and patient was taken to IR for angiogram, with no intervention upon Acomm aneurysm. Upon further review of DSA, a vertebral artery aneurysm was noted and patient was brought back to IR for a right V4/PICA irregular aneurysm stent assisted coiling.     Imaging reviewed:  CTH 3/5: Diffuse basal cistern SAH with IVH   CTA 3/5: 3mm ant projecting L Acomm, no R A1, R pcomm feeding posterior circulation   DSA 3/5: EVD in position, anteriosuperiorly projecting small L Acomm, R V4 irregular aneurysm not well visualized    DSA +embo 3/5: stent assisted coiling of R V4/PICA irregularly shaped anuerysm, 1/4 lobules still filling     - Admit to NCC with q1 neuro checks  - EVD removed 3/13, CTH post pull stable  - Keppra 500 BID x7d   - TCDs daily x14d   - Nimotop 60q4 x 21d   - strict I/Os, goal euvolemia to net positive for SAH patients in general, in this patient may need to be more conservative due to cardiac hx, management per NCC. F/u TCDs  - continue ASA/brilnta per neuro IR  - based on bleed pattern believe vertebral aneurysm to be the ruptured aneurysm, no plan for intervention upon Acomm at this point  -MRI with contrast not suggestive of vessel wall enhancement to Acomm  - Continue to follow clinically and notify NSGY immediately if any neurostatus change     Case and plan discussed with attending Neurosurgeon Dr. Kyle

## 2024-03-15 NOTE — PLAN OF CARE
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 Pt likes dim lights.    Restraints:              Outcome: Ongoing, Progressing  Goal: Absence of Hospital-Acquired Illness or Injury  Outcome: Ongoing, Progressing  Goal: Optimal Comfort and Wellbeing  Outcome: Ongoing, Progressing  Goal: Readiness for Transition of Care  Outcome: Ongoing, Progressing     Problem: Adjustment to Illness (Stroke, Hemorrhagic)  Goal: Optimal Coping  Outcome: Ongoing, Progressing     Problem: Cerebral Tissue Perfusion (Stroke, Hemorrhagic)  Goal: Optimal Cerebral Tissue Perfusion  Outcome: Ongoing, Progressing     Problem: Communication Impairment (Stroke, Hemorrhagic)  Goal: Effective Communication Skills  Outcome: Ongoing, Progressing     Problem: Functional Ability Impaired (Stroke, Hemorrhagic)  Goal: Optimal Functional Ability  Outcome: Ongoing, Progressing     Problem: Pain (Stroke, Hemorrhagic)  Goal: Acceptable Pain Control  Outcome: Ongoing, Progressing     Problem: Sensorimotor Impairment (Stroke, Hemorrhagic)  Goal: Improved Sensorimotor Function  Outcome: Ongoing, Progressing     Problem: Fall Injury Risk  Goal: Absence of Fall and Fall-Related Injury  Outcome: Ongoing, Progressing     Problem: Restraint, Nonbehavioral (Nonviolent)  Goal: Absence  of Harm or Injury  Outcome: Ongoing, Progressing     Problem: Bariatric Environmental Safety  Goal: Safety Maintained with Care  Outcome: Met

## 2024-03-15 NOTE — SUBJECTIVE & OBJECTIVE
Review of Systems   Constitutional: Negative.    HENT: Negative.     Eyes: Negative.    Respiratory: Negative.     Cardiovascular: Negative.    Gastrointestinal: Negative.    Endocrine: Negative.    Genitourinary: Negative.    Musculoskeletal: Negative.    Skin: Negative.      2 systems   Objective:     Vitals:  Temp: 98.2 °F (36.8 °C)  Pulse: (!) 58  Rhythm: normal sinus rhythm  BP: 134/60  MAP (mmHg): 86  Resp: 15  SpO2: 97 %    Temp  Min: 98 °F (36.7 °C)  Max: 98.3 °F (36.8 °C)  Pulse  Min: 58  Max: 95  BP  Min: 99/44  Max: 160/70  MAP (mmHg)  Min: 63  Max: 109  Resp  Min: 15  Max: 45  SpO2  Min: 91 %  Max: 100 %    03/14 0701 - 03/15 0700  In: 250 [P.O.:250]  Out: 3150 [Urine:3150]   Unmeasured Output  Urine Occurrence: 1  Stool Occurrence: 1  Emesis Occurrence: 1  Pad Count: 2        Physical Exam  Vitals and nursing note reviewed.   Constitutional:       Appearance: Normal appearance.   HENT:      Head: Normocephalic.      Nose: Nose normal.      Mouth/Throat:      Mouth: Mucous membranes are moist.      Pharynx: Oropharynx is clear.   Eyes:      Pupils: Pupils are equal, round, and reactive to light.   Cardiovascular:      Rate and Rhythm: Normal rate and regular rhythm.      Pulses: Normal pulses.      Heart sounds: Normal heart sounds.   Pulmonary:      Effort: Pulmonary effort is normal.      Breath sounds: Normal breath sounds.   Abdominal:      General: Bowel sounds are normal.      Palpations: Abdomen is soft.   Musculoskeletal:         General: Normal range of motion.   Skin:     General: Skin is warm and dry.      Capillary Refill: Capillary refill takes 2 to 3 seconds.   Neurological:      Mental Status: He is alert.      Comments:   AAO X4  GCS 15  ENCARNACION to command and spontaneously  Sensation Intact               Medications:  Continuous Scheduledaspirin, 81 mg, Daily  gabapentin, 300 mg, Q8H  heparin (porcine), 5,000 Units, Q8H  melatonin, 6 mg, Nightly  niMODipine, 30 mg, Q2H  polyethylene  glycol, 17 g, Daily  senna-docusate 8.6-50 mg, 1 tablet, BID  sodium chloride 0.9% infusion, 1,000 mL, Once  ticagrelor, 90 mg, BID    PRNacetaminophen, 650 mg, Q6H PRN  bisacodyL, 10 mg, Daily PRN  diazePAM, 2 mg, Q6H PRN  hydrOXYzine HCL, 25 mg, Nightly PRN  labetalol, 10 mg, Q15 Min PRN  magnesium oxide, 800 mg, PRN  magnesium oxide, 800 mg, PRN  methocarbamoL, 500 mg, QID PRN  metoclopramide, 10 mg, Q6H PRN  ondansetron, 4 mg, Q6H PRN  oxyCODONE, 5 mg, Q6H PRN  potassium bicarbonate, 35 mEq, PRN  potassium bicarbonate, 50 mEq, PRN  potassium bicarbonate, 60 mEq, PRN  potassium, sodium phosphates, 2 packet, PRN  potassium, sodium phosphates, 2 packet, PRN  potassium, sodium phosphates, 2 packet, PRN      Today I personally reviewed pertinent medications, lines/drains/airways, imaging, cardiology results, laboratory results, microbiology results, notably:    Diet  Diet Adult Regular (IDDSI Level 7) Thin; Standard Tray  Diet Adult Regular (IDDSI Level 7) Thin; Standard Tray

## 2024-03-15 NOTE — PLAN OF CARE
[facility] - Neuro Critical Care  Discharge Reassessment    Primary Care Provider: Christiano Baldwin MD    Expected Discharge Date: 3/21/2024    Per MD:  - TCDs daily x14d   - Nimotop 60q4 x 21d     Patient PBD 11 per MD    Patient not medically ready for discharge.           Reassessment (most recent)       Discharge Reassessment - 03/15/24 1601          Discharge Reassessment    Assessment Type Discharge Planning Reassessment     Did the patient's condition or plan change since previous assessment? No     Communicated FRANCISCO with patient/caregiver Date not available/Unable to determine     Discharge Plan A Home with family     Discharge Plan B Home with family;Home Health     DME Needed Upon Discharge  none     Transition of Care Barriers Underinsured     Why the patient remains in the hospital Requires continued medical care                   Discharge Plan A and Plan B have been determined by review of patient's clinical status, future medical and therapeutic needs, and coverage/benefits for post-acute care in coordination with multidisciplinary team members.      Amber Vazquez RN, CCRN-K, [facility]  Neuro-Critical Care   [phone]

## 2024-03-15 NOTE — PLAN OF CARE
"[facility] Care Plan    POC reviewed with Kalia Pacheco and family at 0300. Pt verbalized understanding. Questions and concerns addressed. No acute events overnight. Pt progressing toward goals. Will continue to monitor. See below and flowsheets for full assessment and VS info.       Labs WDL   Robaxin, Valium, Oxy given x1    Is this a stroke patient? yes- Stroke booklet reviewed with patient, risk factors identified for patient and stroke booklet remains at bedside for ongoing education.     Care individualization: comfort    Neuro:  [place] Coma Scale  Best Eye Response: 4-->(E4) spontaneous  Best Motor Response: 6-->(M6) obeys commands  Best Verbal Response: 5-->(V5) oriented  [place] Coma Scale Score: 15  Assessment Qualifiers: patient not sedated/intubated  Pupil PERRLA: yes     24hr Temp:  [98.1 °F (36.7 °C)-98.9 °F (37.2 °C)]     CV:   Rhythm: normal sinus rhythm  BP goals:   SBP < 220  MAP > 65    Resp:      Vent Mode: Spont  Set Rate: 16 BPM  Oxygen Concentration (%): 40  Vt Set: 500 mL  PEEP/CPAP: 5 cmH20  Pressure Support: 8 cmH20    Plan: N/A    GI/:     Diet/Nutrition Received: regular  Last Bowel Movement: 03/13/24  Voiding Characteristics: voids spontaneously without difficulty    Intake/Output Summary (Last 24 hours) at 3/15/2024 0525  Last data filed at 3/14/2024 2325  Gross per 24 hour   Intake 250 ml   Output 2450 ml   Net -2200 ml     Unmeasured Output  Urine Occurrence: 1  Stool Occurrence: 1  Emesis Occurrence: 1  Pad Count: 2    Labs/Accuchecks:  Recent Labs   Lab 03/15/24  0108   WBC 7.55   RBC 3.99*   HGB 12.6*   HCT 37.5*         Recent Labs   Lab 03/15/24  0108   *   K 4.4   CO2 22*      BUN 7   CREATININE 0.8   ALKPHOS 37*   ALT 24   AST 33   BILITOT 0.5    No results for input(s): "PROTIME", "INR", "APTT", "HEPANTIXA" in the last 168 hours. No results for input(s): "CPK", "CPKMB", "TROPONINI", "MB" in the last 168 hours.    Electrolytes: N/A - electrolytes " WDL  Accuchecks: none    Gtts:      LDA/Wounds:    Nurses Note -- 4 Eyes    Is there altered skin present? no     Prevention Measures Documented    Second RN/Staff Member:  Wang RN    Restraints:   Restraint Order  Length of Order: Order good for next 24 hours or when removed.  Date that the current order will : 24  Time that the current order will : 1529  Order Upon Application: Yes    [facility]

## 2024-03-15 NOTE — CHAPLAIN
"   03/15/24 1015   Clinical Encounter Type   Visit Type Follow-up   Visit Category General Rounding   Visited With Patient   Length of Visit 15 Minutes   Continue Visiting Yes   Patient Spiritual Encounters   Care Provided Compassionate presence;Prayer support   Patient Coping Accepting;Open/discussion   Comments - Patient "I feel that I am recovering fast and the doctors say the same since I am the baby here". Pt is relaxed and happy. Mentioned family is taking care of him well.       "

## 2024-03-15 NOTE — PT/OT/SLP PROGRESS
"Occupational Therapy   Treatment    Name: Kalia Pacheco  MRN: [id number]  Admitting Diagnosis:  Subarachnoid hemorrhage due to ruptured aneurysm       Recommendations:     Discharge Recommendations: No Therapy Indicated  Discharge Equipment Recommendations:  none  Barriers to discharge:  None    Assessment:     Kalia Pacheco is a 33 y.o. male with a medical diagnosis of Subarachnoid hemorrhage due to ruptured aneurysm.  He presents with performance deficits affecting function are impaired self care skills, impaired functional mobility.     Rehab Prognosis:  Good; patient would benefit from acute skilled OT services to address these deficits and reach maximum level of function.       Plan:     Patient to be seen 4 x/week to address the above listed problems via self-care/home management, therapeutic activities, therapeutic exercises, neuromuscular re-education  Plan of Care Expires: 04/03/24  Plan of Care Reviewed with: patient    Subjective     Patient:  "I am a lot better today than yesterday; no back spasms in the last 6-7 hours."  Pain/Comfort:  Pain Rating 1: 3/10  Location 1: back  Pain Addressed 1: Reposition (heating pad)  Pain Rating Post-Intervention 1: 3/10    Objective:     Communicated with: Nurse prior to session.  Patient found supine with telemetry, pulse ox (continuous), blood pressure cuff upon OT entry to room.    General Precautions: Standard, fall    Orthopedic Precautions:N/A  Braces: N/A  Respiratory Status: Room air     Occupational Performance:     Bed Mobility:    Patient completed Rolling/Turning to Left with  modified independence  Patient completed Rolling/Turning to Right with modified independence  Patient completed Supine to Sit with modified independence  Patient completed Sit to Supine with modified independence     Functional Mobility/Transfers:  Patient completed Sit <> Stand Transfer with supervision  with  no assistive device   Patient completed Toilet Transfer Stand " Pivot technique with supervision with  no AD    Activities of Daily Living:  Grooming: modified independence while standing  Lower Body Dressing: minimum assistance to donning left sock  Toileting: supervision with use of std commode    [facility] 6 Click ADL: 21    Treatment & Education:  Patient alert and oriented x 3; able to follow 4/4 one step commands.  Patient attentive and interactive throughout the session.  Stretches performed prior to LB dressing.      Patient left supine with all lines intact, call button in reach, and bed alarm on    GOALS:   Multidisciplinary Problems       Occupational Therapy Goals          Problem: Occupational Therapy    Goal Priority Disciplines Outcome Interventions   Occupational Therapy Goal     OT, PT/OT Ongoing, Progressing    Description: Goals set 3/7 with an expiration date, 4/4:  Patient will increase functional independence with ADLs by performing:    Patient will demonstrate rolling to the right with modified independence.  Not met   Patient will demonstrate rolling to the left with modified independence.   Not met  Patient will demonstrate supine -sit with modified independence.   Not met  Patient will demonstrate stand pivot transfers with modified independence.   Not met  Patient will demonstrate grooming while standing with modified independence.   Not met  Patient will demonstrate upper body dressing with modified independence while seated EOB.   Not met  Patient will demonstrate lower body dressing with modified independence while seated EOB.   Not met  Patient will demonstrate toileting with modified independence.   Not met  Patient will demonstrate bathing while seated EOB with modified independence   Not met  Patient's family / caregiver will demonstrate independence and safety with assisting patient with self-care skills and functional mobility.     Not met                           Time Tracking:     OT Date of Treatment: 03/15/24  OT Start Time: 0540  OT Stop  Time: 0603  OT Total Time (min): 23 min    Billable Minutes:Self Care/Home Management 23    OT/ZULEYMA: OT          3/15/2024

## 2024-03-16 LAB
ALBUMIN SERPL BCP-MCNC: 2.8 G/DL (ref 3.5–5.2)
ALP SERPL-CCNC: 38 U/L (ref 55–135)
ALT SERPL W/O P-5'-P-CCNC: 42 U/L (ref 10–44)
ANION GAP SERPL CALC-SCNC: 9 MMOL/L (ref 8–16)
AST SERPL-CCNC: 40 U/L (ref 10–40)
BASOPHILS # BLD AUTO: 0.04 K/UL (ref 0–0.2)
BASOPHILS NFR BLD: 0.6 % (ref 0–1.9)
BILIRUB SERPL-MCNC: 0.3 MG/DL (ref 0.1–1)
BUN SERPL-MCNC: 10 MG/DL (ref 6–20)
CALCIUM SERPL-MCNC: 8.8 MG/DL (ref 8.7–10.5)
CHLORIDE SERPL-SCNC: 106 MMOL/L (ref 95–110)
CO2 SERPL-SCNC: 22 MMOL/L (ref 23–29)
CREAT SERPL-MCNC: 0.8 MG/DL (ref 0.5–1.4)
DIFFERENTIAL METHOD BLD: ABNORMAL
EOSINOPHIL # BLD AUTO: 0.2 K/UL (ref 0–0.5)
EOSINOPHIL NFR BLD: 2.5 % (ref 0–8)
ERYTHROCYTE [DISTWIDTH] IN BLOOD BY AUTOMATED COUNT: 13 % (ref 11.5–14.5)
EST. GFR  (NO RACE VARIABLE): >60 ML/MIN/1.73 M^2
GLUCOSE SERPL-MCNC: 108 MG/DL (ref 70–110)
HCT VFR BLD AUTO: 37.2 % (ref 40–54)
HGB BLD-MCNC: 12.7 G/DL (ref 14–18)
IMM GRANULOCYTES # BLD AUTO: 0.03 K/UL (ref 0–0.04)
IMM GRANULOCYTES NFR BLD AUTO: 0.4 % (ref 0–0.5)
LYMPHOCYTES # BLD AUTO: 1.9 K/UL (ref 1–4.8)
LYMPHOCYTES NFR BLD: 25.8 % (ref 18–48)
MAGNESIUM SERPL-MCNC: 1.8 MG/DL (ref 1.6–2.6)
MCH RBC QN AUTO: 32.3 PG (ref 27–31)
MCHC RBC AUTO-ENTMCNC: 34.1 G/DL (ref 32–36)
MCV RBC AUTO: 95 FL (ref 82–98)
MONOCYTES # BLD AUTO: 0.5 K/UL (ref 0.3–1)
MONOCYTES NFR BLD: 6.9 % (ref 4–15)
NEUTROPHILS # BLD AUTO: 4.6 K/UL (ref 1.8–7.7)
NEUTROPHILS NFR BLD: 63.8 % (ref 38–73)
NRBC BLD-RTO: 0 /100 WBC
PHOSPHATE SERPL-MCNC: 4.7 MG/DL (ref 2.7–4.5)
PLATELET # BLD AUTO: 254 K/UL (ref 150–450)
PMV BLD AUTO: 9.8 FL (ref 9.2–12.9)
POTASSIUM SERPL-SCNC: 4.3 MMOL/L (ref 3.5–5.1)
PROT SERPL-MCNC: 5.5 G/DL (ref 6–8.4)
RBC # BLD AUTO: 3.93 M/UL (ref 4.6–6.2)
SODIUM SERPL-SCNC: 137 MMOL/L (ref 136–145)
WBC # BLD AUTO: 7.21 K/UL (ref 3.9–12.7)

## 2024-03-16 PROCEDURE — 63600175 PHARM REV CODE 636 W HCPCS

## 2024-03-16 PROCEDURE — 85025 COMPLETE CBC W/AUTO DIFF WBC: CPT

## 2024-03-16 PROCEDURE — 83735 ASSAY OF MAGNESIUM: CPT

## 2024-03-16 PROCEDURE — 99233 SBSQ HOSP IP/OBS HIGH 50: CPT | Mod: ,,, | Performed by: NEUROLOGICAL SURGERY

## 2024-03-16 PROCEDURE — 25000003 PHARM REV CODE 250: Performed by: STUDENT IN AN ORGANIZED HEALTH CARE EDUCATION/TRAINING PROGRAM

## 2024-03-16 PROCEDURE — 25000003 PHARM REV CODE 250

## 2024-03-16 PROCEDURE — 84100 ASSAY OF PHOSPHORUS: CPT

## 2024-03-16 PROCEDURE — 99291 CRITICAL CARE FIRST HOUR: CPT | Mod: ,,, | Performed by: PSYCHIATRY & NEUROLOGY

## 2024-03-16 PROCEDURE — 94761 N-INVAS EAR/PLS OXIMETRY MLT: CPT

## 2024-03-16 PROCEDURE — 80053 COMPREHEN METABOLIC PANEL: CPT

## 2024-03-16 PROCEDURE — 20000000 HC ICU ROOM

## 2024-03-16 PROCEDURE — 25000003 PHARM REV CODE 250: Performed by: PSYCHIATRY & NEUROLOGY

## 2024-03-16 PROCEDURE — 25000003 PHARM REV CODE 250: Performed by: NURSE PRACTITIONER

## 2024-03-16 RX ADMIN — NIMODIPINE 30 MG: 30 CAPSULE, LIQUID FILLED ORAL at 10:03

## 2024-03-16 RX ADMIN — TICAGRELOR 90 MG: 90 TABLET ORAL at 08:03

## 2024-03-16 RX ADMIN — NIMODIPINE 30 MG: 30 CAPSULE, LIQUID FILLED ORAL at 03:03

## 2024-03-16 RX ADMIN — NIMODIPINE 30 MG: 30 CAPSULE, LIQUID FILLED ORAL at 05:03

## 2024-03-16 RX ADMIN — NIMODIPINE 30 MG: 30 CAPSULE, LIQUID FILLED ORAL at 06:03

## 2024-03-16 RX ADMIN — ACETAMINOPHEN 650 MG: 325 TABLET ORAL at 03:03

## 2024-03-16 RX ADMIN — GABAPENTIN 300 MG: 300 CAPSULE ORAL at 05:03

## 2024-03-16 RX ADMIN — ASPIRIN 81 MG CHEWABLE TABLET 81 MG: 81 TABLET CHEWABLE at 08:03

## 2024-03-16 RX ADMIN — NIMODIPINE 30 MG: 30 CAPSULE, LIQUID FILLED ORAL at 02:03

## 2024-03-16 RX ADMIN — HEPARIN SODIUM 5000 UNITS: 5000 INJECTION INTRAVENOUS; SUBCUTANEOUS at 05:03

## 2024-03-16 RX ADMIN — ACETAMINOPHEN 650 MG: 325 TABLET ORAL at 12:03

## 2024-03-16 RX ADMIN — HEPARIN SODIUM 5000 UNITS: 5000 INJECTION INTRAVENOUS; SUBCUTANEOUS at 02:03

## 2024-03-16 RX ADMIN — NIMODIPINE 30 MG: 30 CAPSULE, LIQUID FILLED ORAL at 08:03

## 2024-03-16 RX ADMIN — GABAPENTIN 300 MG: 300 CAPSULE ORAL at 02:03

## 2024-03-16 RX ADMIN — NIMODIPINE 30 MG: 30 CAPSULE, LIQUID FILLED ORAL at 09:03

## 2024-03-16 RX ADMIN — HEPARIN SODIUM 5000 UNITS: 5000 INJECTION INTRAVENOUS; SUBCUTANEOUS at 09:03

## 2024-03-16 RX ADMIN — NIMODIPINE 30 MG: 30 CAPSULE, LIQUID FILLED ORAL at 04:03

## 2024-03-16 RX ADMIN — GABAPENTIN 300 MG: 300 CAPSULE ORAL at 09:03

## 2024-03-16 RX ADMIN — DOCUSATE SODIUM AND SENNOSIDES 1 TABLET: 8.6; 5 TABLET, FILM COATED ORAL at 08:03

## 2024-03-16 RX ADMIN — LIDOCAINE 5% 1 PATCH: 700 PATCH TOPICAL at 09:03

## 2024-03-16 RX ADMIN — Medication 6 MG: at 09:03

## 2024-03-16 RX ADMIN — TICAGRELOR 90 MG: 90 TABLET ORAL at 09:03

## 2024-03-16 RX ADMIN — NIMODIPINE 30 MG: 30 CAPSULE, LIQUID FILLED ORAL at 12:03

## 2024-03-16 RX ADMIN — POLYETHYLENE GLYCOL 3350 17 G: 17 POWDER, FOR SOLUTION ORAL at 08:03

## 2024-03-16 NOTE — SUBJECTIVE & OBJECTIVE
Interval History:   No acute events overnight.  He is intact on exam.  Denied any complain.  TCDs without concern for vasospasm      Medications:  Continuous Infusions:  Scheduled Meds:   aspirin  81 mg Oral Daily    gabapentin  300 mg Oral Q8H    heparin (porcine)  5,000 Units Subcutaneous Q8H    LIDOcaine  1 patch Transdermal QHS    melatonin  6 mg Oral Nightly    niMODipine  30 mg Oral Q2H    polyethylene glycol  17 g Oral Daily    senna-docusate 8.6-50 mg  1 tablet Oral BID    ticagrelor  90 mg Oral BID     PRN Meds:acetaminophen, bisacodyL, diazePAM, hydrOXYzine HCL, labetalol, magnesium oxide, magnesium oxide, methocarbamoL, metoclopramide, ondansetron, oxyCODONE, potassium bicarbonate, potassium bicarbonate, potassium bicarbonate, potassium, sodium phosphates, potassium, sodium phosphates, potassium, sodium phosphates     Review of Systems  Objective:     Weight: 106.6 kg (235 lb)  Body mass index is 35.73 kg/m².  Vital Signs (Most Recent):  Temp: 98.3 °F (36.8 °C) (03/16/24 0526)  Pulse: 84 (03/16/24 1001)  Resp: 18 (03/16/24 1001)  BP: (!) 151/74 (03/16/24 1007)  SpO2: 99 % (03/16/24 1001) Vital Signs (24h Range):  Temp:  [98 °F (36.7 °C)-98.3 °F (36.8 °C)] 98.3 °F (36.8 °C)  Pulse:  [] 84  Resp:  [14-33] 18  SpO2:  [85 %-100 %] 99 %  BP: ()/(45-74) 151/74     Date 03/16/24 0700 - 03/17/24 0659   Shift 5146-5708 4940-9504 4584-8506 24 Hour Total   INTAKE   P.O. 350   350   Shift Total(mL/kg) 350(3.3)   350(3.3)   OUTPUT   Urine(mL/kg/hr) 325   325   Shift Total(mL/kg) 325(3)   325(3)   Weight (kg) 106.6 106.6 106.6 106.6                               Physical Exam         Neurosurgery Physical Exam    General: well developed, well nourished, no distress.   HEENT: normocephalic, atraumatic  CV: regular rate   Pulmonary: normal respirations, no signs of respiratory distress  Abdomen: soft, non-distended, not tender to palpation  Skin: Skin is warm, dry and intact  Heme: no bruising  Neuro:  "  Mental Status: AO x4, no aphasia, no dysarthria   CN: PERRL, EOMI, VF intact to confrontation, sensation intact bilaterally, eyebrow raise and grimace symmetric, tongue midline  Motor: moves all extremities spontaneously, full strength throughout, no pronator drift   Sensory: intact to light touch throughout  Cerebellar: finger to nose intact bilaterally  Reflexes: -Patterson's, -Babinski, no clonus. Patellar: 2+ bilaterally      Incision: EVD site Clean, dry, intact. No drainage or TTP.      Significant Labs:  Recent Labs   Lab 03/15/24  0108 03/16/24  0023   GLU 88 108   * 137   K 4.4 4.3    106   CO2 22* 22*   BUN 7 10   CREATININE 0.8 0.8   CALCIUM 8.6* 8.8   MG 1.9 1.8       Recent Labs   Lab 03/15/24  0108 03/16/24  0023   WBC 7.55 7.21   HGB 12.6* 12.7*   HCT 37.5* 37.2*    254       No results for input(s): "LABPT", "INR", "APTT" in the last 48 hours.  Microbiology Results (last 7 days)       ** No results found for the last 168 hours. **          All pertinent labs from the last 24 hours have been reviewed.    Significant Diagnostics:  CT: No results found in the last 24 hours.  MRI: No results found in the last 24 hours.  "

## 2024-03-16 NOTE — SUBJECTIVE & OBJECTIVE
Objective:     Vitals:  Temp: 98.3 °F (36.8 °C)  Pulse: 84  Rhythm: normal sinus rhythm  BP: (!) 150/72  MAP (mmHg): 103  Resp: 18  SpO2: 99 %    Temp  Min: 98 °F (36.7 °C)  Max: 98.3 °F (36.8 °C)  Pulse  Min: 67  Max: 103  BP  Min: 96/45  Max: 173/67  MAP (mmHg)  Min: 65  Max: 103  Resp  Min: 16  Max: 33  SpO2  Min: 85 %  Max: 100 %    03/15 0701 - 03/16 0700  In: 1440 [P.O.:440]  Out: 1300 [Urine:1300]   Unmeasured Output  Urine Occurrence: 1  Stool Occurrence: 1  Emesis Occurrence: 1  Pad Count: 3        Physical Exam  Vital signs, lab studies, and imaging reviewed by me  Alert, oriented x3, intact concentration (spells 5 letter words backwards), cranial II-XII intact, sensation full, moves all extremities with full strength, no dysmetria  Warm and well perfused, regular rate and rhythm, no murmurs  No dependent edema  Breathing comfortably, breath sounds clear  Belly soft, nontender, no hepatosplenomegaly  Alvarez in place with yellow urine       Medications:  Continuous Scheduledaspirin, 81 mg, Daily  gabapentin, 300 mg, Q8H  heparin (porcine), 5,000 Units, Q8H  LIDOcaine, 1 patch, QHS  melatonin, 6 mg, Nightly  niMODipine, 30 mg, Q2H  polyethylene glycol, 17 g, Daily  senna-docusate 8.6-50 mg, 1 tablet, BID  ticagrelor, 90 mg, BID    PRNacetaminophen, 650 mg, Q6H PRN  bisacodyL, 10 mg, Daily PRN  diazePAM, 2 mg, Q6H PRN  hydrOXYzine HCL, 25 mg, Nightly PRN  labetalol, 10 mg, Q15 Min PRN  magnesium oxide, 800 mg, PRN  magnesium oxide, 800 mg, PRN  methocarbamoL, 500 mg, QID PRN  metoclopramide, 10 mg, Q6H PRN  ondansetron, 4 mg, Q6H PRN  oxyCODONE, 5 mg, Q6H PRN  potassium bicarbonate, 35 mEq, PRN  potassium bicarbonate, 50 mEq, PRN  potassium bicarbonate, 60 mEq, PRN  potassium, sodium phosphates, 2 packet, PRN  potassium, sodium phosphates, 2 packet, PRN  potassium, sodium phosphates, 2 packet, PRN

## 2024-03-16 NOTE — PROGRESS NOTES
[facility] - Neuro Critical Care  Neurosurgery  Progress Note    Subjective:     History of Present Illness: Kalia Pacheco is a 32yo man with history of known CHESTER aneurysm, not on any AP/AC who presented to [facility] this AM with headache and nausea that began 2 days ago and acutely worsened while he was at work this morning. He also reports blurry vision and has had multiple episodes of emesis. Denies any inciting factors. CTH reveals diffuse subarachnoid hemorrhage throughout the basal cisterns, HH score 1. He was transferred to [facility] [place] for Neuro Critical Care and Neurosurgical management.     Post-Op Info:  * No surgery found *       Interval History:   No acute events overnight.  He is intact on exam.  Denied any complain.  TCDs without concern for vasospasm      Medications:  Continuous Infusions:  Scheduled Meds:   aspirin  81 mg Oral Daily    gabapentin  300 mg Oral Q8H    heparin (porcine)  5,000 Units Subcutaneous Q8H    LIDOcaine  1 patch Transdermal QHS    melatonin  6 mg Oral Nightly    niMODipine  30 mg Oral Q2H    polyethylene glycol  17 g Oral Daily    senna-docusate 8.6-50 mg  1 tablet Oral BID    ticagrelor  90 mg Oral BID     PRN Meds:acetaminophen, bisacodyL, diazePAM, hydrOXYzine HCL, labetalol, magnesium oxide, magnesium oxide, methocarbamoL, metoclopramide, ondansetron, oxyCODONE, potassium bicarbonate, potassium bicarbonate, potassium bicarbonate, potassium, sodium phosphates, potassium, sodium phosphates, potassium, sodium phosphates     Review of Systems  Objective:     Weight: 106.6 kg (235 lb)  Body mass index is 35.73 kg/m².  Vital Signs (Most Recent):  Temp: 98.3 °F (36.8 °C) (03/16/24 0526)  Pulse: 84 (03/16/24 1001)  Resp: 18 (03/16/24 1001)  BP: (!) 151/74 (03/16/24 1007)  SpO2: 99 % (03/16/24 1001) Vital Signs (24h Range):  Temp:  [98 °F (36.7 °C)-98.3 °F (36.8 °C)] 98.3 °F (36.8 °C)  Pulse:  [] 84  Resp:  [14-33] 18  SpO2:  [85 %-100 %] 99 %  BP: ()/(45-74) 151/74  "    Date 03/16/24 0700 - 03/17/24 0659   Shift 7114-3315 0113-2761 1330-1056 24 Hour Total   INTAKE   P.O. 350   350   Shift Total(mL/kg) 350(3.3)   350(3.3)   OUTPUT   Urine(mL/kg/hr) 325   325   Shift Total(mL/kg) 325(3)   325(3)   Weight (kg) 106.6 106.6 106.6 106.6                              Physical Exam         Neurosurgery Physical Exam    General: well developed, well nourished, no distress.   HEENT: normocephalic, atraumatic  CV: regular rate   Pulmonary: normal respirations, no signs of respiratory distress  Abdomen: soft, non-distended, not tender to palpation  Skin: Skin is warm, dry and intact  Heme: no bruising  Neuro:   Mental Status: AO x4, no aphasia, no dysarthria   CN: PERRL, EOMI, VF intact to confrontation, sensation intact bilaterally, eyebrow raise and grimace symmetric, tongue midline  Motor: moves all extremities spontaneously, full strength throughout, no pronator drift   Sensory: intact to light touch throughout  Cerebellar: finger to nose intact bilaterally  Reflexes: -Patterson's, -Babinski, no clonus. Patellar: 2+ bilaterally      Incision: EVD site Clean, dry, intact. No drainage or TTP.      Significant Labs:  Recent Labs   Lab 03/15/24  0108 03/16/24  0023   GLU 88 108   * 137   K 4.4 4.3    106   CO2 22* 22*   BUN 7 10   CREATININE 0.8 0.8   CALCIUM 8.6* 8.8   MG 1.9 1.8       Recent Labs   Lab 03/15/24  0108 03/16/24  0023   WBC 7.55 7.21   HGB 12.6* 12.7*   HCT 37.5* 37.2*    254       No results for input(s): "LABPT", "INR", "APTT" in the last 48 hours.  Microbiology Results (last 7 days)       ** No results found for the last 168 hours. **          All pertinent labs from the last 24 hours have been reviewed.    Significant Diagnostics:  CT: No results found in the last 24 hours.  MRI: No results found in the last 24 hours.  Assessment/Plan:     * Subarachnoid hemorrhage due to ruptured aneurysm  Kalia Pacheco is a 32yo man with history of known CHESTER " aneurysm who presented to [place] with headache and nausea. CTH demonstrated diffuse SAH throughout the basal cisterns HH and he was transferred to [facility] [place] for close monitoring in the Neuro ICU and Neurosurgical consultation.     R frontal EVD was placed on arrival on 3/6 and patient was taken to IR for angiogram, with no intervention upon Acomm aneurysm. Upon further review of DSA, a vertebral artery aneurysm was noted and patient was brought back to IR for a right V4/PICA irregular aneurysm stent assisted coiling.     Imaging reviewed:  CTH 3/5: Diffuse basal cistern SAH with IVH   MRI with contrast not suggestive of vessel wall enhancement to Acomm  CTA 3/5: 3mm ant projecting L Acomm, no R A1, R pcomm feeding posterior circulation   DSA 3/5: EVD in position, anteriosuperiorly projecting small L Acomm, R V4 irregular aneurysm not well visualized    DSA +embo 3/5: stent assisted coiling of R V4/PICA irregularly shaped anuerysm, 1/4 lobules still filling       Continue ICU care   Continue neuro checks   Please call Neurosurgery with any change in exam   EVD pulled and CT scan post fall stable.  While DVT site and call neurosurgical visit week   AEDs per ICU team   TCDs for 14 days   Nimotop for 21 days  Continue dual antiplatelets  Satting well on room air   Keep blood pressure below 180   Strict in's and out and keep it even to positive 500 cc   Advance diet as tolerated   PTOT  SubQ heparin for DVT prophylaxis   Further care per ICU team   Neurosurgical continue to follow up  Continue to follow clinically and notify NSGY immediately if any neurostatus change         Tatiana Moeller MD  Neurosurgery  [facility] - Neuro Critical Care

## 2024-03-16 NOTE — PROGRESS NOTES
[facility] - Neuro Critical Care  Neurocritical Care  Progress Note    Admit Date: 3/5/2024  Service Date: 03/16/2024  Length of Stay: 11    Subjective:     Chief Complaint: Subarachnoid hemorrhage due to ruptured aneurysm    History of Present Illness: The patient is a 32-year-old man with a past medical history of ADHD, EtOH use and abuse, arthritis, hypertension, coarctation of the aorta (status post repair), PDA, VSD status post repair x2.  History of Legg-Perthes disease, and intracranial hemorrhage in May 2022.  presented to [facility] this AM with headache and nausea that began 2 days ago and acutely worsened while he was at work this morning. CTH demonstrated HH1mF4 SAH. S/p EVD placement by Nsx. Was taken to IR for angiography, but the vessel was not sercured during the procedure, the patient was returned to the NSICU, intubated and sedated, [facility] neurosurgery pending.     Hospital Course: 03/06/2024 Overnight, mild bradycardia and soft BPs. Weaned off fentanyl, weaning precedex. Extubated to NC this morning.   03/07/2024 AF, soft BPs. TCD with no signs of vasospasm. Neuro exam stable. Persistent HA, waxing/waning in severity. EVD with 289mL output. MRA pending.   03/08/2024 AF, HDS on RA. TCD no signs of vasospasm. Neuro exam stable. HA. EVD open @10 w/ 250mL output. MRA today.  03/09/2024 AF. BP soft. RA. TTE with reduced EF (40-45%) and mild aortic regurg. TCD w/ no signs of vasospasm. EVD open @10 w/ 224mL output. Started brillinta.   3/10: KUB, robaxin, encourage PO  3/11/2024: no free water intake, send Urine studies, TCD no vasospasm, Follow Na, discussed with IR- aneurysm secured and now SBP <220  03/12/2024: mild hyponatremia, mild hypotension fluid responsive, evd clamped  3/13/24: Pull EVD per NSGY  3/14/2024 Q2h neuros, added valium 2 mg q6h prn for spasms   3/15/2024 TCDs pending, 3/14 TCD with mild MCA/CHESTER vasospasm, NS 1 L bolus x 1  03/16/2024 naeon, mild pi elevation no exam  change        Objective:     Vitals:  Temp: 98.3 °F (36.8 °C)  Pulse: 84  Rhythm: normal sinus rhythm  BP: (!) 150/72  MAP (mmHg): 103  Resp: 18  SpO2: 99 %    Temp  Min: 98 °F (36.7 °C)  Max: 98.3 °F (36.8 °C)  Pulse  Min: 67  Max: 103  BP  Min: 96/45  Max: 173/67  MAP (mmHg)  Min: 65  Max: 103  Resp  Min: 16  Max: 33  SpO2  Min: 85 %  Max: 100 %    03/15 0701 - 03/16 0700  In: 1440 [P.O.:440]  Out: 1300 [Urine:1300]   Unmeasured Output  Urine Occurrence: 1  Stool Occurrence: 1  Emesis Occurrence: 1  Pad Count: 3        Physical Exam  Vital signs, lab studies, and imaging reviewed by me  Alert, oriented x3, intact concentration (spells 5 letter words backwards), cranial II-XII intact, sensation full, moves all extremities with full strength, no dysmetria  Warm and well perfused, regular rate and rhythm, no murmurs  No dependent edema  Breathing comfortably, breath sounds clear  Belly soft, nontender, no hepatosplenomegaly  Alvarez in place with yellow urine       Medications:  Continuous Scheduledaspirin, 81 mg, Daily  gabapentin, 300 mg, Q8H  heparin (porcine), 5,000 Units, Q8H  LIDOcaine, 1 patch, QHS  melatonin, 6 mg, Nightly  niMODipine, 30 mg, Q2H  polyethylene glycol, 17 g, Daily  senna-docusate 8.6-50 mg, 1 tablet, BID  ticagrelor, 90 mg, BID    PRNacetaminophen, 650 mg, Q6H PRN  bisacodyL, 10 mg, Daily PRN  diazePAM, 2 mg, Q6H PRN  hydrOXYzine HCL, 25 mg, Nightly PRN  labetalol, 10 mg, Q15 Min PRN  magnesium oxide, 800 mg, PRN  magnesium oxide, 800 mg, PRN  methocarbamoL, 500 mg, QID PRN  metoclopramide, 10 mg, Q6H PRN  ondansetron, 4 mg, Q6H PRN  oxyCODONE, 5 mg, Q6H PRN  potassium bicarbonate, 35 mEq, PRN  potassium bicarbonate, 50 mEq, PRN  potassium bicarbonate, 60 mEq, PRN  potassium, sodium phosphates, 2 packet, PRN  potassium, sodium phosphates, 2 packet, PRN  potassium, sodium phosphates, 2 packet, PRN        Assessment/Plan:     Neuro  * Subarachnoid hemorrhage due to ruptured aneurysm  Hh2 mf4 R  vert anx rupture, S/p stent coil of R v4 anx 3/5, unruptured acomm anx not secured  Obs hydro s/p evd, removed 3/13  Hyponatremia, normal urine/serum osm     -Cont spasm watch, nimotop  -eunatremia (cont fw restriction today), permissive htn, euvolemia, avoid fever  -q2 neuro checks  -pt/ot, oob, diet          The patient is being Prophylaxed for:  Venous Thromboembolism with: Mechanical or Chemical  Stress Ulcer with: Not Applicable   Ventilator Pneumonia with: not applicable    Activity Orders            Diet Adult Regular (IDDSI Level 7) Thin; Standard Tray: Regular starting at 03/11 1118    Turn patient starting at 03/05 1200    Elevate HOB starting at 03/05 1119          Full Code    Critical condition in that Patient has a condition that poses threat to life and bodily function: as above     31 minutes of Critical care time was spent personally by me on the following activities: development of treatment plan with patient or surrogate and bedside caregivers, discussions with consultants, evaluation of patient's response to treatment, examination of patient, ordering and performing treatments and interventions, ordering and review of laboratory studies, ordering and review of radiographic studies, pulse oximetry, antibiotic titration if applicable, vasopressor titration if applicable, re-evaluation of patient's condition. This critical care time did not overlap with that of any other provider or involve time for any procedures. There is high probability for acute neurological change leading to clinical and possibly life-threatening deterioration requiring highest level of physician preparedness for urgent intervention.      Lukas Reveles MD  Neurocritical Care  [facility] - Neuro Critical Care

## 2024-03-16 NOTE — ASSESSMENT & PLAN NOTE
Kalia Pacheco is a 34yo man with history of known CHESTER aneurysm who presented to [place] with headache and nausea. CTH demonstrated diffuse SAH throughout the basal cisterns HH and [place] [facility] [place] ICU and Neurosurgical consultation.     R frontal EVD was placed on arrival on 3/6 and patient was taken to IR for angiogram, with no intervention upon Acomm aneurysm. Upon further review of DSA, a vertebral artery aneurysm was noted and patient was brought back to IR for a right V4/PICA irregular aneurysm stent assisted coiling.     Imaging reviewed:  CTH 3/5: Diffuse basal cistern SAH with IVH   MRI with contrast not suggestive of vessel wall enhancement to Acomm  CTA 3/5: 3mm ant projecting L Acomm, no R A1, R pcomm feeding posterior circulation   DSA 3/5: EVD in position, anteriosuperiorly projecting small L Acomm, R V4 irregular aneurysm not well visualized    DSA +embo 3/5: stent assisted coiling of R V4/PICA irregularly shaped anuerysm, 1/4 lobules still filling       Continue ICU care   Continue neuro checks   Please call Neurosurgery with any change in exam   EVD pulled and CT scan post fall stable.  While DVT site and call neurosurgical visit week   AEDs per ICU team   TCDs for 14 days   Nimotop for 21 days  Continue dual antiplatelets  Satting well on room air   Keep blood pressure below 180   Strict in's and out and keep it even to positive 500 cc   Advance diet as tolerated   PTOT  SubQ heparin for DVT prophylaxis   Further care per ICU team   Neurosurgical continue to follow up  Continue to follow clinically and notify NSGY immediately if any neurostatus change

## 2024-03-16 NOTE — PLAN OF CARE
"[facility] Care Plan    POC reviewed with Kalia Pacheco and family at 1400. Pt verbalized understanding. Questions and concerns addressed. No acute events today. Pt progressing toward goals. Will continue to monitor. See below and flowsheets for full assessment and VS info.       Is this a stroke patient? yes- Stroke booklet reviewed with patient and family, risk factors identified for patient and stroke booklet remains at bedside for ongoing education.     Care individualization:  - Patient ambulated in moseley and room  - Free water restriction continued for Na goal    Neuro:  [place] Coma Scale  Best Eye Response: 4-->(E4) spontaneous  Best Motor Response: 6-->(M6) obeys commands  Best Verbal Response: 5-->(V5) oriented  [place] Coma Scale Score: 15  Assessment Qualifiers: no eye obstruction present, patient not sedated/intubated  Pupil PERRLA: yes     24 hr Temp:  [98 °F (36.7 °C)-98.4 °F (36.9 °C)]     CV:   Rhythm: normal sinus rhythm  BP goals:   SBP < 220  MAP > 65    Resp:      Vent Mode: Spont  Set Rate: 16 BPM  Oxygen Concentration (%): 40  Vt Set: 500 mL  PEEP/CPAP: 5 cmH20  Pressure Support: 8 cmH20    Plan: N/A    GI/:     Diet/Nutrition Received: regular  Last Bowel Movement: 03/13/24  Voiding Characteristics: voids spontaneously without difficulty    Intake/Output Summary (Last 24 hours) at 3/16/2024 1753  Last data filed at 3/16/2024 1001  Gross per 24 hour   Intake 1550 ml   Output 1625 ml   Net -75 ml     Unmeasured Output  Urine Occurrence: 1  Stool Occurrence: 1  Emesis Occurrence: 1  Pad Count: 3    Labs/Accuchecks:  Recent Labs   Lab 03/16/24  0023   WBC 7.21   RBC 3.93*   HGB 12.7*   HCT 37.2*         Recent Labs   Lab 03/16/24  0023      K 4.3   CO2 22*      BUN 10   CREATININE 0.8   ALKPHOS 38*   ALT 42   AST 40   BILITOT 0.3    No results for input(s): "PROTIME", "INR", "APTT", "HEPANTIXA" in the last 168 hours. No results for input(s): "CPK", "CPKMB", "TROPONINI", "MB" " in the last 168 hours.    Electrolytes: N/A - electrolytes WDL  Accuchecks: none    Gtts:      LDA/Wounds:      Nurses Note -- 4 Eyes      Is there altered skin present? no     Prevention Measures Documented    Second RN/Staff Member:  MAREN Barbosa

## 2024-03-16 NOTE — PLAN OF CARE
"[facility] Care Plan    POC reviewed with Kalia Pacheco and family at 0300. Pt verbalized understanding. Questions and concerns addressed. No acute events overnight. Pt progressing toward goals. Will continue to monitor. See below and flowsheets for full assessment and VS info.           Is this a stroke patient? yes- Stroke booklet reviewed with patient, risk factors identified for patient and stroke booklet remains at bedside for ongoing education.     Care individualization: yes    Neuro:  [place] Coma Scale  Best Eye Response: 4-->(E4) spontaneous  Best Motor Response: 6-->(M6) obeys commands  Best Verbal Response: 5-->(V5) oriented  [place] Coma Scale Score: 15  Assessment Qualifiers: patient not sedated/intubated  Pupil PERRLA: yes     24hr Temp:  [98 °F (36.7 °C)-98.3 °F (36.8 °C)]     CV:   Rhythm: normal sinus rhythm  BP goals:   SBP < 220  MAP > 65    Resp:      Vent Mode: Spont  Set Rate: 16 BPM  Oxygen Concentration (%): 40  Vt Set: 500 mL  PEEP/CPAP: 5 cmH20  Pressure Support: 8 cmH20    Plan: N/A    GI/:     Diet/Nutrition Received: regular  Last Bowel Movement: 03/13/24  Voiding Characteristics: voids spontaneously without difficulty    Intake/Output Summary (Last 24 hours) at 3/16/2024 0536  Last data filed at 3/16/2024 0522  Gross per 24 hour   Intake 1440 ml   Output 2000 ml   Net -560 ml     Unmeasured Output  Urine Occurrence: 1  Stool Occurrence: 1  Emesis Occurrence: 1  Pad Count: 3    Labs/Accuchecks:  Recent Labs   Lab 03/16/24  0023   WBC 7.21   RBC 3.93*   HGB 12.7*   HCT 37.2*         Recent Labs   Lab 03/16/24  0023      K 4.3   CO2 22*      BUN 10   CREATININE 0.8   ALKPHOS 38*   ALT 42   AST 40   BILITOT 0.3    No results for input(s): "PROTIME", "INR", "APTT", "HEPANTIXA" in the last 168 hours. No results for input(s): "CPK", "CPKMB", "TROPONINI", "MB" in the last 168 hours.    Electrolytes: N/A - electrolytes WDL  Accuchecks: " none    Gtts:      LDA/Wounds:    Nurses Note -- 4 Eyes    Is there altered skin present? no     Prevention Measures Documented    Second RN/Staff Member:  Zachary rn    Restraints:   Restraint Order  Length of Order: Order good for next 24 hours or when removed.  Date that the current order will : 24  Time that the current order will :   Order Upon Application: Yes    [facility]

## 2024-03-16 NOTE — ASSESSMENT & PLAN NOTE
Hh2 mf4 R vert anx rupture, S/p stent coil of R v4 anx 3/5, unruptured acomm anx not secured  Obs hydro s/p evd, removed 3/13  Hyponatremia, normal urine/serum osm     -Cont spasm watch, nimotop  -eunatremia (cont fw restriction today), permissive htn, euvolemia, avoid fever  -q2 neuro checks  -pt/ot, oob, diet

## 2024-03-17 LAB
ALBUMIN SERPL BCP-MCNC: 3.1 G/DL (ref 3.5–5.2)
ALP SERPL-CCNC: 39 U/L (ref 55–135)
ALT SERPL W/O P-5'-P-CCNC: 60 U/L (ref 10–44)
ANION GAP SERPL CALC-SCNC: 7 MMOL/L (ref 8–16)
AST SERPL-CCNC: 58 U/L (ref 10–40)
BASOPHILS # BLD AUTO: 0.05 K/UL (ref 0–0.2)
BASOPHILS NFR BLD: 0.7 % (ref 0–1.9)
BILIRUB SERPL-MCNC: 0.3 MG/DL (ref 0.1–1)
BUN SERPL-MCNC: 10 MG/DL (ref 6–20)
CALCIUM SERPL-MCNC: 9.2 MG/DL (ref 8.7–10.5)
CHLORIDE SERPL-SCNC: 105 MMOL/L (ref 95–110)
CO2 SERPL-SCNC: 23 MMOL/L (ref 23–29)
CREAT SERPL-MCNC: 0.9 MG/DL (ref 0.5–1.4)
DIFFERENTIAL METHOD BLD: ABNORMAL
EOSINOPHIL # BLD AUTO: 0.2 K/UL (ref 0–0.5)
EOSINOPHIL NFR BLD: 2.6 % (ref 0–8)
ERYTHROCYTE [DISTWIDTH] IN BLOOD BY AUTOMATED COUNT: 12.9 % (ref 11.5–14.5)
EST. GFR  (NO RACE VARIABLE): >60 ML/MIN/1.73 M^2
GLUCOSE SERPL-MCNC: 89 MG/DL (ref 70–110)
HCT VFR BLD AUTO: 39.5 % (ref 40–54)
HGB BLD-MCNC: 12.9 G/DL (ref 14–18)
IMM GRANULOCYTES # BLD AUTO: 0.02 K/UL (ref 0–0.04)
IMM GRANULOCYTES NFR BLD AUTO: 0.3 % (ref 0–0.5)
LYMPHOCYTES # BLD AUTO: 2 K/UL (ref 1–4.8)
LYMPHOCYTES NFR BLD: 26.8 % (ref 18–48)
MAGNESIUM SERPL-MCNC: 1.9 MG/DL (ref 1.6–2.6)
MCH RBC QN AUTO: 31.5 PG (ref 27–31)
MCHC RBC AUTO-ENTMCNC: 32.7 G/DL (ref 32–36)
MCV RBC AUTO: 96 FL (ref 82–98)
MONOCYTES # BLD AUTO: 0.5 K/UL (ref 0.3–1)
MONOCYTES NFR BLD: 6.3 % (ref 4–15)
NEUTROPHILS # BLD AUTO: 4.7 K/UL (ref 1.8–7.7)
NEUTROPHILS NFR BLD: 63.3 % (ref 38–73)
NRBC BLD-RTO: 0 /100 WBC
PHOSPHATE SERPL-MCNC: 4.6 MG/DL (ref 2.7–4.5)
PLATELET # BLD AUTO: 296 K/UL (ref 150–450)
PMV BLD AUTO: 9.7 FL (ref 9.2–12.9)
POTASSIUM SERPL-SCNC: 4.3 MMOL/L (ref 3.5–5.1)
PROT SERPL-MCNC: 6.1 G/DL (ref 6–8.4)
RBC # BLD AUTO: 4.1 M/UL (ref 4.6–6.2)
SODIUM SERPL-SCNC: 135 MMOL/L (ref 136–145)
WBC # BLD AUTO: 7.35 K/UL (ref 3.9–12.7)

## 2024-03-17 PROCEDURE — 99233 SBSQ HOSP IP/OBS HIGH 50: CPT | Mod: FS,,, | Performed by: NEUROLOGICAL SURGERY

## 2024-03-17 PROCEDURE — 80053 COMPREHEN METABOLIC PANEL: CPT

## 2024-03-17 PROCEDURE — 63600175 PHARM REV CODE 636 W HCPCS

## 2024-03-17 PROCEDURE — 99499 UNLISTED E&M SERVICE: CPT | Mod: ,,, | Performed by: NURSE PRACTITIONER

## 2024-03-17 PROCEDURE — 83735 ASSAY OF MAGNESIUM: CPT

## 2024-03-17 PROCEDURE — 25000003 PHARM REV CODE 250

## 2024-03-17 PROCEDURE — 25000003 PHARM REV CODE 250: Performed by: STUDENT IN AN ORGANIZED HEALTH CARE EDUCATION/TRAINING PROGRAM

## 2024-03-17 PROCEDURE — 85025 COMPLETE CBC W/AUTO DIFF WBC: CPT

## 2024-03-17 PROCEDURE — 20000000 HC ICU ROOM

## 2024-03-17 PROCEDURE — 94761 N-INVAS EAR/PLS OXIMETRY MLT: CPT

## 2024-03-17 PROCEDURE — 99233 SBSQ HOSP IP/OBS HIGH 50: CPT | Mod: FS,,, | Performed by: PSYCHIATRY & NEUROLOGY

## 2024-03-17 PROCEDURE — 25000003 PHARM REV CODE 250: Performed by: PSYCHIATRY & NEUROLOGY

## 2024-03-17 PROCEDURE — 84100 ASSAY OF PHOSPHORUS: CPT

## 2024-03-17 RX ADMIN — GABAPENTIN 300 MG: 300 CAPSULE ORAL at 02:03

## 2024-03-17 RX ADMIN — NIMODIPINE 30 MG: 30 CAPSULE, LIQUID FILLED ORAL at 04:03

## 2024-03-17 RX ADMIN — ASPIRIN 81 MG CHEWABLE TABLET 81 MG: 81 TABLET CHEWABLE at 10:03

## 2024-03-17 RX ADMIN — NIMODIPINE 30 MG: 30 CAPSULE, LIQUID FILLED ORAL at 06:03

## 2024-03-17 RX ADMIN — NIMODIPINE 30 MG: 30 CAPSULE, LIQUID FILLED ORAL at 02:03

## 2024-03-17 RX ADMIN — TICAGRELOR 90 MG: 90 TABLET ORAL at 10:03

## 2024-03-17 RX ADMIN — TICAGRELOR 90 MG: 90 TABLET ORAL at 09:03

## 2024-03-17 RX ADMIN — NIMODIPINE 30 MG: 30 CAPSULE, LIQUID FILLED ORAL at 09:03

## 2024-03-17 RX ADMIN — ACETAMINOPHEN 650 MG: 325 TABLET ORAL at 06:03

## 2024-03-17 RX ADMIN — GABAPENTIN 300 MG: 300 CAPSULE ORAL at 09:03

## 2024-03-17 RX ADMIN — HEPARIN SODIUM 5000 UNITS: 5000 INJECTION INTRAVENOUS; SUBCUTANEOUS at 06:03

## 2024-03-17 RX ADMIN — NIMODIPINE 30 MG: 30 CAPSULE, LIQUID FILLED ORAL at 10:03

## 2024-03-17 RX ADMIN — Medication 6 MG: at 09:03

## 2024-03-17 RX ADMIN — NIMODIPINE 30 MG: 30 CAPSULE, LIQUID FILLED ORAL at 12:03

## 2024-03-17 RX ADMIN — NIMODIPINE 30 MG: 30 CAPSULE, LIQUID FILLED ORAL at 08:03

## 2024-03-17 RX ADMIN — GABAPENTIN 300 MG: 300 CAPSULE ORAL at 06:03

## 2024-03-17 RX ADMIN — NIMODIPINE 30 MG: 30 CAPSULE, LIQUID FILLED ORAL at 07:03

## 2024-03-17 NOTE — ASSESSMENT & PLAN NOTE
Kalia Pacheco is a 34yo man with history of known CHESTER aneurysm who presented to [place] with headache and nausea. CTH demonstrated diffuse SAH throughout the basal cisterns HH and he was transferred to [facility] [place] for close monitoring in the Neuro ICU and Neurosurgical consultation.     R frontal EVD was placed on arrival on 3/6 and patient was taken to IR for angiogram, with no intervention upon Acomm aneurysm. Upon further review of DSA, a vertebral artery aneurysm was noted and patient was brought back to IR for a right V4/PICA irregular aneurysm stent assisted coiling.     Imaging reviewed:  CTH 3/5: Diffuse basal cistern SAH with IVH   MRI with contrast not suggestive of vessel wall enhancement to Acomm  CTA 3/5: 3mm ant projecting L Acomm, no R A1, R pcomm feeding posterior circulation   DSA 3/5: EVD in position, anteriosuperiorly projecting small L Acomm, R V4 irregular aneurysm not well visualized    DSA +embo 3/5: stent assisted coiling of R V4/PICA irregularly shaped anuerysm, 1/4 lobules still filling       Continue ICU care   Continue neuro checks   Please call Neurosurgery with any change in exam   EVD pulled and CT scan post fall stable.  While DVT site and call neurosurgical visit week   AEDs per ICU team   TCDs for 14 days   Nimotop for 21 days  Continue dual antiplatelets  Satting well on room air   Keep blood pressure below 180   Strict in's and out and keep it even to positive 500 cc   Advance diet as tolerated   PTOT  SubQ heparin for DVT prophylaxis   Further care per ICU team   Neurosurgical continue to follow up  Continue to follow clinically and notify NSGY immediately if any neurostatus change

## 2024-03-17 NOTE — PLAN OF CARE
"[facility] Care Plan    POC reviewed with Kalia Pacheco and family at 0300. Pt verbalized understanding. Questions and concerns addressed. No acute events overnight. Pt progressing toward goals. Will continue to monitor. See below and flowsheets for full assessment and VS info.         Is this a stroke patient? yes- Stroke booklet reviewed with patient, risk factors identified for patient and stroke booklet remains at bedside for ongoing education.     Care individualization: ambulating to bathroom, video games/movies    Neuro:  [place] Coma Scale  Best Eye Response: 4-->(E4) spontaneous  Best Motor Response: 6-->(M6) obeys commands  Best Verbal Response: 5-->(V5) oriented  [place] Coma Scale Score: 15  Assessment Qualifiers: patient not sedated/intubated, no eye obstruction present  Pupil PERRLA: yes     24hr Temp:  [98.1 °F (36.7 °C)-98.5 °F (36.9 °C)]     CV:   Rhythm: normal sinus rhythm  BP goals:   SBP < 220  MAP > 65    Resp:      Vent Mode: Spont  Set Rate: 16 BPM  Oxygen Concentration (%): 40  Vt Set: 500 mL  PEEP/CPAP: 5 cmH20  Pressure Support: 8 cmH20    Plan: N/A    GI/:     Diet/Nutrition Received: regular  Last Bowel Movement: 03/16/24  Voiding Characteristics: voids spontaneously without difficulty    Intake/Output Summary (Last 24 hours) at 3/17/2024 0423  Last data filed at 3/17/2024 0302  Gross per 24 hour   Intake 700 ml   Output 1175 ml   Net -475 ml     Unmeasured Output  Urine Occurrence: 1  Stool Occurrence: 1  Emesis Occurrence: 1  Pad Count: 3    Labs/Accuchecks:  Recent Labs   Lab 03/17/24  0027   WBC 7.35   RBC 4.10*   HGB 12.9*   HCT 39.5*         Recent Labs   Lab 03/17/24  0027   *   K 4.3   CO2 23      BUN 10   CREATININE 0.9   ALKPHOS 39*   ALT 60*   AST 58*   BILITOT 0.3    No results for input(s): "PROTIME", "INR", "APTT", "HEPANTIXA" in the last 168 hours. No results for input(s): "CPK", "CPKMB", "TROPONINI", "MB" in the last 168 hours.    Electrolytes: N/A - " electrolytes WDL  Accuchecks: none    Gtts:      LDA/Wounds:    Nurses Note -- 4 Eyes    Is there altered skin present? no     Prevention Measures Documented    Second RN/Staff Member:  Carol     Restraints: N/A    [facility]

## 2024-03-17 NOTE — PROGRESS NOTES
[facility]  Neurocritical Care  Progress Note    Admit Date: 3/5/2024  Service Date: 03/17/2024  Length of Stay: 12    Subjective:     Chief Complaint: Subarachnoid hemorrhage due to ruptured aneurysm    History of Present Illness: The patient is a 32-year-old man with a past medical history of ADHD, EtOH use and abuse, arthritis, hypertension, coarctation of the aorta (status post repair), PDA, VSD status post repair x2.  History of Legg-Perthes disease, and intracranial hemorrhage in May 2022.  presented to [facility] this AM with headache and nausea that began 2 days ago and acutely worsened while he was at work this morning. CTH demonstrated HH1mF4 SAH. S/p EVD placement by Nsx. Was taken to IR for angiography, but the vessel was not sercured during the procedure, the patient was returned to the NSICU, intubated and sedated, [facility] neurosurgery pending.     Hospital Course: 03/06/2024 Overnight, mild bradycardia and soft BPs. Weaned off fentanyl, weaning precedex. Extubated to NC this morning.   03/07/2024 AF, soft BPs. TCD with no signs of vasospasm. Neuro exam stable. Persistent HA, waxing/waning in severity. EVD with 289mL output. MRA pending.   03/08/2024 AF, HDS on RA. TCD no signs of vasospasm. Neuro exam stable. HA. EVD open @10 w/ 250mL output. MRA today.  03/09/2024 AF. BP soft. RA. TTE with reduced EF (40-45%) and mild aortic regurg. TCD w/ no signs of vasospasm. EVD open @10 w/ 224mL output. Started brillinta.   3/10: KUB, robaxin, encourage PO  3/11/2024: no free water intake, send Urine studies, TCD no vasospasm, Follow Na, discussed with IR- aneurysm secured and now SBP <220  03/12/2024: mild hyponatremia, mild hypotension fluid responsive, evd clamped  3/13/24: Pull EVD per NSGY  3/14/2024 Q2h neuros, added valium 2 mg q6h prn for spasms   3/15/2024 TCDs pending, 3/14 TCD with mild MCA/CHESTER vasospasm, NS 1 L bolus x 1  03/16/2024 naeon, mild pi elevation no exam change  3/17/2024  SHIRA      Objective:     Vitals:  Temp: 97.6 °F (36.4 °C)  Pulse: 84  Rhythm: normal sinus rhythm  BP: (!) 130/58  MAP (mmHg): 75  Resp: 14  SpO2: 98 %    Temp  Min: 97.6 °F (36.4 °C)  Max: 98.5 °F (36.9 °C)  Pulse  Min: 62  Max: 90  BP  Min: 107/51  Max: 189/87  MAP (mmHg)  Min: 73  Max: 125  Resp  Min: 2  Max: 41  SpO2  Min: 94 %  Max: 100 %    03/16 0701 - 03/17 0700  In: 900 [P.O.:900]  Out: 1275 [Urine:1275]   Unmeasured Output  Urine Occurrence: 1  Stool Occurrence: 1  Emesis Occurrence: 1  Pad Count: 3        Physical Exam  Vital signs, lab studies, and imaging reviewed by me  Alert, oriented x3, intact concentration (spells 5 letter words backwards), cranial II-XII intact, sensation full, moves all extremities with full strength  Warm and well perfused, regular rate and rhythm, no murmurs  No dependent edema  Breathing comfortably, breath sounds clear  Belly soft, nontender, no hepatosplenomegaly      Medications:  Continuous Scheduledaspirin, 81 mg, Daily  gabapentin, 300 mg, Q8H  LIDOcaine, 1 patch, QHS  melatonin, 6 mg, Nightly  niMODipine, 30 mg, Q2H  polyethylene glycol, 17 g, Daily  senna-docusate 8.6-50 mg, 1 tablet, BID  ticagrelor, 90 mg, BID    PRNbisacodyL, 10 mg, Daily PRN  diazePAM, 2 mg, Q6H PRN  hydrOXYzine HCL, 25 mg, Nightly PRN  labetalol, 10 mg, Q15 Min PRN  magnesium oxide, 800 mg, PRN  magnesium oxide, 800 mg, PRN  methocarbamoL, 500 mg, QID PRN  metoclopramide, 10 mg, Q6H PRN  ondansetron, 4 mg, Q6H PRN  oxyCODONE, 5 mg, Q6H PRN  potassium bicarbonate, 35 mEq, PRN  potassium bicarbonate, 50 mEq, PRN  potassium bicarbonate, 60 mEq, PRN  potassium, sodium phosphates, 2 packet, PRN  potassium, sodium phosphates, 2 packet, PRN  potassium, sodium phosphates, 2 packet, PRN          Assessment/Plan:     Neuro  * Subarachnoid hemorrhage due to ruptured aneurysm  Hh2 mf4 R vert anx rupture, S/p stent coil of R v4 anx 3/5, unruptured acomm anx not secured  Obs hydro s/p evd, removed  3/13  Hyponatremia, normal urine/serum osm   -Cont spasm watch, nimotop  -eunatremia (cont fw restriction today), permissive htn, euvolemia, avoid fever  -q2 neuro checks  -pt/ot, oob, diet  3/17/2024 TCDs neg    IVH (intraventricular hemorrhage)  EVD removed per NSGY  Monitor scans    Communicating hydrocephalus  EVD out 3/13  Stable    Anterior communicating artery aneurysm  Unsecure  - continue ASA/brilnta per neuro IR  - based on bleed pattern believe vertebral aneurysm to be the ruptured aneurysm, no plan for intervention upon Acomm at this point  - MRI with contrast not suggestive of vessel wall enhancement to Acomm  3/15/2024 TCDs pending, 3/14 TCD with mild MCA/CHESTER vasospasm, NS 1 L bolus x 1  3/17/2024 TCDs neg    Cardiac/Vascular  Vasospasm  3/15/2024 TCDs pending, 3/14 TCD with mild MCA/CHESTER vasospasm, NS 1 L bolus x 1  3/17/2024 TCDs neg    Hypertension  -continue current regimen    Other  Spasm  Valium 2 mg q6h prn              Activity Orders            Diet Adult Regular (IDDSI Level 7) Thin; Standard Tray: Regular starting at 03/11 1118    Turn patient starting at 03/05 1200    Elevate HOB starting at 03/05 1119          Full Code    Wang Gomes NP  Neurocritical Care  [facility]

## 2024-03-17 NOTE — ASSESSMENT & PLAN NOTE
3/15/2024 TCDs pending, 3/14 TCD with mild MCA/CHESTER vasospasm, NS 1 L bolus x 1  3/17/2024 TCDs neg

## 2024-03-17 NOTE — ASSESSMENT & PLAN NOTE
Unsecure  - continue ASA/brilnta per neuro IR  - based on bleed pattern believe vertebral aneurysm to be the ruptured aneurysm, no plan for intervention upon Acomm at this point  - MRI with contrast not suggestive of vessel wall enhancement to Acomm  3/15/2024 TCDs pending, 3/14 TCD with mild MCA/CHESTER vasospasm, NS 1 L bolus x 1  3/17/2024 TCDs neg

## 2024-03-17 NOTE — CARE UPDATE
Patient's chart was reviewed by a stroke team provider and discussed with staff.    Briefly, Kalia Pacheco is a 33 y.o. male with PMHx of HTN, coarctation of the aorta (s/p repair), PDA, VSD s/p repair, ADHD, EtOH use/abuse, arthritis, legg-perthes disease, ICH 5/2022, known CHESTER aneurysm that was transferred to [facility] for higher level of care of SAH (HH1). He presented to OSH with HA and nausea. CTH showed diffuse SAH throughout basal cisterns. At C neuro exam stable with NIHSS 1. CTA shows 3mm L acomm aneurysm, hypoplastic R A1, persistent fetal circulation, as well as evolving SAH/ICH now with concern for developing hydrocephalus and questionable early cerebellar tonsillar herniation. NSGY consulted, EVD placed. Taken to IR for DSA, no intervention of acomm aneurysm at that time. 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 Suspect etiology of SAH likely due to vertebral artery aneurysm.     EVD removed 3/13. Na 135, free water restrictions continued for Na goal. Patient remains in NCC for close monitoring.          -Please see note completed on 3/15 for current recommendations  -We will continue to follow and provide any additional recommendations  -Please do not hesitate to contact the stroke team for any questions or concerns.             EUGENIE Mckeon, FNP-C  Department of Vascular Neurology  Com[facility] St[facility]  [facility]  [phone]

## 2024-03-17 NOTE — SUBJECTIVE & OBJECTIVE
Interval History: NAEON. Pt exam stable this morning.    Medications:  Continuous Infusions:  Scheduled Meds:   aspirin  81 mg Oral Daily    gabapentin  300 mg Oral Q8H    heparin (porcine)  5,000 Units Subcutaneous Q8H    LIDOcaine  1 patch Transdermal QHS    melatonin  6 mg Oral Nightly    niMODipine  30 mg Oral Q2H    polyethylene glycol  17 g Oral Daily    senna-docusate 8.6-50 mg  1 tablet Oral BID    ticagrelor  90 mg Oral BID     PRN Meds:acetaminophen, bisacodyL, diazePAM, hydrOXYzine HCL, labetalol, magnesium oxide, magnesium oxide, methocarbamoL, metoclopramide, ondansetron, oxyCODONE, potassium bicarbonate, potassium bicarbonate, potassium bicarbonate, potassium, sodium phosphates, potassium, sodium phosphates, potassium, sodium phosphates     Review of Systems  Objective:     Weight: 106.6 kg (235 lb)  Body mass index is 35.73 kg/m².  Vital Signs (Most Recent):  Temp: 98.1 °F (36.7 °C) (03/17/24 0701)  Pulse: 71 (03/17/24 0901)  Resp: 20 (03/17/24 0901)  BP: 119/85 (03/17/24 0901)  SpO2: 98 % (03/17/24 0901) Vital Signs (24h Range):  Temp:  [98.1 °F (36.7 °C)-98.5 °F (36.9 °C)] 98.1 °F (36.7 °C)  Pulse:  [62-90] 71  Resp:  [2-22] 20  SpO2:  [94 %-100 %] 98 %  BP: (107-189)/(51-87) 119/85                                 Physical Exam         Neurosurgery Physical Exam  General: well developed, well nourished, no distress.   HEENT: normocephalic, atraumatic  CV: regular rate   Pulmonary: normal respirations, no signs of respiratory distress  Abdomen: soft, non-distended, not tender to palpation  Skin: Skin is warm, dry and intact  Heme: no bruising  Neuro:   Mental Status: AO x4, no aphasia, no dysarthria   CN: PERRL, EOMI, VF intact to confrontation, sensation intact bilaterally, eyebrow raise and grimace symmetric, tongue midline  Motor: moves all extremities spontaneously, full strength throughout, no pronator drift   Sensory: intact to light touch throughout  Cerebellar: finger to nose intact  "bilaterally  Reflexes: -Patterson's, -Babinski, no clonus. Patellar: 2+ bilaterally      Incision: EVD site Clean, dry, intact. No drainage or TTP.    Significant Labs:  Recent Labs   Lab 03/16/24  0023 03/17/24  0027    89    135*   K 4.3 4.3    105   CO2 22* 23   BUN 10 10   CREATININE 0.8 0.9   CALCIUM 8.8 9.2   MG 1.8 1.9     Recent Labs   Lab 03/16/24  0023 03/17/24  0027   WBC 7.21 7.35   HGB 12.7* 12.9*   HCT 37.2* 39.5*    296     No results for input(s): "LABPT", "INR", "APTT" in the last 48 hours.  Microbiology Results (last 7 days)       ** No results found for the last 168 hours. **          All pertinent labs from the last 24 hours have been reviewed.    Significant Diagnostics:  I have reviewed all pertinent imaging results/findings within the past 24 hours.  "

## 2024-03-17 NOTE — PLAN OF CARE
"[facility] Care Plan    POC reviewed with aKlia Pacheco and family at 1400. Pt verbalized understanding. Questions and concerns addressed. No acute events today. Pt progressing toward goals. Will continue to monitor. See below and flowsheets for full assessment and VS info.     - TCDs completed  - Pt completed multiple laps around the unit.   - BM x 1         Is this a stroke patient? yes- Stroke booklet reviewed with patient and family, risk factors identified for patient and stroke booklet remains at bedside for ongoing education.     Care individualization: communication between team and patient    Neuro:  Gris[place]old Coma Scale  Best Eye Response: 4-->(E4) spontaneous  Best Motor Response: 6-->(M6) obeys commands  Best Verbal Response: 5-->(V5) oriented  Griswold Coma Scale Score: 15  Assessment Qualifiers: patient not sedated/intubated, no eye obstruction present  Pupil PERRLA: yes     24 hr Temp:  [98.1 °F (36.7 °C)-98.5 °F (36.9 °C)]     CV:   Rhythm: normal sinus rhythm  BP goals:   SBP < 220  MAP > 65    Resp:      Vent Mode: Spont  Set Rate: 16 BPM  Oxygen Concentration (%): 40  Vt Set: 500 mL  PEEP/CPAP: 5 cmH20  Pressure Support: 8 cmH20    Plan: N/A    GI/:     Diet/Nutrition Received: regular  Last Bowel Movement: 03/17/24  Voiding Characteristics: voids spontaneously without difficulty    Intake/Output Summary (Last 24 hours) at 3/17/2024 1516  Last data filed at 3/17/2024 1101  Gross per 24 hour   Intake 550 ml   Output 1650 ml   Net -1100 ml     Unmeasured Output  Urine Occurrence: 1  Stool Occurrence: 1  Emesis Occurrence: 1  Pad Count: 3    Labs/Accuchecks:  Recent Labs   Lab 03/17/24  0027   WBC 7.35   RBC 4.10*   HGB 12.9*   HCT 39.5*         Recent Labs   Lab 03/17/24  0027   *   K 4.3   CO2 23      BUN 10   CREATININE 0.9   ALKPHOS 39*   ALT 60*   AST 58*   BILITOT 0.3    No results for input(s): "PROTIME", "INR", "APTT", "HEPANTIXA" in the last 168 hours. No results for " "input(s): "CPK", "CPKMB", "TROPONINI", "MB" in the last 168 hours.    Electrolytes: N/A - electrolytes WDL  Accuchecks: none    Gtts:      LDA/Wounds:      Nurses Note -- 4 Eyes      Is there altered skin present? Yes - previous incision sites    Prevention Measures Documented    Second RN/Staff Member:  MAREN bell    "

## 2024-03-17 NOTE — SUBJECTIVE & OBJECTIVE
Objective:     Vitals:  Temp: 97.6 °F (36.4 °C)  Pulse: 84  Rhythm: normal sinus rhythm  BP: (!) 130/58  MAP (mmHg): 75  Resp: 14  SpO2: 98 %    Temp  Min: 97.6 °F (36.4 °C)  Max: 98.5 °F (36.9 °C)  Pulse  Min: 62  Max: 90  BP  Min: 107/51  Max: 189/87  MAP (mmHg)  Min: 73  Max: 125  Resp  Min: 2  Max: 41  SpO2  Min: 94 %  Max: 100 %    03/16 0701 - 03/17 0700  In: 900 [P.O.:900]  Out: 1275 [Urine:1275]   Unmeasured Output  Urine Occurrence: 1  Stool Occurrence: 1  Emesis Occurrence: 1  Pad Count: 3        Physical Exam  Vital signs, lab studies, and imaging reviewed by me  Alert, oriented x3, intact concentration (spells 5 letter words backwards), cranial II-XII intact, sensation full, moves all extremities with full strength  Warm and well perfused, regular rate and rhythm, no murmurs  No dependent edema  Breathing comfortably, breath sounds clear  Belly soft, nontender, no hepatosplenomegaly      Medications:  Continuous Scheduledaspirin, 81 mg, Daily  gabapentin, 300 mg, Q8H  LIDOcaine, 1 patch, QHS  melatonin, 6 mg, Nightly  niMODipine, 30 mg, Q2H  polyethylene glycol, 17 g, Daily  senna-docusate 8.6-50 mg, 1 tablet, BID  ticagrelor, 90 mg, BID    PRNbisacodyL, 10 mg, Daily PRN  diazePAM, 2 mg, Q6H PRN  hydrOXYzine HCL, 25 mg, Nightly PRN  labetalol, 10 mg, Q15 Min PRN  magnesium oxide, 800 mg, PRN  magnesium oxide, 800 mg, PRN  methocarbamoL, 500 mg, QID PRN  metoclopramide, 10 mg, Q6H PRN  ondansetron, 4 mg, Q6H PRN  oxyCODONE, 5 mg, Q6H PRN  potassium bicarbonate, 35 mEq, PRN  potassium bicarbonate, 50 mEq, PRN  potassium bicarbonate, 60 mEq, PRN  potassium, sodium phosphates, 2 packet, PRN  potassium, sodium phosphates, 2 packet, PRN  potassium, sodium phosphates, 2 packet, PRN

## 2024-03-17 NOTE — ASSESSMENT & PLAN NOTE
Hh2 mf4 R vert anx rupture, S/p stent coil of R v4 anx 3/5, unruptured acomm anx not secured  Obs hydro s/p evd, removed 3/13  Hyponatremia, normal urine/serum osm   -Cont spasm watch, nimotop  -eunatremia (cont fw restriction today), permissive htn, euvolemia, avoid fever  -q2 neuro checks  -pt/ot, oob, diet  3/17/2024 TCDs neg

## 2024-03-17 NOTE — PROGRESS NOTES
[facility]  Neurosurgery  Progress Note    Subjective:     History of Present Illness: Kalia Pacheco is a 34yo man with history of known CHESTER aneurysm, not on any AP/AC who presented to [facility] this AM with headache and nausea that began 2 days ago and acutely worsened while he was at work this morning. He also reports blurry vision and has had multiple episodes of emesis. Denies any inciting factors. CTH reveals diffuse subarachnoid hemorrhage throughout the basal cisterns, HH score 1. He was transferred to [facility] for Neuro Critical Care and Neurosurgical management.     Post-Op Info:  * No surgery found *       Interval History: NAEON. Pt exam stable this morning.    Medications:  Continuous Infusions:  Scheduled Meds:   aspirin  81 mg Oral Daily    gabapentin  300 mg Oral Q8H    heparin (porcine)  5,000 Units Subcutaneous Q8H    LIDOcaine  1 patch Transdermal QHS    melatonin  6 mg Oral Nightly    niMODipine  30 mg Oral Q2H    polyethylene glycol  17 g Oral Daily    senna-docusate 8.6-50 mg  1 tablet Oral BID    ticagrelor  90 mg Oral BID     PRN Meds:acetaminophen, bisacodyL, diazePAM, hydrOXYzine HCL, labetalol, magnesium oxide, magnesium oxide, methocarbamoL, metoclopramide, ondansetron, oxyCODONE, potassium bicarbonate, potassium bicarbonate, potassium bicarbonate, potassium, sodium phosphates, potassium, sodium phosphates, potassium, sodium phosphates     Review of Systems  Objective:     Weight: 106.6 kg (235 lb)  Body mass index is 35.73 kg/m².  Vital Signs (Most Recent):  Temp: 98.1 °F (36.7 °C) (03/17/24 0701)  Pulse: 71 (03/17/24 0901)  Resp: 20 (03/17/24 0901)  BP: 119/85 (03/17/24 0901)  SpO2: 98 % (03/17/24 0901) Vital Signs (24h Range):  Temp:  [98.1 °F (36.7 °C)-98.5 °F (36.9 °C)] 98.1 °F (36.7 °C)  Pulse:  [62-90] 71  Resp:  [2-22] 20  SpO2:  [94 %-100 %] 98 %  BP: (107-189)/(51-87) 119/85                                 Physical Exam         Neurosurgery Physical  "Exam  General: well developed, well nourished, no distress.   HEENT: normocephalic, atraumatic  CV: regular rate   Pulmonary: normal respirations, no signs of respiratory distress  Abdomen: soft, non-distended, not tender to palpation  Skin: Skin is warm, dry and intact  Heme: no bruising  Neuro:   Mental Status: AO x4, no aphasia, no dysarthria   CN: PERRL, EOMI, VF intact to confrontation, sensation intact bilaterally, eyebrow raise and grimace symmetric, tongue midline  Motor: moves all extremities spontaneously, full strength throughout, no pronator drift   Sensory: intact to light touch throughout  Cerebellar: finger to nose intact bilaterally  Reflexes: -Patterson's, -Babinski, no clonus. Patellar: 2+ bilaterally      Incision: EVD site Clean, dry, intact. No drainage or TTP.    Significant Labs:  Recent Labs   Lab 03/16/24  0023 03/17/24  0027    89    135*   K 4.3 4.3    105   CO2 22* 23   BUN 10 10   CREATININE 0.8 0.9   CALCIUM 8.8 9.2   MG 1.8 1.9     Recent Labs   Lab 03/16/24  0023 03/17/24  0027   WBC 7.21 7.35   HGB 12.7* 12.9*   HCT 37.2* 39.5*    296     No results for input(s): "LABPT", "INR", "APTT" in the last 48 hours.  Microbiology Results (last 7 days)       ** No results found for the last 168 hours. **          All pertinent labs from the last 24 hours have been reviewed.    Significant Diagnostics:  I have reviewed all pertinent imaging results/findings within the past 24 hours.  Assessment/Plan:     * Subarachnoid hemorrhage due to ruptured aneurysm  Kalia Pacheco is a 32yo man with history of known CHESTER aneurysm who presented to [place] with headache and nausea. CTH demonstrated diffuse SAH throughout the basal cisterns HH and he was transferred to [facility] for close monitoring in the Neuro ICU and Neurosurgical consultation.     R frontal EVD was placed on arrival on 3/6 and patient was taken to IR for angiogram, with no intervention upon Acomm aneurysm. Upon " further review of DSA, a vertebral artery aneurysm was noted and patient was brought back to IR for a right V4/PICA irregular aneurysm stent assisted coiling.     Imaging reviewed:  CTH 3/5: Diffuse basal cistern SAH with IVH   MRI with contrast not suggestive of vessel wall enhancement to Acomm  CTA 3/5: 3mm ant projecting L Acomm, no R A1, R pcomm feeding posterior circulation   DSA 3/5: EVD in position, anteriosuperiorly projecting small L Acomm, R V4 irregular aneurysm not well visualized    DSA +embo 3/5: stent assisted coiling of R V4/PICA irregularly shaped anuerysm, 1/4 lobules still filling       Continue ICU care   Continue neuro checks   Please call Neurosurgery with any change in exam   EVD pulled and CT scan post fall stable.  While DVT site and call neurosurgical visit week   AEDs per ICU team   TCDs for 14 days   Nimotop for 21 days  Continue dual antiplatelets  Satting well on room air   Keep blood pressure below 180   Strict in's and out and keep it even to positive 500 cc   Advance diet as tolerated   PTOT  SubQ heparin for DVT prophylaxis   Further care per ICU team   Neurosurgical continue to follow up  Continue to follow clinically and notify NSGY immediately if any neurostatus change         Jack Santiago MD  Neurosurgery  [facility]

## 2024-03-18 LAB
ALBUMIN SERPL BCP-MCNC: 3.1 G/DL (ref 3.5–5.2)
ALP SERPL-CCNC: 39 U/L (ref 55–135)
ALT SERPL W/O P-5'-P-CCNC: 82 U/L (ref 10–44)
ANION GAP SERPL CALC-SCNC: 8 MMOL/L (ref 8–16)
AST SERPL-CCNC: 59 U/L (ref 10–40)
BASOPHILS # BLD AUTO: 0.05 K/UL (ref 0–0.2)
BASOPHILS NFR BLD: 0.8 % (ref 0–1.9)
BILIRUB SERPL-MCNC: 0.3 MG/DL (ref 0.1–1)
BUN SERPL-MCNC: 10 MG/DL (ref 6–20)
CALCIUM SERPL-MCNC: 9.2 MG/DL (ref 8.7–10.5)
CHLORIDE SERPL-SCNC: 107 MMOL/L (ref 95–110)
CO2 SERPL-SCNC: 22 MMOL/L (ref 23–29)
CREAT SERPL-MCNC: 0.8 MG/DL (ref 0.5–1.4)
DIFFERENTIAL METHOD BLD: ABNORMAL
EOSINOPHIL # BLD AUTO: 0.2 K/UL (ref 0–0.5)
EOSINOPHIL NFR BLD: 3.5 % (ref 0–8)
ERYTHROCYTE [DISTWIDTH] IN BLOOD BY AUTOMATED COUNT: 12.9 % (ref 11.5–14.5)
EST. GFR  (NO RACE VARIABLE): >60 ML/MIN/1.73 M^2
GLUCOSE SERPL-MCNC: 89 MG/DL (ref 70–110)
HCT VFR BLD AUTO: 38.6 % (ref 40–54)
HGB BLD-MCNC: 12.8 G/DL (ref 14–18)
IMM GRANULOCYTES # BLD AUTO: 0.02 K/UL (ref 0–0.04)
IMM GRANULOCYTES NFR BLD AUTO: 0.3 % (ref 0–0.5)
LYMPHOCYTES # BLD AUTO: 2.1 K/UL (ref 1–4.8)
LYMPHOCYTES NFR BLD: 31.6 % (ref 18–48)
MAGNESIUM SERPL-MCNC: 1.9 MG/DL (ref 1.6–2.6)
MCH RBC QN AUTO: 31.5 PG (ref 27–31)
MCHC RBC AUTO-ENTMCNC: 33.2 G/DL (ref 32–36)
MCV RBC AUTO: 95 FL (ref 82–98)
MONOCYTES # BLD AUTO: 0.4 K/UL (ref 0.3–1)
MONOCYTES NFR BLD: 6.3 % (ref 4–15)
NEUTROPHILS # BLD AUTO: 3.7 K/UL (ref 1.8–7.7)
NEUTROPHILS NFR BLD: 57.5 % (ref 38–73)
NRBC BLD-RTO: 0 /100 WBC
PHOSPHATE SERPL-MCNC: 4.3 MG/DL (ref 2.7–4.5)
PLATELET # BLD AUTO: 305 K/UL (ref 150–450)
PMV BLD AUTO: 9.5 FL (ref 9.2–12.9)
POTASSIUM SERPL-SCNC: 4.5 MMOL/L (ref 3.5–5.1)
PROT SERPL-MCNC: 6 G/DL (ref 6–8.4)
RBC # BLD AUTO: 4.06 M/UL (ref 4.6–6.2)
SODIUM SERPL-SCNC: 137 MMOL/L (ref 136–145)
WBC # BLD AUTO: 6.48 K/UL (ref 3.9–12.7)

## 2024-03-18 PROCEDURE — 25000003 PHARM REV CODE 250: Performed by: PHYSICIAN ASSISTANT

## 2024-03-18 PROCEDURE — 99233 SBSQ HOSP IP/OBS HIGH 50: CPT | Mod: ,,, | Performed by: PSYCHIATRY & NEUROLOGY

## 2024-03-18 PROCEDURE — 25000003 PHARM REV CODE 250: Performed by: STUDENT IN AN ORGANIZED HEALTH CARE EDUCATION/TRAINING PROGRAM

## 2024-03-18 PROCEDURE — 25000003 PHARM REV CODE 250

## 2024-03-18 PROCEDURE — 94761 N-INVAS EAR/PLS OXIMETRY MLT: CPT

## 2024-03-18 PROCEDURE — 84100 ASSAY OF PHOSPHORUS: CPT

## 2024-03-18 PROCEDURE — 97535 SELF CARE MNGMENT TRAINING: CPT

## 2024-03-18 PROCEDURE — 97110 THERAPEUTIC EXERCISES: CPT

## 2024-03-18 PROCEDURE — 11000001 HC ACUTE MED/SURG PRIVATE ROOM

## 2024-03-18 PROCEDURE — 83735 ASSAY OF MAGNESIUM: CPT

## 2024-03-18 PROCEDURE — 80053 COMPREHEN METABOLIC PANEL: CPT

## 2024-03-18 PROCEDURE — 85025 COMPLETE CBC W/AUTO DIFF WBC: CPT

## 2024-03-18 PROCEDURE — 25000003 PHARM REV CODE 250: Performed by: PSYCHIATRY & NEUROLOGY

## 2024-03-18 RX ORDER — AMOXICILLIN 250 MG
1 CAPSULE ORAL DAILY
Status: DISCONTINUED | OUTPATIENT
Start: 2024-03-18 | End: 2024-03-20 | Stop reason: HOSPADM

## 2024-03-18 RX ADMIN — NIMODIPINE 30 MG: 30 CAPSULE, LIQUID FILLED ORAL at 03:03

## 2024-03-18 RX ADMIN — NIMODIPINE 30 MG: 30 CAPSULE, LIQUID FILLED ORAL at 12:03

## 2024-03-18 RX ADMIN — GABAPENTIN 300 MG: 300 CAPSULE ORAL at 05:03

## 2024-03-18 RX ADMIN — NIMODIPINE 30 MG: 30 CAPSULE, LIQUID FILLED ORAL at 04:03

## 2024-03-18 RX ADMIN — NIMODIPINE 30 MG: 30 CAPSULE, LIQUID FILLED ORAL at 08:03

## 2024-03-18 RX ADMIN — TICAGRELOR 90 MG: 90 TABLET ORAL at 08:03

## 2024-03-18 RX ADMIN — NIMODIPINE 30 MG: 30 CAPSULE, LIQUID FILLED ORAL at 10:03

## 2024-03-18 RX ADMIN — GABAPENTIN 300 MG: 300 CAPSULE ORAL at 02:03

## 2024-03-18 RX ADMIN — NIMODIPINE 30 MG: 30 CAPSULE, LIQUID FILLED ORAL at 05:03

## 2024-03-18 RX ADMIN — NIMODIPINE 30 MG: 30 CAPSULE, LIQUID FILLED ORAL at 02:03

## 2024-03-18 RX ADMIN — NIMODIPINE 30 MG: 30 CAPSULE, LIQUID FILLED ORAL at 07:03

## 2024-03-18 RX ADMIN — NIMODIPINE 30 MG: 30 CAPSULE, LIQUID FILLED ORAL at 09:03

## 2024-03-18 RX ADMIN — NIMODIPINE 30 MG: 30 CAPSULE, LIQUID FILLED ORAL at 01:03

## 2024-03-18 RX ADMIN — GABAPENTIN 300 MG: 300 CAPSULE ORAL at 10:03

## 2024-03-18 RX ADMIN — OXYCODONE 5 MG: 5 TABLET ORAL at 05:03

## 2024-03-18 RX ADMIN — Medication 6 MG: at 08:03

## 2024-03-18 RX ADMIN — ASPIRIN 81 MG CHEWABLE TABLET 81 MG: 81 TABLET CHEWABLE at 08:03

## 2024-03-18 NOTE — ASSESSMENT & PLAN NOTE
33yoM with PMH of HTN, coarctation of the aorta (s/p repair), PDA, VSD s/p repair, ADHD, EtOH use/abuse, arthritis, legg-perthes disease, ICH 5/2022, known CHESTER aneurysm that was transferred to [facility] for higher level of care of SAH (HH1). He presented to OSH with HA and nausea. CTH showed diffuse SAH throughout basal cisterns. At C neuro exam stable with NIHSS 1. CTA shows 3mm L acomm aneurysm, hypoplastic R A1, persistent fetal circulation, as well as evolving SAH/ICH now with concern for developing hydrocephalus and questionable early cerebellar tonsillar herniation. NSGY consulted, EVD placed. Taken to IR for DSA, no intervention of acomm aneurysm at that time. 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 Suspect etiology of SAH likely due to vertebral artery aneurysm. TTE with EF 40-45%, global hypokinesis present, LAE. MRA Brain with unchanged 2 mm left CHESTER/anterior communicating artery aneurysm. No abnormal vessel wall enhancement.     No acute events overnight. Patient completed 14-days monitoring in [facility]. TCD today with no vasospasms. Tolerating diet well. PT/OT recommend no therapy needs. Patient can discharge home from [facility] and follow up with NSGY and Vascular Neurology in clinic.     Antithrombotics for secondary stroke prevention: Antiplatelets: Aspirin: 81 mg daily and Brilinta 90mg BID (plavix switched to brilinta due to plavix non-responder on PRU assay)    Statins for secondary stroke prevention and hyperlipidemia, if present: Statins: Atorvastatin- 40 mg daily, continue    Aggressive risk factor modification: HTN     Rehab efforts: The patient has been evaluated by a stroke team provider and the therapy needs have been fully considered based off the presenting complaints and exam findings. The following therapy evaluations are needed: PT evaluate and treat, OT evaluate and treat, SLP evaluate and  treat, PM&R evaluate for appropriate placement-evaluated.  Currently no therapy needs.  Regular diet.    Diagnostics ordered/pending: Daily TCDs per SAH protocol    VTE prophylaxis: Mechanical prophylaxis: Place SCDs    BP parameters: SAH: Secured aneurysm, no target, increase BP to prevent vasospasm if needed

## 2024-03-18 NOTE — PLAN OF CARE
[facility]  Discharge Reassessment    Primary Care Provider: Christiano Baldwin MD    Expected Discharge Date: 3/21/2024    Patient with TCD monitoring.  EVD Removed 03/13 per MD.  Regular diet.  Anticipate Outpatient OT.    Reassessment (most recent)       Discharge Reassessment - 03/18/24 1226          Discharge Reassessment    Assessment Type Discharge Planning Reassessment     Did the patient's condition or plan change since previous assessment? No     Communicated FRANCISCO with patient/caregiver Date not available/Unable to determine     Discharge Plan A Home with family     Discharge Plan B Home with family     DME Needed Upon Discharge  none     Transition of Care Barriers Underinsured     Why the patient remains in the hospital Requires continued medical care                   Discharge Plan A and Plan B have been determined by review of patient's clinical status, future medical and therapeutic needs, and coverage/benefits for post-acute care in coordination with multidisciplinary team members.      Amber Vazquez RN, CCRN-K, [facility]  Neuro-Critical Care   [phone]

## 2024-03-18 NOTE — ASSESSMENT & PLAN NOTE
Hh2 mf4 R vert anx rupture, S/p stent coil of R v4 anx 3/5, unruptured acomm anx not secured  Obs hydro s/p evd, removed 3/13  Hyponatremia, normal urine/serum osm   -Cont spasm watch, nimotop  -eunatremia (cont fw restriction today), permissive htn, euvolemia, avoid fever  -q2 neuro checks  -pt/ot, oob, diet  3/17/2024 TCDs neg  3/18/24: Ready for step down

## 2024-03-18 NOTE — PLAN OF CARE
"[facility] Care Plan    POC reviewed with Kalia Pacheco and family at 1400. Pt verbalized understanding. Questions and concerns addressed. No acute events today. Pt progressing toward goals. Will continue to monitor. See below and flowsheets for full assessment and VS info.             Is this a stroke patient? yes- Stroke booklet reviewed with patient and family, risk factors identified for patient and stroke booklet remains at bedside for ongoing education.   Patient likes to walk around the unit    Neuro:  [place] Coma Scale  Best Eye Response: 4-->(E4) spontaneous  Best Motor Response: 6-->(M6) obeys commands  Best Verbal Response: 5-->(V5) oriented  [place] Coma Scale Score: 15  Assessment Qualifiers: patient not sedated/intubated  Pupil PERRLA: yes     24 hr Temp:  [97.9 °F (36.6 °C)-98.4 °F (36.9 °C)]         Resp:      Vent Mode: Spont  Set Rate: 16 BPM  Oxygen Concentration (%): 40  Vt Set: 500 mL  PEEP/CPAP: 5 cmH20  Pressure Support: 8 cmH20        GI/:     Diet/Nutrition Received: regular  Last Bowel Movement: 03/17/24  Voiding Characteristics: voids spontaneously without difficulty    Intake/Output Summary (Last 24 hours) at 3/18/2024 1617  Last data filed at 3/18/2024 1540  Gross per 24 hour   Intake 1100 ml   Output 2200 ml   Net -1100 ml     Unmeasured Output  Urine Occurrence: 1  Stool Occurrence: 1  Emesis Occurrence: 1  Pad Count: 3    Labs/Accuchecks:  Recent Labs   Lab 03/18/24  0359   WBC 6.48   RBC 4.06*   HGB 12.8*   HCT 38.6*         Recent Labs   Lab 03/18/24  0359      K 4.5   CO2 22*      BUN 10   CREATININE 0.8   ALKPHOS 39*   ALT 82*   AST 59*   BILITOT 0.3    No results for input(s): "PROTIME", "INR", "APTT", "HEPANTIXA" in the last 168 hours. No results for input(s): "CPK", "CPKMB", "TROPONINI", "MB" in the last 168 hours.        Nurses Note -- 4 Eyes  Skin intact, verified by Brian ENGEL    Is there altered skin present? no     Prevention Measures " Documented    Second RN/Staff Member:  shannan

## 2024-03-18 NOTE — PROGRESS NOTES
[facility] - Neuro Critical Care  Vascular Neurology  Comprehensive Stroke Center  Progress Note    Assessment/Plan:     * Subarachnoid hemorrhage due to ruptured aneurysm  33yoM with PMH of HTN, coarctation of the aorta (s/p repair), PDA, VSD s/p repair, ADHD, EtOH use/abuse, arthritis, legg-perthes disease, ICH 5/2022, known CHESTER aneurysm that was transferred to [facility] for higher level of care of SAH (HH1). He presented to OSH with HA and nausea. CTH showed diffuse SAH throughout basal cisterns. At C neuro exam stable with NIHSS 1. CTA shows 3mm L acomm aneurysm, hypoplastic R A1, persistent fetal circulation, as well as evolving SAH/ICH now with concern for developing hydrocephalus and questionable early cerebellar tonsillar herniation. NSGY consulted, EVD placed. Taken to IR for DSA, no intervention of acomm aneurysm at that time. 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 Suspect etiology of SAH likely due to vertebral artery aneurysm. TTE with EF 40-45%, global hypokinesis present, LAE. MRA Brain with unchanged 2 mm left CHESTER/anterior communicating artery aneurysm. No abnormal vessel wall enhancement.     No acute events overnight. Patient completed 14-days monitoring in [facility]. TCD today with no vasospasms. Tolerating diet well. PT/OT recommend no therapy needs. Patient can discharge home from [facility] and follow up with NSGY and Vascular Neurology in clinic.     Antithrombotics for secondary stroke prevention: Antiplatelets: Aspirin: 81 mg daily and Brilinta 90mg BID (plavix switched to brilinta due to plavix non-responder on PRU assay)    Statins for secondary stroke prevention and hyperlipidemia, if present: Statins: Atorvastatin- 40 mg daily, continue    Aggressive risk factor modification: HTN     Rehab efforts: The patient has been evaluated by a stroke team provider and the therapy needs have been fully  considered based off the presenting complaints and exam findings. The following therapy evaluations are needed: PT evaluate and treat, OT evaluate and treat, SLP evaluate and treat, PM&R evaluate for appropriate placement-evaluated.  Currently no therapy needs.  Regular diet.    Diagnostics ordered/pending: Daily TCDs per SAH protocol    VTE prophylaxis: Mechanical prophylaxis: Place SCDs    BP parameters: SAH: Secured aneurysm, no target, increase BP to prevent vasospasm if needed       Vasospasm  Stroke Risk Factor.  Monitored with Daily TCDs.  3/18 TCD negative for vasospasm.  Nimodipine 30mg    IVH (intraventricular hemorrhage)  See Subarachnoid hemorrhage due to ruptured aneurysm for full plan.    Communicating hydrocephalus  EVD placed by NSGY on 3/5, d/c'd on 3/13.  Repeat CTH stable post d/c      Anterior communicating artery aneurysm  Known hx of  Anterior communicating artery aneurysm 2 x 1.7 mm with a wide neck.  Stable.  No intervention    Hypertension  Stroke Risk Factor  SBP <220 given aneurysm now secured, MAP >65  PRN labetalol  24° -178         03/06/2024: Patient s/p EVD placement by NSGY secondary to hydrocephalus, s/p DSA with identification of  right vertebral artery aneurysm and confirmed stable left anterior communicating artery aneurysm, and s/p stent and coil of vertebral artery aneurysm. Patient extubated this morning, pending SLP eval. Patient complains of headache, but was improved with 5ml drained from EVD per nursing staff. Neuro exam stable. Pending ECHO.   03/07/24: Patient stable on SAH pathway with daily TCDs and nimodipine. Exam non-focal with EVD @10 with 250ml out in last 24hrs. Awaiting MRA at this time. On DAPT with subtherapeutic PRU with possible pending switch to brillinta ameliorate.  03/09/2024: No acute events overnight. Stable neuro exam (NIH 1 for chronic leg weakness). Remain on SAH pathway. EVD in place. MRA Brain with no vessel wall enhancement. DAPT switched  to ASA and brilinta for stents; patient tolerating well.   03/10/2024 NAEO, neuro exam stable. EVD remains in place and open. No current complaints/concerns. NSGY following.  03/11/2024 NAEO, neuro exam stable. EVD still in, drain moved to 20 today. TCDs unremarkable thus far. Hyponatremic with Na 132, urine studies ordered.   3/12/2024 EVD clamped. TCDs today w/signs of early vasospasm in the L CHESTER. Neuro exam stable.  3/13/2024 NAEON, neuro exam remains stable. Remains on SAH protocol. No evidence of vasospasm on today's TCD.  3/14/2024 No acute events overnight. Stable neuro exam. EVD d/c'd yesterday. Repeat CTH stable. Continue to remain on SAH protocol. TCD from today with Mild MCA and left CHESTER territory vasospasm. On nimodipine.    3/18/2024 No acute events overnight. Patient completed 14-days monitoring in [facility]. TCD today with no vasospasms. Tolerating diet well. PT/OT recommend low intensity therapy. Patient can discharge home from [facility] and follow up with NSGY and Vascular Neurology in clinic.     STROKE DOCUMENTATION   Acute Stroke Times   Last Known Normal Date: 03/05/24  Unknown Normal Time: Unknown Time  Symptom Onset Date: 03/05/24  Unknown Symptom Onset Time: Unknown Time  Thrombolytic Therapy Recommended: No  Thrombectomy Recommended: No    NIH Scale:  1a. Level of Consciousness: 0-->Alert, keenly responsive  1b. LOC Questions: 0-->Answers both questions correctly  1c. LOC Commands: 0-->Performs both tasks correctly  2. Best Gaze: 0-->Normal  3. Visual: 0-->No visual loss  4. Facial Palsy: 0-->Normal symmetrical movements  5a. Motor Arm, Left: 0-->No drift, limb holds 90 (or 45) degrees for full 10 secs  5b. Motor Arm, Right: 0-->No drift, limb holds 90 (or 45) degrees for full 10 secs  6a. Motor Leg, Left: 0-->No drift, leg holds 30 degree position for full 5 secs  6b. Motor Leg, Right: 0-->No drift, leg holds 30 degree position for full 5 secs  7. Limb Ataxia: 0-->Absent  8. Sensory: 0-->Normal, no  sensory loss  9. Best Language: 0-->No aphasia, normal  10. Dysarthria: 0-->Normal  11. Extinction and Inattention (formerly Neglect): 0-->No abnormality  Total (NIH Stroke Scale): 0       Modified Stefanie Score: 0  [place] Coma Scale:    ABCD2 Score:    [id number] Score:   HAS -BLED Score:   ICH Score:   Hunt & Hill Classification:Grade I     Hemorrhagic change of an Ischemic Stroke: Does this patient have an ischemic stroke with hemorrhagic changes? No     Neurologic Chief Complaint: SAH    Subjective:     Interval History: Patient is seen for follow-up neurological assessment and treatment recommendations: No acute events overnight. Patient completed 14-days monitoring in [facility]. TCD today with no vasospasms. Tolerating diet well. PT/OT recommend low intensity therapy. Patient can discharge home from [facility] and follow up with NSGY and Vascular Neurology in clinic.      HPI, Past Medical, Family, and Social History remains the same as documented in the initial encounter.     Review of Systems   Constitutional:  Negative for chills and fever.   HENT:  Negative for trouble swallowing.    Respiratory:  Negative for cough, chest tightness and shortness of breath.    Cardiovascular:  Negative for chest pain and palpitations.   Gastrointestinal:  Negative for abdominal pain and vomiting.   Genitourinary:  Negative for dysuria.   Musculoskeletal:  Negative for neck pain and neck stiffness.   Neurological:  Negative for dizziness, speech difficulty, weakness, numbness and headaches.   Psychiatric/Behavioral:  Negative for agitation and confusion.      Scheduled Meds:   aspirin  81 mg Oral Daily    gabapentin  300 mg Oral Q8H    LIDOcaine  1 patch Transdermal QHS    melatonin  6 mg Oral Nightly    niMODipine  30 mg Oral Q2H    senna-docusate 8.6-50 mg  1 tablet Oral Daily    ticagrelor  90 mg Oral BID     Continuous Infusions:  PRN Meds:bisacodyL, diazePAM, labetalol, magnesium oxide, magnesium oxide, methocarbamoL,  metoclopramide, ondansetron, oxyCODONE, potassium bicarbonate, potassium bicarbonate, potassium bicarbonate, potassium, sodium phosphates, potassium, sodium phosphates, potassium, sodium phosphates    Objective:     Vital Signs (Most Recent):  Temp: 98.4 °F (36.9 °C) (03/18/24 1500)  Pulse: 73 (03/18/24 1700)  Resp: (!) 25 (03/18/24 1700)  BP: 115/83 (03/18/24 1700)  SpO2: 99 % (03/18/24 1700)  BP Location: Right arm    Vital Signs Range (Last 24H):  Temp:  [97.9 °F (36.6 °C)-98.4 °F (36.9 °C)]   Pulse:  [63-87]   Resp:  [9-25]   BP: ()/(50-83)   SpO2:  [96 %-100 %]   BP Location: Right arm       Physical Exam  Vitals and nursing note reviewed.   HENT:      Head:      Comments: Old EVD site with no signs of infection     Mouth/Throat:      Mouth: Mucous membranes are moist.   Eyes:      General:         Right eye: No discharge.         Left eye: No discharge.      Extraocular Movements: Extraocular movements intact.   Cardiovascular:      Rate and Rhythm: Normal rate.   Pulmonary:      Effort: Pulmonary effort is normal. No respiratory distress.   Abdominal:      General: There is no distension.   Musculoskeletal:      Cervical back: Normal range of motion and neck supple.   Skin:     General: Skin is warm and dry.   Neurological:      Mental Status: He is oriented to person, place, and time.      Motor: No weakness.              Neurological Exam:   LOC: alert  Language: No aphasia  Articulation: No dysarthria  Orientation: Person, Place, Time   Visual Fields: Full  EOM (CN III, IV, VI): Full/intact  Pupils (CN II, III): PERRL  Facial Sensation (CN V): Normal  Facial Movement (CN VII): Symmetric facial expression    Motor: Arm left  Normal 5/5  Leg left  Normal 5/5  Arm right  Normal 5/5  Leg right Normal 5/5  Sensation: Intact to light touch, temperature and vibration    Laboratory:  CMP:   Recent Labs   Lab 03/18/24  0359   CALCIUM 9.2   ALBUMIN 3.1*   PROT 6.0      K 4.5   CO2 22*      BUN 10    CREATININE 0.8   ALKPHOS 39*   ALT 82*   AST 59*   BILITOT 0.3       CBC:   Recent Labs   Lab 03/18/24  0359   WBC 6.48   RBC 4.06*   HGB 12.8*   HCT 38.6*      MCV 95   MCH 31.5*   MCHC 33.2         Diagnostic Results     Brain Imaging     CTH 3/14/2024 - Post EVD removal  Impression:     No new abnormalities are seen following removal of the EVD.  No hydrocephalus or new hemorrhage.      CTH 3/13/2024-newly acquired and reviewed by Vascular Neurology provider. Impression: IVC placement and aneurysm coiling.  The ventricles are mildly decreased in size.  Decreased intraventricular hemorrhage.    CTH 3/5/23:   Impression:  Subarachnoid hemorrhage with blood throughout the basilar cisterns.  Moderate intraventricular blood and mild ventricular prominence.     Vessel Imaging     TCD 3/18/2024  Impression:     No Doppler evidence of significant vasospasm.    TCD 3/14/2024  Impression:        Mild MCA and left CHESTER territory vasospasm.    TCD 3/12/2024 Impression: No significant vasospasm is identified.  Velocities are increasing in the left CHESTER which may represent mild early vasospasm     MRA Brain - 03/08/2024  Impression:  Examination mildly degraded by patient motion artifact.  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  No abnormal vessel wall enhancement.  No residual flow related signal within the recently treated right PICA origin aneurysm.  Prominent enhancement at this site on vessel wall imaging.  This is nonspecific in the post treatment setting but aneurysm is presumed site of rupture given distribution of hemorrhage.  Retrograde filling of the left vertebral artery, patient with known subclavian steal.     CTA head/neck 3/5/23:   Impression: 3 mm left anterior communicating artery aneurysm again identified.  No definite additional aneurysm identified concerning for source of hemorrhage.  Clinical correlation and correlation with conventional angiography recommended.  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 Interval increase distension lateral and 3rd ventricles compatible with developing hydrocephalus. Crowding cerebral sulci at the vertex with additional crowding basilar cisterns with questionable early cerebellar tonsillar herniation.     IR Angio 3/5/24:   Impression:  Anterior communicating artery aneurysm 2 x 1.7 mm with a wide neck. Small irregular aneurysm from the right V 4 vertebral segment just proximal to the posteroinferior cerebellar artery.  This is not well characterized on these views.  Repeat selective angiography is planned for better evaluation.     Cardiac Imaging   TTE 3/8/24:     Left Ventricle: The left ventricle is normal in size. Normal wall thickness. Global hypokinesis present. There is mildly reduced systolic function with a visually estimated ejection fraction of 40 - 45%. There is indeterminate diastolic function.    Right Ventricle: Normal right ventricular cavity size. Wall thickness is normal. Right ventricle wall motion  is normal. Systolic function is normal.    Left Atrium: Left atrium is severely dilated.    Aortic Valve: The valve morphology is clearly abnormal though the number of leaflets is not evident.  There is severe aortic valve stenosis. Aortic valve area by VTI is 0.8 cm². Aortic valve peak velocity is 4.5 m/s. Mean gradient is 55 mmHg. The dimensionless index is 0.17. There is mild aortic regurgitation.    IVC/SVC:  Normal venous pressure at 3 mmHg.    Lindy Mabry DNP  [facility]  Department of Vascular Neurology   [facility] - Neuro Critical Care

## 2024-03-18 NOTE — SUBJECTIVE & OBJECTIVE
Neurologic Chief Complaint: SAH    Subjective:     Interval History: Patient is seen for follow-up neurological assessment and treatment recommendations: No acute events overnight. Patient completed 14-days monitoring in [facility]. TCD today with no vasospasms. Tolerating diet well. PT/OT recommend low intensity therapy. Patient can discharge home from [facility] and follow up with NSGY and Vascular Neurology in clinic.      HPI, Past Medical, Family, and Social History remains the same as documented in the initial encounter.     Review of Systems   Constitutional:  Negative for chills and fever.   HENT:  Negative for trouble swallowing.    Respiratory:  Negative for cough, chest tightness and shortness of breath.    Cardiovascular:  Negative for chest pain and palpitations.   Gastrointestinal:  Negative for abdominal pain and vomiting.   Genitourinary:  Negative for dysuria.   Musculoskeletal:  Negative for neck pain and neck stiffness.   Neurological:  Negative for dizziness, speech difficulty, weakness, numbness and headaches.   Psychiatric/Behavioral:  Negative for agitation and confusion.      Scheduled Meds:   aspirin  81 mg Oral Daily    gabapentin  300 mg Oral Q8H    LIDOcaine  1 patch Transdermal QHS    melatonin  6 mg Oral Nightly    niMODipine  30 mg Oral Q2H    senna-docusate 8.6-50 mg  1 tablet Oral Daily    ticagrelor  90 mg Oral BID     Continuous Infusions:  PRN Meds:bisacodyL, diazePAM, labetalol, magnesium oxide, magnesium oxide, methocarbamoL, metoclopramide, ondansetron, oxyCODONE, potassium bicarbonate, potassium bicarbonate, potassium bicarbonate, potassium, sodium phosphates, potassium, sodium phosphates, potassium, sodium phosphates    Objective:     Vital Signs (Most Recent):  Temp: 98.4 °F (36.9 °C) (03/18/24 1500)  Pulse: 73 (03/18/24 1700)  Resp: (!) 25 (03/18/24 1700)  BP: 115/83 (03/18/24 1700)  SpO2: 99 % (03/18/24 1700)  BP Location: Right arm    Vital Signs Range (Last 24H):  Temp:  [97.9 °F  (36.6 °C)-98.4 °F (36.9 °C)]   Pulse:  [63-87]   Resp:  [9-25]   BP: ()/(50-83)   SpO2:  [96 %-100 %]   BP Location: Right arm       Physical Exam  Vitals and nursing note reviewed.   HENT:      Head:      Comments: Old EVD site with no signs of infection     Mouth/Throat:      Mouth: Mucous membranes are moist.   Eyes:      General:         Right eye: No discharge.         Left eye: No discharge.      Extraocular Movements: Extraocular movements intact.   Cardiovascular:      Rate and Rhythm: Normal rate.   Pulmonary:      Effort: Pulmonary effort is normal. No respiratory distress.   Abdominal:      General: There is no distension.   Musculoskeletal:      Cervical back: Normal range of motion and neck supple.   Skin:     General: Skin is warm and dry.   Neurological:      Mental Status: He is oriented to person, place, and time.      Motor: No weakness.              Neurological Exam:   LOC: alert  Language: No aphasia  Articulation: No dysarthria  Orientation: Person, Place, Time   Visual Fields: Full  EOM (CN III, IV, VI): Full/intact  Pupils (CN II, III): PERRL  Facial Sensation (CN V): Normal  Facial Movement (CN VII): Symmetric facial expression    Motor: Arm left  Normal 5/5  Leg left  Normal 5/5  Arm right  Normal 5/5  Leg right Normal 5/5  Sensation: Intact to light touch, temperature and vibration    Laboratory:  CMP:   Recent Labs   Lab 03/18/24  0359   CALCIUM 9.2   ALBUMIN 3.1*   PROT 6.0      K 4.5   CO2 22*      BUN 10   CREATININE 0.8   ALKPHOS 39*   ALT 82*   AST 59*   BILITOT 0.3       CBC:   Recent Labs   Lab 03/18/24  0359   WBC 6.48   RBC 4.06*   HGB 12.8*   HCT 38.6*      MCV 95   MCH 31.5*   MCHC 33.2         Diagnostic Results     Brain Imaging     CTH 3/14/2024 - Post EVD removal  Impression:     No new abnormalities are seen following removal of the EVD.  No hydrocephalus or new hemorrhage.      CTH 3/13/2024-newly acquired and reviewed by Vascular Neurology  provider. Impression: IVC placement and aneurysm coiling.  The ventricles are mildly decreased in size.  Decreased intraventricular hemorrhage.    CTH 3/5/23:   Impression:  Subarachnoid hemorrhage with blood throughout the basilar cisterns.  Moderate intraventricular blood and mild ventricular prominence.     Vessel Imaging     TCD 3/18/2024  Impression:     No Doppler evidence of significant vasospasm.    TCD 3/14/2024  Impression:        Mild MCA and left CHESTER territory vasospasm.    TCD 3/12/2024 Impression: No significant vasospasm is identified.  Velocities are increasing in the left CHESTER which may represent mild early vasospasm     MRA Brain - 03/08/2024  Impression:  Examination mildly degraded by patient motion artifact.  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  No abnormal vessel wall enhancement.  No residual flow related signal within the recently treated right PICA origin aneurysm.  Prominent enhancement at this site on vessel wall imaging.  This is nonspecific in the post treatment setting but aneurysm is presumed site of rupture given distribution of hemorrhage.  Retrograde filling of the left vertebral artery, patient with known subclavian steal.     CTA head/neck 3/5/23:   Impression: 3 mm left anterior communicating artery aneurysm again identified.  No definite additional aneurysm identified concerning for source of hemorrhage.  Clinical correlation and correlation with conventional angiography recommended. 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 Please note there is prominence of the ascending aorta and unusual configuration of the descending thoracic aorta with slight truncated configuration which may  represent usual developmental hypoplasia.volving subarachnoid and intraventricular hemorrhages. Interval increase distension lateral and 3rd ventricles compatible with developing hydrocephalus. Crowding cerebral sulci at the vertex with additional crowding basilar cisterns with questionable early cerebellar tonsillar herniation.     IR Angio 3/5/24:   Impression:  Anterior communicating artery aneurysm 2 x 1.7 mm with a wide neck. Small irregular aneurysm from the right V 4 vertebral segment just proximal to the posteroinferior cerebellar artery.  This is not well characterized on these views.  Repeat selective angiography is planned for better evaluation.     Cardiac Imaging   TTE 3/8/24:     Left Ventricle: The left ventricle is normal in size. Normal wall thickness. Global hypokinesis present. There is mildly reduced systolic function with a visually estimated ejection fraction of 40 - 45%. There is indeterminate diastolic function.    Right Ventricle: Normal right ventricular cavity size. Wall thickness is normal. Right ventricle wall motion  is normal. Systolic function is normal.    Left Atrium: Left atrium is severely dilated.    Aortic Valve: The valve morphology is clearly abnormal though the number of leaflets is not evident.  There is severe aortic valve stenosis. Aortic valve area by VTI is 0.8 cm². Aortic valve peak velocity is 4.5 m/s. Mean gradient is 55 mmHg. The dimensionless index is 0.17. There is mild aortic regurgitation.    IVC/SVC: Normal venous pressure at 3 mmHg.

## 2024-03-18 NOTE — ASSESSMENT & PLAN NOTE
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  Stable.  No intervention

## 2024-03-18 NOTE — PLAN OF CARE
Goals met. Pt is at baseline with functional mobility and does not have further therapy needs. D/c from PT services.    Problem: Physical Therapy  Goal: Physical Therapy Goal  Description: Goals to be met by: 4/10/2024     Patient will increase functional independence with mobility by performin. Supine to sit with [place] - met 3/18/2024  2. Sit to supine with [place] - met 3/18/2024  3. Sit to stand transfer with [place] - met 3/18/2024  4. Gait  x 300 feet with Supervision using LRAD. - met 3/18/2024  5. Ascend/descend 4 stair with bilateral Handrails Stand-by Assistance using LRAD. - met 3/18/2024  6. Lower extremity exercise program x15 reps per handout, with independence    Outcome: Met     3/18/2024

## 2024-03-18 NOTE — PT/OT/SLP PROGRESS
"Physical Therapy Treatment and Discharge    Patient Name: Kalia Pacheco   MRN: [id number]    Recommendations:     Discharge Recommendations: No Therapy Indicated  Discharge Equipment Recommendations: none  Barriers to discharge: None    Assessment:     Kalia Pacheco is a 33 y.o. male admitted with a medical diagnosis of Subarachnoid hemorrhage due to ruptured aneurysm. He presents with the following impairments/functional limitations: decreased lower extremity function, decreased safety awareness, gait instability, impaired endurance. Patient is able to complete all visualized functional mobility with independence. They are functioning at their baseline and no longer require acute care physical therapy.    Rehab Prognosis: Good; patient continues to benefit from acute skilled PT services to address these deficits and reach maximum level of function.  Recent Surgery: * No surgery found *      Plan:     During this hospitalization, patient to be seen 3 x/week to address the identified rehab impairments via gait training, therapeutic activities, therapeutic exercises, neuromuscular re-education and progress toward the following goals:    Plan of Care Expires:  04/10/24    Subjective     Chief Complaint: None verbalized  Patient/Family Comments/Goals: "I'm ready to get out of here. Can you tell them I'm good to go."  Pain/Comfort:  Pain Rating 1: 0/10    Objective:     Communicated with RN prior to session. Patient found up in chair with blood pressure cuff, pulse ox (continuous), telemetry upon PT entry to room.     General Precautions: Standard, fall  Orthopedic Precautions: N/A  Braces: N/A    Functional Mobility:  Bed Mobility:    Seated in chair at start of session and returned to chair  Transfers:    Sit to Stand: independence with no AD  Gait: Patient ambulated 600' with no AD and independence.  Patient demonstrates occasional unsteady gait, decreased step length, wide base of support, decreased weight " shift, flexed posture, and decreased wale.   Patient required cues for upright posture, gluteal activation, sequencing, increased step size, and increased foot clearance.  All lines remained intact throughout ambulation trial, gait belt utilized, portable monitor utilized.    AM-PAC 6 CLICK MOBILITY  Turning over in bed (including adjusting bedclothes, sheets and blankets)?: 4  Sitting down on and standing up from a chair with arms (e.g., wheelchair, bedside commode, etc.): 4  Moving from lying on back to sitting on the side of the bed?: 4  Moving to and from a bed to a chair (including a wheelchair)?: 4  Need to walk in hospital room?: 4  Climbing 3-5 steps with a railing?: 3  Basic Mobility Total Score: 23     Therapeutic Activities and Exercises:  Patient educated on role of acute care PT and PT POC, safety while in hospital including calling nurse for mobility, and call light usage  Pt educated on the effects of bed rest and the importance of OOB activity. Pt encouraged to sit UIC majority of day as tolerated and continue daily ambulation with nursing assist. Pt verbalized understanding.  Answered all questions within PT scope of practice and addressed functional mobility concerns.    Patient left up in chair with all lines intact, call button in reach, and RN notified.    GOALS:   Multidisciplinary Problems       Physical Therapy Goals       Not on file              Multidisciplinary Problems (Resolved)          Problem: Physical Therapy    Goal Priority Disciplines Outcome Goal Variances Interventions   Physical Therapy Goal   (Resolved)     PT, PT/OT Met     Description: Goals to be met by: 4/10/2024     Patient will increase functional independence with mobility by performin. Supine to sit with [place] - met 3/18/2024  2. Sit to supine with [place] - met 3/18/2024  3. Sit to stand transfer with [place] - met 3/18/2024  4. Gait  x 300 feet with Supervision using LRAD. - met  3/18/2024  5. Ascend/descend 4 stair with bilateral Handrails Stand-by Assistance using LRAD. - met 3/18/2024  6. Lower extremity exercise program x15 reps per handout, with independence                         Time Tracking:     PT Received On: 03/18/24  PT Start Time: 1248     PT Stop Time: 1259  PT Total Time (min): 11 min     Billable Minutes: Therapeutic Exercise 11      Treatment Type: Treatment  PT/PTA: PT     Number of PTA visits since last PT visit: 0     03/18/2024

## 2024-03-18 NOTE — ASSESSMENT & PLAN NOTE
Kalia Pacheco is a 34yo man with history of known CHESTER aneurysm who presented to [place] with headache and nausea. CTH demonstrated diffuse SAH throughout the basal cisterns HH and he was transferred to [facility] for close monitoring in the Neuro ICU and Neurosurgical consultation.     R frontal EVD was placed on arrival on 3/6 and patient was taken to IR for angiogram, with no intervention upon Acomm aneurysm. Upon further review of DSA, a vertebral artery aneurysm was noted and patient was brought back to IR for a right V4/PICA irregular aneurysm stent assisted coiling.     Imaging reviewed:  CTH 3/5: Diffuse basal cistern SAH with IVH   MRI with contrast not suggestive of vessel wall enhancement to Acomm  CTA 3/5: 3mm ant projecting L Acomm, no R A1, R pcomm feeding posterior circulation   DSA 3/5: EVD in position, anteriosuperiorly projecting small L Acomm, R V4 irregular aneurysm not well visualized    DSA +embo 3/5: stent assisted coiling of R V4/PICA irregularly shaped anuerysm, 1/4 lobules still filling   CTH 3/13: EVD into 3rd, vents smaller   CTH 3/14: stable post EVD pull     Continue ICU care   Continue neuro checks   Please call Neurosurgery with any change in exam   EVD pulled and CT scan post fall stable  AEDs per ICU team   TCDs for 14 days   Nimotop for 21 days  Continue dual antiplatelets  Satting well on room air   Keep blood pressure below 180   Strict in's and out and keep even to positive 500 cc   Advance diet as tolerated   PTOT  SubQ heparin for DVT prophylaxis   Further care per ICU team   Neurosurgical continue to follow up  Continue to follow clinically and notify NSGY immediately if any neurostatus change   Stable for stepdown to floor

## 2024-03-18 NOTE — PLAN OF CARE
"[facility] Care Plan    POC reviewed with Kalia Pacheco and family at 0300. Pt verbalized understanding. Questions and concerns addressed. No acute events overnight. Pt progressing toward goals. Will continue to monitor. See below and flowsheets for full assessment and VS info.           Is this a stroke patient? yes- Stroke booklet reviewed with patient and family, risk factors identified for patient and stroke booklet remains at bedside for ongoing education.     Care individualization: Movies    Neuro:  [place] Coma Scale  Best Eye Response: 4-->(E4) spontaneous  Best Motor Response: 6-->(M6) obeys commands  Best Verbal Response: 5-->(V5) oriented  [place] Coma Scale Score: 15  Assessment Qualifiers: patient not sedated/intubated, no eye obstruction present  Pupil PERRLA: yes     24hr Temp:  [97.6 °F (36.4 °C)-98.4 °F (36.9 °C)]     CV:   Rhythm: normal sinus rhythm  BP goals:   SBP < 220  MAP > 65    Resp:      Vent Mode: Spont  Set Rate: 16 BPM  Oxygen Concentration (%): 40  Vt Set: 500 mL  PEEP/CPAP: 5 cmH20  Pressure Support: 8 cmH20    Plan: N/A    GI/:     Diet/Nutrition Received: regular  Last Bowel Movement: 03/17/24  Voiding Characteristics: voids spontaneously without difficulty    Intake/Output Summary (Last 24 hours) at 3/18/2024 0641  Last data filed at 3/18/2024 0600  Gross per 24 hour   Intake 300 ml   Output 2000 ml   Net -1700 ml     Unmeasured Output  Urine Occurrence: 1  Stool Occurrence: 1  Emesis Occurrence: 1  Pad Count: 3    Labs/Accuchecks:  Recent Labs   Lab 03/18/24  0359   WBC 6.48   RBC 4.06*   HGB 12.8*   HCT 38.6*         Recent Labs   Lab 03/18/24  0359      K 4.5   CO2 22*      BUN 10   CREATININE 0.8   ALKPHOS 39*   ALT 82*   AST 59*   BILITOT 0.3    No results for input(s): "PROTIME", "INR", "APTT", "HEPANTIXA" in the last 168 hours. No results for input(s): "CPK", "CPKMB", "TROPONINI", "MB" in the last 168 hours.    Electrolytes: N/A - electrolytes " WDL  Accuchecks: none    Gtts:      LDA/Wounds:    Nurses Note -- 4 Eyes    Is there altered skin present? no     Prevention Measures Documented    Second RN/Staff Member:  Nain    Restraints:   Restraint Order  Length of Order: Order good for next 24 hours or when removed.  Date that the current order will : 24  Time that the current order will : 1529  Order Upon Application: Yes    WCTM       Problem: Adult Inpatient Plan of Care  Goal: Plan of Care Review  Outcome: Ongoing, Not Progressing  Goal: Patient-Specific Goal (Individualized)  Description: Admit Date: 3/5/2024    Subarachnoid hemorrhage    Past Medical History:  No date: ADHD (attention deficit hyperactivity disorder)  No date: Aneurysm  No date: Aorta coarctation  No date: Arthritis  No date: Coarctation of aorta  No date: Depression  No date: GERD (gastroesophageal reflux disease)  No date: History of alcohol abuse  No date: Hypertension  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 Pt likes dim lights.    Restraints:              Outcome: Ongoing, Not Progressing  Goal: Absence of Hospital-Acquired Illness or Injury  Outcome: Ongoing, Not Progressing  Goal: Optimal Comfort and Wellbeing  Outcome: Ongoing, Not Progressing  Goal: Readiness for Transition of Care  Outcome: Ongoing, Not Progressing     Problem: Adjustment to Illness (Stroke, Hemorrhagic)  Goal: Optimal Coping  Outcome: Ongoing, Not Progressing     Problem: Cerebral Tissue Perfusion (Stroke, Hemorrhagic)  Goal: Optimal Cerebral Tissue Perfusion  Outcome: Ongoing, Not Progressing

## 2024-03-18 NOTE — PROGRESS NOTES
[facility]  Neurocritical Care  Progress Note    Admit Date: 3/5/2024  Service Date: 03/18/2024  Length of Stay: 13    Subjective:     Chief Complaint: Subarachnoid hemorrhage due to ruptured aneurysm    History of Present Illness: The patient is a 32-year-old man with a past medical history of ADHD, EtOH use and abuse, arthritis, hypertension, coarctation of the aorta (status post repair), PDA, VSD status post repair x2.  History of Legg-Perthes disease, and intracranial hemorrhage in May 2022.  presented to [facility] this AM with headache and nausea that began 2 days ago and acutely worsened while he was at work this morning. CTH demonstrated HH1mF4 SAH. S/p EVD placement by Nsx. Was taken to IR for angiography, but the vessel was not sercured during the procedure, the patient was returned to the NSICU, intubated and sedated, [facility] neurosurgery pending.     Hospital Course: 03/06/2024 Overnight, mild bradycardia and soft BPs. Weaned off fentanyl, weaning precedex. Extubated to NC this morning.   03/07/2024 AF, soft BPs. TCD with no signs of vasospasm. Neuro exam stable. Persistent HA, waxing/waning in severity. EVD with 289mL output. MRA pending.   03/08/2024 AF, HDS on RA. TCD no signs of vasospasm. Neuro exam stable. HA. EVD open @10 w/ 250mL output. MRA today.  03/09/2024 AF. BP soft. RA. TTE with reduced EF (40-45%) and mild aortic regurg. TCD w/ no signs of vasospasm. EVD open @10 w/ 224mL output. Started brillinta.   3/10: KUB, robaxin, encourage PO  3/11/2024: no free water intake, send Urine studies, TCD no vasospasm, Follow Na, discussed with IR- aneurysm secured and now SBP <220  03/12/2024: mild hyponatremia, mild hypotension fluid responsive, evd clamped  3/13/24: Pull EVD per NSGY  3/14/2024 Q2h neuros, added valium 2 mg q6h prn for spasms   3/15/2024 TCDs pending, 3/14 TCD with mild MCA/CHESTER vasospasm, NS 1 L bolus x 1  03/16/2024 naeon, mild pi elevation no exam change  3/17/2024  NAEOD  3/18/2024: Stable. NSGY recommended step down    Interval History:      Review of Systems   Constitutional:  Negative for fatigue and fever.   HENT:  Negative for congestion and facial swelling.    Eyes:  Negative for photophobia and visual disturbance.   Respiratory:  Negative for choking and shortness of breath.    Cardiovascular:  Negative for chest pain and palpitations.   Gastrointestinal:  Negative for abdominal pain, constipation and diarrhea.   Endocrine: Negative for polyuria.   Genitourinary:  Negative for decreased urine volume and enuresis.   Musculoskeletal:  Negative for back pain, neck pain and neck stiffness.   Skin:  Negative for pallor and rash.   Neurological:  Negative for seizures, syncope, speech difficulty, weakness and numbness.   Hematological:  Does not bruise/bleed easily.   Psychiatric/Behavioral:  Negative for confusion, hallucinations and sleep disturbance.      Objective:     Vitals:  Temp: 98.4 °F (36.9 °C)  Pulse: 73  Rhythm: normal sinus rhythm  BP: 115/83  MAP (mmHg): 94  Resp: (!) 25  SpO2: 99 %    Temp  Min: 97.9 °F (36.6 °C)  Max: 98.4 °F (36.9 °C)  Pulse  Min: 63  Max: 87  BP  Min: 95/63  Max: 156/66  MAP (mmHg)  Min: 72  Max: 100  Resp  Min: 9  Max: 25  SpO2  Min: 96 %  Max: 100 %    03/17 0701 - 03/18 0700  In: 300 [P.O.:300]  Out: 2000 [Urine:2000]   Unmeasured Output  Urine Occurrence: 1  Stool Occurrence: 1  Emesis Occurrence: 1  Pad Count: 3        Physical Exam  Vitals and nursing note reviewed.     Gen: AxOx3, well nourished, appears stated age, no pallor, no jaundice, appears well hydrated  Eye: EOMI, no scleral icterus, no periorbital edema or ecchymosis  Head: Normocephalic, atraumatic, no lesions, scalp appears normal  ENT: Neck supple, no stridor, no masses, no drooling or voice changes  CVS: All distal pulses intact with normal rate and rhythm, no JVD, normal S1/S2, no murmur  Pulm: Normal breath sounds, no wheezes, rales or rhonchi, no increased work of  breathing  Abd:  Nondistended, soft, nontender, no organomegaly, no CVAT  Ext: No edema, no lesions, rashes, or deformity  Neuro: GCS15, moving all extremities, gait intact, face grossly symmetric  Psych: normal affect, cooperative, well groomed, makes good eye contact         Medications:  Continuous Scheduledaspirin, 81 mg, Daily  gabapentin, 300 mg, Q8H  LIDOcaine, 1 patch, QHS  melatonin, 6 mg, Nightly  niMODipine, 30 mg, Q2H  senna-docusate 8.6-50 mg, 1 tablet, Daily  ticagrelor, 90 mg, BID    PRNbisacodyL, 10 mg, Daily PRN  diazePAM, 2 mg, Q6H PRN  labetalol, 10 mg, Q15 Min PRN  magnesium oxide, 800 mg, PRN  magnesium oxide, 800 mg, PRN  methocarbamoL, 500 mg, QID PRN  metoclopramide, 10 mg, Q6H PRN  ondansetron, 4 mg, Q6H PRN  oxyCODONE, 5 mg, Q6H PRN  potassium bicarbonate, 35 mEq, PRN  potassium bicarbonate, 50 mEq, PRN  potassium bicarbonate, 60 mEq, PRN  potassium, sodium phosphates, 2 packet, PRN  potassium, sodium phosphates, 2 packet, PRN  potassium, sodium phosphates, 2 packet, PRN      Today I personally reviewed pertinent medications, lines/drains/airways, imaging, laboratory results,  Diet  Diet Adult Regular (IDDSI Level 7) Thin; Standard Tray  Diet Adult Regular (IDDSI Level 7) Thin; Standard Tray    Assessment/Plan:     Neuro  * Subarachnoid hemorrhage due to ruptured aneurysm  Hh2 mf4 R vert anx rupture, S/p stent coil of R v4 anx 3/5, unruptured acomm anx not secured  Obs hydro s/p evd, removed 3/13  Hyponatremia, normal urine/serum osm   -Cont spasm watch, nimotop  -eunatremia (cont fw restriction today), permissive htn, euvolemia, avoid fever  -q2 neuro checks  -pt/ot, oob, diet  3/17/2024 TCDs neg  3/18/24: Ready for step down    IVH (intraventricular hemorrhage)  EVD removed per NSGY  Monitor scans    Communicating hydrocephalus  EVD out 3/13  Stable    Anterior communicating artery aneurysm  Unsecure  - continue ASA/brilnta per neuro IR  - based on bleed pattern believe vertebral aneurysm  to be the ruptured aneurysm, no plan for intervention upon Acomm at this point  - MRI with contrast not suggestive of vessel wall enhancement to Acomm  3/15/2024 TCDs pending, 3/14 TCD with mild MCA/CHESTER vasospasm, NS 1 L bolus x 1  3/17/2024 TCDs neg    Cardiac/Vascular  Vasospasm  3/15/2024 TCDs pending, 3/14 TCD with mild MCA/CHESTER vasospasm, NS 1 L bolus x 1  3/17/2024 TCDs neg    Hypertension  -continue current regimen    Other  Spasm  Valium 2 mg q6h prn          The patient is being Prophylaxed for:  Venous Thromboembolism with: None  Stress Ulcer with: None  Ventilator Pneumonia with: not applicable    Activity Orders            Diet Adult Regular (IDDSI Level 7) Thin; Standard Tray: Regular starting at 03/11 1118    Turn patient starting at 03/05 1200    Elevate HOB starting at 03/05 1119          Full Code    Daryn Delarosa MD  Neurocritical Care  [facility] Critical [facility]

## 2024-03-18 NOTE — SUBJECTIVE & OBJECTIVE
Interval History: 3/18: Afsaneh SHAFFER wnl. stable for stepdown    Medications:  Continuous Infusions:  Scheduled Meds:   aspirin  81 mg Oral Daily    gabapentin  300 mg Oral Q8H    LIDOcaine  1 patch Transdermal QHS    melatonin  6 mg Oral Nightly    niMODipine  30 mg Oral Q2H    senna-docusate 8.6-50 mg  1 tablet Oral Daily    ticagrelor  90 mg Oral BID     PRN Meds:bisacodyL, diazePAM, labetalol, magnesium oxide, magnesium oxide, methocarbamoL, metoclopramide, ondansetron, oxyCODONE, potassium bicarbonate, potassium bicarbonate, potassium bicarbonate, potassium, sodium phosphates, potassium, sodium phosphates, potassium, sodium phosphates     Review of Systems  Objective:     Weight: 106.6 kg (235 lb)  Body mass index is 35.73 kg/m².  Vital Signs (Most Recent):  Temp: 98.4 °F (36.9 °C) (03/18/24 1100)  Pulse: 74 (03/18/24 1620)  Resp: (!) 24 (03/18/24 1600)  BP: 132/61 (03/18/24 1600)  SpO2: 100 % (03/18/24 1600) Vital Signs (24h Range):  Temp:  [97.9 °F (36.6 °C)-98.4 °F (36.9 °C)] 98.4 °F (36.9 °C)  Pulse:  [63-87] 74  Resp:  [9-24] 24  SpO2:  [96 %-100 %] 100 %  BP: ()/(50-78) 132/61     Date 03/18/24 0700 - 03/19/24 0659   Shift 9488-1721 4929-1048 5646-2390 24 Hour Total   INTAKE   P.O. 600 200  800   Shift Total(mL/kg) 600(5.6) 200(1.9)  800(7.5)   OUTPUT   Urine(mL/kg/hr) 500(0.6) 400  900   Shift Total(mL/kg) 500(4.7) 400(3.8)  900(8.4)   Weight (kg) 106.6 106.6 106.6 106.6              Resp Rate Total:  [14 br/min-16 br/min] 16 br/min                Physical Exam         Neurosurgery Physical Exam    General: well developed, well nourished, no distress.   HEENT: normocephalic, atraumatic  CV: regular rate   Pulmonary: normal respirations, no signs of respiratory distress  Abdomen: soft, non-distended, not tender to palpation  Skin: Skin is warm, dry and intact  Heme: no bruising  Neuro:   Mental Status: AO x4, no aphasia, no dysarthria   CN: PERRL, EOMI, VF intact to confrontation, sensation intact  "bilaterally, eyebrow raise and grimace symmetric, tongue midline  Motor: moves all extremities spontaneously, full strength throughout, no pronator drift   Sensory: intact to light touch throughout  Cerebellar: finger to nose intact bilaterally  Reflexes: -Patterson's, -Babinski, no clonus. Patellar: 2+ bilaterally      Incision: EVD site Clean, dry, intact. No drainage or TTP.      Significant Labs:  Recent Labs   Lab 03/17/24  0027 03/18/24  0359   GLU 89 89   * 137   K 4.3 4.5    107   CO2 23 22*   BUN 10 10   CREATININE 0.9 0.8   CALCIUM 9.2 9.2   MG 1.9 1.9     Recent Labs   Lab 03/17/24 0027 03/18/24  0359   WBC 7.35 6.48   HGB 12.9* 12.8*   HCT 39.5* 38.6*    305     No results for input(s): "LABPT", "INR", "APTT" in the last 48 hours.  Microbiology Results (last 7 days)       ** No results found for the last 168 hours. **          All pertinent labs from the last 24 hours have been reviewed.    Significant Diagnostics:  CT: No results found in the last 24 hours.  MRI: No results found in the last 24 hours.  "

## 2024-03-18 NOTE — PT/OT/SLP PROGRESS
"Occupational Therapy   Treatment    Name: Kalia Pacheco  MRN: [id number]  Admitting Diagnosis:  Subarachnoid hemorrhage due to ruptured aneurysm       Recommendations:     Discharge Recommendations: Low Intensity Therapy  Discharge Equipment Recommendations:  none  Barriers to discharge:  None    Assessment:     Kalia Pacheco is a 33 y.o. male with a medical diagnosis of Subarachnoid hemorrhage due to ruptured aneurysm.  He presents with performance deficits affecting function are impaired self care skills, impaired functional mobility.     Rehab Prognosis:  Good; patient would benefit from acute skilled OT services to address these deficits and reach maximum level of function.       Plan:     Patient to be seen 4 x/week to address the above listed problems via self-care/home management, therapeutic activities, neuromuscular re-education  Plan of Care Expires: 04/03/24  Plan of Care Reviewed with: patient    Subjective     Patient:  "I am so much better."   Pain/Comfort:  Pain Rating 1: 0/10  Pain Rating Post-Intervention 1: 0/10    Objective:     Communicated with: Nurse prior to session.  Patient found supine with telemetry, pulse ox (continuous) upon OT entry to room.    General Precautions: Standard, aspiration, fall    Orthopedic Precautions:N/A  Braces: N/A  Respiratory Status: Room air     Occupational Performance:     Bed Mobility:    Patient completed Rolling/Turning to Left with  modified independence  Patient completed Rolling/Turning to Right with modified independence  Patient completed Supine to Sit with modified independence  Patient completed Sit to Supine with modified independence     Functional Mobility/Transfers:  Patient completed Sit <> Stand Transfer with supervision  with  no assistive device   Functional Mobility: Supervision ambulating within the room    Activities of Daily Living:  Feeding:  modified independence    Grooming: supervision    Upper Body Dressing: modified " independence    Lower Body Dressing: supervision      [facility] 6 Click ADL: 20    Treatment & Education:  Patient alert and oriented x 3; able to follow 4/4 one step commands.  Patient attentive and interactive throughout the session.      Patient left supine with all lines intact, call button in reach, and bed alarm on    GOALS:   Multidisciplinary Problems       Occupational Therapy Goals          Problem: Occupational Therapy    Goal Priority Disciplines Outcome Interventions   Occupational Therapy Goal     OT, PT/OT Ongoing, Progressing    Description: Goals set 3/7 with an expiration date, 4/4:  Patient will increase functional independence with ADLs by performing:    Patient will demonstrate rolling to the right with modified independence.  Not met   Patient will demonstrate rolling to the left with modified independence.   Not met  Patient will demonstrate supine -sit with modified independence.   Not met  Patient will demonstrate stand pivot transfers with modified independence.   Not met  Patient will demonstrate grooming while standing with modified independence.   Not met  Patient will demonstrate upper body dressing with modified independence while seated EOB.   Not met  Patient will demonstrate lower body dressing with modified independence while seated EOB.   Not met  Patient will demonstrate toileting with modified independence.   Not met  Patient will demonstrate bathing while seated EOB with modified independence   Not met  Patient's family / caregiver will demonstrate independence and safety with assisting patient with self-care skills and functional mobility.     Not met                           Time Tracking:     OT Date of Treatment: 03/18/24  OT Start Time: 0540  OT Stop Time: 0603  OT Total Time (min): 23 min    Billable Minutes:Self Care/Home Management 23    OT/ZULEYMA: OT          3/18/2024

## 2024-03-18 NOTE — SUBJECTIVE & OBJECTIVE
Interval History:      Review of Systems   Constitutional:  Negative for fatigue and fever.   HENT:  Negative for congestion and facial swelling.    Eyes:  Negative for photophobia and visual disturbance.   Respiratory:  Negative for choking and shortness of breath.    Cardiovascular:  Negative for chest pain and palpitations.   Gastrointestinal:  Negative for abdominal pain, constipation and diarrhea.   Endocrine: Negative for polyuria.   Genitourinary:  Negative for decreased urine volume and enuresis.   Musculoskeletal:  Negative for back pain, neck pain and neck stiffness.   Skin:  Negative for pallor and rash.   Neurological:  Negative for seizures, syncope, speech difficulty, weakness and numbness.   Hematological:  Does not bruise/bleed easily.   Psychiatric/Behavioral:  Negative for confusion, hallucinations and sleep disturbance.      Objective:     Vitals:  Temp: 98.4 °F (36.9 °C)  Pulse: 73  Rhythm: normal sinus rhythm  BP: 115/83  MAP (mmHg): 94  Resp: (!) 25  SpO2: 99 %    Temp  Min: 97.9 °F (36.6 °C)  Max: 98.4 °F (36.9 °C)  Pulse  Min: 63  Max: 87  BP  Min: 95/63  Max: 156/66  MAP (mmHg)  Min: 72  Max: 100  Resp  Min: 9  Max: 25  SpO2  Min: 96 %  Max: 100 %    03/17 0701 - 03/18 0700  In: 300 [P.O.:300]  Out: 2000 [Urine:2000]   Unmeasured Output  Urine Occurrence: 1  Stool Occurrence: 1  Emesis Occurrence: 1  Pad Count: 3        Physical Exam  Vitals and nursing note reviewed.     Gen: AxOx3, well nourished, appears stated age, no pallor, no jaundice, appears well hydrated  Eye: EOMI, no scleral icterus, no periorbital edema or ecchymosis  Head: Normocephalic, atraumatic, no lesions, scalp appears normal  ENT: Neck supple, no stridor, no masses, no drooling or voice changes  CVS: All distal pulses intact with normal rate and rhythm, no JVD, normal S1/S2, no murmur  Pulm: Normal breath sounds, no wheezes, rales or rhonchi, no increased work of breathing  Abd:  Nondistended, soft, nontender, no  organomegaly, no CVAT  Ext: No edema, no lesions, rashes, or deformity  Neuro: GCS15, moving all extremities, gait intact, face grossly symmetric  Psych: normal affect, cooperative, well groomed, makes good eye contact         Medications:  Continuous Scheduledaspirin, 81 mg, Daily  gabapentin, 300 mg, Q8H  LIDOcaine, 1 patch, QHS  melatonin, 6 mg, Nightly  niMODipine, 30 mg, Q2H  senna-docusate 8.6-50 mg, 1 tablet, Daily  ticagrelor, 90 mg, BID    PRNbisacodyL, 10 mg, Daily PRN  diazePAM, 2 mg, Q6H PRN  labetalol, 10 mg, Q15 Min PRN  magnesium oxide, 800 mg, PRN  magnesium oxide, 800 mg, PRN  methocarbamoL, 500 mg, QID PRN  metoclopramide, 10 mg, Q6H PRN  ondansetron, 4 mg, Q6H PRN  oxyCODONE, 5 mg, Q6H PRN  potassium bicarbonate, 35 mEq, PRN  potassium bicarbonate, 50 mEq, PRN  potassium bicarbonate, 60 mEq, PRN  potassium, sodium phosphates, 2 packet, PRN  potassium, sodium phosphates, 2 packet, PRN  potassium, sodium phosphates, 2 packet, PRN      Today I personally reviewed pertinent medications, lines/drains/airways, imaging, laboratory results,  Diet  Diet Adult Regular (IDDSI Level 7) Thin; Standard Tray  Diet Adult Regular (IDDSI Level 7) Thin; Standard Tray

## 2024-03-18 NOTE — PROGRESS NOTES
[facility] Neuro [facility] Care  Neurosurgery  Progress Note    Subjective:     History of Present Illness: Kalia Pacheco is a 32yo man with history of known CHESTER aneurysm, not on any AP/AC who presented to [facility] this AM with headache and nausea that began 2 days ago and acutely worsened while he was at work this morning. He also reports blurry vision and has had multiple episodes of emesis. Denies any inciting factors. CTH reveals diffuse subarachnoid hemorrhage throughout the basal cisterns, HH score 1. [place] [facility] [place] and Neurosurgical management.     Post-Op Info:  * No surgery found *       Interval History: 3/18: Afsaneh SHAFFER wnl. stable for stepdown    Medications:  Continuous Infusions:  Scheduled Meds:   aspirin  81 mg Oral Daily    gabapentin  300 mg Oral Q8H    LIDOcaine  1 patch Transdermal QHS    melatonin  6 mg Oral Nightly    niMODipine  30 mg Oral Q2H    senna-docusate 8.6-50 mg  1 tablet Oral Daily    ticagrelor  90 mg Oral BID     PRN Meds:bisacodyL, diazePAM, labetalol, magnesium oxide, magnesium oxide, methocarbamoL, metoclopramide, ondansetron, oxyCODONE, potassium bicarbonate, potassium bicarbonate, potassium bicarbonate, potassium, sodium phosphates, potassium, sodium phosphates, potassium, sodium phosphates     Review of Systems  Objective:     Weight: 106.6 kg (235 lb)  Body mass index is 35.73 kg/m².  Vital Signs (Most Recent):  Temp: 98.4 °F (36.9 °C) (03/18/24 1100)  Pulse: 74 (03/18/24 1620)  Resp: (!) 24 (03/18/24 1600)  BP: 132/61 (03/18/24 1600)  SpO2: 100 % (03/18/24 1600) Vital Signs (24h Range):  Temp:  [97.9 °F (36.6 °C)-98.4 °F (36.9 °C)] 98.4 °F (36.9 °C)  Pulse:  [63-87] 74  Resp:  [9-24] 24  SpO2:  [96 %-100 %] 100 %  BP: ()/(50-78) 132/61     Date 03/18/24 0700 - 03/19/24 0659   Shift 5172-7864 3688-1025 9558-9055 24 Hour Total   INTAKE   P.O. 600 200  800   Shift Total(mL/kg) 600(5.6) 200(1.9)  800(7.5)   OUTPUT  "  Urine(mL/kg/hr) 500(0.6) 400  900   Shift Total(mL/kg) 500(4.7) 400(3.8)  900(8.4)   Weight (kg) 106.6 106.6 106.6 106.6              Resp Rate Total:  [14 br/min-16 br/min] 16 br/min                Physical Exam         Neurosurgery Physical Exam    General: well developed, well nourished, no distress.   HEENT: normocephalic, atraumatic  CV: regular rate   Pulmonary: normal respirations, no signs of respiratory distress  Abdomen: soft, non-distended, not tender to palpation  Skin: Skin is warm, dry and intact  Heme: no bruising  Neuro:   Mental Status: AO x4, no aphasia, no dysarthria   CN: PERRL, EOMI, VF intact to confrontation, sensation intact bilaterally, eyebrow raise and grimace symmetric, tongue midline  Motor: moves all extremities spontaneously, full strength throughout, no pronator drift   Sensory: intact to light touch throughout  Cerebellar: finger to nose intact bilaterally  Reflexes: -Patterson's, -Babinski, no clonus. Patellar: 2+ bilaterally      Incision: EVD site Clean, dry, intact. No drainage or TTP.      Significant Labs:  Recent Labs   Lab 03/17/24  0027 03/18/24  0359   GLU 89 89   * 137   K 4.3 4.5    107   CO2 23 22*   BUN 10 10   CREATININE 0.9 0.8   CALCIUM 9.2 9.2   MG 1.9 1.9     Recent Labs   Lab 03/17/24  0027 03/18/24  0359   WBC 7.35 6.48   HGB 12.9* 12.8*   HCT 39.5* 38.6*    305     No results for input(s): "LABPT", "INR", "APTT" in the last 48 hours.  Microbiology Results (last 7 days)       ** No results found for the last 168 hours. **          All pertinent labs from the last 24 hours have been reviewed.    Significant Diagnostics:  CT: No results found in the last 24 hours.  MRI: No results found in the last 24 hours.  Assessment/Plan:     * Subarachnoid hemorrhage due to ruptured aneurysm  Kalia Pacheco is a 32yo man with history of known CHESTER aneurysm who presented to [place] with headache and nausea. CTH demonstrated diffuse SAH throughout the " basal cisterns HH and [place] [facility] [place] in the Neuro ICU and Neurosurgical consultation.     R frontal EVD was placed on arrival on 3/6 and patient was taken to IR for angiogram, with no intervention upon Acomm aneurysm. Upon further review of DSA, a vertebral artery aneurysm was noted and patient was brought back to IR for a right V4/PICA irregular aneurysm stent assisted coiling.     Imaging reviewed:  CTH 3/5: Diffuse basal cistern SAH with IVH   MRI with contrast not suggestive of vessel wall enhancement to Acomm  CTA 3/5: 3mm ant projecting L Acomm, no R A1, R pcomm feeding posterior circulation   DSA 3/5: EVD in position, anteriosuperiorly projecting small L Acomm, R V4 irregular aneurysm not well visualized    DSA +embo 3/5: stent assisted coiling of R V4/PICA irregularly shaped anuerysm, 1/4 lobules still filling   CTH 3/13: EVD into 3rd, vents smaller   CTH 3/14: stable post EVD pull     Continue ICU care   Continue neuro checks   Please call Neurosurgery with any change in exam   EVD pulled and CT scan post fall stable  AEDs per ICU team   TCDs for 14 days   Nimotop for 21 days  Continue dual antiplatelets  Satting well on room air   Keep blood pressure below 180   Strict in's and out and keep even to positive 500 cc   Advance diet as tolerated   PTOT  SubQ heparin for DVT prophylaxis   Further care per ICU team   Neurosurgical continue to follow up  Continue to follow clinically and notify NSGY immediately if any neurostatus change   Stable for stepdown to floor        Cici Lange MD  Neurosurgery  [facility] - Neuro Critical [facility]

## 2024-03-18 NOTE — ASSESSMENT & PLAN NOTE
Stroke Risk Factor.  Monitored with Daily TCDs.  3/18 TCD negative for vasospasm.  Nimodipine 30mg

## 2024-03-19 PROBLEM — R25.2 SPASM: Status: RESOLVED | Noted: 2024-03-14 | Resolved: 2024-03-19

## 2024-03-19 PROBLEM — I73.9 VASOSPASM: Status: RESOLVED | Noted: 2024-03-15 | Resolved: 2024-03-19

## 2024-03-19 LAB
ALBUMIN SERPL BCP-MCNC: 3.1 G/DL (ref 3.5–5.2)
ALP SERPL-CCNC: 39 U/L (ref 55–135)
ALT SERPL W/O P-5'-P-CCNC: 86 U/L (ref 10–44)
ANION GAP SERPL CALC-SCNC: 7 MMOL/L (ref 8–16)
AST SERPL-CCNC: 50 U/L (ref 10–40)
BASOPHILS # BLD AUTO: 0.04 K/UL (ref 0–0.2)
BASOPHILS NFR BLD: 0.7 % (ref 0–1.9)
BILIRUB SERPL-MCNC: 0.3 MG/DL (ref 0.1–1)
BUN SERPL-MCNC: 10 MG/DL (ref 6–20)
CALCIUM SERPL-MCNC: 9.3 MG/DL (ref 8.7–10.5)
CHLORIDE SERPL-SCNC: 108 MMOL/L (ref 95–110)
CO2 SERPL-SCNC: 23 MMOL/L (ref 23–29)
CREAT SERPL-MCNC: 0.9 MG/DL (ref 0.5–1.4)
DIFFERENTIAL METHOD BLD: ABNORMAL
EOSINOPHIL # BLD AUTO: 0.2 K/UL (ref 0–0.5)
EOSINOPHIL NFR BLD: 3.9 % (ref 0–8)
ERYTHROCYTE [DISTWIDTH] IN BLOOD BY AUTOMATED COUNT: 13.1 % (ref 11.5–14.5)
EST. GFR  (NO RACE VARIABLE): >60 ML/MIN/1.73 M^2
GLUCOSE SERPL-MCNC: 88 MG/DL (ref 70–110)
HCT VFR BLD AUTO: 39.3 % (ref 40–54)
HGB BLD-MCNC: 12.9 G/DL (ref 14–18)
IMM GRANULOCYTES # BLD AUTO: 0.03 K/UL (ref 0–0.04)
IMM GRANULOCYTES NFR BLD AUTO: 0.5 % (ref 0–0.5)
LYMPHOCYTES # BLD AUTO: 1.5 K/UL (ref 1–4.8)
LYMPHOCYTES NFR BLD: 27.6 % (ref 18–48)
MAGNESIUM SERPL-MCNC: 1.9 MG/DL (ref 1.6–2.6)
MCH RBC QN AUTO: 31.7 PG (ref 27–31)
MCHC RBC AUTO-ENTMCNC: 32.8 G/DL (ref 32–36)
MCV RBC AUTO: 97 FL (ref 82–98)
MONOCYTES # BLD AUTO: 0.4 K/UL (ref 0.3–1)
MONOCYTES NFR BLD: 7.7 % (ref 4–15)
NEUTROPHILS # BLD AUTO: 3.3 K/UL (ref 1.8–7.7)
NEUTROPHILS NFR BLD: 59.6 % (ref 38–73)
NRBC BLD-RTO: 0 /100 WBC
PHOSPHATE SERPL-MCNC: 4.8 MG/DL (ref 2.7–4.5)
PLATELET # BLD AUTO: 334 K/UL (ref 150–450)
PMV BLD AUTO: 9.4 FL (ref 9.2–12.9)
POTASSIUM SERPL-SCNC: 4.6 MMOL/L (ref 3.5–5.1)
PROT SERPL-MCNC: 5.5 G/DL (ref 6–8.4)
RBC # BLD AUTO: 4.07 M/UL (ref 4.6–6.2)
SODIUM SERPL-SCNC: 138 MMOL/L (ref 136–145)
WBC # BLD AUTO: 5.57 K/UL (ref 3.9–12.7)

## 2024-03-19 PROCEDURE — 80053 COMPREHEN METABOLIC PANEL: CPT

## 2024-03-19 PROCEDURE — 25000003 PHARM REV CODE 250: Performed by: STUDENT IN AN ORGANIZED HEALTH CARE EDUCATION/TRAINING PROGRAM

## 2024-03-19 PROCEDURE — 83735 ASSAY OF MAGNESIUM: CPT

## 2024-03-19 PROCEDURE — 85025 COMPLETE CBC W/AUTO DIFF WBC: CPT

## 2024-03-19 PROCEDURE — 25000003 PHARM REV CODE 250

## 2024-03-19 PROCEDURE — 11000001 HC ACUTE MED/SURG PRIVATE ROOM

## 2024-03-19 PROCEDURE — 99238 HOSP IP/OBS DSCHRG MGMT 30/<: CPT | Mod: ,,, | Performed by: NEUROLOGICAL SURGERY

## 2024-03-19 PROCEDURE — 84100 ASSAY OF PHOSPHORUS: CPT

## 2024-03-19 PROCEDURE — 25000003 PHARM REV CODE 250: Performed by: PSYCHIATRY & NEUROLOGY

## 2024-03-19 RX ORDER — GADOBUTROL 604.72 MG/ML
10 INJECTION INTRAVENOUS
Status: COMPLETED | OUTPATIENT
Start: 2024-03-20 | End: 2024-03-19

## 2024-03-19 RX ADMIN — METHOCARBAMOL 500 MG: 500 TABLET ORAL at 11:03

## 2024-03-19 RX ADMIN — NIMODIPINE 30 MG: 30 CAPSULE, LIQUID FILLED ORAL at 10:03

## 2024-03-19 RX ADMIN — NIMODIPINE 30 MG: 30 CAPSULE, LIQUID FILLED ORAL at 12:03

## 2024-03-19 RX ADMIN — GABAPENTIN 300 MG: 300 CAPSULE ORAL at 11:03

## 2024-03-19 RX ADMIN — NIMODIPINE 30 MG: 30 CAPSULE, LIQUID FILLED ORAL at 08:03

## 2024-03-19 RX ADMIN — TICAGRELOR 90 MG: 90 TABLET ORAL at 08:03

## 2024-03-19 RX ADMIN — GABAPENTIN 300 MG: 300 CAPSULE ORAL at 05:03

## 2024-03-19 RX ADMIN — NIMODIPINE 30 MG: 30 CAPSULE, LIQUID FILLED ORAL at 02:03

## 2024-03-19 RX ADMIN — NIMODIPINE 30 MG: 30 CAPSULE, LIQUID FILLED ORAL at 05:03

## 2024-03-19 RX ADMIN — ASPIRIN 81 MG CHEWABLE TABLET 81 MG: 81 TABLET CHEWABLE at 08:03

## 2024-03-19 RX ADMIN — GABAPENTIN 300 MG: 300 CAPSULE ORAL at 01:03

## 2024-03-19 RX ADMIN — TICAGRELOR 90 MG: 90 TABLET ORAL at 11:03

## 2024-03-19 RX ADMIN — GADOBUTROL 10 ML: 604.72 INJECTION INTRAVENOUS at 11:03

## 2024-03-19 RX ADMIN — NIMODIPINE 30 MG: 30 CAPSULE, LIQUID FILLED ORAL at 07:03

## 2024-03-19 RX ADMIN — NIMODIPINE 30 MG: 30 CAPSULE, LIQUID FILLED ORAL at 11:03

## 2024-03-19 RX ADMIN — NIMODIPINE 30 MG: 30 CAPSULE, LIQUID FILLED ORAL at 01:03

## 2024-03-19 RX ADMIN — Medication 6 MG: at 11:03

## 2024-03-19 RX ADMIN — NIMODIPINE 30 MG: 30 CAPSULE, LIQUID FILLED ORAL at 06:03

## 2024-03-19 RX ADMIN — NIMODIPINE 30 MG: 30 CAPSULE, LIQUID FILLED ORAL at 04:03

## 2024-03-19 NOTE — PROGRESS NOTES
[facility]surgery ([facility])  Neurosurgery  Progress Note    Subjective:     History of Present Illness: Kalia Pacheco is a 32yo man with history of known CHESTER aneurysm, not on any AP/AC who presented to [facility] this AM with headache and nausea that began 2 days ago and acutely worsened while he was at work this morning. He also reports blurry vision and has had multiple episodes of emesis. Denies any inciting factors. CTH reveals diffuse subarachnoid hemorrhage throughout the basal cisterns, HH score 1. He was transferred to [facility] for Neuro Critical Care and Neurosurgical management.     Post-Op Info:  * No surgery found *       Interval History: 3/19: NAEO. Downgraded to NPU. TCDs stable. Pending MRA brain with contrast to assess for residual flow.     Medications:  Continuous Infusions:  Scheduled Meds:   aspirin  81 mg Oral Daily    gabapentin  300 mg Oral Q8H    LIDOcaine  1 patch Transdermal QHS    melatonin  6 mg Oral Nightly    niMODipine  30 mg Oral Q2H    senna-docusate 8.6-50 mg  1 tablet Oral Daily    ticagrelor  90 mg Oral BID     PRN Meds:bisacodyL, diazePAM, labetalol, methocarbamoL, metoclopramide, ondansetron, oxyCODONE     Review of Systems  Objective:     Weight: 106.6 kg (235 lb)  Body mass index is 35.73 kg/m².  Vital Signs (Most Recent):  Temp: 97.6 °F (36.4 °C) (03/19/24 1143)  Pulse: 72 (03/19/24 1143)  Resp: 16 (03/19/24 1143)  BP: 136/60 (03/19/24 1223)  SpO2: 99 % (03/19/24 1143) Vital Signs (24h Range):  Temp:  [97.4 °F (36.3 °C)-98.8 °F (37.1 °C)] 97.6 °F (36.4 °C)  Pulse:  [63-80] 72  Resp:  [9-25] 16  SpO2:  [97 %-100 %] 99 %  BP: (115-156)/(56-83) 136/60     Date 03/19/24 0700 - 03/20/24 0659   Shift 7869-7980 0485-3079 7321-8676 24 Hour Total   INTAKE   P.O. 200   200   Shift Total(mL/kg) 200(1.9)   200(1.9)   OUTPUT   Shift Total(mL/kg)       Weight (kg) 106.6 106.6 106.6 106.6          Resp Rate Total:  [16 br/min] 16 br/min     Physical Exam  General: well developed,  "well nourished, no distress.   Head: normocephalic, atraumatic  Mental Status: Awake, Alert, Oriented  Speech: Clear with content appropriate to conversation  Cranial nerves: face symmetric, CN II-XII grossly intact.   Sensory: intact to light touch throughout    Motor Strength: Moves all extremities spontaneously with good tone.  Full strength upper and lower extremities. No abnormal movements seen.     Significant Labs:  Recent Labs   Lab 03/18/24  0359 03/19/24  0353   GLU 89 88    138   K 4.5 4.6    108   CO2 22* 23   BUN 10 10   CREATININE 0.8 0.9   CALCIUM 9.2 9.3   MG 1.9 1.9     Recent Labs   Lab 03/18/24  0359 03/19/24  0353   WBC 6.48 5.57   HGB 12.8* 12.9*   HCT 38.6* 39.3*    334     No results for input(s): "LABPT", "INR", "APTT" in the last 48 hours.  Microbiology Results (last 7 days)       ** No results found for the last 168 hours. **          All pertinent labs from the last 24 hours have been reviewed.    Significant Diagnostics:  I have reviewed and interpreted all pertinent imaging results/findings within the past 24 hours.  Assessment/Plan:     * Subarachnoid hemorrhage due to ruptured aneurysm  Kalia Pacheco is a 32yo man with history of known CHESTER aneurysm who presented to [place] with headache and nausea. CTH demonstrated diffuse SAH throughout the basal cisterns and he was transferred to [facility] for close monitoring in the Neuro ICU and Neurosurgical consultation.     R frontal EVD was placed on arrival on 3/6 and patient was taken to IR for angiogram, with no intervention upon Acomm aneurysm. Upon further review of DSA, a vertebral artery aneurysm was noted and patient was brought back to IR for a right V4/PICA irregular aneurysm stent assisted coiling.     Imaging reviewed:  CTH 3/5: Diffuse basal cistern SAH with IVH   MRI with contrast not suggestive of vessel wall enhancement to Acomm  CTA 3/5: 3mm ant projecting L Acomm, no R A1, R pcomm feeding posterior " circulation   DSA 3/5: EVD in position, anteriosuperiorly projecting small L Acomm, R V4 irregular aneurysm not well visualized    DSA +embo 3/5: stent assisted coiling of R V4/PICA irregularly shaped anuerysm, 1/4 lobules still filling   CTH 3/13: EVD into 3rd, vents smaller   CTH 3/14: stable post EVD pull     Admitted to NPU with q4 hr neuro checks  Please call Neurosurgery with any change in exam   Pending MRA brain with contrast to assess residual flow  EVD pulled and CT scan post fall stable  TCDs for 14 days   Nimotop for 21 days  Continue dual antiplatelets  Satting well on room air   Keep blood pressure below 180   Strict in's and out and keep even to positive 500 cc   PTOT rec low intensity  SubQ heparin for DVT prophylaxis   Continue to follow clinically and notify NSGY immediately if any neurostatus change     Dispo: pending repeat MRA brain with contrast    Anterior communicating artery aneurysm  - Known, stable on imaging, will CTM    Hypertension  - Chronic, holding home antihypertensives during tx with Nimodipine  - Continue Nimodipine 30mg q2 hrs per SAH protocol        Rica Lilly PA-C  Neurosurgery  [facility] - Neurosurgery ([facility])

## 2024-03-19 NOTE — ASSESSMENT & PLAN NOTE
- Chronic, holding home antihypertensives during tx with Nimodipine  - Continue Nimodipine 30mg q2 hrs per SAH protocol

## 2024-03-19 NOTE — NURSING TRANSFER
Nursing Transfer Note      3/18/2024   7:56 PM    Nurse giving handoff: MAREN Harrell  Nurse receiving handoff: MAREN Santoyo    Reason patient is being transferred: step-down    Transfer To: 959 From: 9088    Transfer via wheelchair    Transfer with cardiac monitoring    Transported by RN x1    Transfer Vital Signs:  Blood Pressure: 119/59  Heart Rate: 78  O2: 97  Temperature: 98.2  Respirations: 17    Telemetry: Box Number [id number]  Order for Tele Monitor? Yes    Additional Lines: N/A    4eyes on Skin: yes    Medicines sent: none    Patient belongings transferred with patient: Yes    Chart send with patient: Yes    Notified: girlfriend at bedside    Patient reassessed at: 1940 3/18/2024    Upon arrival to floor: cardiac monitor applied, patient oriented to room, call bell in reach, and bed in lowest position

## 2024-03-19 NOTE — ASSESSMENT & PLAN NOTE
Kalia Pahceco is a 32yo man with history of known CHESTER aneurysm who presented to [place] with headache and nausea. CTH demonstrated diffuse SAH throughout the basal cisterns and [place] [facility] [place] in the Neuro ICU and Neurosurgical consultation.     R frontal EVD was placed on arrival on 3/6 and patient was taken to IR for angiogram, with no intervention upon Acomm aneurysm. Upon further review of DSA, a vertebral artery aneurysm was noted and patient was brought back to IR for a right V4/PICA irregular aneurysm stent assisted coiling.     Imaging reviewed:  CTH 3/5: Diffuse basal cistern SAH with IVH   MRI with contrast not suggestive of vessel wall enhancement to Acomm  CTA 3/5: 3mm ant projecting L Acomm, no R A1, R pcomm feeding posterior circulation   DSA 3/5: EVD in position, anteriosuperiorly projecting small L Acomm, R V4 irregular aneurysm not well visualized    DSA +embo 3/5: stent assisted coiling of R V4/PICA irregularly shaped anuerysm, 1/4 lobules still filling   CTH 3/13: EVD into 3rd, vents smaller   CTH 3/14: stable post EVD pull     Admitted to NPU with q4 hr neuro checks  Please call Neurosurgery with any change in exam   Pending MRA brain with contrast to assess residual flow  EVD pulled and CT scan post fall stable  TCDs for 14 days   Nimotop for 21 days  Continue dual antiplatelets  Satting well on room air   Keep blood pressure below 180   Strict in's and out and keep even to positive 500 cc   PTOT rec low intensity  SubQ heparin for DVT prophylaxis   Continue to follow clinically and notify NSGY immediately if any neurostatus change     Dispo: pending repeat MRA brain with contrast

## 2024-03-19 NOTE — CARE UPDATE
Patient's chart was reviewed by a stroke team provider and discussed with staff.    Briefly, Kalia Pacheco is a 33 y.o. male with PMH of HTN, coarctation of the aorta (s/p repair), PDA, VSD s/p repair, ADHD, EtOH use/abuse, arthritis, legg-perthes disease, ICH 5/2022, known CHESTER aneurysm that was [place] [facility] [place] higher level of care of SAH (HH1). He presented to OSH with HA and nausea. CTH showed diffuse SAH throughout basal cisterns. At C neuro exam stable with NIHSS 1. CTA shows 3mm L acomm aneurysm, hypoplastic R A1, persistent fetal circulation, as well as evolving SAH/ICH now with concern for developing hydrocephalus and questionable early cerebellar tonsillar herniation. NSGY consulted, EVD placed. Taken to IR for DSA, no intervention of acomm aneurysm at that time. 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 Suspect etiology of SAH likely due to vertebral artery aneurysm. TTE with EF 40-45%, global hypokinesis present, LAE. MRA Brain with unchanged 2 mm left CHESTER/anterior communicating artery aneurysm, no abnormal vessel wall enhancement.       -Patient stepped down to NSGY  -Ongoing SAH protocol per primary  -No further inpatient workup recommended from stroke standpoint  -Vascular Neurology will sign off at this time, thank you for involving us in the care of this patient  -Please do not hesitate to contact the stroke team for any questions or concerns.             Atiya Delgado PA-C  Department of Vascular Neurology  [facility]  [facility]  [phone]

## 2024-03-19 NOTE — NURSING
Nurses Note -- 4 Eyes      3/19/2024   0000      Skin assessed during: Transfer      [] No Altered Skin Integrity Present    []Prevention Measures Documented      [x] Yes- Altered Skin Integrity Present or Discovered   [] LDA Added if Not in Epic (Describe Wound)   [x] New Altered Skin Integrity was Present on Admit and Documented in LDA   [] Wound Image Taken    Wound Care Consulted? No    Attending Nurse:  Yarelis ENGEL    Second RN/Staff Member:  Esme ENGEL     Two sites to anterior right scalp with staples. Clean, dry and intact. Open toa ir

## 2024-03-19 NOTE — SUBJECTIVE & OBJECTIVE
Interval History: 3/18: NAEO. Downgraded to NPU. TCDs stable. Pending MRA brain with contrast to assess for residual flow.     Medications:  Continuous Infusions:  Scheduled Meds:   aspirin  81 mg Oral Daily    gabapentin  300 mg Oral Q8H    LIDOcaine  1 patch Transdermal QHS    melatonin  6 mg Oral Nightly    niMODipine  30 mg Oral Q2H    senna-docusate 8.6-50 mg  1 tablet Oral Daily    ticagrelor  90 mg Oral BID     PRN Meds:bisacodyL, diazePAM, labetalol, methocarbamoL, metoclopramide, ondansetron, oxyCODONE     Review of Systems  Objective:     Weight: 106.6 kg (235 lb)  Body mass index is 35.73 kg/m².  Vital Signs (Most Recent):  Temp: 97.6 °F (36.4 °C) (03/19/24 1143)  Pulse: 72 (03/19/24 1143)  Resp: 16 (03/19/24 1143)  BP: 136/60 (03/19/24 1223)  SpO2: 99 % (03/19/24 1143) Vital Signs (24h Range):  Temp:  [97.4 °F (36.3 °C)-98.8 °F (37.1 °C)] 97.6 °F (36.4 °C)  Pulse:  [63-80] 72  Resp:  [9-25] 16  SpO2:  [97 %-100 %] 99 %  BP: (115-156)/(56-83) 136/60     Date 03/19/24 0700 - 03/20/24 0659   Shift 0780-5153 7539-2077 4116-2176 24 Hour Total   INTAKE   P.O. 200   200   Shift Total(mL/kg) 200(1.9)   200(1.9)   OUTPUT   Shift Total(mL/kg)       Weight (kg) 106.6 106.6 106.6 106.6          Resp Rate Total:  [16 br/min] 16 br/min     Physical Exam  General: well developed, well nourished, no distress.   Head: normocephalic, atraumatic  Mental Status: Awake, Alert, Oriented  Speech: Clear with content appropriate to conversation  Cranial nerves: face symmetric, CN II-XII grossly intact.   Sensory: intact to light touch throughout    Motor Strength: Moves all extremities spontaneously with good tone.  Full strength upper and lower extremities. No abnormal movements seen.     Significant Labs:  Recent Labs   Lab 03/18/24  0359 03/19/24  0353   GLU 89 88    138   K 4.5 4.6    108   CO2 22* 23   BUN 10 10   CREATININE 0.8 0.9   CALCIUM 9.2 9.3   MG 1.9 1.9     Recent Labs   Lab 03/18/24  0359  "03/19/24  0353   WBC 6.48 5.57   HGB 12.8* 12.9*   HCT 38.6* 39.3*    334     No results for input(s): "LABPT", "INR", "APTT" in the last 48 hours.  Microbiology Results (last 7 days)       ** No results found for the last 168 hours. **          All pertinent labs from the last 24 hours have been reviewed.    Significant Diagnostics:  I have reviewed and interpreted all pertinent imaging results/findings within the past 24 hours.  "

## 2024-03-20 ENCOUNTER — TELEPHONE (OUTPATIENT)
Dept: NEUROSURGERY | Facility: CLINIC | Age: 34
End: 2024-03-20
Payer: MEDICAID

## 2024-03-20 VITALS
OXYGEN SATURATION: 96 % | HEIGHT: 68 IN | RESPIRATION RATE: 18 BRPM | BODY MASS INDEX: 35.61 KG/M2 | TEMPERATURE: 98 F | HEART RATE: 72 BPM | DIASTOLIC BLOOD PRESSURE: 63 MMHG | WEIGHT: 235 LBS | SYSTOLIC BLOOD PRESSURE: 136 MMHG

## 2024-03-20 DIAGNOSIS — I60.8 SUBARACHNOID HEMORRHAGE DUE TO RUPTURED ANEURYSM: Primary | ICD-10-CM

## 2024-03-20 PROBLEM — R52 PAIN: Status: ACTIVE | Noted: 2024-03-20

## 2024-03-20 PROBLEM — G93.5 BRAIN COMPRESSION: Status: ACTIVE | Noted: 2024-03-20

## 2024-03-20 PROBLEM — G91.1 OBSTRUCTIVE HYDROCEPHALUS: Status: ACTIVE | Noted: 2024-03-20

## 2024-03-20 PROBLEM — R11.0 NAUSEA: Status: ACTIVE | Noted: 2024-03-20

## 2024-03-20 PROBLEM — G93.6 VASOGENIC CEREBRAL EDEMA: Status: ACTIVE | Noted: 2024-03-20

## 2024-03-20 PROBLEM — E87.1 HYPONATREMIA: Status: ACTIVE | Noted: 2024-03-20

## 2024-03-20 LAB
ALBUMIN SERPL BCP-MCNC: 3.3 G/DL (ref 3.5–5.2)
ALP SERPL-CCNC: 39 U/L (ref 55–135)
ALT SERPL W/O P-5'-P-CCNC: 92 U/L (ref 10–44)
ANION GAP SERPL CALC-SCNC: 11 MMOL/L (ref 8–16)
AST SERPL-CCNC: 51 U/L (ref 10–40)
BASOPHILS # BLD AUTO: 0.04 K/UL (ref 0–0.2)
BASOPHILS NFR BLD: 0.5 % (ref 0–1.9)
BILIRUB SERPL-MCNC: 0.2 MG/DL (ref 0.1–1)
BUN SERPL-MCNC: 13 MG/DL (ref 6–20)
CALCIUM SERPL-MCNC: 9.3 MG/DL (ref 8.7–10.5)
CHLORIDE SERPL-SCNC: 107 MMOL/L (ref 95–110)
CO2 SERPL-SCNC: 22 MMOL/L (ref 23–29)
CREAT SERPL-MCNC: 0.9 MG/DL (ref 0.5–1.4)
DIFFERENTIAL METHOD BLD: ABNORMAL
EOSINOPHIL # BLD AUTO: 0.3 K/UL (ref 0–0.5)
EOSINOPHIL NFR BLD: 4.6 % (ref 0–8)
ERYTHROCYTE [DISTWIDTH] IN BLOOD BY AUTOMATED COUNT: 13.4 % (ref 11.5–14.5)
EST. GFR  (NO RACE VARIABLE): >60 ML/MIN/1.73 M^2
GLUCOSE SERPL-MCNC: 79 MG/DL (ref 70–110)
HCT VFR BLD AUTO: 39.7 % (ref 40–54)
HGB BLD-MCNC: 13.4 G/DL (ref 14–18)
IMM GRANULOCYTES # BLD AUTO: 0.03 K/UL (ref 0–0.04)
IMM GRANULOCYTES NFR BLD AUTO: 0.4 % (ref 0–0.5)
LYMPHOCYTES # BLD AUTO: 2 K/UL (ref 1–4.8)
LYMPHOCYTES NFR BLD: 27 % (ref 18–48)
MAGNESIUM SERPL-MCNC: 1.9 MG/DL (ref 1.6–2.6)
MCH RBC QN AUTO: 32.2 PG (ref 27–31)
MCHC RBC AUTO-ENTMCNC: 33.8 G/DL (ref 32–36)
MCV RBC AUTO: 95 FL (ref 82–98)
MONOCYTES # BLD AUTO: 0.5 K/UL (ref 0.3–1)
MONOCYTES NFR BLD: 7.3 % (ref 4–15)
NEUTROPHILS # BLD AUTO: 4.5 K/UL (ref 1.8–7.7)
NEUTROPHILS NFR BLD: 60.2 % (ref 38–73)
NRBC BLD-RTO: 0 /100 WBC
PHOSPHATE SERPL-MCNC: 5.1 MG/DL (ref 2.7–4.5)
PLATELET # BLD AUTO: 360 K/UL (ref 150–450)
PMV BLD AUTO: 9.3 FL (ref 9.2–12.9)
POTASSIUM SERPL-SCNC: 4.7 MMOL/L (ref 3.5–5.1)
PROT SERPL-MCNC: 5.9 G/DL (ref 6–8.4)
RBC # BLD AUTO: 4.16 M/UL (ref 4.6–6.2)
SODIUM SERPL-SCNC: 140 MMOL/L (ref 136–145)
WBC # BLD AUTO: 7.41 K/UL (ref 3.9–12.7)

## 2024-03-20 PROCEDURE — 25000003 PHARM REV CODE 250

## 2024-03-20 PROCEDURE — 97535 SELF CARE MNGMENT TRAINING: CPT

## 2024-03-20 PROCEDURE — 83735 ASSAY OF MAGNESIUM: CPT

## 2024-03-20 PROCEDURE — 85025 COMPLETE CBC W/AUTO DIFF WBC: CPT

## 2024-03-20 PROCEDURE — A9585 GADOBUTROL INJECTION: HCPCS | Performed by: NEUROLOGICAL SURGERY

## 2024-03-20 PROCEDURE — 25500020 PHARM REV CODE 255: Performed by: NEUROLOGICAL SURGERY

## 2024-03-20 PROCEDURE — 25000003 PHARM REV CODE 250: Performed by: STUDENT IN AN ORGANIZED HEALTH CARE EDUCATION/TRAINING PROGRAM

## 2024-03-20 PROCEDURE — 84100 ASSAY OF PHOSPHORUS: CPT

## 2024-03-20 PROCEDURE — 80053 COMPREHEN METABOLIC PANEL: CPT

## 2024-03-20 PROCEDURE — 99238 HOSP IP/OBS DSCHRG MGMT 30/<: CPT | Mod: ,,, | Performed by: NEUROLOGICAL SURGERY

## 2024-03-20 RX ORDER — ACETAMINOPHEN 325 MG/1
650 TABLET ORAL EVERY 6 HOURS PRN
Status: DISCONTINUED | OUTPATIENT
Start: 2024-03-20 | End: 2024-03-20 | Stop reason: HOSPADM

## 2024-03-20 RX ORDER — GABAPENTIN 300 MG/1
300 CAPSULE ORAL EVERY 8 HOURS
Qty: 90 CAPSULE | Refills: 11 | Status: SHIPPED | OUTPATIENT
Start: 2024-03-20 | End: 2024-04-22 | Stop reason: SDUPTHER

## 2024-03-20 RX ORDER — NIMODIPINE 30 MG/1
60 CAPSULE, LIQUID FILLED ORAL EVERY 4 HOURS
Qty: 84 CAPSULE | Refills: 0 | Status: SHIPPED | OUTPATIENT
Start: 2024-03-20 | End: 2024-03-27

## 2024-03-20 RX ORDER — NAPROXEN SODIUM 220 MG/1
81 TABLET, FILM COATED ORAL DAILY
Qty: 90 TABLET | Refills: 3 | Status: SHIPPED | OUTPATIENT
Start: 2024-03-21 | End: 2025-03-21

## 2024-03-20 RX ORDER — NIMODIPINE 30 MG/1
60 CAPSULE, LIQUID FILLED ORAL EVERY 4 HOURS
Qty: 360 CAPSULE | Refills: 11 | Status: SHIPPED | OUTPATIENT
Start: 2024-03-20 | End: 2024-03-20

## 2024-03-20 RX ORDER — ACETAMINOPHEN 325 MG/1
650 TABLET ORAL EVERY 6 HOURS PRN
Qty: 120 TABLET | Refills: 0 | Status: SHIPPED | OUTPATIENT
Start: 2024-03-20 | End: 2024-04-19

## 2024-03-20 RX ADMIN — NIMODIPINE 30 MG: 30 CAPSULE, LIQUID FILLED ORAL at 04:03

## 2024-03-20 RX ADMIN — GABAPENTIN 300 MG: 300 CAPSULE ORAL at 02:03

## 2024-03-20 RX ADMIN — NIMODIPINE 30 MG: 30 CAPSULE, LIQUID FILLED ORAL at 09:03

## 2024-03-20 RX ADMIN — DOCUSATE SODIUM AND SENNOSIDES 1 TABLET: 8.6; 5 TABLET, FILM COATED ORAL at 08:03

## 2024-03-20 RX ADMIN — NIMODIPINE 30 MG: 30 CAPSULE, LIQUID FILLED ORAL at 02:03

## 2024-03-20 RX ADMIN — NIMODIPINE 30 MG: 30 CAPSULE, LIQUID FILLED ORAL at 12:03

## 2024-03-20 RX ADMIN — NIMODIPINE 30 MG: 30 CAPSULE, LIQUID FILLED ORAL at 08:03

## 2024-03-20 RX ADMIN — TICAGRELOR 90 MG: 90 TABLET ORAL at 08:03

## 2024-03-20 RX ADMIN — ACETAMINOPHEN 650 MG: 325 TABLET ORAL at 10:03

## 2024-03-20 RX ADMIN — ASPIRIN 81 MG CHEWABLE TABLET 81 MG: 81 TABLET CHEWABLE at 08:03

## 2024-03-20 RX ADMIN — GABAPENTIN 300 MG: 300 CAPSULE ORAL at 06:03

## 2024-03-20 RX ADMIN — NIMODIPINE 30 MG: 30 CAPSULE, LIQUID FILLED ORAL at 06:03

## 2024-03-20 NOTE — PT/OT/SLP PROGRESS
"Occupational Therapy   Treatment / Discharge Summary    Name: Kalia Pacheco  MRN: [id number]  Admitting Diagnosis:  Subarachnoid hemorrhage due to ruptured aneurysm       Recommendations:     Discharge Recommendations: No Therapy Indicated  Discharge Equipment Recommendations:  none  Barriers to discharge:  None    Assessment:     Kalia Pacheco is a 33 y.o. male with a medical diagnosis of Subarachnoid hemorrhage due to ruptured aneurysm.  He presents with performance deficits affecting function are impaired self care skills, impaired functional mobility.     Rehab Prognosis:  Good; patient would benefit from acute skilled OT services to address these deficits and reach maximum level of function.       Plan:     Discharge from OT services on acute  Plan of Care Expires: 04/03/24  Plan of Care Reviewed with: patient    Subjective     Patient:  "I hope to go home today."  Pain/Comfort:  Pain Rating 1: 0/10  Pain Rating Post-Intervention 1: 0/10    Objective:     Communicated with: Nurse prior to session.  Patient found supine with telemetry upon OT entry to room.    General Precautions: Standard, aspiration, fall    Orthopedic Precautions:N/A  Braces: N/A  Respiratory Status: Room air     Occupational Performance:     Bed Mobility:    Patient completed Rolling/Turning to Left with  independence  Patient completed Rolling/Turning to Right with independence  Patient completed Supine to Sit with independence  Patient completed Sit to Supine with independence     Functional Mobility/Transfers:  Patient completed Sit <> Stand Transfer with modified independence  with  no assistive device   Patient completed Bed <> Chair Transfer using Stand Pivot technique with modified independence with no assistive device  Patient completed Toilet Transfer Stand Pivot technique with modified independence with  no AD    Activities of Daily Living:  Grooming: modified independence    Upper Body Dressing: modified independence  "   Lower Body Dressing: modified independence    Toileting: modified independence      [facility] 6 Click ADL: 24    Treatment & Education:  Patient alert and oriented x 3; able to follow 4/4 one step commands.  Patient attentive and interactive throughout the session.     Patient left supine with all lines intact, call button in reach, and bed alarm on    GOALS:   Multidisciplinary Problems       Occupational Therapy Goals       Not on file              Multidisciplinary Problems (Resolved)          Problem: Occupational Therapy    Goal Priority Disciplines Outcome Interventions   Occupational Therapy Goal   (Resolved)     OT, PT/OT Met    Description: Goals set 3/7 with an expiration date, 4/4:  Patient will increase functional independence with ADLs by performing:    Patient will demonstrate rolling to the right with modified independence.  met   Patient will demonstrate rolling to the left with modified independence.   met  Patient will demonstrate supine -sit with modified independence.   met  Patient will demonstrate stand pivot transfers with modified independence.   met  Patient will demonstrate grooming while standing with modified independence.   met  Patient will demonstrate upper body dressing with modified independence while seated EOB.    met  Patient will demonstrate lower body dressing with modified independence while seated EOB.   met  Patient will demonstrate toileting with modified independence.    met  Patient will demonstrate bathing while seated EOB with modified independence    met  Patient's family / caregiver will demonstrate independence and safety with assisting patient with self-care skills and functional mobility.     met                           Time Tracking:     OT Date of Treatment: 03/20/24  OT Start Time: 0734  OT Stop Time: 0758  OT Total Time (min): 24 min    Billable Minutes:Self Care/Home Management 24    OT/ZULEYMA: OT          3/20/2024

## 2024-03-20 NOTE — PLAN OF CARE
[facility] (Hospital)  Discharge Final Note    Primary Care Provider: Christiano Baldwin MD    Expected Discharge Date: 3/20/2024    Patient to be discharged home.  The patient does not have any home needs.  Family to provide transportation home.  Neurosurgery clinic to schedule follow up appointment.      Final Discharge Note (most recent)       Final Note - 03/20/24 1521          Final Note    Assessment Type Final Discharge Note     Anticipated Discharge Disposition Home or Self Care        Post-Acute Status    Post-Acute Authorization Other     Other Status No Post-Acute Service Needs     Discharge Delays None known at this time                     Important Message from Medicare

## 2024-03-20 NOTE — PLAN OF CARE
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 Pt likes dim lights.    Restraints:              Outcome: Met  Goal: Absence of Hospital-Acquired Illness or Injury  Outcome: Met  Goal: Optimal Comfort and Wellbeing  Outcome: Met  Goal: Readiness for Transition of Care  Outcome: Met     Problem: Adjustment to Illness (Stroke, Hemorrhagic)  Goal: Optimal Coping  Outcome: Met     Problem: Cerebral Tissue Perfusion (Stroke, Hemorrhagic)  Goal: Optimal Cerebral Tissue Perfusion  Outcome: Met     Problem: Communication Impairment (Stroke, Hemorrhagic)  Goal: Effective Communication Skills  Outcome: Met     Problem: Functional Ability Impaired (Stroke, Hemorrhagic)  Goal: Optimal Functional Ability  Outcome: Met     Problem: Pain (Stroke, Hemorrhagic)  Goal: Acceptable Pain Control  Outcome: Met     Problem: Sensorimotor Impairment (Stroke, Hemorrhagic)  Goal: Improved Sensorimotor Function  Outcome: Met     Problem: Fall Injury Risk  Goal: Absence of Fall and Fall-Related Injury  Outcome: Met     Problem: Impaired Wound Healing  Goal: Optimal Wound Healing  Outcome: Met     Pt. Discharged pending prior authorization of home medication. Staples removed

## 2024-03-20 NOTE — DISCHARGE SUMMARY
[facility] ([facility])  Neurosurgery  Discharge Summary      Patient Name: Kalia Pacheco  MRN: [id number]  Admission Date: 3/5/2024  Hospital Length of Stay: 15 days  Discharge Date and Time:  03/20/2024 4:00 PM  Attending Physician: Swapna Kyle MD   Discharging Provider: Rica Lilly PA-C  Primary Care Provider: Christiano Baldwin MD    HPI:   Kalia Pacheco is a 34yo man with history of known CHESTER aneurysm, not on any AP/AC who presented to [facility] this AM with headache and nausea that began 2 days ago and acutely worsened while he was at work this morning. He also reports blurry vision and has had multiple episodes of emesis. Denies any inciting factors. CTH reveals diffuse subarachnoid hemorrhage throughout the basal cisterns, HH score 1. He was transferred to [facility] for Neuro Critical Care and Neurosurgical management.     * No surgery found *     Hospital Course:   Patient was admitted to the Neuro ICU for close neurological monitoring. 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 He was brought back to the Neuro ICU and started on DAPT. His EVD was removed on 3/13. Exam remained stable throughout his hospital course. He underwent 14 days of TCDs which did not demonstrate vasospasm. He was downgraded out of the Neuro ICU on 3.18. PT and OT assessments recommended return to home upon discharge. At time of discharge on 3/20 the patient was neurologically stable, was afebrile, and vital signs were stable. He was tolerating a diet and voiding without difficulty. Discharge instructions were verbally discussed with the patient, including wound care and follow up appointments. The patient was also given a discharge instruction sheet explaining the aforementioned discussion. The patient verbalized understanding of instructions and agreed to the plan.     Goals of Care Treatment Preferences:  Code  Status: Full Code    Physical Exam:  General: well developed, well nourished, no distress.   Head: normocephalic, atraumatic  Mental Status: Awake, Alert, Oriented  Speech: Clear with content appropriate to conversation  Cranial nerves: face symmetric, CN II-XII grossly intact.   Sensory: intact to light touch throughout     Motor Strength: Moves all extremities spontaneously with good tone.  Full strength upper and lower extremities. No abnormal movements seen.     EVD incision clean, dry, and intact with staples removed    Consults:   Consults (From admission, onward)          Status Ordering Provider     Inpatient consult to Interventional Radiology  Once        Provider:  (Not yet assigned)    Completed HERO FELIZ     Inpatient consult to Vascular (Stroke) Neurology  Once        Provider:  (Not yet assigned)    Completed HERO FELIZ     Inpatient consult to Neurosurgery  Once        Provider:  (Not yet assigned)    Completed HERO FELIZ     Inpatient consult to Physical Medicine Rehab  Once        Provider:  (Not yet assigned)    Completed HERO FELIZ     Inpatient consult to Registered Dietitian/Nutritionist  Once        Provider:  (Not yet assigned)    Completed HERO FELIZ     Inpatient consult to Social Work/Case Management  Once        Provider:  (Not yet assigned)    Completed HERO FELIZ            Significant Diagnostic Studies: N/A    Pending Diagnostic Studies:       None          Final Active Diagnoses:    Diagnosis Date Noted POA    PRINCIPAL PROBLEM:  Subarachnoid hemorrhage due to ruptured aneurysm [I60.8] 03/05/2024 Yes    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] 03/05/2024 Yes    IVH (intraventricular hemorrhage) [I61.5] 03/05/2024 Yes    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] 05/08/2023 Yes    Hypertension [I10] 06/27/2017 Yes      Problems Resolved During this Admission:    Diagnosis Date Noted Date Resolved POA    Vasospasm [I73.9] 03/15/2024 03/19/2024 Yes    Spasm [R25.2]  03/14/2024 03/19/2024 Yes      Discharged Condition: good     Disposition: Home or Self Care    Follow Up:    Patient Instructions:      Notify your health care provider if you experience any of the following:  temperature >100.4     Notify your health care provider if you experience any of the following:  persistent nausea and vomiting or diarrhea     Notify your health care provider if you experience any of the following:  severe uncontrolled pain     Notify your health care provider if you experience any of the following:  redness, tenderness, or signs of infection (pain, swelling, redness, odor or green/yellow discharge around incision site)     Notify your health care provider if you experience any of the following:  difficulty breathing or increased cough     Notify your health care provider if you experience any of the following:  severe persistent headache     Notify your health care provider if you experience any of the following:  worsening rash     Notify your health care provider if you experience any of the following:  persistent dizziness, light-headedness, or visual disturbances     Notify your health care provider if you experience any of the following:  increased confusion or weakness     Notify your health care provider if you experience any of the following:     Activity as tolerated     Medications:  Reconciled Home Medications:      Medication List        START taking these medications      acetaminophen 325 MG tablet  Commonly known as: TYLENOL  Take 2 tablets (650 mg total) by mouth every 6 (six) hours as needed.     aspirin 81 MG Chew  Chew and swallow 1 tablet (81 mg total) by mouth once daily.  Start taking on: March 21, 2024     gabapentin 300 MG capsule  Commonly known as: NEURONTIN  Take 1 capsule (300 mg total) by mouth every 8 (eight) hours.     niMODipine 30 MG Cap  Commonly known as: NIMOTOP  Take 2 capsules (60 mg total) by mouth every 4 (four) hours. for 7 days     ticagrelor 90 mg  tablet  Commonly known as: BRILINTA  Take 1 tablet (90 mg total) by mouth 2 (two) times daily.            CONTINUE taking these medications      valACYclovir 500 MG tablet  Commonly known as: VALTREX  Take 1 tablet (500 mg total) by mouth 2 (two) times daily.            STOP taking these medications      metoprolol succinate 50 MG 24 hr tablet  Commonly known as: TOPROL-XL     metoprolol tartrate 50 MG tablet  Commonly known as: LOPRESSOR     ondansetron 4 MG Tbdl  Commonly known as: ZOFRAN-ODT     valsartan 160 MG tablet  Commonly known as: MARIAN Lilly PA-C  Neurosurgery  [facility] - Neurosurgery [facility])

## 2024-03-20 NOTE — TELEPHONE ENCOUNTER
----- Message from India Fernandes RN sent at 3/20/2024  3:28 PM CDT -----    ----- Message -----  From: Rica Lilly PA-C  Sent: 3/20/2024   3:06 PM CDT  To: Saroj SUNSHINE Staff    [facility]!     Could we please schedule this patient to see Dr. Mendoza with an MRA Brain with Contrast in 4 weeks?    Thanks!  -Rica

## 2024-03-20 NOTE — CARE UPDATE
I have reviewed the chart of Kalia Pacheco who is hospitalized for the following:    Active Hospital Problems    Diagnosis    *Subarachnoid hemorrhage due to ruptured aneurysm    Hyponatremia     Monitored with labs  improved      Brain compression     CT head: Evolving subarachnoid and intraventricular hemorrhages.  Interval increase distension lateral and 3rd ventricles compatible with developing hydrocephalus     Crowding cerebral sulci at the vertex with additional crowding basilar cisterns with questionable early cerebellar tonsillar herniation.  S/p evd placement      Vasogenic cerebral edema     CT head: Evolving subarachnoid and intraventricular hemorrhages.  Interval increase distension lateral and 3rd ventricles compatible with developing hydrocephalus     Crowding cerebral sulci at the vertex with additional crowding basilar cisterns with questionable early cerebellar tonsillar herniation.  S/p evd placement      Pain     Headache due to ICH  Prn pain medication  Frequent tcd and nimotop to prevent vasospasms       Nausea     nausea due to ICH  Prn pain medication  Frequent tcd and nimotop to prevent vasospasms       Obstructive hydrocephalus     CT head: Evolving subarachnoid and intraventricular hemorrhages.  Interval increase distension lateral and 3rd ventricles compatible with developing hydrocephalus     Crowding cerebral sulci at the vertex with additional crowding basilar cisterns with questionable early cerebellar tonsillar herniation.  S/p evd placement      Communicating hydrocephalus    IVH (intraventricular hemorrhage)    Anterior communicating artery aneurysm    Hypertension          Francheska Campo NP  [facility] Based [facility]

## 2024-03-20 NOTE — PLAN OF CARE
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 Pt likes dim lights.    Restraints:              Outcome: Ongoing, Progressing  Goal: Absence of Hospital-Acquired Illness or Injury  Outcome: Ongoing, Progressing  Goal: Optimal Comfort and Wellbeing  Outcome: Ongoing, Progressing  Goal: Readiness for Transition of Care  Outcome: Ongoing, Progressing     Problem: Adjustment to Illness (Stroke, Hemorrhagic)  Goal: Optimal Coping  Outcome: Ongoing, Progressing     Problem: Cerebral Tissue Perfusion (Stroke, Hemorrhagic)  Goal: Optimal Cerebral Tissue Perfusion  Outcome: Ongoing, Progressing     Problem: Functional Ability Impaired (Stroke, Hemorrhagic)  Goal: Optimal Functional Ability  Outcome: Ongoing, Progressing

## 2024-03-20 NOTE — DISCHARGE INSTRUCTIONS
Wound care:    Keep incisions dry. Do not soak under water (bathtub, swimming pool, etc.). Please shower with baby shampoo, but do not take a bath. If the incision becomes wet, gently pat it dry with a clean towel; do not rub.    Other post-op instructions:    No lifting anything heavier than a gallon of milk until cleared in post-operative visit.    No driving until cleared in your post-operative appointment. No driving while on narcotics.    You have been prescribed the remainder of a 21 day course of Nimodipine. Continue taking this every 4 hours through 3/26/24.    After you have completed your course of Nimodipine, please resume your home blood pressure medication starting 3/27/24.

## 2024-03-26 ENCOUNTER — PATIENT MESSAGE (OUTPATIENT)
Dept: NEUROLOGY | Facility: HOSPITAL | Age: 34
End: 2024-03-26
Payer: MEDICAID

## 2024-03-26 NOTE — PHYSICIAN QUERY
PT Name: Kalia Pacheco  MR #: [id number]    DOCUMENTATION CLARIFICATION     CDS/: Cole Blunt               Contact information:[email or web address]     This form is a permanent document in the medical record.     Query Date: March 26, 2024    By submitting this query, we are merely seeking further clarification of documentation. Please utilize your independent clinical judgment when addressing the question(s) below.    The Medical Record contains the following:  Clinical Findings Location in Medical Record   Brain compression  CT head: Evolving subarachnoid and intraventricular hemorrhages     vasogenic cerebral edema  CT head: Evolving subarachnoid and intraventricular hemorrhages    Care Update by Francheska Campo NP at 3/20/2024  5:00 PM          For reporting purposes, the definition for other diagnoses is interpreted as additional conditions that affect patient care in terms of requiring: clinical evaluation; or therapeutic treatment; or diagnostic procedures; or extended length of hospital stay; or increased nursing care and/or monitoring. (ICD-10-CM Official Guidelines for Coding and Reporting FY 2021)     After study, the diagnosis of cerebral edema is:  [  x ] Clinically significant diagnosis that was monitored and/or treated as follows (please specify): ___________   [   ] Present but was inherent to the stroke   [   ] Other explanation (please specify): _________________   [  ] Clinically Undetermined       Please document in your progress notes daily for the duration of treatment until resolved, and include in your discharge summary.    Reference:  ICD-10-CM Official Guidelines for Coding and Reporting FY 2021. (2020). Retrieved April 7, 2021, from https://www.cdc.gov/nchs/data/icd/10cmguidelines-FY2021.pdf?fbclid=[email or web address]       Form No. [id number]

## 2024-04-30 ENCOUNTER — OFFICE VISIT (OUTPATIENT)
Dept: NEUROSURGERY | Facility: CLINIC | Age: 34
End: 2024-04-30
Payer: MEDICAID

## 2024-04-30 ENCOUNTER — TELEPHONE (OUTPATIENT)
Dept: NEUROSURGERY | Facility: CLINIC | Age: 34
End: 2024-04-30

## 2024-04-30 ENCOUNTER — PATIENT MESSAGE (OUTPATIENT)
Dept: NEUROSURGERY | Facility: CLINIC | Age: 34
End: 2024-04-30

## 2024-04-30 DIAGNOSIS — I67.1 CEREBRAL ANEURYSM, NONRUPTURED: Primary | ICD-10-CM

## 2024-04-30 DIAGNOSIS — I60.8 SUBARACHNOID HEMORRHAGE DUE TO RUPTURED ANEURYSM: Primary | ICD-10-CM

## 2024-04-30 PROCEDURE — 99215 OFFICE O/P EST HI 40 MIN: CPT | Mod: 95,,, | Performed by: NEUROLOGICAL SURGERY

## 2024-04-30 PROCEDURE — 4010F ACE/ARB THERAPY RXD/TAKEN: CPT | Mod: CPTII,95,, | Performed by: NEUROLOGICAL SURGERY

## 2024-04-30 NOTE — PROGRESS NOTES
Neurosurgery  Established Patient    Virtual visit Attestation   The patient location is: Home  The chief complaint leading to consultation is: f/u SAH and vertebral artery aneurysm   Visit type: audiovisual  Total time spent with patient: 20 min     Each patient to whom he or she provides medical services by telemedicine is:  (1) informed of the relationship between the physician and patient and the respective role of any other health care provider with respect to management of the patient; and (2) notified that he or she may decline to receive medical services by telemedicine and may withdraw from such care at any time.     Anibalibe Attestation  YOLANDA, Jess Nuñez, attest that this documentation has been prepared under the direction and in the presence of Swapna Kyle MD.       SUBJECTIVE:     History of Present Illness 4/17/2023   The patient is a 32-year-old man with a past medical history of ADHD, EtOH use and abuse, arthritis, hypertension, coarctation of the aorta (status post repair), PDA, VSD status post repair x2.  History of Legg-Perthes disease, and intracranial hemorrhage in May 2022.  He had a new onset seizure, without a previous history of seizure.  He was referred by my endovascular partner Dr. Michael Reyes and Mariah Adam, secondary to his CHESTER aneurysm.  Was concern that his CHESTER aneurysms may not be able to be treated endovascularly given its size, and may be more appropriate for microsurgical clip ligation.  This was a virtual audio visual visit.  The patient denies any new headache, nausea, vomiting.  Denies any new or recurrent seizure since his previous seizure.    Interval History 5/8/2023  Patient presents for follow-up via audiovirtual visit after undergoing MRA with vessel wall imaging to review results. He is accompanied by his mother. Reports he has been doing well since last visit, no changes. Denies any significant headache, seizure like activity, speech difficulty, visual  disturbance, focal weakness/numbness, or any other neurologic deficits.      Interval History 4/30/2024  Kalia Pacheco is a 32 y/o M that presents after being admitted to the ED with headache and nausea (3/5/2024). CTH done at that time demonstrated diffuse SAH throughout basal cisterns. R frontal EVD was placed and followed up with angiogram. Review of angiogram revealed vertebral artery aneurysm which was treated with placement of right V4/PICA stent assisted coiling. He is here today for follow up. The pt reports feeling overall well. Denies headache, nausea, or vomiting. No acute changes or issues to report. Patient is accompanied by his family.      Review of patient's allergies indicates:  No Known Allergies    Current Outpatient Medications   Medication Sig Dispense Refill    aspirin 81 MG Chew Chew and swallow 1 tablet (81 mg total) by mouth once daily. 90 tablet 3    gabapentin (NEURONTIN) 300 MG capsule Take 1 capsule (300 mg total) by mouth every 8 (eight) hours. 90 capsule 11    niMODipine (NIMOTOP) 30 MG Cap Take 2 capsules (60 mg total) by mouth every 4 (four) hours. for 7 days 84 capsule 0    ticagrelor (BRILINTA) 90 mg tablet Take 1 tablet (90 mg total) by mouth 2 (two) times daily. 180 tablet 3    valACYclovir (VALTREX) 500 MG tablet Take 1 tablet (500 mg total) by mouth 2 (two) times daily. 180 tablet 2     No current facility-administered medications for this visit.       Past Medical History:   Diagnosis Date    ADHD (attention deficit hyperactivity disorder)     Aneurysm     Aorta coarctation     Arthritis     Coarctation of aorta     Depression     GERD (gastroesophageal reflux disease)     History of alcohol abuse     Hypertension     Legg-Perthes disease     Seizures 05/2022     Past Surgical History:   Procedure Laterality Date    hip surgeries      x 6    open heart surgery      surgery on aorta as a child , 3 procedures       Family History       Problem Relation (Age of Onset)     Hypertension Mother, Father    No Known Problems Sister          Social History     Socioeconomic History    Marital status: Single    Number of children: 0   Tobacco Use    Smoking status: Every Day     Types: Vaping with nicotine    Smokeless tobacco: Never   Substance and Sexual Activity    Alcohol use: Not Currently     Comment: rarely    Drug use: Yes     Types: Marijuana     Comment: daily    Sexual activity: Yes     Partners: Female   Social History Narrative    ** Merged History Encounter **         Single- lives with parents.     Social Determinants of Health     Financial Resource Strain: Low Risk  (3/6/2024)    Overall Financial Resource Strain (CARDIA)     Difficulty of Paying Living Expenses: Not hard at all   Food Insecurity: No Food Insecurity (3/6/2024)    Hunger Vital Sign     Worried About Running Out of Food in the Last Year: Never true     Ran Out of Food in the Last Year: Never true   Transportation Needs: No Transportation Needs (3/6/2024)    PRAPARE - Transportation     Lack of Transportation (Medical): No     Lack of Transportation (Non-Medical): No   Physical Activity: Sufficiently Active (3/6/2024)    Exercise Vital Sign     Days of Exercise per Week: 7 days     Minutes of Exercise per Session: 40 min   Stress: No Stress Concern Present (3/6/2024)    [employer] [place] [employer] - Occupational Stress Questionnaire     Feeling of Stress : Not at all   Housing Stability: Unknown (3/6/2024)    Housing Stability Vital Sign     Unable to Pay for Housing in the Last Year: No     Number of Places Lived in the Last Year: 2       Review of Systems   All other systems reviewed and are negative.      OBJECTIVE:     Vital Signs     There is no height or weight on file to calculate BMI.    Neurosurgery Physical Exam  On virtual audio visual visit   Head is normocephalic atraumatic   Neck is grossly supple  No appreciable labored breathing   Admitting appears to be nontender   Patient is able  to move extremities     On virtual audio visual visit the patient's speech is fluent, goal directed without any noted dysarthria or aphasia   Unable to evaluate pupils on virtual audio visual visit   Face is symmetric   Tongue is midline  Unable to evaluate palate   Hearing appears to be intact on virtual audio visual visit to voice   Patient able to move both upper and lower extremities without any gross motor asymmetry on virtual audio visual visit     Diagnostic Results:  I personally reviewed the patient's Diagnostic Imaging.      MRA Brain W Cont 4/17/2024  Saccular aneurysm at the junction of the left A1 segment and anterior communicating artery, measuring 2 mm, stable from prior exam. Contrast enhancement on lateral wall of aneurysm. Difficult to see filling in dome itself. No vessel stenosis or occlusion near stent.     IR Angiogram Carotid Cerebral Bilateral inc Arch 3/6/2024  Anterior communicating artery aneurysm 2 x 1.7 mm with a wide neck. Small irregular aneurysm from the right V 4 vertebral segment just proximal to the posteroinferior cerebellar artery with possible filling.     CTA Head and Neck 3/5/2024  CTA head: 3 mm left anterior communicating artery aneurysm again identified.  No definite additional aneurysm identified concerning for source of hemorrhage. 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 Please note prominence of the ascending aorta and unusual configuration of the descending thoracic aorta with slight truncated configuration which may represent usual developmental hypoplasia.     CT head: Evolving subarachnoid and intraventricular hemorrhages.  Interval increase distension lateral and 3rd ventricles  compatible with developing hydrocephalus. Crowding cerebral sulci at the vertex with additional crowding basilar cisterns with questionable early cerebellar tonsillar herniation.     CT Head WO Cont  3/5/2024   Subarachnoid hemorrhage with blood throughout the basilar cisterns.  Moderate intraventricular blood and mild ventricular prominence.     MRA Brain W Wo Contrast 5/6/2023  MRA head: Small medially projected aneurysm left aspect of the anterior communicating artery again identified less well seen compared to prior conventional angiography.  Superimposed developmental variant with absent or severely hypoplastic right A1 CHESTER and right P1 PCA.     Vessel wall imaging: No evidence for abnormal wall enhancement associated with anterior communicating artery aneurysm.     IR Angiogram 3/21/2023  1. Previously mentioned, unruptured, homogeneous, saccular, anterior communicating segment aneurysm. Approximate dimensions of 1.8 mm height X 1.5 mm width X 1.3 mm neck.     2.  Prominent retrograde collateralization of the left vertebral artery and left subclavian artery through the right vertebral artery is noted in this injection.  Compensatiory findings likely related to congenital proximal left subclavian artery stenosis versus atresia.  Negative for any obvious steal phenomena related intracranial hypoperfusion.     CTA Head 1/18/2023  1. No evidence of acute intracranial hemorrhage, midline shift, mass, or mass effect.  2. Stable CT appearance of anterior communicating artery aneurysm.      ASSESSMENT/PLAN:   34 y/o M with past SAH and vertebral artery aneurysm s/p stent assisted coiling placement. Pt with stable left anterior communicating artery aneurysm and small irregular aneurysm from the right V4 vertebral with possible filling.    No focal neurological deficits noted on examination.  Discussed imaging results of MRA, angiogram, CTA, and CT demonstrating stable left anterior communicating artery aneurysm and  small irregular aneurysm from the right V4 vertebral with possible filling.  After talking with the patient, I recommend follow up with angiogram to further evaluate aneurysms. Patient was also told to continue taking aspirin 81 mg and Brilinta. I discussed with the patient and answered all questions to his satisfaction.     Thank you so very much for allowing me to participate in the care of this patient.  Please feel free to call any questions, comments, or concerns.     Swapna Kyle MD,MSc  Department of Neurosurgery   Department of Radiology  Department of Neurology  [facility]    [facility] [employer]     Total time spent in counseling and discussion about further management options including relevant lab work, treatment,  prognosis, medications and intended side effects was more than 60 minutes. More than 50 % of the time was spent in counseling and coordination of care.  I spent a total of 60 minutes on the day of the visit.This includes face to face time and non-face to face time preparing to see the patient (eg, review of tests), Obtaining and/or reviewing separately obtained history, Documenting clinical information in the electronic or other health record, Independently interpreting resultsand communicating results to the patient/family/caregiver, or Care coordination      Scribe Attestation  I, Dr.Vernard Kyle personally performed the services described in this documentation. All medical record entries made by the scribe, Jess Nuñez, were at my direction and in my presence.  I have reviewed the chart and agree that the record reflects my personal performance and is accurate and complete.

## 2024-07-11 ENCOUNTER — PATIENT MESSAGE (OUTPATIENT)
Dept: NEUROSURGERY | Facility: CLINIC | Age: 34
End: 2024-07-11

## 2024-07-21 ENCOUNTER — DOCUMENTATION ONLY (OUTPATIENT)
Dept: NEUROLOGY | Facility: HOSPITAL | Age: 34
End: 2024-07-21
Payer: MEDICAID

## 2024-07-24 ENCOUNTER — PATIENT MESSAGE (OUTPATIENT)
Dept: NEUROSURGERY | Facility: CLINIC | Age: 34
End: 2024-07-24
Payer: MEDICAID

## 2024-07-24 ENCOUNTER — TELEPHONE (OUTPATIENT)
Dept: NEUROSURGERY | Facility: CLINIC | Age: 34
End: 2024-07-24
Payer: MEDICAID

## 2024-07-24 NOTE — TELEPHONE ENCOUNTER
Called and spoke with pt. Instructed on angiogram for 08/22/24. Will let pt know of time once scheduled. Instructed pt will send further instructions through portal Pt noah.

## 2024-07-24 NOTE — TELEPHONE ENCOUNTER
----- Message from Elaina Vaughn sent at 7/24/2024 12:06 PM CDT -----  Regarding: Aortic Valve replacement  Contact: Nora @ [phone]  Vivian  calling with IMS from Dr. Ortiz  Office  is calling asking to speak with provider, in regards to getting  pt seen sooner  because pt has been having shortness of breathe ,  needs to replace an aortic valve but, his appt with Dr. Kyle is needed first. Caller is asking to speak directly with the provider and to please call at any time. Thank you.

## 2024-07-24 NOTE — TELEPHONE ENCOUNTER
"Returned call to Nora. Nora stated pt is in need of a valve replacement due to being symptomatic. But cardiologist does not want to do surgery until "his head is fixed". Explained that the angiogram is not going to fix anything. It is diagnostic. I explained he is on the list but I will try to move pt up. Nora zamora.   "

## 2024-07-24 NOTE — TELEPHONE ENCOUNTER
Called Nora and left a message I had a cancellation on 08/22/24 and will schedule pt at that time.

## 2024-08-13 ENCOUNTER — DOCUMENTATION ONLY (OUTPATIENT)
Dept: PREADMISSION TESTING | Facility: HOSPITAL | Age: 34
End: 2024-08-13
Payer: MEDICAID

## 2024-08-13 NOTE — PRE-PROCEDURE INSTRUCTIONS
PRE-OP INSTRUCTIONS:  Instructed patient to have no food,milk or milk products after midnight 8/14/2024  It is ok to take AM medications with a few sips of water   Medication instructions for pm prior to and am of surgery reviewed.  Instructed patient to avoid taking vitamins,supplements,aspirin or ibuprofen the am of surgery.  Shower instructions provided    Patient reported that he doesn't take any medication in the morning    Patient denies any side effects or issues with anesthesia or sedation.

## 2024-08-15 ENCOUNTER — HOSPITAL ENCOUNTER (OUTPATIENT)
Dept: INTERVENTIONAL RADIOLOGY/VASCULAR | Facility: HOSPITAL | Age: 34
Discharge: HOME | End: 2024-08-15
Attending: NEUROLOGICAL SURGERY | Admitting: NEUROLOGICAL SURGERY
Payer: MEDICAID

## 2024-08-15 ENCOUNTER — ANESTHESIA (OUTPATIENT)
Dept: INTERVENTIONAL RADIOLOGY/VASCULAR | Facility: HOSPITAL | Age: 34
End: 2024-08-15
Payer: MEDICAID

## 2024-08-15 VITALS
HEART RATE: 73 BPM | OXYGEN SATURATION: 98 % | HEIGHT: 68 IN | WEIGHT: 185 LBS | TEMPERATURE: 97 F | RESPIRATION RATE: 18 BRPM | DIASTOLIC BLOOD PRESSURE: 69 MMHG | SYSTOLIC BLOOD PRESSURE: 118 MMHG | BODY MASS INDEX: 28.04 KG/M2

## 2024-08-15 DIAGNOSIS — I67.1 CEREBRAL ANEURYSM, NONRUPTURED: ICD-10-CM

## 2024-08-15 PROCEDURE — 99900035 HC TECH TIME PER 15 MIN (STAT)

## 2024-08-15 PROCEDURE — 27201423 OPTIME MED/SURG SUP & DEVICES STERILE SUPPLY

## 2024-08-15 PROCEDURE — 94761 N-INVAS EAR/PLS OXIMETRY MLT: CPT

## 2024-08-15 PROCEDURE — 63600175 PHARM REV CODE 636 W HCPCS: Performed by: NURSE ANESTHETIST, CERTIFIED REGISTERED

## 2024-08-15 PROCEDURE — 63600175 PHARM REV CODE 636 W HCPCS: Performed by: STUDENT IN AN ORGANIZED HEALTH CARE EDUCATION/TRAINING PROGRAM

## 2024-08-15 PROCEDURE — C1760 CLOSURE DEV, VASC: HCPCS

## 2024-08-15 PROCEDURE — C1769 GUIDE WIRE: HCPCS

## 2024-08-15 PROCEDURE — 25500020 PHARM REV CODE 255: Performed by: NEUROLOGICAL SURGERY

## 2024-08-15 PROCEDURE — C1894 INTRO/SHEATH, NON-LASER: HCPCS

## 2024-08-15 PROCEDURE — 25000003 PHARM REV CODE 250: Performed by: STUDENT IN AN ORGANIZED HEALTH CARE EDUCATION/TRAINING PROGRAM

## 2024-08-15 PROCEDURE — 27000221 HC OXYGEN, UP TO 24 HOURS

## 2024-08-15 PROCEDURE — 25000003 PHARM REV CODE 250: Performed by: NURSE ANESTHETIST, CERTIFIED REGISTERED

## 2024-08-15 PROCEDURE — 37000009 HC ANESTHESIA EA ADD 15 MINS

## 2024-08-15 PROCEDURE — 25000003 PHARM REV CODE 250

## 2024-08-15 PROCEDURE — C1887 CATHETER, GUIDING: HCPCS

## 2024-08-15 PROCEDURE — 37000008 HC ANESTHESIA 1ST 15 MINUTES

## 2024-08-15 RX ORDER — PHENYLEPHRINE HCL IN 0.9% NACL 1 MG/10 ML
100 SYRINGE (ML) INTRAVENOUS EVERY 5 MIN PRN
Status: COMPLETED | OUTPATIENT
Start: 2024-08-15 | End: 2024-08-15

## 2024-08-15 RX ORDER — SODIUM CHLORIDE 0.9 % (FLUSH) 0.9 %
10 SYRINGE (ML) INJECTION
Status: DISCONTINUED | OUTPATIENT
Start: 2024-08-15 | End: 2024-08-16 | Stop reason: HOSPADM

## 2024-08-15 RX ORDER — DEXAMETHASONE SODIUM PHOSPHATE 4 MG/ML
INJECTION, SOLUTION INTRA-ARTICULAR; INTRALESIONAL; INTRAMUSCULAR; INTRAVENOUS; SOFT TISSUE
Status: DISCONTINUED | OUTPATIENT
Start: 2024-08-15 | End: 2024-08-15

## 2024-08-15 RX ORDER — SACUBITRIL AND VALSARTAN 49; 51 MG/1; MG/1
1 TABLET, FILM COATED ORAL 2 TIMES DAILY
COMMUNITY

## 2024-08-15 RX ORDER — PROPOFOL 10 MG/ML
VIAL (ML) INTRAVENOUS
Status: DISCONTINUED | OUTPATIENT
Start: 2024-08-15 | End: 2024-08-15

## 2024-08-15 RX ORDER — LIDOCAINE AND PRILOCAINE 25; 25 MG/G; MG/G
CREAM TOPICAL ONCE
Status: DISCONTINUED | OUTPATIENT
Start: 2024-08-15 | End: 2024-08-15

## 2024-08-15 RX ORDER — FENTANYL CITRATE 50 UG/ML
100 INJECTION, SOLUTION INTRAMUSCULAR; INTRAVENOUS ONCE
Status: DISCONTINUED | OUTPATIENT
Start: 2024-08-15 | End: 2024-08-16 | Stop reason: HOSPADM

## 2024-08-15 RX ORDER — ROCURONIUM BROMIDE 10 MG/ML
INJECTION, SOLUTION INTRAVENOUS
Status: DISCONTINUED | OUTPATIENT
Start: 2024-08-15 | End: 2024-08-15

## 2024-08-15 RX ORDER — FENTANYL CITRATE 50 UG/ML
INJECTION, SOLUTION INTRAMUSCULAR; INTRAVENOUS
Status: DISCONTINUED | OUTPATIENT
Start: 2024-08-15 | End: 2024-08-15

## 2024-08-15 RX ORDER — LIDOCAINE AND PRILOCAINE 25; 25 MG/G; MG/G
CREAM TOPICAL ONCE
Status: COMPLETED | OUTPATIENT
Start: 2024-08-15 | End: 2024-08-15

## 2024-08-15 RX ORDER — PHENYLEPHRINE HCL IN 0.9% NACL 1 MG/10 ML
SYRINGE (ML) INTRAVENOUS
Status: DISCONTINUED | OUTPATIENT
Start: 2024-08-15 | End: 2024-08-15

## 2024-08-15 RX ORDER — FENTANYL CITRATE 50 UG/ML
25 INJECTION, SOLUTION INTRAMUSCULAR; INTRAVENOUS EVERY 5 MIN PRN
Status: DISCONTINUED | OUTPATIENT
Start: 2024-08-15 | End: 2024-08-16 | Stop reason: HOSPADM

## 2024-08-15 RX ORDER — PHENYLEPHRINE HCL IN 0.9% NACL 20MG/250ML
0-5 PLASTIC BAG, INJECTION (ML) INTRAVENOUS CONTINUOUS
Status: DISCONTINUED | OUTPATIENT
Start: 2024-08-15 | End: 2024-08-16 | Stop reason: HOSPADM

## 2024-08-15 RX ORDER — HEPARIN SODIUM 1000 [USP'U]/ML
INJECTION, SOLUTION INTRAVENOUS; SUBCUTANEOUS
Status: DISCONTINUED | OUTPATIENT
Start: 2024-08-15 | End: 2024-08-15

## 2024-08-15 RX ORDER — GLUCAGON 1 MG
1 KIT INJECTION
Status: DISCONTINUED | OUTPATIENT
Start: 2024-08-15 | End: 2024-08-16 | Stop reason: HOSPADM

## 2024-08-15 RX ORDER — SODIUM CHLORIDE 9 MG/ML
INJECTION, SOLUTION INTRAVENOUS CONTINUOUS
Status: DISCONTINUED | OUTPATIENT
Start: 2024-08-15 | End: 2024-08-16 | Stop reason: HOSPADM

## 2024-08-15 RX ORDER — MIDAZOLAM HYDROCHLORIDE 1 MG/ML
2 INJECTION, SOLUTION INTRAMUSCULAR; INTRAVENOUS ONCE
Status: DISCONTINUED | OUTPATIENT
Start: 2024-08-15 | End: 2024-08-16 | Stop reason: HOSPADM

## 2024-08-15 RX ORDER — MIDAZOLAM HYDROCHLORIDE 1 MG/ML
INJECTION INTRAMUSCULAR; INTRAVENOUS
Status: DISCONTINUED | OUTPATIENT
Start: 2024-08-15 | End: 2024-08-15

## 2024-08-15 RX ORDER — SODIUM CHLORIDE 0.9 % (FLUSH) 0.9 %
3 SYRINGE (ML) INJECTION
Status: DISCONTINUED | OUTPATIENT
Start: 2024-08-15 | End: 2024-08-16 | Stop reason: HOSPADM

## 2024-08-15 RX ORDER — IODIXANOL 320 MG/ML
300 INJECTION, SOLUTION INTRAVASCULAR
Status: COMPLETED | OUTPATIENT
Start: 2024-08-15 | End: 2024-08-15

## 2024-08-15 RX ORDER — PHENYLEPHRINE HCL IN 0.9% NACL 1 MG/10 ML
SYRINGE (ML) INTRAVENOUS
Status: COMPLETED
Start: 2024-08-15 | End: 2024-08-15

## 2024-08-15 RX ORDER — FUROSEMIDE 20 MG/1
20 TABLET ORAL 2 TIMES DAILY
COMMUNITY

## 2024-08-15 RX ORDER — EPHEDRINE SULFATE 50 MG/ML
INJECTION, SOLUTION INTRAVENOUS
Status: DISCONTINUED | OUTPATIENT
Start: 2024-08-15 | End: 2024-08-15

## 2024-08-15 RX ORDER — LIDOCAINE HYDROCHLORIDE 20 MG/ML
INJECTION, SOLUTION EPIDURAL; INFILTRATION; INTRACAUDAL; PERINEURAL
Status: DISCONTINUED | OUTPATIENT
Start: 2024-08-15 | End: 2024-08-15

## 2024-08-15 RX ORDER — ONDANSETRON HYDROCHLORIDE 2 MG/ML
INJECTION, SOLUTION INTRAVENOUS
Status: DISCONTINUED | OUTPATIENT
Start: 2024-08-15 | End: 2024-08-15

## 2024-08-15 RX ADMIN — LIDOCAINE HYDROCHLORIDE 80 MG: 20 INJECTION, SOLUTION EPIDURAL; INFILTRATION; INTRACAUDAL at 08:08

## 2024-08-15 RX ADMIN — Medication 100 MCG: at 09:08

## 2024-08-15 RX ADMIN — EPHEDRINE SULFATE 10 MG: 50 INJECTION INTRAVENOUS at 09:08

## 2024-08-15 RX ADMIN — EPHEDRINE SULFATE 10 MG: 50 INJECTION INTRAVENOUS at 08:08

## 2024-08-15 RX ADMIN — PHENYLEPHRINE HYDROCHLORIDE 1 MCG/KG/MIN: 10 INJECTION INTRAVENOUS at 09:08

## 2024-08-15 RX ADMIN — ROCURONIUM BROMIDE 20 MG: 10 INJECTION, SOLUTION INTRAVENOUS at 09:08

## 2024-08-15 RX ADMIN — SUGAMMADEX 200 MG: 100 INJECTION, SOLUTION INTRAVENOUS at 10:08

## 2024-08-15 RX ADMIN — PROPOFOL 50 MG: 10 INJECTION, EMULSION INTRAVENOUS at 08:08

## 2024-08-15 RX ADMIN — FENTANYL CITRATE 50 MCG: 50 INJECTION, SOLUTION INTRAMUSCULAR; INTRAVENOUS at 08:08

## 2024-08-15 RX ADMIN — Medication 100 MCG: at 11:08

## 2024-08-15 RX ADMIN — LIDOCAINE AND PRILOCAINE: 25; 25 CREAM TOPICAL at 07:08

## 2024-08-15 RX ADMIN — MIDAZOLAM 2 MG: 1 INJECTION INTRAMUSCULAR; INTRAVENOUS at 08:08

## 2024-08-15 RX ADMIN — ROCURONIUM BROMIDE 40 MG: 10 INJECTION, SOLUTION INTRAVENOUS at 08:08

## 2024-08-15 RX ADMIN — SODIUM CHLORIDE: 0.9 INJECTION, SOLUTION INTRAVENOUS at 08:08

## 2024-08-15 RX ADMIN — HEPARIN SODIUM 2000 UNITS: 1000 INJECTION, SOLUTION INTRAVENOUS; SUBCUTANEOUS at 09:08

## 2024-08-15 RX ADMIN — IODIXANOL 180 ML: 320 INJECTION, SOLUTION INTRAVASCULAR at 10:08

## 2024-08-15 RX ADMIN — PROPOFOL 50 MG: 10 INJECTION, EMULSION INTRAVENOUS at 09:08

## 2024-08-15 RX ADMIN — ONDANSETRON 4 MG: 2 INJECTION INTRAMUSCULAR; INTRAVENOUS at 09:08

## 2024-08-15 RX ADMIN — Medication 100 MCG: at 08:08

## 2024-08-15 RX ADMIN — DEXAMETHASONE SODIUM PHOSPHATE 4 MG: 4 INJECTION INTRA-ARTICULAR; INTRALESIONAL; INTRAMUSCULAR; INTRAVENOUS; SOFT TISSUE at 09:08

## 2024-08-15 RX ADMIN — ROCURONIUM BROMIDE 10 MG: 10 INJECTION, SOLUTION INTRAVENOUS at 08:08

## 2024-08-15 RX ADMIN — Medication 0.5 MCG/KG/MIN: at 11:08

## 2024-08-15 NOTE — PLAN OF CARE
Pt arrived to IR 4 (200)  for diagnostic cerebral angiogram. Pt oriented to unit and staff. Plan of care reviewed with patient, patient verbalizes understanding. Comfort measures utilized. Pt safely transferred from stretcher to procedural table. Fall risk reviewed with patient, fall risk interventions maintained. Positioner pillows utilized to minimize pressure points. Blankets applied. Pt prepped and draped utilizing standard sterile technique. Timeouts completed utilizing standard universal time-out, per department and facility policy.  Anesthesia at bedside; Refer to anesthesia record regarding sedation and vital signs.

## 2024-08-15 NOTE — ANESTHESIA POSTPROCEDURE EVALUATION
Anesthesia Post Evaluation    Patient: Kalia Pacheco    Procedure(s) Performed: * No procedures listed *    Final Anesthesia Type: general      Patient location during evaluation: PACU  Patient participation: Yes- Able to Participate  Level of consciousness: awake and alert and oriented  Post-procedure vital signs: reviewed and stable  Pain management: adequate  Airway patency: patent    PONV status at discharge: No PONV  Anesthetic complications: no      Cardiovascular status: hemodynamically stable and blood pressure returned to baseline (Patient initially hypotensive and required phenylephrine infusion. BP stabilized)  Respiratory status: unassisted and spontaneous ventilation  Hydration status: euvolemic  Follow-up not needed.          Vitals Value Taken Time   /57 08/15/24 1345   Temp 36.2 °C (97.2 °F) 08/15/24 1313   Pulse 71 08/15/24 1345   Resp 18 08/15/24 1345   SpO2 98 % 08/15/24 1345         Event Time   Out of Recovery 13:15:00         Pain/Erin Score: Erin Score: 10 (8/15/2024  1:45 PM)

## 2024-08-15 NOTE — H&P
Interventional Neuroradiology Pre-procedure Note    Procedure: Diagnostic cerebral angiogram    History of Present Illness:  Kalia Pacheco is a 33 y.o. male with past medical history of ADHD, EtOH use and abuse, arthritis, hypertension, coarctation of the aorta (status post repair), PDA, VSD status post repair x2, Legg-Perthes disease, past SAH suspected from right ruptured vertebral artery aneurysm s/p stent assisted coiling (in 03/2024) and A. Comm aneurysm who presents for DSA to further characterize the A.Comm aneurysm.    ROS:   Hematological: no known coagulopathies  Respiratory: no shortness of breath  Cardiovascular: no chest pain  Gastrointestinal: no abdominal pain  Genito-Urinary: no dysuria  Musculoskeletal: negative  Neurological: no TIA or stroke symptoms     Scheduled Meds:   Current Meds:   Current Outpatient Medications:     aspirin 81 MG Chew, Chew and swallow 1 tablet (81 mg total) by mouth once daily. (Patient taking differently: Take 81 mg by mouth every evening. AFTERNOONS), Disp: 90 tablet, Rfl: 3    metoprolol succinate (TOPROL-XL) 50 MG 24 hr tablet, Take 1 tablet (50 mg total) by mouth once daily. (Patient taking differently: Take 50 mg by mouth every evening. AFTERNOONS), Disp: 90 tablet, Rfl: 3    sacubitriL-valsartan (ENTRESTO) 49-51 mg per tablet, Take 1 tablet by mouth 2 (two) times daily., Disp: , Rfl:     ticagrelor (BRILINTA) 90 mg tablet, Take 1 tablet (90 mg total) by mouth 2 (two) times daily., Disp: 180 tablet, Rfl: 3    valACYclovir (VALTREX) 500 MG tablet, Take 1 tablet (500 mg total) by mouth 2 (two) times daily. (Patient taking differently: Take 500 mg by mouth 2 (two) times daily as needed. PRN), Disp: 180 tablet, Rfl: 2    gabapentin (NEURONTIN) 300 MG capsule, Take 1 capsule (300 mg total) by mouth every 8 (eight) hours. (Patient not taking: Reported on 6/27/2024), Disp: 90 capsule, Rfl: 11    niMODipine (NIMOTOP) 30 MG Cap, Take 2 capsules (60 mg total) by mouth  every 4 (four) hours. for 7 days, Disp: 84 capsule, Rfl: 0    valsartan (DIOVAN) 160 MG tablet, Take 1 tablet (160 mg total) by mouth once daily. (Patient taking differently: Take 160 mg by mouth every evening.), Disp: 90 tablet, Rfl: 3   Continuous Infusions:   PRN Meds:    Allergies: Review of patient's allergies indicates:  No Known Allergies  Sedation Hx: No adverse events.    Labs:              Objective:  Vitals: There were no vitals taken for this visit.     Physical Exam:  General: well developed, well nourished, no distress.   Head: normocephalic, atraumatic  Neck: No tracheal deviation. No palpable masses. Full ROM.   Neurologic: Alert and oriented. Thought content appropriate.  GCS: E4 V5 M6; Total: 15  Language: No aphasia  Speech: No dysarthria  Cranial nerves: face symmetric, tongue midline, CN II-XII grossly intact.   Eyes: pupils equal, round, reactive to light with accomodation, EOMI.   Pulmonary: normal respirations, no signs of respiratory distress  Abdomen: soft, non-distended, not tender to palpation  Vascular: Pulses 2+ and symmetric radial and dorsalis pedis. No LE edema.   Skin: Skin is warm, dry and intact.  Sensory: intact to light touch throughout  Motor Strength: Moves all extremities spontaneously with good tone.  Full strength upper and lower extremities. No abnormal movements seen.     ASA: 2  MAL: 2    Plan:  -Plan for cerebral angiogram   -Sedation Plan: General anesthesia  -All diagnostics and imaging reviewed  -Patient NPO since [place]  -Risks & benefits of procedure explained in detail; patient consented and all questions answered  -Further reccs to follow procedure          Denver Larry MD, MHA  Fellow, NeuroEndovascular Surgery, [facility] Geoffrey Bowser  Neurologist, [facility] [place], [place]

## 2024-08-15 NOTE — DISCHARGE INSTRUCTIONS
Please call with any questions or concerns.      Monday thru Friday 8:00 am - 4:30 pm    Interventional Radiology clinic  [phone]    After Hours    Ask for the Radiology Resident on call  [phone]      Post-Angiogram instructions:    Dont drive for 24 hours.  Avoid unnecessary walking, bending, and taking stairs for 24 hours.  No heavy lifting anything 10 lbs or heavier (gallon of milk) or strenuous exercise for 10 days.  Keep your dressing clean and dry for 24 hours. Do not submerge insertion site in water (bath tub, swimming pool) until fully healed.  Be sure to follow any other instructions from your doctor.      Call your doctor if you have any of the following:    Fever of 100.4 (38C) or higher lasting for 24 to 48 hours  Bleeding, swelling, or a large lump at the insertion site  Sharp or increasing pain at the insertion site  Leg pain, numbness, or a cold leg or foot/arm/hand  Any other symptoms your provider instructed you to report based on your medical condition.      Your doctor attempted access for your procedure by making small puncture in your wrist; however, the radial access was not successful and the procedure was not done through your right wrist. You must observe that area for bleeding and swelling once you are discharged from the hospital. Be careful not to over-stimulate the affected wrist for 24 hours.    Do not strenuously flex the wrist for 24 hours.  Occlusive bandage (Tegaderm) may be removed after 24 hours.  At 24 hours after your procedure, remove the Tagaderm bandage and leave puncture site open to air. If minor oozing, apply adhesive bandage (Band-Aid) and remove after 12 hours.  No driving for 24 hours.  No soaking wrist for 2 days. Gently wash site with soap and water. Dry well. Do not apply powders, lotions or ointments. No tub baths, whirlpool or swimming for 48 hours.  No lifting more than 3-5 pounds with affected wrist for 7 days.  Restart your usual diet and  medications with appropriate changes as dictated by your doctor.  For any bleeding, hold pressure with thumb against puncture site and finger against back of wrist.  If you see fresh bleeding, bright red or a lump that is getting bigger at the puncture site, apply pressure to the area as instructed above and call [phone] between the hours of 8:00am-5:00pm or [phone] after hours and ask to speak to the Interventional Radiology Resident on-call. If you are unable to control the bleeding, call [phone].   Call the Interventional Radiology Resident on-call at [phone] with any concerns or any of the following:  Swelling, redness, discharge for the site, large bruising or increasing pain, numbness or tingling at the site  Temperature of 101 F or higher  Shortness of breath      Contact information:    For immediate concerns that are not emergent, you may call our interventional radiology clinic at [phone] or [phone]    ** After hours and weekends: Call the paging  at [phone] and ask for the Radiology Resident on call**

## 2024-08-15 NOTE — PLAN OF CARE
Cerebral angiogram procedure completed. Mynx closure device deployed in right  femoral artery; hemostasis achieved at 1015. Patient to lay flat for 2 hours until 12:15 on 08/15/24 per MD. Right groin site clean, dry, and intact; no bleeding or hematoma noted. Patient to be transferred to [facility] for post-procedural recovery per MD. Leg immobilizer can be removed once laying-flat time is complete. Report to be given at bedside to RN.

## 2024-08-15 NOTE — PROCEDURES
Interventional Neuroradiology Post-Procedure Note    Pre Op Diagnosis: History of right ruptured vertebral artery aneurysm s/p stent assisted coiling (in 03/2024) and A. Comm aneurysm     Post Op Diagnosis: Same    Procedure: Diagnostic cerebral angiogram    Procedure performed by: Swapna Mendoza MD; Denver Larry MD; David Shay MD    Written Informed Consent Obtained: Yes    Specimen Removed: NO    Estimated Blood Loss: Minimal    Procedure report:     A 5F sheath was placed into the right femoral artery and a 5F Som catheter was advanced into the aortic arch.  The bilateral ICA and right vertebral arteries were subselected and angiography of the brain was performed after injection into each of these vessels.    Preliminary interpretation: Adequately coiled right vertebral artery aneurysm without intra-aneurysmal contrast filling. Fausto-superiorly projecting, saccular aneurysm at the left CHESTER-A.Comm segment (1.9 mm dome x 2.4 mm width x 2.1 mm neck)  Please see Imaging report for full details.    A right femoral artery angiogram was performed, the sheath removed and hemostasis achieved using 5F Mynx.  No hematoma was present at the time of hemostasis.    The patient tolerated the procedure well.     Plan:  -Bed rest for 2h  -Groin check and pulse check q2h   -Avoid carrying heavy weights > 10 lbs x 24 hrs   -Remove groin dressing tomorrow                       Denver Larry MD, A  Fellow, NeuroEndovascular Surgery, [facility] Geoffrey malika  Neurologist, [facility], [place]

## 2024-08-15 NOTE — TRANSFER OF CARE
"Anesthesia Transfer of Care Note    Patient: Kalia Pacheco    Procedure(s) Performed: * No procedures listed *    Patient location: PACU    Anesthesia Type: general    Transport from OR: Transported from OR on 6-10 L/min O2 by face mask with adequate spontaneous ventilation    Post pain: adequate analgesia    Post assessment: no apparent anesthetic complications and tolerated procedure well    Post vital signs: stable    Level of consciousness: awake, alert and oriented    Nausea/Vomiting: no nausea/vomiting    Complications: none    Transfer of care protocol was followed    Last vitals: Visit Vitals  BP (!) 93/54 (BP Location: Left arm, Patient Position: Lying)   Pulse 77   Temp 36.2 °C (97.2 °F) (Temporal)   Resp 20   Ht 5' 8" (1.727 m)   Wt 83.9 kg (185 lb)   SpO2 99%   BMI 28.13 kg/m²     "

## 2024-08-15 NOTE — ANESTHESIA PROCEDURE NOTES
Arterial    Diagnosis: heart failure    Patient location during procedure: done in OR    Staffing  Authorizing Provider: Beatriz Menon MD  Performing Provider: Beatriz Menon MD    Staffing  Performed by: Beatriz Menon MD  Authorized by: Beatriz Menon MD    Anesthesiologist was present at the time of the procedure.    Preanesthetic Checklist  Completed: patient identified, IV checked, site marked, risks and benefits discussed, surgical consent, monitors and equipment checked, pre-op evaluation, timeout performed and anesthesia consent givenArterial  Skin Prep: chlorhexidine gluconate  Local Infiltration: none  Orientation: left  Location: radial    Catheter Size: 20 G  Catheter placement by Ultrasound guidance. Heme positive aspiration all ports.   Vessel Caliber: medium, patent  Needle advanced into vessel with real time Ultrasound guidance.Insertion Attempts: 1  Assessment  Dressing: secured with tape and tegaderm  Patient: Tolerated well

## 2024-08-15 NOTE — ANESTHESIA PROCEDURE NOTES
Intubation    Date/Time: 8/15/2024 8:29 AM    Performed by: Marga Trevizo CRNA  Authorized by: Beatriz Menon MD    Intubation:     Induction:  Intravenous    Intubated:  Postinduction    Mask Ventilation:  Easy mask    Attempts:  1    Attempted By:  CRNA    Method of Intubation:  Video laryngoscopy    Blade:  Machado 3    Laryngeal View Grade: Grade I - full view of cords      Difficult Airway Encountered?: No      Complications:  None    Airway Device:  Oral endotracheal tube    Airway Device Size:  7.5    Style/Cuff Inflation:  Cuffed (inflated to minimal occlusive pressure)    Tube secured:  21    Secured at:  The lips    Placement Verified By:  Capnometry    Complicating Factors:  None    Findings Post-Intubation:  BS equal bilateral and atraumatic/condition of teeth unchanged

## 2024-08-15 NOTE — ANESTHESIA PREPROCEDURE EVALUATION
08/15/2024  Kalia Pacheco is a 33 y.o., male.    Pre-operative evaluation for * No procedures listed *    Kalia Pacheco is a 33 y.o. male     Patient Active Problem List   Diagnosis    Aorta coarctation    Hypertension    Anterior communicating artery aneurysm    Subarachnoid hemorrhage due to ruptured aneurysm    Communicating hydrocephalus    IVH (intraventricular hemorrhage)    Hyponatremia    Brain compression    Vasogenic cerebral edema    Pain    Nausea    Obstructive hydrocephalus       Review of patient's allergies indicates:  No Known Allergies    Current Outpatient Medications on File Prior to Encounter   Medication Sig Dispense Refill    aspirin 81 MG Chew Chew and swallow 1 tablet (81 mg total) by mouth once daily. (Patient taking differently: Take 81 mg by mouth every evening. AFTERNOONS) 90 tablet 3    gabapentin (NEURONTIN) 300 MG capsule Take 1 capsule (300 mg total) by mouth every 8 (eight) hours. (Patient not taking: Reported on 6/27/2024) 90 capsule 11    metoprolol succinate (TOPROL-XL) 50 MG 24 hr tablet Take 1 tablet (50 mg total) by mouth once daily. (Patient taking differently: Take 50 mg by mouth every evening. AFTERNOONS) 90 tablet 3    niMODipine (NIMOTOP) 30 MG Cap Take 2 capsules (60 mg total) by mouth every 4 (four) hours. for 7 days 84 capsule 0    ticagrelor (BRILINTA) 90 mg tablet Take 1 tablet (90 mg total) by mouth 2 (two) times daily. 180 tablet 3    valACYclovir (VALTREX) 500 MG tablet Take 1 tablet (500 mg total) by mouth 2 (two) times daily. (Patient taking differently: Take 500 mg by mouth 2 (two) times daily as needed. PRN) 180 tablet 2    valsartan (DIOVAN) 160 MG tablet Take 1 tablet (160 mg total) by mouth once daily. (Patient taking differently: Take 160 mg by mouth every evening.) 90 tablet 3     No current facility-administered  medications on file prior to encounter.       Past Surgical History:   Procedure Laterality Date    hip surgeries      x 6    open heart surgery      surgery on aorta as a child , 3 procedures           CMP:   Recent Labs     08/13/24  1316      K 4.8      CO2 27   BUN 14   CREATININE 1.15      CALCIUM 9.9         Pre-op Assessment    I have reviewed the Patient Summary Reports.     I have reviewed the Nursing Notes. I have reviewed the NPO Status.   I have reviewed the Medications.     Review of Systems  Anesthesia Hx:  No problems with previous Anesthesia                Cardiovascular:     Hypertension                                        Pulmonary:      Shortness of breath                  Hepatic/GI:     GERD             Neurological:       Seizures                                Endocrine:  Denies Diabetes. Denies Hypothyroidism.  Denies Hyperthyroidism.             Physical Exam  General: Cooperative    Airway:  Mallampati: II   Mouth Opening: Normal  TM Distance: Normal  Tongue: Normal  Neck ROM: Normal ROM    Dental:  Intact    Anesthesia Plan  Type of Anesthesia, risks & benefits discussed:    Anesthesia Type: Gen ETT, Gen Natural Airway  Intra-op Monitoring Plan: Standard ASA Monitors  Post Op Pain Control Plan: multimodal analgesia  Induction:  IV  Airway Plan: Direct, Post-Induction  Informed Consent: Informed consent signed with the Patient and all parties understand the risks and agree with anesthesia plan.  All questions answered.   ASA Score: 3  Day of Surgery Review of History & Physical: H&P Update referred to the surgeon/provider.    Ready For Surgery From Anesthesia Perspective.   .

## 2024-08-15 NOTE — PLAN OF CARE
Patient discharged in the care of his mother and significant other. VSS on room air. Patient ambulated around the post-op unit and tolerated activity well. Right groin site remained CDI after ambulation. VSS on room air. AVS provided and reviewed with patient, patient's mother, patient's sister, and patient's significant other. No questions or concerns verbalized upon discharge. PIV discontinued. Patient transported to private vehicle via wheelchair and discharged home.

## 2024-08-26 ENCOUNTER — PATIENT MESSAGE (OUTPATIENT)
Dept: NEUROSURGERY | Facility: CLINIC | Age: 34
End: 2024-08-26
Payer: MEDICAID

## 2024-09-17 ENCOUNTER — TELEPHONE (OUTPATIENT)
Dept: NEUROSURGERY | Facility: CLINIC | Age: 34
End: 2024-09-17
Payer: MEDICAID

## 2024-09-17 ENCOUNTER — OFFICE VISIT (OUTPATIENT)
Dept: NEUROSURGERY | Facility: CLINIC | Age: 34
End: 2024-09-17
Payer: MEDICAID

## 2024-09-17 DIAGNOSIS — I60.9 SUBARACHNOID HEMORRHAGE: ICD-10-CM

## 2024-09-17 DIAGNOSIS — I67.1 ANTERIOR COMMUNICATING ARTERY ANEURYSM: ICD-10-CM

## 2024-09-17 DIAGNOSIS — I67.1 CEREBRAL ANEURYSM, NONRUPTURED: Primary | ICD-10-CM

## 2024-09-17 PROCEDURE — 4010F ACE/ARB THERAPY RXD/TAKEN: CPT | Mod: CPTII,95,, | Performed by: NEUROLOGICAL SURGERY

## 2024-09-17 PROCEDURE — 99215 OFFICE O/P EST HI 40 MIN: CPT | Mod: 95,,, | Performed by: NEUROLOGICAL SURGERY

## 2024-09-17 NOTE — PROGRESS NOTES
Neurosurgery  Established Patient    SUBJECTIVE:     History of Present Illness 4/17/2023   The patient is a 32-year-old man with a past medical history of ADHD, EtOH use and abuse, arthritis, hypertension, coarctation of the aorta (status post repair), PDA, VSD status post repair x2.  History of Legg-Perthes disease, and intracranial hemorrhage in May 2022.  He had a new onset seizure, without a previous history of seizure.  He was referred by my endovascular partner Dr. Michael Reyes and Mariah Adam, secondary to his CHESTER aneurysm.  Was concern that his CHESTER aneurysms may not be able to be treated endovascularly given its size, and may be more appropriate for microsurgical clip ligation.  This was a virtual audio visual visit.  The patient denies any new headache, nausea, vomiting.  Denies any new or recurrent seizure since his previous seizure.    Interval History 5/8/2023  Patient presents for follow-up via audiovirtual visit after undergoing MRA with vessel wall imaging to review results. He is accompanied by his mother. Reports he has been doing well since last visit, no changes. Denies any significant headache, seizure like activity, speech difficulty, visual disturbance, focal weakness/numbness, or any other neurologic deficits.      Interval History 4/30/2024  Kalia Pacheco is a 34 y/o M that presents after being admitted to the ED with headache and nausea (3/5/2024). CTH done at that time demonstrated diffuse SAH throughout basal cisterns. R frontal EVD was placed and followed up with angiogram. Review of angiogram revealed vertebral artery aneurysm which was treated with placement of right V4/PICA stent assisted coiling. He is here today for follow up. The pt reports feeling overall well. Denies headache, nausea, or vomiting. No acute changes or issues to report. Patient is accompanied by his family.    Interval history 09/17/2024:  This is a virtual audio visual visit to discuss the patient's  recent cerebral angiogram.  The vertebral artery aneurysm is stable.  The patient does have aortic stenosis which may need mechanical valve placement in the future he notes.     Vertebral artery aneurysms, stable     Patient with aortic stenosis, may need mechanical valve placement in the future.  Review of patient's allergies indicates:  No Known Allergies    Current Outpatient Medications   Medication Sig Dispense Refill    aspirin 81 MG Chew Chew and swallow 1 tablet (81 mg total) by mouth once daily. (Patient taking differently: Take 81 mg by mouth every evening. AFTERNOONS) 90 tablet 3    furosemide (LASIX) 20 MG tablet Take 20 mg by mouth 2 (two) times daily.      gabapentin (NEURONTIN) 300 MG capsule Take 1 capsule (300 mg total) by mouth every 8 (eight) hours. (Patient not taking: Reported on 6/27/2024) 90 capsule 11    metoprolol succinate (TOPROL-XL) 50 MG 24 hr tablet Take 1 tablet (50 mg total) by mouth once daily. (Patient taking differently: Take 50 mg by mouth every evening. AFTERNOONS) 90 tablet 3    niMODipine (NIMOTOP) 30 MG Cap Take 2 capsules (60 mg total) by mouth every 4 (four) hours. for 7 days 84 capsule 0    sacubitriL-valsartan (ENTRESTO) 49-51 mg per tablet Take 1 tablet by mouth 2 (two) times daily.      ticagrelor (BRILINTA) 90 mg tablet Take 1 tablet (90 mg total) by mouth 2 (two) times daily. 180 tablet 3    valACYclovir (VALTREX) 500 MG tablet Take 1 tablet (500 mg total) by mouth 2 (two) times daily. (Patient taking differently: Take 500 mg by mouth 2 (two) times daily as needed. PRN) 180 tablet 2    valsartan (DIOVAN) 160 MG tablet Take 1 tablet (160 mg total) by mouth once daily. (Patient taking differently: Take 160 mg by mouth every evening.) 90 tablet 3     No current facility-administered medications for this visit.       Past Medical History:   Diagnosis Date    ADHD (attention deficit hyperactivity disorder)     Aneurysm     Aorta coarctation     Aortic stenosis     Arthritis      Coarctation of aorta     Depression     GERD (gastroesophageal reflux disease)     History of alcohol abuse     Hypertension     Legg-Perthes disease     Seizures 2022     Past Surgical History:   Procedure Laterality Date    hip surgeries      x 6    open heart surgery      surgery on aorta as a child , 3 procedures       Family History       Problem Relation (Age of Onset)    Hypertension Mother, Father    No Known Problems Sister          Social History     Socioeconomic History    Marital status: Single    Number of children: 0   Tobacco Use    Smoking status: Former     Types: Vaping with nicotine     Quit date: 2023     Years since quittin.8    Smokeless tobacco: Never    Tobacco comments:     Quit in    Substance and Sexual Activity    Alcohol use: Not Currently     Comment: rarely    Drug use: Yes     Types: Marijuana     Comment: daily    Sexual activity: Yes     Partners: Female   Social History Narrative    ** Merged History Encounter **         Single- lives with parents.     Social Determinants of Health     Financial Resource Strain: Low Risk  (3/6/2024)    Overall Financial Resource Strain (CARDIA)     Difficulty of Paying Living Expenses: Not hard at all   Food Insecurity: No Food Insecurity (3/6/2024)    Hunger Vital Sign     Worried About Running Out of Food in the Last Year: Never true     Ran Out of Food in the Last Year: Never true   Transportation Needs: No Transportation Needs (3/6/2024)    PRAPARE - Transportation     Lack of Transportation (Medical): No     Lack of Transportation (Non-Medical): No   Physical Activity: Sufficiently Active (3/6/2024)    Exercise Vital Sign     Days of Exercise per Week: 7 days     Minutes of Exercise per Session: 40 min   Stress: No Stress Concern Present (3/6/2024)    [employer] - Occupational Stress Questionnaire     Feeling of Stress : Not at all   Housing Stability: Unknown (3/6/2024)    Housing Stability  Vital Sign     Unable to Pay for Housing in the Last Year: No     Number of Places Lived in the Last Year: 2       Review of Systems    OBJECTIVE:     Vital Signs     There is no height or weight on file to calculate BMI.    Neurosurgery Physical Exam  On virtual audio visual visit   Head is normocephalic atraumatic   Neck is grossly supple  No appreciable labored breathing   Admitting appears to be nontender   Patient is able to move extremities     On virtual audio visual visit the patient's speech is fluent, goal directed without any noted dysarthria or aphasia   Unable to evaluate pupils on virtual audio visual visit   Face is symmetric   Tongue is midline  Unable to evaluate palate   Hearing appears to be intact on virtual audio visual visit to voice   Patient able to move both upper and lower extremities without any gross motor asymmetry on virtual audio visual visit     Diagnostic Results:    I personally reviewed patient's Diagnostic Imaging.    ASSESSMENT/PLAN:       33-year-old man, previously ruptured vertebral artery aneurysms, underwent stent coil.    1.  No new deficits on virtual audio visual visit    2. Persistent anterior communicating artery aneurysm    3. Had a discussion with the patient and we will discuss with his cardiologist the timeframe for possible mechanical valve the patient noted.  I did note the patient that the anterior cerebral artery aneurysms likely need to be treated with open microsurgery.  This is, given his age, the angiographic morphology.  Would also recommend treatment prior to getting a mechanical valve, as patient would likely need to be on systemic anticoagulation to prevent stroke.    Answered all patient's questions to his satisfaction.      Thank you so very much for allowing me to participate in the care of this patient.  Please feel free to call any questions, comments, concerns.    Swapna Kyle MD,MSc  Department of Neurosurgery   Department of  Radiology  Department of Neurology  [facility]  [facility]    [employer]   [employer] [facility]      Discussed with the cardiologist timeframe for possible mechanical valve   CHESTER/ a, aneurysm will likely need to be treated, with open microsurgery.    Note dictated with voice recognition software, please excuse any grammatical errors.

## 2024-10-13 ENCOUNTER — HOSPITAL ENCOUNTER (OUTPATIENT)
Dept: RADIOLOGY | Facility: HOSPITAL | Age: 34
Discharge: HOME | End: 2024-10-13
Attending: NEUROLOGICAL SURGERY
Payer: MEDICAID

## 2024-10-13 DIAGNOSIS — I67.1 CEREBRAL ANEURYSM, NONRUPTURED: ICD-10-CM

## 2024-10-13 PROCEDURE — 25500020 PHARM REV CODE 255: Performed by: NEUROLOGICAL SURGERY

## 2024-10-13 PROCEDURE — 70496 CT ANGIOGRAPHY HEAD: CPT | Mod: 26,,, | Performed by: RADIOLOGY

## 2024-10-13 PROCEDURE — 70498 CT ANGIOGRAPHY NECK: CPT | Mod: 26,,, | Performed by: RADIOLOGY

## 2024-10-13 PROCEDURE — A9585 GADOBUTROL INJECTION: HCPCS | Performed by: NEUROLOGICAL SURGERY

## 2024-10-13 PROCEDURE — 70546 MR ANGIOGRAPH HEAD W/O&W/DYE: CPT | Mod: TC

## 2024-10-13 PROCEDURE — 70496 CT ANGIOGRAPHY HEAD: CPT | Mod: TC

## 2024-10-13 PROCEDURE — 70546 MR ANGIOGRAPH HEAD W/O&W/DYE: CPT | Mod: 26,,, | Performed by: RADIOLOGY

## 2024-10-13 RX ORDER — GADOBUTROL 604.72 MG/ML
8 INJECTION INTRAVENOUS
Status: COMPLETED | OUTPATIENT
Start: 2024-10-13 | End: 2024-10-13

## 2024-10-13 RX ADMIN — IOHEXOL 75 ML: 350 INJECTION, SOLUTION INTRAVENOUS at 10:10

## 2024-10-13 RX ADMIN — GADOBUTROL 8 ML: 604.72 INJECTION INTRAVENOUS at 09:10

## 2024-10-28 ENCOUNTER — OFFICE VISIT (OUTPATIENT)
Dept: NEUROSURGERY | Facility: CLINIC | Age: 34
End: 2024-10-28
Payer: MEDICAID

## 2024-10-28 DIAGNOSIS — I67.1 CEREBRAL ANEURYSM, NONRUPTURED: Primary | ICD-10-CM

## 2024-10-28 DIAGNOSIS — I60.9 NONTRAUMATIC SUBARACHNOID HEMORRHAGE, UNSPECIFIED: ICD-10-CM

## 2024-10-28 PROCEDURE — 99214 OFFICE O/P EST MOD 30 MIN: CPT | Mod: 95,,, | Performed by: NEUROLOGICAL SURGERY

## 2024-10-28 PROCEDURE — 4010F ACE/ARB THERAPY RXD/TAKEN: CPT | Mod: CPTII,95,, | Performed by: NEUROLOGICAL SURGERY

## 2024-10-31 ENCOUNTER — TELEPHONE (OUTPATIENT)
Dept: NEUROSURGERY | Facility: CLINIC | Age: 34
End: 2024-10-31
Payer: MEDICAID

## 2024-10-31 ENCOUNTER — PATIENT MESSAGE (OUTPATIENT)
Dept: NEUROSURGERY | Facility: CLINIC | Age: 34
End: 2024-10-31
Payer: MEDICAID

## 2024-10-31 DIAGNOSIS — I67.1 CEREBRAL ANEURYSM, NONRUPTURED: Primary | ICD-10-CM

## 2024-11-01 ENCOUNTER — TELEPHONE (OUTPATIENT)
Dept: NEUROSURGERY | Facility: CLINIC | Age: 34
End: 2024-11-01
Payer: MEDICAID

## 2024-11-21 ENCOUNTER — PATIENT MESSAGE (OUTPATIENT)
Dept: NEUROSURGERY | Facility: CLINIC | Age: 34
End: 2024-11-21
Payer: MEDICAID

## 2024-11-25 ENCOUNTER — TELEPHONE (OUTPATIENT)
Dept: NEUROSURGERY | Facility: CLINIC | Age: 34
End: 2024-11-25
Payer: MEDICAID

## 2024-12-05 ENCOUNTER — TELEPHONE (OUTPATIENT)
Dept: NEUROSURGERY | Facility: CLINIC | Age: 34
End: 2024-12-05
Payer: MEDICAID

## 2024-12-05 NOTE — TELEPHONE ENCOUNTER
----- Message from Jenni sent at 12/5/2024  2:47 PM CST -----  Contact: Raulito @ [phone]  Raulito from [employer] calling regarding Patient Advice (message) for #is returning call from Rocio concerning pt surgery, asking for call back

## 2024-12-05 NOTE — TELEPHONE ENCOUNTER
----- Message from Lou sent at 12/5/2024 12:27 PM CST -----    ADVICE     Type:  Needs Medical Advice     Who Called: Raulito     Would the patient rather a call back or a response via [facility]? call  Best Call Back Number: [phone]  Additional Information: needs to know if the patient had his procedure done on 11/27

## 2024-12-10 PROBLEM — I35.0 AORTIC STENOSIS: Status: ACTIVE | Noted: 2024-01-01

## 2024-12-10 PROBLEM — M91.10 LEGG-CALVE-PERTHES DISEASE: Status: ACTIVE | Noted: 2024-01-01

## 2024-12-10 PROBLEM — I50.9 ACUTE DECOMPENSATED HEART FAILURE: Status: ACTIVE | Noted: 2024-01-01

## 2024-12-10 PROBLEM — I50.9 CHF (CONGESTIVE HEART FAILURE): Status: ACTIVE | Noted: 2024-01-01

## 2024-12-17 NOTE — LETTER
December 19, 2024        Paulina Jiang  [address] [place] [id number]  Phone: [phone]  Fax: [phone]             [id number]  [address]  [place] [address]  Phone: [phone]   Patient: Kalia Pacheco   MR Number: [id number]   YOB: 1990   Date of Visit: 12/17/2024       Dear Dr. Paulina Jiang    Thank you for referring Kalia Pacheco to me for evaluation. Attached you will find relevant portions of my assessment and plan of care.    If you have questions, please do not hesitate to call me. I look forward to following Kalia Pacheco along with you.    Sincerely,    Tony Núñez Jr, MD    Enclosure    If you would like to receive this communication electronically, please contact [email or web address] or [phone] to request [employer] access.    [employer] is a tool which provides read-only access to select patient information with whom you have a relationship. Its easy to use and provides real time access to review your patients record including encounter summaries, notes, results, and demographic information.    If you feel you have received this communication in error or would no longer like to receive these types of communications, please e-mail [email or web address]

## 2024-12-18 NOTE — PROCEDURES
Kalia Pacheco is a 34 y.o.   male patient, who presents for a 6 minute walk test ordered by MD Lizbeth.  The diagnosis is Pre Heart Transplant.  The patient's BMI is 23.9 kg/m2.  Predicted distance (lower limit of normal) is 610.02 meters.      Test Results:    The test was completed without stopping.  The total time walked was 360 seconds.  During walking, the patient reported:  No complaints. The patient used no assistive devices  during testing.     12/17/2024---------Distance: 381 meters (1250 feet)     O2 Sat % Supplemental Oxygen Heart Rate Blood Pressure Els Scale   Pre-exercise  (Resting) 100 % Room Air 87 bpm 121/71 mmHg 0   During Exercise 99 % Room Air 101 bpm 136/80 mmHg 1   Post-exercise  (Recovery) 100 % Room Air  89 bpm   mmHg       Recovery Time: 50 seconds    Performing nurse/tech: [employer] RRT      PREVIOUS STUDY:   The patient has not had a previous study.      CLINICAL INTERPRETATION:  Six minute walk distance is 381 meters (1250 feet) with very light dyspnea.  During exercise, there was no significant desaturation while breathing room air.  Both blood pressure and heart rate remained stable with walking.  The patient did not report non-pulmonary symptoms during exercise.  The patient did complete the study, walking 360 seconds of the 360 second test.  No previous study performed.  Based upon age and body mass index, exercise capacity is less than predicted.

## 2024-12-19 PROBLEM — Q23.81 AORTIC STENOSIS DUE TO BICUSPID AORTIC VALVE: Status: ACTIVE | Noted: 2024-01-01

## 2024-12-19 PROBLEM — Q23.0 AORTIC STENOSIS DUE TO BICUSPID AORTIC VALVE: Status: ACTIVE | Noted: 2024-01-01

## 2024-12-19 NOTE — PROGRESS NOTES
"Subjective:   Initial evaluation of heart transplant candidacy.    HPI:  Mr. Pacheco is a 34 y.o. year old White male referred by Dr. Paulina Jiang for consideration of advanced surgical options (LVAD/OHT).   He has a remote history coarctation of the aorta repaired at the age of 5 or 6.  He has a known bicuspid aortic valve.  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    During the March 20, 2024 admission for the intracerebral hemorrhage he had an echocardiogram March 8, 2024 which by report had an ejection fraction of 40-45%, severe aortic valvular stenosis, mild aortic insufficiency.  He and the family denied any heart failure symptoms at that point in time.     Earlier this month he developed clinical congestive heart failure and would admitted to the hospital for treatment of this decompensation.  A repeat echocardiogram demonstrated severe aortic stenosis and severe aortic insufficiency along with severely reduced ejection fraction on December 10, 2024.     Prior to coming here today he had seen a cardiovascular surgeon who recommended placement of a mechanical aortic prosthesis but asked that he see neurosurgery for elective repair of the anterior communicating artery aneurysm. The surgeons reasoning was that because of his young age and his desire to proceed with mechanical aortic prosthesis the aneurysm should be repaired 1st.  He has seen Neurosurgery here and they are waiting to set a date but that import depends upon the outcome of today's clinic visit.    Since his discharge he has not physically been very active and has not gone back to golf.  He denies CP, NASH, orthopnea, PND, palpitations, presyncope or syncope.  He has " a LifeVest which he has on today but per patient and family he does not wear regularly. It has never alarmed.    He works in catering.  He plays golf for his exercise.    Past Medical History:   Diagnosis Date    ADHD (attention deficit hyperactivity disorder)     Aneurysm     Aorta coarctation     Aortic stenosis     Arthritis     Bicuspid aortic valve     Coarctation of aorta     Depression     GERD (gastroesophageal reflux disease)     History of alcohol abuse     Hypertension     Legg-Perthes disease     Seizures 2022       Past Surgical History:   Procedure Laterality Date     coarctation aorta surgical repair      hip surgeries      x 6    open heart surgery      surgery on aorta as a child , 3 procedures         Family History   Problem Relation Name Age of Onset    Hypertension Mother      Hypertension Father      No Known Problems Sister 1        Social History     Socioeconomic History    Marital status: Single    Number of children: 0   Tobacco Use    Smoking status: Former     Types: Vaping with nicotine     Quit date: 2023     Years since quittin.1    Smokeless tobacco: Never    Tobacco comments:     Quit in    Substance and Sexual Activity    Alcohol use: Not Currently     Comment: 2-3 beers as well as well as probably 6-8 ounces of liquor when he did drink but nothing of note since 2024    Drug use: Yes     Types: Marijuana     Current Outpatient Medications   Medication Sig Dispense Refill    acetaminophen (TYLENOL) 325 MG tablet Take 325 mg by mouth every 6 (six) hours as needed for Pain.      ALPRAZolam (XANAX) 0.25 MG tablet Take by mouth 3 (three) times daily.      aspirin 81 MG Chew Chew and swallow 1 tablet (81 mg total) by mouth once daily. 90 tablet 3    furosemide (LASIX) 40 MG tablet Take 1 tablet (40 mg total) by mouth once daily. 30 tablet 11    JARDIANCE 10 mg tablet Take 10 mg by mouth once daily.      magnesium oxide (MAG-OX) 400 mg (241.3 mg magnesium) tablet  "Take 1 tablet (400 mg total) by mouth 2 (two) times daily. 60 tablet 2    metoprolol succinate (TOPROL-XL) 100 MG 24 hr tablet Take 1 tablet (100 mg total) by mouth once daily. 90 tablet 3    ondansetron (ZOFRAN-ODT) 4 MG TbDL Take 1 tablet (4 mg total) by mouth every 8 (eight) hours as needed. 20 tablet 2    pantoprazole (PROTONIX) 40 MG tablet Take 40 mg by mouth once daily.      sacubitriL-valsartan (ENTRESTO) 24-26 mg per tablet Take 1 tablet by mouth 2 (two) times daily. 180 tablet 3    spironolactone (ALDACTONE) 25 MG tablet Take 1 tablet (25 mg total) by mouth once daily. 30 tablet 11    valACYclovir (VALTREX) 500 MG tablet Take 1 tablet (500 mg total) by mouth 2 (two) times daily. (Patient taking differently: Take 500 mg by mouth as needed.) 180 tablet 2     No current facility-administered medications for this visit.       Review of patient's allergies indicates:  No Known Allergies    Review of Systems   Constitutional: Positive for weight loss (he had lost weight intentionally over the years going from 283 lb down to 157 lb.  Since his hospital discharge his weight has been stable.). Negative for chills, fever and malaise/fatigue.   Cardiovascular:  Negative for chest pain, cyanosis, dyspnea on exertion, irregular heartbeat, leg swelling, near-syncope, orthopnea (Two pillows but this is for general comfort not breathing), paroxysmal nocturnal dyspnea and syncope.   Hematologic/Lymphatic: Does not bruise/bleed easily.   Gastrointestinal:  Positive for diarrhea (intermittent) and nausea (intermittent).     Objective:   Blood pressure (!) 107/51, pulse 77, height 5' 8" (1.727 m), weight 72.9 kg (160 lb 11.5 oz), SpO2 100%.body mass index is 24.44 kg/m².  Physical Exam  Constitutional:       General: He is not in acute distress.     Appearance: Normal appearance. He is well-developed. He is not ill-appearing or diaphoretic.      Comments: BP (!) 107/51 (BP Location: Right arm, Patient Position: Sitting)   " "Pulse 77   Ht 5' 8" (1.727 m)   Wt 72.9 kg (160 lb 11.5 oz)   SpO2 100%   BMI 24.44 kg/m²    Friendly white male in no acute distress   HENT:      Head: Normocephalic and atraumatic.   Eyes:      General: No scleral icterus.        Right eye: No discharge.         Left eye: No discharge.      Conjunctiva/sclera: Conjunctivae normal.   Neck:      Thyroid: No thyromegaly.      Vascular: No JVD.      Trachea: No tracheal deviation.   Cardiovascular:      Rate and Rhythm: Normal rate and regular rhythm.      Heart sounds: Murmur (grade 3/6 late-peaking systolic ejection murmur at the base and over the precordium; I do not hear a definite murmur of aortic insufficiency) heard.      No gallop.   Pulmonary:      Effort: Pulmonary effort is normal.      Breath sounds: Normal breath sounds.   Abdominal:      General: Bowel sounds are normal. There is no distension.      Palpations: Abdomen is soft. There is no hepatomegaly (liver span 10 cm) or mass.      Tenderness: There is no abdominal tenderness. There is no guarding or rebound.   Musculoskeletal:         General: No swelling or tenderness.      Right lower leg: No edema.      Left lower leg: No edema.   Skin:     General: Skin is warm and dry.   Neurological:      Mental Status: He is alert.      Comments: Grossly intact but formal exam not performed   Psychiatric:         Mood and Affect: Mood normal.         Behavior: Behavior normal.         Thought Content: Thought content normal.         Judgment: Judgment normal.       12/17/2024 ECHO mages reviewed and he has dilated left ventricle with severely reduced left ventricular systolic function.  Though the peak aortic valvular velocity is 3.5 m/sec on our study with his severely reduced left ventricular systolic function I am concerned that his aortic stenosis is underestimated and certainly had a much higher gradient and more severe aortic stenosis on outside echocardiogram a few months ago when his ejection " fraction was approximately 20 points better    Left Ventricle: The left ventricle is severely dilated. Ventricular mass is normal. Normal wall thickness. Severe global hypokinesis present. There is severely reduced systolic function with a visually estimated ejection fraction of 20 - 25%. Ejection fraction is approximately 23%. Quantitated ejection fraction is 31% ( visually appears alot lower). Grade III diastolic dysfunction.    Right Ventricle: Right ventricular enlargement. Wall thickness is normal. Systolic function visually  is mildly reduced despite the good TAPSE.    Left Atrium: Left atrium is severely dilated.    Aortic Valve: There is severe aortic valve sclerosis. There is a non-mobile 11 by 22 mm calcified mass right above the valve and a 10 by 5 mobile one that moves with walve movement. Moderately restricted motion. There is moderate to severe stenosis. Aortic valve area by VTI is 1.1 cm2. Aortic valve peak velocity is 3.5 m/s. Mean gradient is 29.9 mmHg. The dimensionless index is 0.22. There is mild aortic regurgitation.    Mitral Valve: There is moderate bileaflet sclerosis. There is mild regurgitation.    Pulmonary Artery: The estimated pulmonary artery systolic pressure is 27 mmHg.    IVC/SVC: Normal venous pressure at 3 mmHg.       Labs reviewed as follows:  Lab Results   Component Value Date    BNP 3,378 (H) 12/17/2024     (L) 12/17/2024    K 4.7 12/17/2024    MG 1.9 12/10/2024     12/17/2024    CO2 25 12/17/2024    PHOS 5.1 (H) 03/20/2024    BUN 13 12/17/2024    CREATININE 1.1 12/17/2024    GLU 95 12/17/2024    AST 19 12/17/2024    ALT 21 12/17/2024    ALBUMIN 3.8 12/17/2024    PROT 6.7 12/17/2024    BILITOT 1.3 (H) 12/17/2024    WBC 10.58 12/17/2024    HGB 14.9 12/17/2024    HCT 44.4 12/17/2024     12/17/2024    INR 1.1 03/05/2024    TSH 1.946 12/17/2024    CHOL 160 03/05/2024    HDL 54 03/05/2024    LDLCALC 94.4 03/05/2024    TRIG 58 03/05/2024     12/10/2024 [facility]NP  520[facility]     12/10/2024 EKG NSR,  first-degree AV block, IVCD, left atrial enlargement    Assessment:      1. Nonrheumatic aortic insufficiency with aortic stenosis    2. Acute on chronic combined systolic and diastolic congestive heart failure    3. Aortic stenosis due to bicuspid aortic valve    4. Chronic systolic congestive heart failure        Plan:   His BNP is elevated but accounting for the difference between the NT proBNP and the BNP and considering there has been no change in his home weight or the development of symptoms of congestion since hospital discharge this may very well be his baseline particularly with his severe aortic stenosis, left ventricular systolic dysfunction and aortic insufficiency all contributing to left ventricular wall stress.     I had a long discussion with the patient, wife, aunt and mother regarding the complexity of his case.  In view of heart failure and severe left ventricular systolic dysfunction in a patient with known severe aortic stenosis with the optimal treatment his surgical aortic valve replacement.  However, the risk of placing a mechanical prosthesis even if the anterior communicating aneurysm is repaired in a patient with prior hemorrhage I will posterior aneurysm, bicuspid aortic valve and long-term Coumadin has to be recognized and is certainly concerning. If he is not offered a mechanical prosthesis and instead a tissue prosthesis only then a reasonable alternative would be a TAVR. This would avoid the need for a sternotomy and even if aortic stenosis became an issue he could undergo TAVR within a TAVR. Hopefully with successful TAVR medical therapy could be advanced with less risk and left ventricular function might improve significantly  and I am more optimistic of this possibility considering his ejection fraction in March was only mildly reduced (according to the mother prior to this is heart function was normal and she does not  recall being told of significant valve disease ).    I recommend consulting with Dr. Ge Hogan and Dr. Juan Carlos Andino (our valve team for multidisciplinary approach).    I discussed the echo findings of the mass which were not reported on his recent echo. There is nothing clinically to suggest infection and with the calcification I wonder if this may in part be artifact.  I have ordered a transesophageal echocardiogram to assist in this evaluation and it would prove useful I suspect to the valve team in their evaluation.    We discussed the Lifevest and the published study that did not demonstrate a benefit but this has been attributed to patients not wearing the vest. He and the family report that he does not wear this regularly. He asked what I do and I explained that I am in the minority in that I do not routinely recommended device pending ICD placement but that I will rhythm doctors and my heart failure partners all do recommend the device. I suggested he have discussions with his attending cardiologist but that is since he has the device I recommend he use it in order to obtain any potential benefit.  He asked about work and golf.  I told him that from my perspective he should be able to return to both but release work release would come from his attending cardiologist.    Patient is now NYHA I with poor activity post discharge but NYHA class 2 based upon 6 .25 meters. ACC stage C    Recommend 2 gram sodium restriction and 1500cc fluid restriction.  Encourage physical activity with graded exercise program.  Requested patient to weigh themselves daily, and to notify us if their weight increases by more than 3 lbs in 1 day or 5 lbs in 1 week.     Transplant Candidacy: Patient is a 34 y.o. year old male with heart failure is being seen for possible LVAD and OHT. In my opinion, he is   Not a candidate for LVAD with aortic valve pathology and I would also be concerned about anticoagulation with his  intracranial aneurysms.  At this time he does not appear sick enough to justify the risk of cardiac transplant but this is an option potentially in the future if his symptoms worsen.      Patient did not meet with [facility] and/or pre-transplant coordinator at the end of this visit for workup. he is not scheduled for  risk testing in instead will be approach with the hope of replacing the aortic valve . The patient will follow up with his attending physicians at home and we will [place] back to review the approach of the valve team and the [facility] though this could be accomplished over the phone.    UNOS Patient Status  Functional Status: 90% - Able to carry on normal activity: minor symptoms of disease  Physical Capacity: No Limitations  Working for Income: yes  If yes, working activity level: Working Full Time but has not yet been given permission to return to work following his heart failure admission earlier this month.    Advance Care Planning     Date: 12/17/2024    Power of    He completed healthcare power-of- form naming his mother as primary form on chart     Thank you for allowing me to see this nice gentleman in consultation    Tony Núñez Jr, MD    12/19/24 addendum:   Earlier today I discussed his case with Dr. Hogan regarding TAVR and he is happy to see him.  I did not yet discuss his case with Dr. Andino.  While it clinic today I learned that he presented to the emergency department in cardiac arrest. I attempted to reach the primary number add the backup number on his record but recorder on so I left a message relaying my condolences.